# Patient Record
Sex: FEMALE | Race: WHITE | NOT HISPANIC OR LATINO | Employment: FULL TIME | URBAN - METROPOLITAN AREA
[De-identification: names, ages, dates, MRNs, and addresses within clinical notes are randomized per-mention and may not be internally consistent; named-entity substitution may affect disease eponyms.]

---

## 2017-02-22 ENCOUNTER — TRANSFERRED RECORDS (OUTPATIENT)
Dept: HEALTH INFORMATION MANAGEMENT | Facility: CLINIC | Age: 67
End: 2017-02-22

## 2019-05-30 ENCOUNTER — TRANSFERRED RECORDS (OUTPATIENT)
Dept: HEALTH INFORMATION MANAGEMENT | Facility: CLINIC | Age: 69
End: 2019-05-30

## 2019-06-04 ENCOUNTER — TRANSFERRED RECORDS (OUTPATIENT)
Dept: HEALTH INFORMATION MANAGEMENT | Facility: CLINIC | Age: 69
End: 2019-06-04

## 2019-06-06 ENCOUNTER — TRANSFERRED RECORDS (OUTPATIENT)
Dept: HEALTH INFORMATION MANAGEMENT | Facility: CLINIC | Age: 69
End: 2019-06-06

## 2019-06-20 NOTE — TELEPHONE ENCOUNTER
ONCOLOGY INTAKE: Records Information      APPT INFORMATION:  Referring provider:  Shanice Hines  Referring provider s clinic:  [facility]  Reason for visit/diagnosis:  Melanoma of Vulva  Has patient been notified of appointment date and time?: Per Gaby @ [facility]    RECORDS INFORMATION:  Were the records received with the referral (via [employer])? No    Has patient been seen for any external appt for this diagnosis? Per Gaby, all records, imaging and Bx @ [facility]    ADDITIONAL INFORMATION:  NA

## 2019-06-21 NOTE — TELEPHONE ENCOUNTER
RECORDS STATUS - ALL OTHER DIAGNOSIS      RECORDS RECEIVED FROM: [facility]   DATE RECEIVED: 06/21/2019   NOTES STATUS DETAILS   OFFICE NOTE from referring provider YES CE   OFFICE NOTE from medical oncologist NA    DISCHARGE SUMMARY from hospital NA    DISCHARGE REPORT from the ER NA    OPERATIVE REPORT NA    MEDICATION LIST YES    CLINICAL TRIAL TREATMENTS TO DATE NA    LABS YES    PATHOLOGY REPORTS PENDING REQUESTED FROM [facility]   ANYTHING RELATED TO DIAGNOSIS NA    GENONOMIC TESTING NA    TYPE:     IMAGING (NEED IMAGES & REPORT) NA    CT SCANS     MRI     MAMMO     ULTRASOUND     PET       Action Khloe   Action Taken Request sent to [facility] for pathology..cdk

## 2019-06-25 ASSESSMENT — ENCOUNTER SYMPTOMS
JOINT SWELLING: 0
MYALGIAS: 0
NECK PAIN: 0
MUSCLE CRAMPS: 0
ARTHRALGIAS: 1
STIFFNESS: 1
BACK PAIN: 0
MUSCLE WEAKNESS: 0

## 2019-06-26 ENCOUNTER — PRE VISIT (OUTPATIENT)
Dept: ONCOLOGY | Facility: CLINIC | Age: 69
End: 2019-06-26

## 2019-06-26 ENCOUNTER — ONCOLOGY VISIT (OUTPATIENT)
Dept: ONCOLOGY | Facility: CLINIC | Age: 69
End: 2019-06-26
Attending: OBSTETRICS & GYNECOLOGY
Payer: COMMERCIAL

## 2019-06-26 VITALS
HEART RATE: 67 BPM | SYSTOLIC BLOOD PRESSURE: 151 MMHG | RESPIRATION RATE: 16 BRPM | BODY MASS INDEX: 41.25 KG/M2 | WEIGHT: 247.58 LBS | DIASTOLIC BLOOD PRESSURE: 82 MMHG | OXYGEN SATURATION: 95 % | HEIGHT: 65 IN | TEMPERATURE: 98.8 F

## 2019-06-26 DIAGNOSIS — C51.9 MELANOMA OF VULVA (H): Primary | ICD-10-CM

## 2019-06-26 PROCEDURE — G0463 HOSPITAL OUTPT CLINIC VISIT: HCPCS | Mod: ZF

## 2019-06-26 PROCEDURE — 99205 OFFICE O/P NEW HI 60 MIN: CPT | Mod: ZP | Performed by: OBSTETRICS & GYNECOLOGY

## 2019-06-26 RX ORDER — CETIRIZINE HYDROCHLORIDE, PSEUDOEPHEDRINE HYDROCHLORIDE 5; 120 MG/1; MG/1
1 TABLET, FILM COATED, EXTENDED RELEASE ORAL EVERY MORNING
COMMUNITY

## 2019-06-26 RX ORDER — LEVOTHYROXINE SODIUM 175 UG/1
175 TABLET ORAL EVERY MORNING
COMMUNITY
Start: 2019-05-03 | End: 2021-05-09

## 2019-06-26 RX ORDER — NAPROXEN 500 MG/1
500 TABLET ORAL EVERY MORNING
COMMUNITY
Start: 2018-12-17

## 2019-06-26 RX ORDER — ACETAMINOPHEN 500 MG
1000 TABLET ORAL PRN
Status: ON HOLD | COMMUNITY
End: 2019-07-31

## 2019-06-26 RX ORDER — SOTALOL HYDROCHLORIDE 160 MG/1
80 TABLET ORAL 2 TIMES DAILY
COMMUNITY
Start: 2019-05-19

## 2019-06-26 RX ORDER — METFORMIN HCL 500 MG
500 TABLET, EXTENDED RELEASE 24 HR ORAL EVERY MORNING
COMMUNITY
Start: 2019-05-19

## 2019-06-26 RX ORDER — BLOOD-GLUCOSE METER
EACH MISCELLANEOUS SEE ADMIN INSTRUCTIONS
Refills: 0 | COMMUNITY
Start: 2019-05-29 | End: 2023-08-02

## 2019-06-26 RX ORDER — LISINOPRIL 5 MG/1
5 TABLET ORAL EVERY MORNING
COMMUNITY
Start: 2019-05-18 | End: 2021-05-09

## 2019-06-26 RX ORDER — WARFARIN SODIUM 5 MG/1
TABLET ORAL
COMMUNITY
Start: 2019-05-19 | End: 2021-05-09

## 2019-06-26 RX ORDER — SIMVASTATIN 40 MG
40 TABLET ORAL AT BEDTIME
COMMUNITY
Start: 2019-01-15 | End: 2021-05-09

## 2019-06-26 RX ORDER — PANTOPRAZOLE SODIUM 40 MG/1
40 TABLET, DELAYED RELEASE ORAL EVERY MORNING
COMMUNITY
Start: 2019-05-18

## 2019-06-26 ASSESSMENT — PAIN SCALES - GENERAL: PAINLEVEL: NO PAIN (0)

## 2019-06-26 ASSESSMENT — MIFFLIN-ST. JEOR: SCORE: 1659.49

## 2019-06-26 NOTE — PROGRESS NOTES
Consult Notes on Referred Patient    Date: 2019       Dr. Ligia Lagunas  [facility]  [address] [place] [address]       RE: Ilana Trent  : 1950  FILOMENA: 2019    Dear Dr. Ligia Lagunas:    I had the pleasure of seeing your patient Ilana Trent here at the Gynecologic Cancer Clinic at the [facility] on 2019.  As you know she is a very pleasant 69 year old woman with a recent diagnosis of vulvar melanoma.  Given these findings she was subsequently sent to the Gynecologic Cancer Clinic for new patient consultation.   , 2 prior vaginal deliveries, went through menopause at age 51, never been on hormone replacement therapy, never had any postmenopausal bleeding.  Is eating and drinking well.  No nausea or vomiting.  She has normal urinary and bowel function.  She had breast cancer at age 51, pancreatic cancer at age 61.  Recently diagnosed with a melanoma of vulva.           Past Medical History:  Breast cancer at age 51, pancreatic cancer at age 61.  Currently, both with no evidence of disease.           Past Surgical History:  1.  Mastectomy.   2.  Partial pancreatectomy.           Health Maintenance:  Health Maintenance Due   Topic Date Due     DEXA  1950     HEPATITIS C SCREENING  1950     ADVANCE CARE PLANNING  1950     COLONOSCOPY  1961     DTAP/TDAP/TD IMMUNIZATION (1 - Tdap) 1976     LIPID  1996     MEDICARE ANNUAL WELLNESS VISIT  2016     FALL RISK ASSESSMENT  2016     PNEUMOCOCCAL IMMUNIZATION 65+ LOW/MEDIUM RISK (1 of 2 - PCV13) 2016     PHQ-2  2019     ZOSTER IMMUNIZATION (3 of 3) 2019     No history of abnormal Pap smear.  Last colonoscopy 4 years ago.             Current Medications:     has a current medication list which includes the following prescription(s): levothyroxine, lisinopril, naproxen, pantoprazole, simvastatin, acetaminophen,  "cetirizine-pseudoephedrine er, contour next ez, metformin, sotalol, vitamin b complex with vitamin c, and warfarin.       Allergies:     [unfilled]        Social History:     Social History     Tobacco Use     Smoking status: Former Smoker     Smokeless tobacco: Never Used   Substance Use Topics     Alcohol use: Not on file       History   Drug Use Not on file           Family History:   Colon cancer in patient's mother. Pancreatic acncer in brother at age 52.  Father had prostate cancer at age 79.           Physical Exam:     /82   Pulse 67   Temp 98.8  F (37.1  C) (Oral)   Resp 16   Ht 1.66 m (5' 5.35\")   Wt 112.3 kg (247 lb 9.2 oz)   SpO2 95%   BMI 40.75 kg/m    Body mass index is 40.75 kg/m .    General Appearance: healthy and alert, no distress     Musculoskeletal: extremities non tender and without edema    Neurological: normal gait, no gross defects     Psychiatric: appropriate mood and affect                               ABDOMEN:  Soft, nontender, nondistended, no organomegaly.  Well-healed midline incision. No enlarged inguinal lymph nodes.   EXTERNAL GENITALIA:  Status post wide local excision of right labia minora.  Well-healed incision site.  Some sutures in place.   PELVIC:  Normal vaginal mucosa.  Normal-appearing cervix.  No adnexal masses or tenderness.  Rectovaginal confirms.           Assessment:    Ilana Trent is a 69 year old woman with a new diagnosis of vulvar melanoma.     A total of 60 minutes was spent with the patient, 40 minutes of which were spent in counseling the patient and/or treatment planning.      1.  Vulva melanoma.   2.  Personal history of breast cancer.   3.  Personal history of pancreatic cancer.      I discussed with the patient we will obtain a PET scan to evaluate for any distant disease.  If that is negative, we will proceed with a radical vulvectomy with a possible sentinel or lymphadenectomy or lymph node sampling.  Will also have her see a genetic " counselor given her significant family history as well as personal history of breast cancer for testing for BRCA2 mutation.  The patient agrees with the plan, is very appreciative of her care.  All questions were answered.  We will see her back after her PET scan.             Thank you for allowing us to participate in the care of your patient.         Sincerely,    Bacilio Celis MD, MS    Department of Obstetrics and Gynecology   Division of Gynecologic Oncology   [facility]  Phone: [phone]    CC  Patient Care Team:  Shanice Hines MD as PCP - General (Family Practice)  CHEN TREVIZO    Answers for HPI/ROS submitted by the patient on 6/25/2019   General Symptoms: No  Skin Symptoms: No  HENT Symptoms: No  EYE SYMPTOMS: No  HEART SYMPTOMS: No  LUNG SYMPTOMS: No  INTESTINAL SYMPTOMS: No  URINARY SYMPTOMS: No  GYNECOLOGIC SYMPTOMS: No  BREAST SYMPTOMS: No  SKELETAL SYMPTOMS: Yes  BLOOD SYMPTOMS: No  NERVOUS SYSTEM SYMPTOMS: No  MENTAL HEALTH SYMPTOMS: No  Back pain: No  Muscle aches: No  Neck pain: No  Swollen joints: No  Joint pain: Yes  Bone pain: No  Muscle cramps: No  Muscle weakness: No  Joint stiffness: Yes  Bone fracture: No

## 2019-06-26 NOTE — LETTER
2019       RE: Ilana Trent  [address] [facility] [address]     Dear Colleague,    Thank you for referring your patient, Ilana Trent, to the [facility]. Please see a copy of my visit note below.                            Consult Notes on Referred Patient    Date: 2019       Dr. Ligia Lagunas  [facility]  [address] [place] [address]       RE: Ilana Trent  : 1950  FILOMENA: 2019    Dear Dr. Ligia Lagunas:    I had the pleasure of seeing your patient Ilana Trent here at the Gynecologic Cancer Clinic at the [facility] on 2019.  As you know she is a very pleasant 69 year old woman with a recent diagnosis of vulvar melanoma.  Given these findings she was subsequently sent to the Gynecologic Cancer Clinic for new patient consultation.   , 2 prior vaginal deliveries, went through menopause at age 51, never been on hormone replacement therapy, never had any postmenopausal bleeding.  Is eating and drinking well.  No nausea or vomiting.  She has normal urinary and bowel function.  She had breast cancer at age 51, pancreatic cancer at age 61.  Recently diagnosed with a melanoma of vulva.           Past Medical History:  Breast cancer at age 51, pancreatic cancer at age 61.  Currently, both with no evidence of disease.           Past Surgical History:  1.  Mastectomy.   2.  Partial pancreatectomy.           Health Maintenance:  Health Maintenance Due   Topic Date Due     DEXA  1950     HEPATITIS C SCREENING  1950     ADVANCE CARE PLANNING  1950     COLONOSCOPY  1961     DTAP/TDAP/TD IMMUNIZATION (1 - Tdap) 1976     LIPID  1996     MEDICARE ANNUAL WELLNESS VISIT  2016     FALL RISK ASSESSMENT  2016     PNEUMOCOCCAL IMMUNIZATION 65+ LOW/MEDIUM RISK (1 of 2 - PCV13) 2016     PHQ-2  2019     ZOSTER IMMUNIZATION (3 of 3) 2019     No history of abnormal Pap smear.  Last  "colonoscopy 4 years ago.             Current Medications:     has a current medication list which includes the following prescription(s): levothyroxine, lisinopril, naproxen, pantoprazole, simvastatin, acetaminophen, cetirizine-pseudoephedrine er, contour next ez, metformin, sotalol, vitamin b complex with vitamin c, and warfarin.       Allergies:     [unfilled]        Social History:     Social History     Tobacco Use     Smoking status: Former Smoker     Smokeless tobacco: Never Used   Substance Use Topics     Alcohol use: Not on file       History   Drug Use Not on file           Family History:   Colon cancer in patient's mother. Pancreatic acncer in brother at age 52.  Father had prostate cancer at age 79.           Physical Exam:     /82   Pulse 67   Temp 98.8  F (37.1  C) (Oral)   Resp 16   Ht 1.66 m (5' 5.35\")   Wt 112.3 kg (247 lb 9.2 oz)   SpO2 95%   BMI 40.75 kg/m     Body mass index is 40.75 kg/m .    General Appearance: healthy and alert, no distress     Musculoskeletal: extremities non tender and without edema    Neurological: normal gait, no gross defects     Psychiatric: appropriate mood and affect                               ABDOMEN:  Soft, nontender, nondistended, no organomegaly.  Well-healed midline incision.  Enlarged inguinal lymph nodes.   EXTERNAL GENITALIA:  Status post wide local excision of right labia minora.  Well-healed incision site.  Some sutures in place.   PELVIC:  Normal vaginal mucosa.  Normal-appearing cervix.  No adnexal masses or tenderness.  Rectovaginal confirms.           Assessment:    Ilana Trent is a 69 year old woman with a new diagnosis of vulvar melanoma.     A total of 60 minutes was spent with the patient, 40 minutes of which were spent in counseling the patient and/or treatment planning.      1.  Vulva melanoma.   2.  Personal history of breast cancer.   3.  Personal history of pancreatic cancer.      I discussed with the patient we will obtain a " PET scan to evaluate for any distant disease.  If that is negative, we will proceed with a radical vulvectomy with a possible sentinel or lymphadenectomy or lymph node sampling.  Will also have her see a genetic counselor given her significant family history as well as personal history of breast cancer for testing for BRCA2 mutation.  The patient agrees with the plan, is very appreciative of her care.  All questions were answered.  We will see her back after her PET scan.             Thank you for allowing us to participate in the care of your patient.         Sincerely,    Bacilio Celis MD, MS    Department of Obstetrics and Gynecology   Division of Gynecologic Oncology   [facility]  Phone: [phone]    CC  Patient Care Team:  Shanice Hines MD as PCP - General (Family Practice)  CHEN TREVIZO    Answers for HPI/ROS submitted by the patient on 6/25/2019   General Symptoms: No  Skin Symptoms: No  HENT Symptoms: No  EYE SYMPTOMS: No  HEART SYMPTOMS: No  LUNG SYMPTOMS: No  INTESTINAL SYMPTOMS: No  URINARY SYMPTOMS: No  GYNECOLOGIC SYMPTOMS: No  BREAST SYMPTOMS: No  SKELETAL SYMPTOMS: Yes  BLOOD SYMPTOMS: No  NERVOUS SYSTEM SYMPTOMS: No  MENTAL HEALTH SYMPTOMS: No  Back pain: No  Muscle aches: No  Neck pain: No  Swollen joints: No  Joint pain: Yes  Bone pain: No  Muscle cramps: No  Muscle weakness: No  Joint stiffness: Yes  Bone fracture: No      Again, thank you for allowing me to participate in the care of your patient.      Sincerely,    Bacilio Celis MD

## 2019-06-26 NOTE — NURSING NOTE
"Oncology Rooming Note    June 26, 2019 5:07 PM   Ilana Trent is a 69 year old female who presents for:    Chief Complaint   Patient presents with     Oncology Clinic Visit     New for Melanoma of Vuva      Initial Vitals: /82   Pulse 67   Temp 98.8  F (37.1  C) (Oral)   Resp 16   Ht 1.66 m (5' 5.35\")   Wt 112.3 kg (247 lb 9.2 oz)   SpO2 95%   BMI 40.75 kg/m   Estimated body mass index is 40.75 kg/m  as calculated from the following:    Height as of this encounter: 1.66 m (5' 5.35\").    Weight as of this encounter: 112.3 kg (247 lb 9.2 oz). Body surface area is 2.28 meters squared.  No Pain (0) Comment: Data Unavailable   No LMP recorded.  Allergies reviewed: Yes  Medications reviewed: Yes    Medications: Medication refills not needed today.  Pharmacy name entered into EPIC: Data Unavailable    Clinical concerns: results  [facility] was notified       Maida Pierre MA              "

## 2019-06-28 ENCOUNTER — ANCILLARY PROCEDURE (OUTPATIENT)
Dept: PET IMAGING | Facility: CLINIC | Age: 69
End: 2019-06-28
Attending: OBSTETRICS & GYNECOLOGY

## 2019-06-28 DIAGNOSIS — C51.9 MELANOMA OF VULVA (H): ICD-10-CM

## 2019-06-28 LAB
CREAT BLD-MCNC: 0.8 MG/DL (ref 0.5–1.2)
GFR SERPL CREATININE-BSD FRML MDRD: 76 ML/MIN/{1.73_M2}
GFRB: NORMAL
GLUCOSE SERPL-MCNC: 101 MG/DL (ref 70–99)

## 2019-06-28 RX ORDER — FUROSEMIDE 10 MG/ML
40 INJECTION INTRAMUSCULAR; INTRAVENOUS ONCE
Status: COMPLETED | OUTPATIENT
Start: 2019-06-28 | End: 2019-06-28

## 2019-06-28 RX ADMIN — FUROSEMIDE 40 MG: 10 INJECTION INTRAMUSCULAR; INTRAVENOUS at 10:30

## 2019-06-28 NOTE — DISCHARGE INSTRUCTIONS

## 2019-07-01 DIAGNOSIS — C51.9 MELANOMA OF VULVA (H): Primary | ICD-10-CM

## 2019-07-03 ENCOUNTER — OFFICE VISIT (OUTPATIENT)
Dept: SURGERY | Facility: CLINIC | Age: 69
End: 2019-07-03

## 2019-07-03 ENCOUNTER — ONCOLOGY VISIT (OUTPATIENT)
Dept: ONCOLOGY | Facility: CLINIC | Age: 69
End: 2019-07-03
Attending: OBSTETRICS & GYNECOLOGY
Payer: COMMERCIAL

## 2019-07-03 ENCOUNTER — ANESTHESIA EVENT (OUTPATIENT)
Dept: SURGERY | Facility: CLINIC | Age: 69
End: 2019-07-03

## 2019-07-03 ENCOUNTER — ANCILLARY PROCEDURE (OUTPATIENT)
Dept: CARDIOLOGY | Facility: CLINIC | Age: 69
End: 2019-07-03
Attending: OBSTETRICS & GYNECOLOGY

## 2019-07-03 VITALS
TEMPERATURE: 98.1 F | OXYGEN SATURATION: 97 % | RESPIRATION RATE: 16 BRPM | BODY MASS INDEX: 37.33 KG/M2 | SYSTOLIC BLOOD PRESSURE: 129 MMHG | DIASTOLIC BLOOD PRESSURE: 73 MMHG | HEIGHT: 68 IN | HEART RATE: 71 BPM | WEIGHT: 246.3 LBS

## 2019-07-03 VITALS
RESPIRATION RATE: 14 BRPM | BODY MASS INDEX: 40.98 KG/M2 | HEART RATE: 64 BPM | TEMPERATURE: 98.2 F | SYSTOLIC BLOOD PRESSURE: 161 MMHG | OXYGEN SATURATION: 98 % | WEIGHT: 246 LBS | DIASTOLIC BLOOD PRESSURE: 82 MMHG | HEIGHT: 65 IN

## 2019-07-03 DIAGNOSIS — I49.5 SICK SINUS SYNDROME (H): ICD-10-CM

## 2019-07-03 DIAGNOSIS — Z01.818 PREOP EXAMINATION: Primary | ICD-10-CM

## 2019-07-03 DIAGNOSIS — C51.9 MELANOMA OF VULVA (H): ICD-10-CM

## 2019-07-03 DIAGNOSIS — C51.9 MELANOMA OF VULVA (H): Primary | ICD-10-CM

## 2019-07-03 PROCEDURE — G0463 HOSPITAL OUTPT CLINIC VISIT: HCPCS | Mod: ZF

## 2019-07-03 PROCEDURE — 99214 OFFICE O/P EST MOD 30 MIN: CPT | Performed by: OBSTETRICS & GYNECOLOGY

## 2019-07-03 RX ORDER — CEFAZOLIN SODIUM 2 G/50ML
2 SOLUTION INTRAVENOUS
Status: CANCELLED | OUTPATIENT
Start: 2019-07-03

## 2019-07-03 RX ORDER — FUROSEMIDE 20 MG
20 TABLET ORAL DAILY PRN
COMMUNITY

## 2019-07-03 RX ORDER — CEFAZOLIN SODIUM 1 G/50ML
1 INJECTION, SOLUTION INTRAVENOUS SEE ADMIN INSTRUCTIONS
Status: CANCELLED | OUTPATIENT
Start: 2019-07-03

## 2019-07-03 RX ORDER — MULTIPLE VITAMINS W/ MINERALS TAB 9MG-400MCG
1 TAB ORAL DAILY
COMMUNITY

## 2019-07-03 RX ORDER — PHENAZOPYRIDINE HYDROCHLORIDE 200 MG/1
200 TABLET, FILM COATED ORAL ONCE
Status: CANCELLED | OUTPATIENT
Start: 2019-07-03 | End: 2019-07-03

## 2019-07-03 ASSESSMENT — PAIN SCALES - GENERAL: PAINLEVEL: NO PAIN (0)

## 2019-07-03 ASSESSMENT — ENCOUNTER SYMPTOMS: DYSRHYTHMIAS: 1

## 2019-07-03 ASSESSMENT — MIFFLIN-ST. JEOR
SCORE: 1652.34
SCORE: 1695.71

## 2019-07-03 ASSESSMENT — LIFESTYLE VARIABLES: TOBACCO_USE: 1

## 2019-07-03 NOTE — H&P
Pre-Operative H & P     CC:  Preoperative exam to assess for increased cardiopulmonary risk while undergoing surgery and anesthesia.    Date of Encounter: 7/3/2019  Primary Care Physician:  Shanice Hines  Reason for visit: melanoma of vulva    HPI  Ilana Trent is a 69 year old female who presents for pre-operative H & P in preparation for Exam under anesthesia, Radical Vulvectomy, possible bilateral Inguinal and Femoral Lymph Node dissection, bilateral sentinel inguinal lymphnode sampling with Dr. Celis on date TBD at [facility]. History is obtained from the patient and .    Patient was recently diagnosed with vulvar melanoma after presenting locally with a vulvar mass which was biopsied on 6/4/19. She was referred to Dr. Celis. PET scan showed no distant disease and she was counseled for above procedure.     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 She is being referred for genetic counseling by Dr. Celis.     Her history is otherwise signficant for obesity, HLD, HTN, paroxysmal atrial fibrillation, first noted in 2004, but s/p ablation in 2012, SSS s/p pacemaker in 2007,  RHONDA, GERD, diabetes, hypothyroidism, and arthritis.    Past Medical History  Past Medical History:   Diagnosis Date     Arthritis      Back pain      Cataract      Diabetes (H)      GERD (gastroesophageal reflux disease)      HLD (hyperlipidemia)      Hypertension      Hypothyroidism      Long term current use of anticoagulant therapy      Malignant neoplasm of breast (female) (H) 2001    s/p lumpectomy, LN removal, radiation     Melanoma of vulva (H)      RHONDA (obstructive sleep apnea)      Osteopenia      PAF (paroxysmal atrial fibrillation) (H) 2012     s/p ablation     Papillary carcinoma (H)     incidentally found     Primary malignant neuroendocrine tumor of pancreas (H) 2011     Toxic multinodular goiter        Past Surgical History  Past Surgical History:   Procedure Laterality Date     BACK SURGERY  1987    L4-5     BREAST LUMPECTOMY, RT/LT  2001     BUNIONECTOMY Bilateral 2006     Excision right vulvar mass  06/04/2019     FOOT SURGERY Bilateral 2006     Intracardiac ablation  2012     Knee arthroscopy surgery Bilateral 2006     Pacemaker implantation  2007     PANCREAS SURGERY  2011    pancreaticoduodenectomy     THYROIDECTOMY          Hx of Blood transfusions/reactions: denies    Hx of abnormal bleeding or anti-platelet use: Anticoagulated on warfarin.    Menstrual history: Postmenopausal    Steroid use in the last year: denies    Personal or FH with difficulty with Anesthesia:  Denies.    Prior to Admission Medications  Current Outpatient Medications   Medication Sig Dispense Refill     acetaminophen (TYLENOL) 500 MG tablet Take 1,000 mg by mouth as needed        cetirizine-pseudoePHEDrine ER (ZYRTEC-D) 5-120 MG 12 hr tablet Take 1 tablet by mouth every morning        levothyroxine (SYNTHROID/LEVOTHROID) 175 MCG tablet Take 175 mcg by mouth every morning        lisinopril (PRINIVIL/ZESTRIL) 5 MG tablet Take 5 mg by mouth every morning        metFORMIN (GLUCOPHAGE-XR) 500 MG 24 hr tablet Take 500 mg by mouth every morning        naproxen (NAPROSYN) 500 MG tablet Take 500 mg by mouth every morning        pantoprazole (PROTONIX) 40 MG EC tablet Take 40 mg by mouth every morning        simvastatin (ZOCOR) 40 MG tablet Take 40 mg by mouth At Bedtime        sotalol (BETAPACE) 160 MG tablet Take 80 mg by mouth 2 times daily        vitamin B complex with vitamin C (STRESS TAB) tablet Take 1 tablet by mouth every morning        warfarin (COUMADIN) 5 MG tablet Take 7.5mg 5 days week ,\ 5 mg Tues, Sat  Takes in the evening       ALOE PO Take by mouth daily as needed        CONTOUR NEXT EZ (CONTOUR NEXT EZ W/DEVICE KIT) w/Device KIT See Admin Instructions  0     furosemide (LASIX) 20 MG tablet Take 20 mg by mouth daily as needed (SWELLING)       multivitamin w/minerals (THERA-VIT-M) tablet Take 1 tablet by mouth daily         Allergies  No Known Allergies    Social History  Social History     Socioeconomic History     Marital status:      Spouse name: Not on file     Number of children: Not on file     Years of education: Not on file     Highest education level: Not on file   Occupational History     Not on file   Social Needs     Financial resource strain: Not on file     Food insecurity:     Worry: Not on file     Inability: Not on file     Transportation needs:     Medical: Not on file     Non-medical: Not on file   Tobacco Use     Smoking status: Former Smoker     Years: 5.00     Types: Cigarettes     Last attempt to quit: 1973     Years since quittin.6     Smokeless tobacco: Never Used     Tobacco comment: Quit age 23   Substance and Sexual Activity     Alcohol use: Yes     Alcohol/week: 0.6 oz     Types: 1 Cans of beer per week     Drug use: Not on file     Sexual activity: Not on file   Lifestyle     Physical activity:     Days per week: Not on file     Minutes per session: Not on file     Stress: Not on file   Relationships     Social connections:     Talks on phone: Not on file     Gets together: Not on file     Attends [employer] service: Not on file     Active member of club or organization: Not on file     Attends meetings of clubs or organizations: Not on file     Relationship status: Not on file     Intimate partner violence:     Fear of current or ex partner: Not on file     Emotionally abused: Not on file     Physically abused: Not on file     Forced sexual activity: Not on file   Other Topics Concern     Not on file   Social History Narrative     Not on file       Family History  Family History   Problem Relation Age of Onset     Colon Cancer  Mother      Arthritis Father      Prostate Cancer Father      Hypertension Father      Obesity Father      Myocardial Infarction Father      Diabetes Sister      Thyroid Disease Sister      Cancer Brother      Hypertension Brother      Diabetes Maternal Grandmother      Heart Disease Maternal Grandfather        Review of Systems  ROS/MED HX    The complete review of systems is negative other than noted in the HPI or here.   ENT/Pulmonary:     (+)sleep apnea, tobacco use, Past use uses CPAP , . .    Neurologic:  - neg neurologic ROS     Cardiovascular: Comment: PAF s/p ablation 2012     (+) Dyslipidemia, hypertension----. Taking blood thinners Pt has received instructions: . . . pacemaker Reason placed: SSS:. dysrhythmias a-fib, . Previous cardiac testing Echodate:2015results:date: results:ECG reviewed date:6/10/19 results:Atrial paced rhythmCath date: 2007 results:         (-) CAD   METS/Exercise Tolerance:  >4 Swims and bikes regularly   Hematologic:  - neg hematologic  ROS       Musculoskeletal:   (+) arthritis     GI/Hepatic:     (+) GERD Asymptomatic on medication,       Renal/Genitourinary:  - ROS Renal section negative       Endo:     (+) type II DM Last HgA1c: 6.2 date: 1/11/9 Not using insulin - not using insulin pump thyroid problem hypothyroidism, Obesity, .      Psychiatric:  - neg psychiatric ROS       Infectious Disease:  - neg infectious disease ROS       Malignancy:   (+) Malignancy History of Breast and Other  Breast CA Remission status post Surgery and Radiation. Other CA Neuroendocrine tumor of pancreas Remission status post Surgery Melanoma of vulva        Other:    (+) No chance of pregnancy C-spine cleared: N/A, no H/O Chronic Pain,no other significant disability        Preop Vitals  BP Readings from Last 3 Encounters:   07/03/19 161/82   06/26/19 151/82    Pulse Readings from Last 3 Encounters:   07/03/19 64   06/26/19 67      Resp Readings from Last 3 Encounters:   07/03/19 14   06/26/19 16     "SpO2 Readings from Last 3 Encounters:   07/03/19 98%   06/26/19 95%      Temp Readings from Last 1 Encounters:   07/03/19 98.2  F (36.8  C) (Oral)    Ht Readings from Last 1 Encounters:   07/03/19 1.66 m (5' 5.35\")      Wt Readings from Last 1 Encounters:   07/03/19 111.6 kg (246 lb)    Estimated body mass index is 40.49 kg/m  as calculated from the following:    Height as of an earlier encounter on 7/3/19: 1.66 m (5' 5.35\").    Weight as of an earlier encounter on 7/3/19: 111.6 kg (246 lb).     Temp: 98.1  F (36.7  C) Temp src: Oral BP: 129/73 Pulse: 71   Resp: 16 SpO2: 97 %         246 lbs 4.8 oz  5' 8\"   Body mass index is 37.45 kg/m .       Physical Exam  Constitutional: Awake, alert, cooperative, no apparent distress, and appears stated age. Accompanied by .  Eyes: Pupils equal, round and reactive to light, extra ocular muscles intact, sclera clear, conjunctiva normal. Glasses on.  HENT: Normocephalic, oral pharynx with moist mucus membranes, good dentition.   Respiratory: Clear to auscultation bilaterally, no crackles or wheezing. No cough or obvious dyspnea.  Cardiovascular: Regular rate and rhythm, normal S1 and S2, and no murmur noted.  Carotids +2, no bruits. No edema. Palpable pulses to radial arteries.   GI: Normal bowel sounds, soft, non-distended, non-tender, no masses palpated. Surgical scars: midline surgical scar well healed.  Lymph/Hematologic: No cervical lymphadenopathy and no supraclavicular lymphadenopathy.  Genitourinary:  deferred  Skin: Warm and dry.  Surgical scar at pacemaker site well healed  Musculoskeletal: limited ROM of neck. There is no redness, warmth, or swelling of the visable joints. Gross motor strength is normal.    Neurologic: Awake, alert, oriented to name, place and time. Cranial nerves II-XII are grossly intact. Gait is normal.   Neuropsychiatric: Calm, cooperative. Normal affect.     Labs: (personally reviewed) OSH   5/30/19  WBC 8.0  Hgb 13.0  hematocrit " 38.8  Platelets 239  Na 140  K 4.1  Cl 102  Glu 155  Cr 0.90  GFR >60  1/11/19 A1c 6.2  4/30/19 TSH 0.40  EKG: Personally reviewed 6/10/19 atrial paced rhythm  Cardiac echo: 2015   CONCLUSION:  1. The left ventricle is normal size with normal systolic function. Ejection fraction is 55-60%.    2. The right ventricle is normal size with normal systolic function.     3. No significant valvular abnormalities noted.    4. No pericardial effusion.    5. When compared to previous echocardiogram done on 9/24/2012, no significant abnormalities noted.    2007 Cardiac cath  CORONARY ANGIOGRAPHY   1)    LEFT MAIN:    No significant coronary angiographic   disease.     2)    LAD:    Proximal 30% lesion noted.    Transapical LAD   without any         significant coronary angiographic disease.     3)    CIRCUMFLEX:    The left circumflex is a large, dominant   vessel         without any significant coronary angiographic disease.     4)    RCA:    Nondominant vessel, but moderate caliber with no   significant   coronary angiographic disease.     CONCLUSION      1) No significant coronary angiography disease other than a   30% proximal         LAD.      2) Left circumflex dominant vessel without any significant   coronary         angiographic disease.     PLAN:   Medical management.   Likely false positive testing in   setting of   pacemaker therapy.  2007 Carotid US  CONCLUSIONS:      1. 1-15% stenosis of the right internal carotid artery.      2. 1-15% stenosis of the left internal carotid artery.      3. Antegrade flow noted in both the right and left vertebral   arteries.      4. No prior studies for comparison.  PET Oncology 6/28/19                                                                   IMPRESSION:   In this patient with biopsy-proven melanoma of the vulva:  1. No evidence of metastatic disease in the body.  2. Postsurgical change to the pancreas.    Pacer interrogation 7/3/19  [employer]tronic pacemaker  Atrial fib burden  1%  Underlying sinus bradycardia 30 bpm or less  Ventricular paced 0.2%  Atrial paced 99.3 %  Settings [id number]   Dependent  Placed for SSS    Imaging, pacer interrogation and cardiac testing reviewed by this provider    Outside records reviewed from: Care Everywhere    ASSESSMENT and PLAN  Ilana Trent is a 69 year old female scheduled to undergo Exam under anesthesia, Radical Vulvectomy, possible bilateral Inguinal and Femoral Lymph Node dissection, bilateral sentinel inguinal lymphnode sampling with Dr. Celis on date TBD. She has the following specific operative considerations:   - RCRI : No serious cardiac risks.   - Anesthesia considerations:  Refer to PAC assessment in anesthesia records  - VTE risk: 3%  - If afib, [id number] score 4.  Risk category High.    - Risk of PONV score = 3.  If > 2, anti-emetic intervention recommended. If 3 or > anti emetic intervention recommended with two or more meds    --Melanoma of vulva with above procedure now planned.   --Multiple other cancers as above, treated and with no current evidence of disease. Referred for genetic counseling.   --Airway concern with challenging airway exam (MP IV, limited neck extension). Denies history of problems with intubation. Final assessment and decisions by Anesthesia on DOS.   --HLD. simvastatin. HTN. Will hold lisinopril on DOS. Paroxysmal atrial fibrillation as above with ablation. Pacer interrogation above, dependent. Will take sotolol on DOS. Chronically anticoagulated with warfarin with plan to hold for 5 days as she has done before without bridging. Patient has an appt for INR next week and will also check in with her INR clinic regarding her plan. All testing above. Good exercise tolerance.   --Former remote smoker. Denies pulmonary symptoms. RHONDA with CPAP and will bring on DOS.  --GERD Will take Protonix on DOS.  --Hypothyroidism. Will take Synthroid on DOS.  --DIABETES MELLITUS II. Last A1C 6.2. Will hold metformin  on DOS.   Type and screen drawn today.     Arrival time, NPO, shower and medication instructions provided by nursing staff today. Preparing For Your Surgery handout given.      Patient was discussed with Dr Peters.    DONTAE Gomez CNS  Preoperative Assessment Center  [facility]  Phone: [phone]  Fax: [phone]

## 2019-07-03 NOTE — LETTER
7/3/2019       RE: Ilana Trent  [id number] [facility] [address]     Dear Colleague,    Thank you for referring your patient, Ilana Trent, to the [facility]. Please see a copy of my visit note below.                Follow Up Notes on Referred Patient    Date: 7/3/2019       Dr. Chauncey Painting MD  No address on file       RE: Ilana Trent  : 1950  FILOMENA: 7/3/2019    Dear Dr. Chauncey Painting:    Ilana Trent is a 69 year old woman with a diagnosis of Vulva melanoma.     Patient is here for followup.  She has had no symptoms since the last time I have seen her.             Past Medical History:    Past Medical History:   Diagnosis Date     Arthritis      Back pain      Cataract      Diabetes (H)      GERD (gastroesophageal reflux disease)      HLD (hyperlipidemia)      Hypertension      Hypothyroidism      Long term current use of anticoagulant therapy      Malignant neoplasm of breast (female) (H)     s/p lumpectomy, LN removal, radiation     Melanoma of vulva (H)      RHONDA (obstructive sleep apnea)      Osteopenia      PAF (paroxysmal atrial fibrillation) (H)     s/p ablation     Papillary carcinoma (H)     incidentally found     Primary malignant neuroendocrine tumor of pancreas (H)      Toxic multinodular goiter          Past Surgical History:    Past Surgical History:   Procedure Laterality Date     BACK SURGERY      L4-5     BREAST LUMPECTOMY, RT/LT       BUNIONECTOMY Bilateral 2006     Excision right vulvar mass  2019     FOOT SURGERY Bilateral 2006     Intracardiac ablation  2012     Knee arthroscopy surgery Bilateral 2006     Pacemaker implantation  2007     PANCREAS SURGERY      pancreaticoduodenectomy     THYROIDECTOMY            Health Maintenance Due   Topic Date Due     DEXA  1950     HEPATITIS C SCREENING  1950     ADVANCE CARE PLANNING  1950     COLONOSCOPY  1961     DTAP/TDAP/TD IMMUNIZATION (1 - Tdap) 1976      LIPID  1996     MEDICARE ANNUAL WELLNESS VISIT  2016     FALL RISK ASSESSMENT  2016     PHQ-2  2019     ZOSTER IMMUNIZATION (3 of 3) 2019       Current Medications:     Current Outpatient Medications   Medication Sig Dispense Refill     acetaminophen (TYLENOL) 500 MG tablet Take 1,000 mg by mouth as needed        cetirizine-pseudoePHEDrine ER (ZYRTEC-D) 5-120 MG 12 hr tablet Take 1 tablet by mouth every morning        CONTOUR NEXT EZ (CONTOUR NEXT EZ W/DEVICE KIT) w/Device KIT See Admin Instructions  0     levothyroxine (SYNTHROID/LEVOTHROID) 175 MCG tablet Take 175 mcg by mouth every morning        lisinopril (PRINIVIL/ZESTRIL) 5 MG tablet Take 5 mg by mouth every morning        metFORMIN (GLUCOPHAGE-XR) 500 MG 24 hr tablet Take 500 mg by mouth every morning        naproxen (NAPROSYN) 500 MG tablet Take 500 mg by mouth every morning        pantoprazole (PROTONIX) 40 MG EC tablet Take 40 mg by mouth every morning        simvastatin (ZOCOR) 40 MG tablet Take 40 mg by mouth At Bedtime        sotalol (BETAPACE) 160 MG tablet Take 80 mg by mouth 2 times daily        vitamin B complex with vitamin C (STRESS TAB) tablet Take 1 tablet by mouth every morning        warfarin (COUMADIN) 5 MG tablet Take 7.5mg 5 days week ,\ 5 mg Tues, Sat           Allergies:      No Known Allergies     Social History:     Social History     Tobacco Use     Smoking status: Former Smoker     Years: 5.00     Types: Cigarettes     Last attempt to quit: 1973     Years since quittin.6     Smokeless tobacco: Never Used     Tobacco comment: Quit age 23   Substance Use Topics     Alcohol use: Yes     Alcohol/week: 0.6 oz     Types: 1 Cans of beer per week       History   Drug Use Not on file         Family History:       Family History   Problem Relation Age of Onset     Colon Cancer Mother      Arthritis Father      Prostate Cancer Father      Hypertension Father      Obesity Father      Myocardial Infarction  "Father      Diabetes Sister      Thyroid Disease Sister      Cancer Brother      Hypertension Brother      Diabetes Maternal Grandmother      Heart Disease Maternal Grandfather          Physical Exam:     /82 (BP Location: Right arm, Patient Position: Chair, Cuff Size: Adult Regular)   Pulse 64   Temp 98.2  F (36.8  C) (Oral)   Resp 14   Ht 1.66 m (5' 5.35\")   Wt 111.6 kg (246 lb)   SpO2 98%   BMI 40.49 kg/m     Body mass index is 40.49 kg/m .    General Appearance: healthy and alert, no distress    Neurological: normal gait, no gross defects     Psychiatric: appropriate mood and affect                                     Assessment:    Ilana Trent is a 69 year old woman with a diagnosis of Vulva melanoma.     A total of 25 minutes was spent with the patient, 20 minutes of which were spent in counseling the patient and/or treatment planning.      1.  Vulva melanoma  2.  PET scan negative for metastatic disease.      I discussed with the patient we will proceed with a radical vulvectomy, sentinel lymph node, possible inguinal femoral lymphadenectomy.  I will have her see my colleagues in Nuclear Medicine for mapping of lymph nodes preoperatively.  I will also have her see my colleagues in Anesthesia for preoperative evaluation.  The patient as well as  agrees with the plan.  They are very appreciative of her care.  All questions were answered.       Risks, benefits and alternatives to proceed discussed in detail with the patient. Risks include but are not limited to bleeding, infection, possible injury to surrounding organs including bowel, bladder, ureter, need for second procedure/surgery related to complications from first procedure, postoperative medical complications such as cardiopulmonary events, lymphedema, lymphocyst, thromboembolic events. Consent for surgery, blood transfusion signed.  Will arrange appropriate preoperative blood work, CXR, EKG. Patient also advised on need for " postoperative surveillance and/or adjuvant therapy. Questions answered.    Bacilio Celis MD, MS    Department of Obstetrics and Gynecology   Division of Gynecologic Oncology   [facility]  Phone: [phone]      CC  Patient Care Team:  Shanice Hines MD as PCP - General (Family Practice)

## 2019-07-03 NOTE — NURSING NOTE
Pre Op Nurse Teaching Template    Relevant Diagnosis: Melanoma of the Vulva     Teaching Topic: Radical vulvectomy, lymph node sampling, possible lymph node dissection, EUA    Person(s) involved in teaching :  Patient  & spouse  Motivation Level:  Asks Questions:    Yes      Eager to Learn:     Yes     Cooperative:          Yes    Receptive (willing. Able to accept information):    Yes      Patient and those who are listed above demonstrates understanding of the following:   Reason for the appointment, diagnosis and treatment plan:   Yes   Knowledge of proper use of medications and conditions for which they are ordered (with special attention to potential side effects or drug interactions): Yes   Which situations necessitate calling provider and whom to contact: Yes         Nutritional needs and diet plan:  Yes      Pain management techniques:     Yes, Pain Scale   Diet:   Yes, [facility] Diet Instructions    Teaching Concerns addressed: Yes    Infection Prevention:  Patient and those who are listed above demonstrate understanding of the following:  Pre-Op CHG Bathing Instructions: Yes  Surgical procedure site care taught:   Yes   Signs and symptoms of infection taught: Yes       Instructional Materials Used/Given:  [facility] [employer] [facility] Booklet  Pain Assessment Tool   Home Care after Major Abdominal or Vaginal Surgery    Copy of Surgical Consent    Comments:  BRITTA Li RN

## 2019-07-03 NOTE — PATIENT INSTRUCTIONS
Preparing for Your Surgery      Name:  Ilana Trent   MRN:  [id number]   :  1950   Today's Date:  7/3/2019     Arriving for surgery:  Surgery date:  To be determined.  You will receive a phone call from the pre-admissions office with a surgery date, arrival time and location.  The pre-admissions office phone number is [phone].  They are open Monday through Friday, 8:00 am to 5:30 pm.?     What can I eat or drink?  -  You may have solid food or milk products until 8 hours prior to your surgery.  -  You may have water, apple juice or 7up/Sprite until 2 hours prior to your surgery.    Which medicines can I take?  Stop Aspirin, vitamins and supplements one week prior to surgery.  Hold Ibuprofen for 24 hours and/or Naproxen for 48 hours prior to surgery.   -  Do NOT take these medications in the morning, the day of surgery:  Cetirizine-Pseudoephedrine (Zyrtec)  Metformin (Glucophage)  Lisinopril (Prinivil/Zesteril)    Warfarin (Coumadin) hold 5 days prior to surgery  -  Please take these medications the day of surgery:  Acetaminophen (Tylenol) if needed  Pantoprazole (Protonix)  Levothyroxine (Synthroid/Levothroid)  Simvastatin (Zocor)  Sotalol (Betapace)  How do I prepare myself?  -  Take two showers: one the night before surgery; and one the morning of surgery.         Use Scrubcare or Hibiclens to wash from neck down.  You may use your own shampoo and conditioner. No other hair products.   -  Do NOT use lotion, powder, deodorant, or antiperspirant the day of your surgery.  -  Do NOT wear any makeup, fingernail polish or jewelry.  -  Do not bring your own medications to the hospital.  -  Bring your ID and insurance card.    -If you are scheduled to go home the Same Day as surgery you must have a responsible adult as a  and to stay with you overnight the first 24 hours after surgery.     Questions or Concerns:  -If you have questions or concerns regarding the day of surgery, please call [phone].      -If you are scheduled at the [facility] please call [phone].    -For questions after surgery please call your surgeons office.           AFTER YOUR SURGERY  Breathing exercises   Breathing exercises help you recover faster. Take deep breaths and let the air out slowly. This will:     Help you wake up after surgery.    Help prevent complications like pneumonia.  Preventing complications will help you go home sooner.   We may give you a breathing device (incentive spirometer) to encourage you to breathe deeply.   Nausea and vomiting   You may feel sick to your stomach after surgery; if so, let your nurse know.    Pain control:  After surgery, you may have pain. Our goal is to help you manage your pain. Pain medicine will help you feel comfortable enough to do activities that will help you heal.  These activities may include breathing exercises, walking and physical therapy.   To help your health care team treat your pain we will ask: 1) If you have pain  2) where it is located 3) describe your pain in your words  Methods of pain control include medications given by mouth, vein or by nerve block for some surgeries.  Sequential Compression Device (SCD) or Pneumo Boots:  You may need to wear SCD S on your legs or feet. These are wraps connected to a machine that pumps in air and releases it. The repeated pumping helps prevent blood clots from forming.

## 2019-07-03 NOTE — PROGRESS NOTES
Follow Up Notes on Referred Patient    Date: 7/3/2019       Dr. Chauncey Painting MD  No address on file       RE: Ilana Trent  : 1950  FILOMENA: 7/3/2019    Dear Dr. Chauncey Painting:    Ilana Trent is a 69 year old woman with a diagnosis of Vulva melanoma.     Patient is here for followup.  She has had no symptoms since the last time I have seen her.             Past Medical History:    Past Medical History:   Diagnosis Date     Arthritis      Back pain      Cataract      Diabetes (H)      GERD (gastroesophageal reflux disease)      HLD (hyperlipidemia)      Hypertension      Hypothyroidism      Long term current use of anticoagulant therapy      Malignant neoplasm of breast (female) (H)     s/p lumpectomy, LN removal, radiation     Melanoma of vulva (H)      RHONDA (obstructive sleep apnea)      Osteopenia      PAF (paroxysmal atrial fibrillation) (H)     s/p ablation     Papillary carcinoma (H)     incidentally found     Primary malignant neuroendocrine tumor of pancreas (H)      Toxic multinodular goiter          Past Surgical History:    Past Surgical History:   Procedure Laterality Date     BACK SURGERY      L4-5     BREAST LUMPECTOMY, RT/LT       BUNIONECTOMY Bilateral 2006     Excision right vulvar mass  2019     FOOT SURGERY Bilateral 2006     Intracardiac ablation  2012     Knee arthroscopy surgery Bilateral 2006     Pacemaker implantation  2007     PANCREAS SURGERY      pancreaticoduodenectomy     THYROIDECTOMY            Health Maintenance Due   Topic Date Due     DEXA  1950     HEPATITIS C SCREENING  1950     ADVANCE CARE PLANNING  1950     COLONOSCOPY  1961     DTAP/TDAP/TD IMMUNIZATION (1 - Tdap) 1976     LIPID  1996     MEDICARE ANNUAL WELLNESS VISIT  2016     FALL RISK ASSESSMENT  2016     PHQ-2  2019     ZOSTER IMMUNIZATION (3 of 3) 2019       Current Medications:     Current Outpatient Medications    Medication Sig Dispense Refill     acetaminophen (TYLENOL) 500 MG tablet Take 1,000 mg by mouth as needed        cetirizine-pseudoePHEDrine ER (ZYRTEC-D) 5-120 MG 12 hr tablet Take 1 tablet by mouth every morning        CONTOUR NEXT EZ (CONTOUR NEXT EZ W/DEVICE KIT) w/Device KIT See Admin Instructions  0     levothyroxine (SYNTHROID/LEVOTHROID) 175 MCG tablet Take 175 mcg by mouth every morning        lisinopril (PRINIVIL/ZESTRIL) 5 MG tablet Take 5 mg by mouth every morning        metFORMIN (GLUCOPHAGE-XR) 500 MG 24 hr tablet Take 500 mg by mouth every morning        naproxen (NAPROSYN) 500 MG tablet Take 500 mg by mouth every morning        pantoprazole (PROTONIX) 40 MG EC tablet Take 40 mg by mouth every morning        simvastatin (ZOCOR) 40 MG tablet Take 40 mg by mouth At Bedtime        sotalol (BETAPACE) 160 MG tablet Take 80 mg by mouth 2 times daily        vitamin B complex with vitamin C (STRESS TAB) tablet Take 1 tablet by mouth every morning        warfarin (COUMADIN) 5 MG tablet Take 7.5mg 5 days week ,\ 5 mg Tues, Sat           Allergies:      No Known Allergies     Social History:     Social History     Tobacco Use     Smoking status: Former Smoker     Years: 5.00     Types: Cigarettes     Last attempt to quit: 1973     Years since quittin.6     Smokeless tobacco: Never Used     Tobacco comment: Quit age 23   Substance Use Topics     Alcohol use: Yes     Alcohol/week: 0.6 oz     Types: 1 Cans of beer per week       History   Drug Use Not on file         Family History:       Family History   Problem Relation Age of Onset     Colon Cancer Mother      Arthritis Father      Prostate Cancer Father      Hypertension Father      Obesity Father      Myocardial Infarction Father      Diabetes Sister      Thyroid Disease Sister      Cancer Brother      Hypertension Brother      Diabetes Maternal Grandmother      Heart Disease Maternal Grandfather          Physical Exam:     /82 (BP Location:  "Right arm, Patient Position: Chair, Cuff Size: Adult Regular)   Pulse 64   Temp 98.2  F (36.8  C) (Oral)   Resp 14   Ht 1.66 m (5' 5.35\")   Wt 111.6 kg (246 lb)   SpO2 98%   BMI 40.49 kg/m    Body mass index is 40.49 kg/m .    General Appearance: healthy and alert, no distress    Neurological: normal gait, no gross defects     Psychiatric: appropriate mood and affect                                     Assessment:    Ilana Trent is a 69 year old woman with a diagnosis of Vulva melanoma.     A total of 25 minutes was spent with the patient, 20 minutes of which were spent in counseling the patient and/or treatment planning.      1.  Vulva melanoma  2.  PET scan negative for metastatic disease.      I discussed with the patient we will proceed with a radical vulvectomy, sentinel lymph node, possible inguinal femoral lymphadenectomy.  I will have her see my colleagues in Nuclear Medicine for mapping of lymph nodes preoperatively.  I will also have her see my colleagues in Anesthesia for preoperative evaluation.  The patient as well as  agrees with the plan.  They are very appreciative of her care.  All questions were answered.       Risks, benefits and alternatives to proceed discussed in detail with the patient. Risks include but are not limited to bleeding, infection, possible injury to surrounding organs including bowel, bladder, ureter, need for second procedure/surgery related to complications from first procedure, postoperative medical complications such as cardiopulmonary events, lymphedema, lymphocyst, thromboembolic events. Consent for surgery, blood transfusion signed.  Will arrange appropriate preoperative blood work, CXR, EKG. Patient also advised on need for postoperative surveillance and/or adjuvant therapy. Questions answered.    Bacilio Celis MD, MS    Department of Obstetrics and Gynecology   Division of Gynecologic Oncology   [facility]  Phone: " [phone]        Patient Care Team:  Shanice Hines MD as PCP - General (Family Practice)  SELF, REFERRED

## 2019-07-03 NOTE — NURSING NOTE
"Oncology Rooming Note    July 3, 2019 7:02 AM   Ilana Trent is a 69 year old female who presents for:    Chief Complaint   Patient presents with     Oncology Clinic Visit     [facility] RETURN- MELANOMA OF VULVA     Initial Vitals: /82 (BP Location: Right arm, Patient Position: Chair, Cuff Size: Adult Regular)   Pulse 64   Temp 98.2  F (36.8  C) (Oral)   Resp 14   Ht 1.66 m (5' 5.35\")   Wt 111.6 kg (246 lb)   SpO2 98%   BMI 40.49 kg/m   Estimated body mass index is 40.49 kg/m  as calculated from the following:    Height as of this encounter: 1.66 m (5' 5.35\").    Weight as of this encounter: 111.6 kg (246 lb). Body surface area is 2.27 meters squared.  No Pain (0) Comment: Data Unavailable   No LMP recorded.  Allergies reviewed: Yes  Medications reviewed: Yes    Medications: Medication refills not needed today.  Pharmacy name entered into EPIC: Data Unavailable    Clinical concerns: No new concerns. Lalita was notified.      Marvin Smith LPN            "

## 2019-07-03 NOTE — PHARMACY - PREOPERATIVE ASSESSMENT CENTER
Anticoagulation Note - Preoperative Assessment Center (PAC) Pharmacist     Patient seen and interviewed during time of PAC Clinic appointment July 3, 2019.  The purpose of this note is to document the perioperative anticoagulation plan outlined by the providers caring for Ilana Trent.     Current Regimen  Anticoagulation Regimen as of July 3, 2019: warfarin 5 mg on Tues/Sat and 7.5 mg on all other days of the week. Takes in the evening.   Indication: a fib  Prescriber:  Dr. Hines, managed through [facility].   Expected Duration of therapy: indefinite   Current medications that may interact with this include: naproxen, pantoprazole, simvastatin, levothyroxine.   INR Goal: 2-3    Perioperative plan  Ilana Trent is planning to have Exam under anesthesia, Radical Vulvectomy, possible bilateral Inguinal and Femoral Lymph Node dissection, bilateral sentinel inguinal lymphnode sampling on date TBD with Dr. Celis and the perioperative anticoagulation plan outlined by PAC staff is hold warfarin x5 days pre op (DOS still TBD).  Patient will be seeing her anticoagulation clinic next week and will defer any final decision for lovenox bridging to the team who manages her anticoagulation.     Resumption of anticoagulation after procedure will be based on surgery team assessment of bleeding risks and complications.  This plan may require re-assessment and modification by her primary team in the perioperative setting depending on patients clinical situation.        Gerardo Tsai RPH  July 3, 2019  9:25 AM

## 2019-07-03 NOTE — ANESTHESIA PREPROCEDURE EVALUATION
Anesthesia Pre-Procedure Evaluation    Patient: Ilana Trent   MRN:     [id number] Gender:   female   Age:    69 year old :      1950        Preoperative Diagnosis: * No surgery found *        Past Medical History:   Diagnosis Date     Arthritis      Back pain      Cataract      Diabetes (H)      GERD (gastroesophageal reflux disease)      HLD (hyperlipidemia)      Hypertension      Hypothyroidism      Long term current use of anticoagulant therapy      Malignant neoplasm of breast (female) (H)     s/p lumpectomy, LN removal, radiation     Melanoma of vulva (H)      RHONDA (obstructive sleep apnea)      Osteopenia      PAF (paroxysmal atrial fibrillation) (H)     s/p ablation     Papillary carcinoma (H)     incidentally found     Primary malignant neuroendocrine tumor of pancreas (H)      Toxic multinodular goiter       Past Surgical History:   Procedure Laterality Date     BACK SURGERY      L4-5     BREAST LUMPECTOMY, RT/LT       BUNIONECTOMY Bilateral 2006     Excision right vulvar mass  2019     FOOT SURGERY Bilateral 2006     Intracardiac ablation       Knee arthroscopy surgery Bilateral 2006     Pacemaker implantation       PANCREAS SURGERY      pancreaticoduodenectomy     THYROIDECTOMY             Anesthesia Evaluation     . Pt has had prior anesthetic. Type: General and MAC    No history of anesthetic complications          ROS/MED HX    ENT/Pulmonary:     (+)sleep apnea, tobacco use, Past use uses CPAP , . .    Neurologic:  - neg neurologic ROS     Cardiovascular: Comment: PAF s/p ablation      (+) Dyslipidemia, hypertension-range: 120/70s, ---. Taking blood thinners Pt has received instructions: Instructions Given to patient: Will hold Coumadin for 5 days as she has done before. No bridge. Will double check plan with her local INR clinic. . . . pacemaker Reason placed: SSS:type: Medtronic settings: AAIR-DDDR  - Patient is dependent on pacemaker .  "dysrhythmias a-fib, . Previous cardiac testing Echodate:2015results:date: results:ECG reviewed date:6/10/19 results:Atrial paced rhythmCath date: 2007 results:         (-) CAD   METS/Exercise Tolerance: Comment: Swims and bikes regularly >4 METS   Hematologic:  - neg hematologic  ROS      (-) History of Transfusion   Musculoskeletal:   (+) arthritis,  -       GI/Hepatic:     (+) GERD Asymptomatic on medication,       Renal/Genitourinary:  - ROS Renal section negative       Endo:     (+) type II DM Last HgA1c: 6.2 date: 1/11/9 Not using insulin - not using insulin pump thyroid problem hypothyroidism, Obesity, .      Psychiatric:  - neg psychiatric ROS       Infectious Disease:  - neg infectious disease ROS       Malignancy:   (+) Malignancy History of Breast and Other  Breast CA Remission status post Surgery and Radiation. Other CA Neuroendocrine tumor of pancreas Remission status post Surgery Melanoma of vulva        Other:    (+) C-spine cleared: N/A, no H/O Chronic Pain,no other significant disability                        PHYSICAL EXAM:   Mental Status/Neuro: A/A/O; Age Appropriate   Airway: Facies: Challenging  Mallampati: IV  Mouth/Opening: Limited  TM distance: < 6 cm  Neck ROM: Limited   Respiratory: Auscultation: CTAB     Resp. Rate: Normal     Resp. Effort: Normal      CV: Rhythm: Regular  Heart: Normal Sounds   Comments:      Dental: Normal                  Lab Results   Component Value Date     (A) 06/28/2019       Preop Vitals  BP Readings from Last 3 Encounters:   07/03/19 161/82   06/26/19 151/82    Pulse Readings from Last 3 Encounters:   07/03/19 64   06/26/19 67      Resp Readings from Last 3 Encounters:   07/03/19 14   06/26/19 16    SpO2 Readings from Last 3 Encounters:   07/03/19 98%   06/26/19 95%      Temp Readings from Last 1 Encounters:   07/03/19 98.2  F (36.8  C) (Oral)    Ht Readings from Last 1 Encounters:   07/03/19 1.66 m (5' 5.35\")      Wt Readings from Last 1 Encounters: " "  07/03/19 111.6 kg (246 lb)    Estimated body mass index is 40.49 kg/m  as calculated from the following:    Height as of an earlier encounter on 7/3/19: 1.66 m (5' 5.35\").    Weight as of an earlier encounter on 7/3/19: 111.6 kg (246 lb).     LDA:            Assessment:              Tobacco Use:  NO Active use of Tobacco/UNKNOWN Tobacco use status     Plan:            Airway: CMAC/VL                 PONV Management:  Adult Risk Factors: Female, Non-Smoker       Comments for Plan/Consent:  Please note airway exam                  PAC Discussion and Assessment    ASA Classification: 3  Case is suitable for: Zevan Limited  Anesthetic techniques and relevant risks discussed: GA  Invasive monitoring and risk discussed: No  Types:   Possibility and Risk of blood transfusion discussed: No  NPO instructions given:   Additional anesthetic preparation and risks discussed:   Needs early admission to pre-op area:   Other:     PAC Resident/NP Anesthesia Assessment:  Ilana Trent is a 69 year old female scheduled to undergo Exam under anesthesia, Radical Vulvectomy, possible bilateral Inguinal and Femoral Lymph Node dissection, bilateral sentinel inguinal lymphnode sampling with Dr. Celis on date TBD. She has the following specific operative considerations:   - RCRI : No serious cardiac risks.   - VTE risk: 3%  - If afib, [id number] score 4.  Risk category High.    - Risk of PONV score = 3.  If > 2, anti-emetic intervention recommended. If 3 or > anti emetic intervention recommended with two or more meds    Pacer interrogation 7/3/19  Medtronic pacemaker  Atrial fib burden 1%  Underlying sinus bradycardia 30 bpm or less  Ventricular paced 0.2%  Atrial paced 99.3 %  Settings AAIR-DDDR   Dependent  Placed for SSS      --Melanoma of vulva with above procedure now planned.   --Multiple other cancers as above, treated and with no current evidence of disease. Referred for genetic testing.   --Airway concern with " challenging airway exam (MP IV, limited neck extension). Denies history of problems with intubation. Final assessment and decisions by Anesthesia on DOS.   --HLD. simvastatin. HTN. Will hold lisinopril on DOS. Paroxysmal atrial fibrillation as above with ablation. Pacer interrogation above, dependent. Will take sotolol on DOS. Chronically anticoagulated with warfarin with plan to hold for 5 days as she has done before without bridging. Patient has an appt for INR next week and will also check in with her INR clinic regarding her plan. All testing above. Good exercise tolerance.   --Former remote smoker. Denies pulmonary symptoms. RHONDA with CPAP and will bring on DOS.  --GERD Will take Protonix on DOS.  --Hypothyroidism. Will take Synthroid on DOS.  --DIABETES MELLITUS II. Last A1C 6.2. Will hold metformin on DOS.   Type and screen drawn today.         Patient was discussed with Dr Peters.      Reviewed and Signed by PAC Mid-Level Provider/Resident  Mid-Level Provider/Resident: DONTAE Funez CNS  Date: 7/3/19  Time: 8:04am    Attending Anesthesiologist Anesthesia Assessment:  I have examined the patient and reviewed the medical record.  I have discussed the patient with the MILO and concur with her assessment  Patient is having radical vulvectomy for melanoma.  She has had multiple neoplasms in the past - pancreatic, thyroid, breast.  She has no known cardiac or pulmonary disease  She has no known anesthetic complication   PE:  WNWD, anxious female.  MPC 4, short, thick neck, 5 cm TMD, limited extension, small mouth opeining.  Lungs clear  CV RRR without murmur    No further testing necessary per protocol.  PLEASE NOTE AIRWAY EXAM - could be challenging airway (unable to find anesthesia notes from previous surgery)  Final plan per attending anesthesiologist the day of surgery.      Reviewed and Signed by PAC Anesthesiologist  Anesthesiologist: Rey Peters MD  Date: 7/3/2019  Time:   Pass/Fail:   Disposition:      PAC Pharmacist Assessment:        Pharmacist:   Date:   Time:        Kaley Dennis, APRN CNS

## 2019-07-03 NOTE — PROGRESS NOTES
Preoperative Assessment Center medication history for July 3, 2019 is complete.    See Epic admission navigator for prior to admission medications.   Operating room staff will still need to confirm medications and last dose information on day of surgery.     Medication history interview sources:  patient, care everywhere    Changes made to PTA medication list (reason)  Added: aloe, lasix, MVI  Deleted: none  Changed: none    Additional medication history information (including reliability of information, actions taken by pharmacist):    -- No recent (within 30 days) course of antibiotics  -- No recent (within 30 days) course of steroids  -- No recent (within 30 days) chronic daily medications stopped   -- Patient declines being on any other prescription or over-the-counter medications    Prior to Admission medications    Medication Sig Last Dose Taking? Auth Provider   acetaminophen (TYLENOL) 500 MG tablet Take 1,000 mg by mouth as needed  Taking Yes Reported, Patient   ALOE PO Take by mouth daily as needed Taking Yes Unknown, Entered By History   cetirizine-pseudoePHEDrine ER (ZYRTEC-D) 5-120 MG 12 hr tablet Take 1 tablet by mouth every morning  Taking Yes Reported, Patient   CONTOUR NEXT EZ (CONTOUR NEXT EZ W/DEVICE KIT) w/Device KIT See Admin Instructions Taking Yes Reported, Patient   furosemide (LASIX) 20 MG tablet Take 20 mg by mouth daily as needed (SWELLING) Taking Yes Unknown, Entered By History   levothyroxine (SYNTHROID/LEVOTHROID) 175 MCG tablet Take 175 mcg by mouth every morning  Taking Yes Reported, Patient   lisinopril (PRINIVIL/ZESTRIL) 5 MG tablet Take 5 mg by mouth every morning  Taking Yes Reported, Patient   metFORMIN (GLUCOPHAGE-XR) 500 MG 24 hr tablet Take 500 mg by mouth every morning  Taking Yes Reported, Patient   multivitamin w/minerals (THERA-VIT-M) tablet Take 1 tablet by mouth daily Taking Yes Unknown, Entered By History   naproxen (NAPROSYN) 500 MG tablet Take 500 mg by mouth every  morning  Taking Yes Reported, Patient   pantoprazole (PROTONIX) 40 MG EC tablet Take 40 mg by mouth every morning  Taking Yes Reported, Patient   simvastatin (ZOCOR) 40 MG tablet Take 40 mg by mouth At Bedtime  Taking Yes Reported, Patient   sotalol (BETAPACE) 160 MG tablet Take 80 mg by mouth 2 times daily  Taking Yes Reported, Patient   vitamin B complex with vitamin C (STRESS TAB) tablet Take 1 tablet by mouth every morning  Taking Yes Reported, Patient   warfarin (COUMADIN) 5 MG tablet Take 7.5mg 5 days week ,\ 5 mg Tues, Sat  Takes in the evening Taking Yes Reported, Patient         Medication history completed by: Gerardo Tsai, [facility]

## 2019-07-08 ENCOUNTER — TELEPHONE (OUTPATIENT)
Dept: ONCOLOGY | Facility: CLINIC | Age: 69
End: 2019-07-08

## 2019-07-08 NOTE — TELEPHONE ENCOUNTER
Patient is scheduled for surgery with Dr. Celis      Spoke or left message with: Al, patient     Date of Surgery: 7/30/2019    Location: [facility]    Informed patient they will need an adult  Yes    H&P: Scheduled with PAC    Additional imaging/appointments: Post op scheduled on 8/21/2019

## 2019-07-11 LAB
MDC_IDC_EPISODE_DTM: NORMAL
MDC_IDC_EPISODE_DURATION: 12 S
MDC_IDC_EPISODE_DURATION: 1290 S
MDC_IDC_EPISODE_DURATION: 130 S
MDC_IDC_EPISODE_DURATION: 132 S
MDC_IDC_EPISODE_DURATION: 1437 S
MDC_IDC_EPISODE_DURATION: 15 S
MDC_IDC_EPISODE_DURATION: 167 S
MDC_IDC_EPISODE_DURATION: 181 S
MDC_IDC_EPISODE_DURATION: 194 S
MDC_IDC_EPISODE_DURATION: 20 S
MDC_IDC_EPISODE_DURATION: 23 S
MDC_IDC_EPISODE_DURATION: 23 S
MDC_IDC_EPISODE_DURATION: 2409 S
MDC_IDC_EPISODE_DURATION: 2419 S
MDC_IDC_EPISODE_DURATION: 25 S
MDC_IDC_EPISODE_DURATION: 26 S
MDC_IDC_EPISODE_DURATION: 27 S
MDC_IDC_EPISODE_DURATION: 274 S
MDC_IDC_EPISODE_DURATION: 2866 S
MDC_IDC_EPISODE_DURATION: 31 S
MDC_IDC_EPISODE_DURATION: 320 S
MDC_IDC_EPISODE_DURATION: 3245 S
MDC_IDC_EPISODE_DURATION: 33 S
MDC_IDC_EPISODE_DURATION: 331 S
MDC_IDC_EPISODE_DURATION: 342 S
MDC_IDC_EPISODE_DURATION: 37 S
MDC_IDC_EPISODE_DURATION: 39 S
MDC_IDC_EPISODE_DURATION: 41 S
MDC_IDC_EPISODE_DURATION: 43 S
MDC_IDC_EPISODE_DURATION: 4541 S
MDC_IDC_EPISODE_DURATION: 50 S
MDC_IDC_EPISODE_DURATION: 5067 S
MDC_IDC_EPISODE_DURATION: 517 S
MDC_IDC_EPISODE_DURATION: 5632 S
MDC_IDC_EPISODE_DURATION: 59 S
MDC_IDC_EPISODE_DURATION: 63 S
MDC_IDC_EPISODE_DURATION: 7044 S
MDC_IDC_EPISODE_DURATION: 71 S
MDC_IDC_EPISODE_DURATION: 75 S
MDC_IDC_EPISODE_DURATION: 82 S
MDC_IDC_EPISODE_DURATION: 871 S
MDC_IDC_EPISODE_DURATION: 88 S
MDC_IDC_EPISODE_DURATION: 88 S
MDC_IDC_EPISODE_DURATION: 9 S
MDC_IDC_EPISODE_DURATION: 943 S
MDC_IDC_EPISODE_DURATION: NORMAL S
MDC_IDC_EPISODE_ID: 5169
MDC_IDC_EPISODE_ID: 5170
MDC_IDC_EPISODE_ID: 5171
MDC_IDC_EPISODE_ID: 5172
MDC_IDC_EPISODE_ID: 5173
MDC_IDC_EPISODE_ID: 5174
MDC_IDC_EPISODE_ID: 5175
MDC_IDC_EPISODE_ID: 5176
MDC_IDC_EPISODE_ID: 5177
MDC_IDC_EPISODE_ID: 5178
MDC_IDC_EPISODE_ID: 5179
MDC_IDC_EPISODE_ID: 5180
MDC_IDC_EPISODE_ID: 5181
MDC_IDC_EPISODE_ID: 5182
MDC_IDC_EPISODE_ID: 5183
MDC_IDC_EPISODE_ID: 5184
MDC_IDC_EPISODE_ID: 5185
MDC_IDC_EPISODE_ID: 5186
MDC_IDC_EPISODE_ID: 5187
MDC_IDC_EPISODE_ID: 5188
MDC_IDC_EPISODE_ID: 5189
MDC_IDC_EPISODE_ID: 5190
MDC_IDC_EPISODE_ID: 5191
MDC_IDC_EPISODE_ID: 5192
MDC_IDC_EPISODE_ID: 5193
MDC_IDC_EPISODE_ID: 5194
MDC_IDC_EPISODE_ID: 5195
MDC_IDC_EPISODE_ID: 5196
MDC_IDC_EPISODE_ID: 5197
MDC_IDC_EPISODE_ID: 5198
MDC_IDC_EPISODE_ID: 5199
MDC_IDC_EPISODE_ID: 5200
MDC_IDC_EPISODE_ID: 5201
MDC_IDC_EPISODE_ID: 5202
MDC_IDC_EPISODE_ID: 5203
MDC_IDC_EPISODE_ID: 5204
MDC_IDC_EPISODE_ID: 5205
MDC_IDC_EPISODE_ID: 5206
MDC_IDC_EPISODE_ID: 5207
MDC_IDC_EPISODE_ID: 5208
MDC_IDC_EPISODE_ID: 5209
MDC_IDC_EPISODE_ID: 5210
MDC_IDC_EPISODE_ID: 5211
MDC_IDC_EPISODE_ID: 5212
MDC_IDC_EPISODE_ID: 5213
MDC_IDC_EPISODE_ID: 5214
MDC_IDC_EPISODE_ID: 5215
MDC_IDC_EPISODE_ID: 5216
MDC_IDC_EPISODE_ID: 5217
MDC_IDC_EPISODE_ID: 5218
MDC_IDC_EPISODE_TYPE: NORMAL
MDC_IDC_LEAD_IMPLANT_DT: NORMAL
MDC_IDC_LEAD_IMPLANT_DT: NORMAL
MDC_IDC_LEAD_LOCATION: NORMAL
MDC_IDC_LEAD_LOCATION: NORMAL
MDC_IDC_LEAD_LOCATION_DETAIL_1: NORMAL
MDC_IDC_LEAD_LOCATION_DETAIL_1: NORMAL
MDC_IDC_LEAD_MFG: NORMAL
MDC_IDC_LEAD_MFG: NORMAL
MDC_IDC_LEAD_MODEL: NORMAL
MDC_IDC_LEAD_MODEL: NORMAL
MDC_IDC_LEAD_POLARITY_TYPE: NORMAL
MDC_IDC_LEAD_POLARITY_TYPE: NORMAL
MDC_IDC_LEAD_SERIAL: NORMAL
MDC_IDC_LEAD_SERIAL: NORMAL
MDC_IDC_MSMT_BATTERY_DTM: NORMAL
MDC_IDC_MSMT_BATTERY_STATUS: NORMAL
MDC_IDC_MSMT_BATTERY_VOLTAGE: 2.9 V
MDC_IDC_MSMT_LEADCHNL_RA_IMPEDANCE_VALUE: 424 OHM
MDC_IDC_MSMT_LEADCHNL_RA_PACING_THRESHOLD_AMPLITUDE: 1 V
MDC_IDC_MSMT_LEADCHNL_RA_PACING_THRESHOLD_PULSEWIDTH: 0.4 MS
MDC_IDC_MSMT_LEADCHNL_RA_SENSING_INTR_AMPL: 1.71 MV
MDC_IDC_MSMT_LEADCHNL_RV_IMPEDANCE_VALUE: 672 OHM
MDC_IDC_MSMT_LEADCHNL_RV_PACING_THRESHOLD_AMPLITUDE: 1 V
MDC_IDC_MSMT_LEADCHNL_RV_PACING_THRESHOLD_PULSEWIDTH: 0.4 MS
MDC_IDC_MSMT_LEADCHNL_RV_SENSING_INTR_AMPL: 13.5 MV
MDC_IDC_PG_IMPLANT_DTM: NORMAL
MDC_IDC_PG_MFG: NORMAL
MDC_IDC_PG_MODEL: NORMAL
MDC_IDC_PG_SERIAL: NORMAL
MDC_IDC_PG_TYPE: NORMAL
MDC_IDC_SESS_CLINIC_NAME: NORMAL
MDC_IDC_SESS_DTM: NORMAL
MDC_IDC_SESS_TYPE: NORMAL
MDC_IDC_SET_BRADY_AT_MODE_SWITCH_RATE: 171 {BEATS}/MIN
MDC_IDC_SET_BRADY_HYSTRATE: NORMAL
MDC_IDC_SET_BRADY_LOWRATE: 60 {BEATS}/MIN
MDC_IDC_SET_BRADY_MAX_SENSOR_RATE: 130 {BEATS}/MIN
MDC_IDC_SET_BRADY_MAX_TRACKING_RATE: 130 {BEATS}/MIN
MDC_IDC_SET_BRADY_MODE: NORMAL
MDC_IDC_SET_BRADY_PAV_DELAY_LOW: 180 MS
MDC_IDC_SET_BRADY_SAV_DELAY_LOW: 150 MS
MDC_IDC_SET_LEADCHNL_RA_PACING_AMPLITUDE: 2.5 V
MDC_IDC_SET_LEADCHNL_RA_PACING_ANODE_ELECTRODE_1: NORMAL
MDC_IDC_SET_LEADCHNL_RA_PACING_ANODE_LOCATION_1: NORMAL
MDC_IDC_SET_LEADCHNL_RA_PACING_CATHODE_ELECTRODE_1: NORMAL
MDC_IDC_SET_LEADCHNL_RA_PACING_CATHODE_LOCATION_1: NORMAL
MDC_IDC_SET_LEADCHNL_RA_PACING_POLARITY: NORMAL
MDC_IDC_SET_LEADCHNL_RA_PACING_PULSEWIDTH: 0.4 MS
MDC_IDC_SET_LEADCHNL_RA_SENSING_ANODE_ELECTRODE_1: NORMAL
MDC_IDC_SET_LEADCHNL_RA_SENSING_ANODE_LOCATION_1: NORMAL
MDC_IDC_SET_LEADCHNL_RA_SENSING_CATHODE_ELECTRODE_1: NORMAL
MDC_IDC_SET_LEADCHNL_RA_SENSING_CATHODE_LOCATION_1: NORMAL
MDC_IDC_SET_LEADCHNL_RA_SENSING_POLARITY: NORMAL
MDC_IDC_SET_LEADCHNL_RA_SENSING_SENSITIVITY: 0.3 MV
MDC_IDC_SET_LEADCHNL_RV_PACING_AMPLITUDE: 2 V
MDC_IDC_SET_LEADCHNL_RV_PACING_ANODE_ELECTRODE_1: NORMAL
MDC_IDC_SET_LEADCHNL_RV_PACING_ANODE_LOCATION_1: NORMAL
MDC_IDC_SET_LEADCHNL_RV_PACING_CATHODE_ELECTRODE_1: NORMAL
MDC_IDC_SET_LEADCHNL_RV_PACING_CATHODE_LOCATION_1: NORMAL
MDC_IDC_SET_LEADCHNL_RV_PACING_POLARITY: NORMAL
MDC_IDC_SET_LEADCHNL_RV_PACING_PULSEWIDTH: 0.4 MS
MDC_IDC_SET_LEADCHNL_RV_SENSING_ANODE_ELECTRODE_1: NORMAL
MDC_IDC_SET_LEADCHNL_RV_SENSING_ANODE_LOCATION_1: NORMAL
MDC_IDC_SET_LEADCHNL_RV_SENSING_CATHODE_ELECTRODE_1: NORMAL
MDC_IDC_SET_LEADCHNL_RV_SENSING_CATHODE_LOCATION_1: NORMAL
MDC_IDC_SET_LEADCHNL_RV_SENSING_POLARITY: NORMAL
MDC_IDC_SET_LEADCHNL_RV_SENSING_SENSITIVITY: 2.1 MV
MDC_IDC_SET_ZONE_DETECTION_INTERVAL: 350 MS
MDC_IDC_SET_ZONE_DETECTION_INTERVAL: 400 MS
MDC_IDC_SET_ZONE_TYPE: NORMAL
MDC_IDC_STAT_AT_BURDEN_PERCENT: 1 %
MDC_IDC_STAT_AT_DTM_END: NORMAL
MDC_IDC_STAT_AT_DTM_START: NORMAL
MDC_IDC_STAT_BRADY_AP_VP_PERCENT: 0.12 %
MDC_IDC_STAT_BRADY_AP_VS_PERCENT: 99.22 %
MDC_IDC_STAT_BRADY_AS_VP_PERCENT: 0.14 %
MDC_IDC_STAT_BRADY_AS_VS_PERCENT: 0.52 %
MDC_IDC_STAT_BRADY_DTM_END: NORMAL
MDC_IDC_STAT_BRADY_DTM_START: NORMAL
MDC_IDC_STAT_BRADY_RA_PERCENT_PACED: 98.81 %
MDC_IDC_STAT_BRADY_RV_PERCENT_PACED: 0.26 %
MDC_IDC_STAT_EPISODE_RECENT_COUNT: 0
MDC_IDC_STAT_EPISODE_RECENT_COUNT: 73
MDC_IDC_STAT_EPISODE_RECENT_COUNT_DTM_END: NORMAL
MDC_IDC_STAT_EPISODE_RECENT_COUNT_DTM_START: NORMAL
MDC_IDC_STAT_EPISODE_TOTAL_COUNT: 1
MDC_IDC_STAT_EPISODE_TOTAL_COUNT: 24
MDC_IDC_STAT_EPISODE_TOTAL_COUNT: 36
MDC_IDC_STAT_EPISODE_TOTAL_COUNT: 5143
MDC_IDC_STAT_EPISODE_TOTAL_COUNT_DTM_END: NORMAL
MDC_IDC_STAT_EPISODE_TYPE: NORMAL

## 2019-07-12 ENCOUNTER — MYC MEDICAL ADVICE (OUTPATIENT)
Dept: ONCOLOGY | Facility: CLINIC | Age: 69
End: 2019-07-12

## 2019-07-26 ENCOUNTER — TELEPHONE (OUTPATIENT)
Dept: ONCOLOGY | Facility: CLINIC | Age: 69
End: 2019-07-26

## 2019-07-26 NOTE — TELEPHONE ENCOUNTER
I left a voicemail for this patient letting her know that NUC med is scheduled at 8AM on 7/30, and surgery is scheduled to start at 11AM.     Confirmed this with OR, NUC Med & RN.     The RN has provided them with education and a packet regarding their procedure.     I did also remind her that an OR RN will call 2 days prior with an exact surgery start time.

## 2019-07-29 ENCOUNTER — ANESTHESIA EVENT (OUTPATIENT)
Dept: SURGERY | Facility: CLINIC | Age: 69
DRG: 746 | End: 2019-07-29
Payer: COMMERCIAL

## 2019-07-30 ENCOUNTER — ANESTHESIA (OUTPATIENT)
Dept: SURGERY | Facility: CLINIC | Age: 69
DRG: 746 | End: 2019-07-30
Payer: COMMERCIAL

## 2019-07-30 ENCOUNTER — HOSPITAL ENCOUNTER (INPATIENT)
Facility: CLINIC | Age: 69
LOS: 1 days | Discharge: HOME | DRG: 746 | End: 2019-07-31
Attending: OBSTETRICS & GYNECOLOGY | Admitting: OBSTETRICS & GYNECOLOGY
Payer: COMMERCIAL

## 2019-07-30 ENCOUNTER — HOSPITAL ENCOUNTER (OUTPATIENT)
Dept: NUCLEAR MEDICINE | Facility: CLINIC | Age: 69
Setting detail: NUCLEAR MEDICINE
Discharge: HOME | DRG: 746 | End: 2019-07-30
Attending: OBSTETRICS & GYNECOLOGY | Admitting: OBSTETRICS & GYNECOLOGY
Payer: COMMERCIAL

## 2019-07-30 ENCOUNTER — SURGERY (OUTPATIENT)
Age: 69
End: 2019-07-30
Payer: COMMERCIAL

## 2019-07-30 DIAGNOSIS — C51.9 MELANOMA OF VULVA (H): ICD-10-CM

## 2019-07-30 DIAGNOSIS — Z98.890 POST-OPERATIVE STATE: Primary | ICD-10-CM

## 2019-07-30 LAB
ABO + RH BLD: NORMAL
ABO + RH BLD: NORMAL
ANION GAP SERPL CALCULATED.3IONS-SCNC: 9 MMOL/L (ref 3–14)
BLD GP AB SCN SERPL QL: NORMAL
BLOOD BANK CMNT PATIENT-IMP: NORMAL
BLOOD BANK CMNT PATIENT-IMP: NORMAL
BUN SERPL-MCNC: 22 MG/DL (ref 7–30)
CALCIUM SERPL-MCNC: 8.4 MG/DL (ref 8.5–10.1)
CHLORIDE SERPL-SCNC: 106 MMOL/L (ref 94–109)
CO2 SERPL-SCNC: 23 MMOL/L (ref 20–32)
CREAT SERPL-MCNC: 0.75 MG/DL (ref 0.52–1.04)
ERYTHROCYTE [DISTWIDTH] IN BLOOD BY AUTOMATED COUNT: 13.4 % (ref 10–15)
GFR SERPL CREATININE-BSD FRML MDRD: 81 ML/MIN/{1.73_M2}
GLUCOSE BLDC GLUCOMTR-MCNC: 129 MG/DL (ref 70–99)
GLUCOSE BLDC GLUCOMTR-MCNC: 139 MG/DL (ref 70–99)
GLUCOSE BLDC GLUCOMTR-MCNC: 246 MG/DL (ref 70–99)
GLUCOSE SERPL-MCNC: 203 MG/DL (ref 70–99)
HCT VFR BLD AUTO: 41.2 % (ref 35–47)
HGB BLD-MCNC: 12.3 G/DL (ref 11.7–15.7)
INR PPP: 0.97 (ref 0.86–1.14)
INR PPP: 1.02 (ref 0.86–1.14)
MAGNESIUM SERPL-MCNC: 1.9 MG/DL (ref 1.6–2.3)
MCH RBC QN AUTO: 26.8 PG (ref 26.5–33)
MCHC RBC AUTO-ENTMCNC: 29.9 G/DL (ref 31.5–36.5)
MCV RBC AUTO: 90 FL (ref 78–100)
PHOSPHATE SERPL-MCNC: 3.4 MG/DL (ref 2.5–4.5)
PLATELET # BLD AUTO: 255 10E9/L (ref 150–450)
POTASSIUM SERPL-SCNC: 4.4 MMOL/L (ref 3.4–5.3)
RBC # BLD AUTO: 4.59 10E12/L (ref 3.8–5.2)
SODIUM SERPL-SCNC: 138 MMOL/L (ref 133–144)
SPECIMEN EXP DATE BLD: NORMAL
WBC # BLD AUTO: 15.9 10E9/L (ref 4–11)

## 2019-07-30 PROCEDURE — 40000170 ZZH STATISTIC PRE-PROCEDURE ASSESSMENT II: Performed by: OBSTETRICS & GYNECOLOGY

## 2019-07-30 PROCEDURE — 83735 ASSAY OF MAGNESIUM: CPT | Performed by: OBSTETRICS & GYNECOLOGY

## 2019-07-30 PROCEDURE — 88342 IMHCHEM/IMCYTCHM 1ST ANTB: CPT | Performed by: OBSTETRICS & GYNECOLOGY

## 2019-07-30 PROCEDURE — 37000009 ZZH ANESTHESIA TECHNICAL FEE, EACH ADDTL 15 MIN: Performed by: OBSTETRICS & GYNECOLOGY

## 2019-07-30 PROCEDURE — 25000128 H RX IP 250 OP 636: Performed by: OBSTETRICS & GYNECOLOGY

## 2019-07-30 PROCEDURE — 85610 PROTHROMBIN TIME: CPT | Performed by: OBSTETRICS & GYNECOLOGY

## 2019-07-30 PROCEDURE — 37000008 ZZH ANESTHESIA TECHNICAL FEE, 1ST 30 MIN: Performed by: OBSTETRICS & GYNECOLOGY

## 2019-07-30 PROCEDURE — 25000125 ZZHC RX 250: Performed by: STUDENT IN AN ORGANIZED HEALTH CARE EDUCATION/TRAINING PROGRAM

## 2019-07-30 PROCEDURE — 36000057 ZZH SURGERY LEVEL 3 1ST 30 MIN - UMMC: Performed by: OBSTETRICS & GYNECOLOGY

## 2019-07-30 PROCEDURE — 25000125 ZZHC RX 250: Performed by: NURSE ANESTHETIST, CERTIFIED REGISTERED

## 2019-07-30 PROCEDURE — 36000059 ZZH SURGERY LEVEL 3 EA 15 ADDTL MIN UMMC: Performed by: OBSTETRICS & GYNECOLOGY

## 2019-07-30 PROCEDURE — 88307 TISSUE EXAM BY PATHOLOGIST: CPT | Performed by: OBSTETRICS & GYNECOLOGY

## 2019-07-30 PROCEDURE — 71000015 ZZH RECOVERY PHASE 1 LEVEL 2 EA ADDTL HR: Performed by: OBSTETRICS & GYNECOLOGY

## 2019-07-30 PROCEDURE — 25000131 ZZH RX MED GY IP 250 OP 636 PS 637: Performed by: STUDENT IN AN ORGANIZED HEALTH CARE EDUCATION/TRAINING PROGRAM

## 2019-07-30 PROCEDURE — 12000004 ZZH R&B IMCU UMMC

## 2019-07-30 PROCEDURE — 25000132 ZZH RX MED GY IP 250 OP 250 PS 637: Performed by: OBSTETRICS & GYNECOLOGY

## 2019-07-30 PROCEDURE — 25800030 ZZH RX IP 258 OP 636: Performed by: ANESTHESIOLOGY

## 2019-07-30 PROCEDURE — 84100 ASSAY OF PHOSPHORUS: CPT | Performed by: OBSTETRICS & GYNECOLOGY

## 2019-07-30 PROCEDURE — 25000128 H RX IP 250 OP 636: Performed by: ANESTHESIOLOGY

## 2019-07-30 PROCEDURE — 25000128 H RX IP 250 OP 636: Performed by: NURSE ANESTHETIST, CERTIFIED REGISTERED

## 2019-07-30 PROCEDURE — 88341 IMHCHEM/IMCYTCHM EA ADD ANTB: CPT | Performed by: OBSTETRICS & GYNECOLOGY

## 2019-07-30 PROCEDURE — 0UBM0ZZ EXCISION OF VULVA, OPEN APPROACH: ICD-10-PCS | Performed by: OBSTETRICS & GYNECOLOGY

## 2019-07-30 PROCEDURE — 88360 TUMOR IMMUNOHISTOCHEM/MANUAL: CPT | Performed by: OBSTETRICS & GYNECOLOGY

## 2019-07-30 PROCEDURE — 56631 VLVCTMY RAD PRTL UNI LYMPHAD: CPT | Mod: GC | Performed by: OBSTETRICS & GYNECOLOGY

## 2019-07-30 PROCEDURE — 07BH0ZX EXCISION OF RIGHT INGUINAL LYMPHATIC, OPEN APPROACH, DIAGNOSTIC: ICD-10-PCS | Performed by: OBSTETRICS & GYNECOLOGY

## 2019-07-30 PROCEDURE — 25000132 ZZH RX MED GY IP 250 OP 250 PS 637: Performed by: STUDENT IN AN ORGANIZED HEALTH CARE EDUCATION/TRAINING PROGRAM

## 2019-07-30 PROCEDURE — 93010 ELECTROCARDIOGRAM REPORT: CPT | Mod: 59 | Performed by: INTERNAL MEDICINE

## 2019-07-30 PROCEDURE — 25000565 ZZH ISOFLURANE, EA 15 MIN: Performed by: OBSTETRICS & GYNECOLOGY

## 2019-07-30 PROCEDURE — 36415 COLL VENOUS BLD VENIPUNCTURE: CPT | Performed by: CLINICAL NURSE SPECIALIST

## 2019-07-30 PROCEDURE — 88309 TISSUE EXAM BY PATHOLOGIST: CPT | Performed by: OBSTETRICS & GYNECOLOGY

## 2019-07-30 PROCEDURE — 85027 COMPLETE CBC AUTOMATED: CPT | Performed by: OBSTETRICS & GYNECOLOGY

## 2019-07-30 PROCEDURE — 36415 COLL VENOUS BLD VENIPUNCTURE: CPT | Performed by: OBSTETRICS & GYNECOLOGY

## 2019-07-30 PROCEDURE — A9520 TC99 TILMANOCEPT DIAG 0.5MCI: HCPCS | Performed by: OBSTETRICS & GYNECOLOGY

## 2019-07-30 PROCEDURE — 40000065 ZZH STATISTIC EKG NON-CHARGEABLE

## 2019-07-30 PROCEDURE — 34300033 ZZH RX 343: Performed by: OBSTETRICS & GYNECOLOGY

## 2019-07-30 PROCEDURE — 27210794 ZZH OR GENERAL SUPPLY STERILE: Performed by: OBSTETRICS & GYNECOLOGY

## 2019-07-30 PROCEDURE — 00000146 ZZHCL STATISTIC GLUCOSE BY METER IP

## 2019-07-30 PROCEDURE — 71000014 ZZH RECOVERY PHASE 1 LEVEL 2 FIRST HR: Performed by: OBSTETRICS & GYNECOLOGY

## 2019-07-30 PROCEDURE — 80048 BASIC METABOLIC PNL TOTAL CA: CPT | Performed by: OBSTETRICS & GYNECOLOGY

## 2019-07-30 PROCEDURE — 78199 UNLSTD HEMATOP RET/ENDO LYMP: CPT

## 2019-07-30 PROCEDURE — 78195 LYMPH SYSTEM IMAGING: CPT

## 2019-07-30 PROCEDURE — 25800030 ZZH RX IP 258 OP 636: Performed by: STUDENT IN AN ORGANIZED HEALTH CARE EDUCATION/TRAINING PROGRAM

## 2019-07-30 PROCEDURE — 85610 PROTHROMBIN TIME: CPT | Performed by: CLINICAL NURSE SPECIALIST

## 2019-07-30 RX ORDER — LABETALOL 20 MG/4 ML (5 MG/ML) INTRAVENOUS SYRINGE
10
Status: DISCONTINUED | OUTPATIENT
Start: 2019-07-30 | End: 2019-07-30 | Stop reason: HOSPADM

## 2019-07-30 RX ORDER — NALOXONE HYDROCHLORIDE 0.4 MG/ML
.1-.4 INJECTION, SOLUTION INTRAMUSCULAR; INTRAVENOUS; SUBCUTANEOUS
Status: DISCONTINUED | OUTPATIENT
Start: 2019-07-30 | End: 2019-07-31

## 2019-07-30 RX ORDER — ACETAMINOPHEN 325 MG/1
650 TABLET ORAL EVERY 4 HOURS PRN
Status: DISCONTINUED | OUTPATIENT
Start: 2019-08-02 | End: 2019-07-31 | Stop reason: HOSPADM

## 2019-07-30 RX ORDER — HYDROMORPHONE HYDROCHLORIDE 1 MG/ML
.3-.5 INJECTION, SOLUTION INTRAMUSCULAR; INTRAVENOUS; SUBCUTANEOUS EVERY 5 MIN PRN
Status: DISCONTINUED | OUTPATIENT
Start: 2019-07-30 | End: 2019-07-30 | Stop reason: HOSPADM

## 2019-07-30 RX ORDER — CEFAZOLIN SODIUM 1 G/3ML
1 INJECTION, POWDER, FOR SOLUTION INTRAMUSCULAR; INTRAVENOUS SEE ADMIN INSTRUCTIONS
Status: DISCONTINUED | OUTPATIENT
Start: 2019-07-30 | End: 2019-07-30 | Stop reason: HOSPADM

## 2019-07-30 RX ORDER — ACETAMINOPHEN 325 MG/1
975 TABLET ORAL EVERY 8 HOURS
Status: DISCONTINUED | OUTPATIENT
Start: 2019-07-30 | End: 2019-07-31 | Stop reason: HOSPADM

## 2019-07-30 RX ORDER — ONDANSETRON 2 MG/ML
INJECTION INTRAMUSCULAR; INTRAVENOUS PRN
Status: DISCONTINUED | OUTPATIENT
Start: 2019-07-30 | End: 2019-07-30

## 2019-07-30 RX ORDER — LIDOCAINE HYDROCHLORIDE 20 MG/ML
INJECTION, SOLUTION INFILTRATION; PERINEURAL PRN
Status: DISCONTINUED | OUTPATIENT
Start: 2019-07-30 | End: 2019-07-30

## 2019-07-30 RX ORDER — SODIUM CHLORIDE, SODIUM LACTATE, POTASSIUM CHLORIDE, CALCIUM CHLORIDE 600; 310; 30; 20 MG/100ML; MG/100ML; MG/100ML; MG/100ML
INJECTION, SOLUTION INTRAVENOUS CONTINUOUS
Status: DISCONTINUED | OUTPATIENT
Start: 2019-07-30 | End: 2019-07-30 | Stop reason: HOSPADM

## 2019-07-30 RX ORDER — PROPOFOL 10 MG/ML
INJECTION, EMULSION INTRAVENOUS PRN
Status: DISCONTINUED | OUTPATIENT
Start: 2019-07-30 | End: 2019-07-30

## 2019-07-30 RX ORDER — OXYCODONE HYDROCHLORIDE 5 MG/1
5-10 TABLET ORAL EVERY 4 HOURS PRN
Status: DISCONTINUED | OUTPATIENT
Start: 2019-07-30 | End: 2019-07-31 | Stop reason: HOSPADM

## 2019-07-30 RX ORDER — PHENAZOPYRIDINE HYDROCHLORIDE 200 MG/1
200 TABLET, FILM COATED ORAL ONCE
Status: COMPLETED | OUTPATIENT
Start: 2019-07-30 | End: 2019-07-30

## 2019-07-30 RX ORDER — NALOXONE HYDROCHLORIDE 0.4 MG/ML
.1-.4 INJECTION, SOLUTION INTRAMUSCULAR; INTRAVENOUS; SUBCUTANEOUS
Status: DISCONTINUED | OUTPATIENT
Start: 2019-07-30 | End: 2019-07-31 | Stop reason: HOSPADM

## 2019-07-30 RX ORDER — SIMVASTATIN 20 MG
40 TABLET ORAL AT BEDTIME
Status: DISCONTINUED | OUTPATIENT
Start: 2019-07-30 | End: 2019-07-31 | Stop reason: HOSPADM

## 2019-07-30 RX ORDER — SODIUM CHLORIDE, SODIUM LACTATE, POTASSIUM CHLORIDE, CALCIUM CHLORIDE 600; 310; 30; 20 MG/100ML; MG/100ML; MG/100ML; MG/100ML
INJECTION, SOLUTION INTRAVENOUS CONTINUOUS
Status: DISCONTINUED | OUTPATIENT
Start: 2019-07-30 | End: 2019-07-30

## 2019-07-30 RX ORDER — AMOXICILLIN 250 MG
1 CAPSULE ORAL 2 TIMES DAILY
Status: DISCONTINUED | OUTPATIENT
Start: 2019-07-30 | End: 2019-07-31 | Stop reason: HOSPADM

## 2019-07-30 RX ORDER — ONDANSETRON 4 MG/1
4 TABLET, ORALLY DISINTEGRATING ORAL EVERY 30 MIN PRN
Status: DISCONTINUED | OUTPATIENT
Start: 2019-07-30 | End: 2019-07-30 | Stop reason: HOSPADM

## 2019-07-30 RX ORDER — LISINOPRIL 5 MG/1
5 TABLET ORAL EVERY MORNING
Status: DISCONTINUED | OUTPATIENT
Start: 2019-07-31 | End: 2019-07-31 | Stop reason: HOSPADM

## 2019-07-30 RX ORDER — ONDANSETRON 2 MG/ML
4 INJECTION INTRAMUSCULAR; INTRAVENOUS EVERY 30 MIN PRN
Status: DISCONTINUED | OUTPATIENT
Start: 2019-07-30 | End: 2019-07-30 | Stop reason: HOSPADM

## 2019-07-30 RX ORDER — DEXAMETHASONE SODIUM PHOSPHATE 4 MG/ML
INJECTION, SOLUTION INTRA-ARTICULAR; INTRALESIONAL; INTRAMUSCULAR; INTRAVENOUS; SOFT TISSUE PRN
Status: DISCONTINUED | OUTPATIENT
Start: 2019-07-30 | End: 2019-07-30

## 2019-07-30 RX ORDER — LIDOCAINE 40 MG/G
CREAM TOPICAL
Status: DISCONTINUED | OUTPATIENT
Start: 2019-07-30 | End: 2019-07-31 | Stop reason: HOSPADM

## 2019-07-30 RX ORDER — IBUPROFEN 200 MG
400 TABLET ORAL EVERY 6 HOURS PRN
Status: DISCONTINUED | OUTPATIENT
Start: 2019-07-30 | End: 2019-07-31 | Stop reason: HOSPADM

## 2019-07-30 RX ORDER — SOTALOL HYDROCHLORIDE 80 MG/1
80 TABLET ORAL 2 TIMES DAILY
Status: DISCONTINUED | OUTPATIENT
Start: 2019-07-30 | End: 2019-07-31 | Stop reason: HOSPADM

## 2019-07-30 RX ORDER — EPHEDRINE SULFATE 50 MG/ML
INJECTION, SOLUTION INTRAMUSCULAR; INTRAVENOUS; SUBCUTANEOUS PRN
Status: DISCONTINUED | OUTPATIENT
Start: 2019-07-30 | End: 2019-07-30

## 2019-07-30 RX ORDER — DEXTROSE MONOHYDRATE 25 G/50ML
25-50 INJECTION, SOLUTION INTRAVENOUS
Status: DISCONTINUED | OUTPATIENT
Start: 2019-07-30 | End: 2019-07-31 | Stop reason: HOSPADM

## 2019-07-30 RX ORDER — FENTANYL CITRATE 50 UG/ML
INJECTION, SOLUTION INTRAMUSCULAR; INTRAVENOUS PRN
Status: DISCONTINUED | OUTPATIENT
Start: 2019-07-30 | End: 2019-07-30

## 2019-07-30 RX ORDER — CEFAZOLIN SODIUM 2 G/100ML
2 INJECTION, SOLUTION INTRAVENOUS
Status: COMPLETED | OUTPATIENT
Start: 2019-07-30 | End: 2019-07-30

## 2019-07-30 RX ORDER — LIDOCAINE 40 MG/G
CREAM TOPICAL
Status: DISCONTINUED | OUTPATIENT
Start: 2019-07-30 | End: 2019-07-30 | Stop reason: HOSPADM

## 2019-07-30 RX ORDER — NICOTINE POLACRILEX 4 MG
15-30 LOZENGE BUCCAL
Status: DISCONTINUED | OUTPATIENT
Start: 2019-07-30 | End: 2019-07-31 | Stop reason: HOSPADM

## 2019-07-30 RX ORDER — PANTOPRAZOLE SODIUM 40 MG/1
40 TABLET, DELAYED RELEASE ORAL EVERY MORNING
Status: DISCONTINUED | OUTPATIENT
Start: 2019-07-31 | End: 2019-07-31 | Stop reason: HOSPADM

## 2019-07-30 RX ORDER — GINSENG 100 MG
CAPSULE ORAL 2 TIMES DAILY
Status: DISCONTINUED | OUTPATIENT
Start: 2019-07-30 | End: 2019-07-31 | Stop reason: HOSPADM

## 2019-07-30 RX ORDER — AMOXICILLIN 250 MG
2 CAPSULE ORAL 2 TIMES DAILY
Status: DISCONTINUED | OUTPATIENT
Start: 2019-07-30 | End: 2019-07-31 | Stop reason: HOSPADM

## 2019-07-30 RX ORDER — WARFARIN SODIUM 7.5 MG/1
7.5 TABLET ORAL
Status: DISCONTINUED | OUTPATIENT
Start: 2019-07-31 | End: 2019-07-30

## 2019-07-30 RX ORDER — FENTANYL CITRATE 50 UG/ML
25-50 INJECTION, SOLUTION INTRAMUSCULAR; INTRAVENOUS
Status: DISCONTINUED | OUTPATIENT
Start: 2019-07-30 | End: 2019-07-30 | Stop reason: HOSPADM

## 2019-07-30 RX ORDER — WARFARIN SODIUM 7.5 MG/1
7.5 TABLET ORAL
Status: COMPLETED | OUTPATIENT
Start: 2019-07-30 | End: 2019-07-30

## 2019-07-30 RX ADMIN — OXYCODONE HYDROCHLORIDE 5 MG: 5 TABLET ORAL at 18:45

## 2019-07-30 RX ADMIN — FENTANYL CITRATE 25 MCG: 50 INJECTION, SOLUTION INTRAMUSCULAR; INTRAVENOUS at 15:41

## 2019-07-30 RX ADMIN — SUGAMMADEX 200 MG: 100 INJECTION, SOLUTION INTRAVENOUS at 14:24

## 2019-07-30 RX ADMIN — ROCURONIUM BROMIDE 50 MG: 10 INJECTION INTRAVENOUS at 12:22

## 2019-07-30 RX ADMIN — HYDROMORPHONE HYDROCHLORIDE 0.3 MG: 1 INJECTION, SOLUTION INTRAMUSCULAR; INTRAVENOUS; SUBCUTANEOUS at 16:28

## 2019-07-30 RX ADMIN — ONDANSETRON 4 MG: 2 INJECTION INTRAMUSCULAR; INTRAVENOUS at 13:48

## 2019-07-30 RX ADMIN — SOTALOL HYDROCHLORIDE 80 MG: 80 TABLET ORAL at 21:10

## 2019-07-30 RX ADMIN — PROPOFOL 50 MG: 10 INJECTION, EMULSION INTRAVENOUS at 12:22

## 2019-07-30 RX ADMIN — SODIUM CHLORIDE, POTASSIUM CHLORIDE, SODIUM LACTATE AND CALCIUM CHLORIDE: 600; 310; 30; 20 INJECTION, SOLUTION INTRAVENOUS at 13:55

## 2019-07-30 RX ADMIN — OXYCODONE HYDROCHLORIDE 10 MG: 5 TABLET ORAL at 22:44

## 2019-07-30 RX ADMIN — DEXAMETHASONE SODIUM PHOSPHATE 6 MG: 4 INJECTION, SOLUTION INTRA-ARTICULAR; INTRALESIONAL; INTRAMUSCULAR; INTRAVENOUS; SOFT TISSUE at 12:30

## 2019-07-30 RX ADMIN — LIDOCAINE HYDROCHLORIDE 100 MG: 20 INJECTION, SOLUTION INFILTRATION; PERINEURAL at 12:22

## 2019-07-30 RX ADMIN — PHENAZOPYRIDINE HYDROCHLORIDE 200 MG: 200 TABLET, FILM COATED ORAL at 07:59

## 2019-07-30 RX ADMIN — SIMVASTATIN 40 MG: 20 TABLET, FILM COATED ORAL at 21:10

## 2019-07-30 RX ADMIN — METHYLENE BLUE 5 MG: 10 INJECTION INTRAVENOUS at 12:47

## 2019-07-30 RX ADMIN — FENTANYL CITRATE 50 MCG: 50 INJECTION, SOLUTION INTRAMUSCULAR; INTRAVENOUS at 13:37

## 2019-07-30 RX ADMIN — HYDROMORPHONE HYDROCHLORIDE 0.2 MG: 1 INJECTION, SOLUTION INTRAMUSCULAR; INTRAVENOUS; SUBCUTANEOUS at 16:55

## 2019-07-30 RX ADMIN — FENTANYL CITRATE 25 MCG: 50 INJECTION, SOLUTION INTRAMUSCULAR; INTRAVENOUS at 14:53

## 2019-07-30 RX ADMIN — ROCURONIUM BROMIDE 10 MG: 10 INJECTION INTRAVENOUS at 13:35

## 2019-07-30 RX ADMIN — SENNOSIDES AND DOCUSATE SODIUM 2 TABLET: 8.6; 5 TABLET ORAL at 21:10

## 2019-07-30 RX ADMIN — CEFAZOLIN SODIUM 2 G: 2 INJECTION, SOLUTION INTRAVENOUS at 12:30

## 2019-07-30 RX ADMIN — Medication 5 MG: at 12:35

## 2019-07-30 RX ADMIN — BACITRACIN: 500 OINTMENT TOPICAL at 21:11

## 2019-07-30 RX ADMIN — INSULIN ASPART 1 UNITS: 100 INJECTION, SOLUTION INTRAVENOUS; SUBCUTANEOUS at 21:59

## 2019-07-30 RX ADMIN — SODIUM CHLORIDE, POTASSIUM CHLORIDE, SODIUM LACTATE AND CALCIUM CHLORIDE: 600; 310; 30; 20 INJECTION, SOLUTION INTRAVENOUS at 11:54

## 2019-07-30 RX ADMIN — Medication 5 MG: at 12:48

## 2019-07-30 RX ADMIN — ROCURONIUM BROMIDE 20 MG: 10 INJECTION INTRAVENOUS at 13:01

## 2019-07-30 RX ADMIN — FENTANYL CITRATE 150 MCG: 50 INJECTION, SOLUTION INTRAMUSCULAR; INTRAVENOUS at 12:22

## 2019-07-30 RX ADMIN — ACETAMINOPHEN 975 MG: 325 TABLET, FILM COATED ORAL at 18:45

## 2019-07-30 RX ADMIN — SODIUM CHLORIDE, POTASSIUM CHLORIDE, SODIUM LACTATE AND CALCIUM CHLORIDE: 600; 310; 30; 20 INJECTION, SOLUTION INTRAVENOUS at 18:46

## 2019-07-30 RX ADMIN — WARFARIN SODIUM 7.5 MG: 7.5 TABLET ORAL at 21:57

## 2019-07-30 RX ADMIN — TILMANOCEPT 0.5 MILLICURIE: KIT at 08:32

## 2019-07-30 RX ADMIN — FENTANYL CITRATE 25 MCG: 50 INJECTION, SOLUTION INTRAMUSCULAR; INTRAVENOUS at 15:17

## 2019-07-30 ASSESSMENT — PAIN DESCRIPTION - DESCRIPTORS
DESCRIPTORS: ACHING;CONSTANT

## 2019-07-30 ASSESSMENT — ACTIVITIES OF DAILY LIVING (ADL): ADLS_ACUITY_SCORE: 13

## 2019-07-30 ASSESSMENT — MIFFLIN-ST. JEOR: SCORE: 1697.5

## 2019-07-30 ASSESSMENT — ENCOUNTER SYMPTOMS: DYSRHYTHMIAS: 1

## 2019-07-30 NOTE — OR NURSING
Writer spoke with Dr. Celis who stated that due to the patients Afib she would place an order for the patient to be transferred to a tele unit.     Patient updated regarding plan of care.

## 2019-07-30 NOTE — PROGRESS NOTES
Gynecologic Oncology Postoperative Check Note  7/30/2019    S: Patient reports she is doing okay postoperatively. Pain is adequately controlled.  Ambulating with assistance. Voiding upon arriving to the room, no issues. Reports she feels warm. Denies chest pain, shortness of breath, dizziness, or other concerns at this time.    O:  Vitals:    07/30/19 1630 07/30/19 1645 07/30/19 1700 07/30/19 1735   BP: 115/76 109/75 115/70    BP Location:       Cuff Size:       Pulse: 105  105    Resp: 16 16 16 18   Temp:   98.2  F (36.8  C)    TempSrc:   Oral    SpO2: 95% 96% 96% 95%   Weight:       Height:       Gen: Well appearing, NAD  Cardio: RRR, S1/S2, no murmurs appreciated   Resp: CTAB, no wheezing or crackles  Abdomen: soft, non-distended, minimally tender, incision in R groin covered with primapore, dry and intact. No shadowing  Extremities: Non-tender, SCDs in place    A/P: 69 year old POD# 0 s/p right radical vulvectomy with right sentinel lymph node biospy. Doing well postoperatively.    Dz: Vulvar melanoma. Negative PET scan.   Cancer history: Hx pancreatic neuroendocrine tumor s/p pancreaticoduodenectomy, papillary thyroid cancer s/p thyroidectomy, breast cancer s/p lumpectomy  FEN: mIVF 100cc/hr, ADAT. Will stop IVF when tolerating diet.  Pain: Tylenol, ibuprofen, prn oxycodone  Heme: Hgb 13> EBL 50cc. On warfarin for hx Afib, restart POD#1, no bridge (ord'd). INR 0.97  CV:  Hx paroxysmal Afib. Afib in PACU, NSR currently. Will continue on tele overnight. Home sotalol ordered. SSS s/p pacemaker. HTN, lisinopril (hold for BP <110/60). HLD, simvistatin   Pulm: RHONDA, has home CPAP. No acute issues.  GI: GERD, protonix   : Voiding spontaneously  ID: No issues  Endo: DM, metformin held; MSSI while inpatient. Last A1c 6.2; Hypothyroid, synthroid.   Psych/Neuro/MSK/Other:No issues  PPX: SCDs, IS  Dispo: Likely home POD#1  Consults: None     Maya Cain MD  Ob/Gyn PGY-2  July 30, 2019 6:08 PM

## 2019-07-30 NOTE — ANESTHESIA POSTPROCEDURE EVALUATION
Anesthesia POST Procedure Evaluation    Patient: Ilana Trent   MRN:     [id number] Gender:   female   Age:    69 year old :      1950        Preoperative Diagnosis: Melanoma Of Vulva   Procedure(s):  Exam Under Anesthesia, Right  Radical Vulvectomy, Right [place] Inguinal Lymph Node Sampling   Postop Comments: No value filed.       Anesthesia Type:  Not documented  General    Reportable Event: NO     PAIN: Uncomplicated   Sign Out status: Comfortable, Well controlled pain     PONV: No PONV   Sign Out status:  No Nausea or Vomiting     Neuro/Psych: Uneventful perioperative course   Sign Out Status: Preoperative baseline; Age appropriate mentation     Airway/Resp.: Uneventful perioperative course   Sign Out Status: Non labored breathing, age appropriate RR; Resp. Status within EXPECTED Parameters     CV: Uneventful perioperative course   Sign Out status: Appropriate BP and perfusion indices; Appropriate HR/Rhythm     Disposition:   Sign Out in:  PACU  Disposition:  Floor  Recovery Course: Uneventful  Follow-Up: Not required           Last Anesthesia Record Vitals:  CRNA VITALS  2019 1404 - 2019 1446      2019             Resp Rate (observed):  9          Last PACU Vitals:  Vitals Value Taken Time   /67 2019  2:42 PM   Temp     Pulse 60 2019  2:42 PM   Resp     SpO2 99 % 2019  2:45 PM   Temp src     NIBP     Pulse     SpO2     Resp     Temp     Ht Rate     Temp 2     Vitals shown include unvalidated device data.      Electronically Signed By: Thomas Mendez MD, 2019, 2:46 PM

## 2019-07-30 NOTE — DISCHARGE SUMMARY
Gynecologic Oncology Discharge Summary    Ilana Trent  [id number]    Admit Date: 7/30/2019  Discharge Date: 7/31/2019   Admitting Provider: Dr. Celis  Discharge Provider: Dr. Clemons    Admission Dx:   -Vulvar Melanoma  -Paroxysmal Atrial Fibrillation  -Hypertension  -Obstructive sleep apnea  -Hypothyroidism  -Sick sinus syndrome s/p ablation  -History of breast cancer, thyroid cancer, and neuroendocrine tumor of pancreas    Discharge Dx:  -Same    Procedures:   -Exam under anesthesia, right radical vulvectomy with right sentinel lymph node dissection    Prior to Admission Medications:  Medications Prior to Admission   Medication Sig Dispense Refill Last Dose     cetirizine-pseudoePHEDrine ER (ZYRTEC-D) 5-120 MG 12 hr tablet Take 1 tablet by mouth every morning    7/29/2019 at 0500     levothyroxine (SYNTHROID/LEVOTHROID) 175 MCG tablet Take 175 mcg by mouth every morning    7/30/2019 at 0330     lisinopril (PRINIVIL/ZESTRIL) 5 MG tablet Take 5 mg by mouth every morning    7/29/2019 at 0500     metFORMIN (GLUCOPHAGE-XR) 500 MG 24 hr tablet Take 500 mg by mouth every morning    7/29/2019 at 0500     multivitamin w/minerals (THERA-VIT-M) tablet Take 1 tablet by mouth daily   7/29/2019 at 0500     naproxen (NAPROSYN) 500 MG tablet Take 500 mg by mouth every morning    7/29/2019 at 0500     pantoprazole (PROTONIX) 40 MG EC tablet Take 40 mg by mouth every morning    7/30/2019 at 0330     simvastatin (ZOCOR) 40 MG tablet Take 40 mg by mouth At Bedtime    7/29/2019 at 2200     sotalol (BETAPACE) 160 MG tablet Take 80 mg by mouth 2 times daily    7/30/2019 at 0330     ALOE PO Take by mouth daily as needed   Taking     CONTOUR NEXT EZ (CONTOUR NEXT EZ W/DEVICE KIT) w/Device KIT See Admin Instructions  0 Taking     furosemide (LASIX) 20 MG tablet Take 20 mg by mouth daily as needed (SWELLING)   7/1/2019     vitamin B complex with vitamin C (STRESS TAB) tablet Take 1 tablet by mouth every morning    Unknown at  Unknown time     warfarin (COUMADIN) 5 MG tablet Take 7.5mg 5 days week ,\ 5 mg Tues, Sat  Takes in the evening   7/24/2019       Discharge Medications:     Review of your medicines      START taking      Dose / Directions   bacitracin 500 UNIT/GM Oint      Apply topically 2 times daily  Quantity:  1 Tube  Refills:  0     oxyCODONE 5 MG tablet  Commonly known as:  ROXICODONE      Dose:  5 mg  Take 1 tablet (5 mg) by mouth every 4 hours as needed for breakthrough pain  Quantity:  15 tablet  Refills:  0     senna-docusate 8.6-50 MG tablet  Commonly known as:  SENOKOT-S/PERICOLACE      Dose:  1-2 tablet  Take 1-2 tablets by mouth 2 times daily as needed for constipation  Quantity:  60 tablet  Refills:  0        CONTINUE these medicines which may have CHANGED, or have new prescriptions. If we are uncertain of the size of tablets/capsules you have at home, strength may be listed as something that might have changed.      Dose / Directions   acetaminophen 500 MG tablet  Commonly known as:  TYLENOL  This may have changed:      how much to take    when to take this    reasons to take this      Dose:  500-1000 mg  Take 1-2 tablets (500-1,000 mg) by mouth every 6 hours as needed for mild pain  Quantity:  50 tablet  Refills:  0        CONTINUE these medicines which have NOT CHANGED      Dose / Directions   ALOE PO      Take by mouth daily as needed  Refills:  0     cetirizine-pseudoePHEDrine ER 5-120 MG 12 hr tablet  Commonly known as:  zyrTEC-D      Dose:  1 tablet  Take 1 tablet by mouth every morning  Refills:  0     CONTOUR NEXT EZ w/Device Kit      See Admin Instructions  Refills:  0     furosemide 20 MG tablet  Commonly known as:  LASIX      Dose:  20 mg  Take 20 mg by mouth daily as needed (SWELLING)  Refills:  0     levothyroxine 175 MCG tablet  Commonly known as:  SYNTHROID/LEVOTHROID      Dose:  175 mcg  Take 175 mcg by mouth every morning  Refills:  0     lisinopril 5 MG tablet  Commonly known as:   PRINIVIL/ZESTRIL      Dose:  5 mg  Take 5 mg by mouth every morning  Refills:  0     metFORMIN 500 MG 24 hr tablet  Commonly known as:  GLUCOPHAGE-XR      Dose:  500 mg  Take 500 mg by mouth every morning  Refills:  0     multivitamin w/minerals tablet      Dose:  1 tablet  Take 1 tablet by mouth daily  Refills:  0     naproxen 500 MG tablet  Commonly known as:  NAPROSYN      Dose:  500 mg  Take 500 mg by mouth every morning  Refills:  0     pantoprazole 40 MG EC tablet  Commonly known as:  PROTONIX      Dose:  40 mg  Take 40 mg by mouth every morning  Refills:  0     simvastatin 40 MG tablet  Commonly known as:  ZOCOR      Dose:  40 mg  Take 40 mg by mouth At Bedtime  Refills:  0     sotalol 160 MG tablet  Commonly known as:  BETAPACE      Dose:  80 mg  Take 80 mg by mouth 2 times daily  Refills:  0     vitamin B complex with vitamin C tablet      Dose:  1 tablet  Take 1 tablet by mouth every morning  Refills:  0     warfarin 5 MG tablet  Commonly known as:  COUMADIN      Take 7.5mg 5 days week ,\ 5 mg Tues, Sat  Takes in the evening  Refills:  0           Where to get your medicines      These medications were sent to [facility] [place], [place] - [address]  [address] [address]    Phone:  [phone]     acetaminophen 500 MG tablet    bacitracin 500 UNIT/GM Oint    senna-docusate 8.6-50 MG tablet     Some of these will need a paper prescription and others can be bought over the counter. Ask your nurse if you have questions.    Bring a paper prescription for each of these medications    oxyCODONE 5 MG tablet         Consultations:  None    Brief History of Illness:  Patient was recently diagnosed with vulvar melanoma after presenting locally with a vulvar mass and underwent a wide local excision on 6/4/19.  She was subsequently recommended to undergo right radical vulvectomy with right sentinel lymph node sampling.    Hospital Course:  Dz:   - Preoperative  diagnosis was vulvar melanoma.  Final pathology is pending at the time of discharge.  She will follow-up postoperatively for a care plan.  FEN:   - She was maintained on IVF until the evening of POD#0, when she was tolerating a regular diet without nausea and vomiting and able to maintain her hydration without IVF supplementation.  Pain:   - She was started on a PO pain regimen post-operatively. Her pain was well controlled on this and she was discharged home with these medications.  CV:   - She has a history of hypertension, atrial fibrillation on warfarin, sick sinus syndrome s/p ablation, and hyperlipidemia. In the PACU she was noted to be in atrial fibrillation and was kept on telemetry overnight. Warfarin plan was discussed with Cardiology and Pharmacy and decision was made to restart Warfarin on POD#0 without bridging. On telemetry, she was in and out of atrial fibrillation throughout the night but was asymptomatic from this. Her home sotalol was restarted on POD#0.  CBC, BMP, magnesium, and phosphorus were normal.  Her home lisinopril and simvastatin were also continued while in house.  Her vital signs were stable while in house and she had no acute CV issues.  PULM:   - She was continued on her home CPAP at night for RHONDA.  She was initially given O2 supplementation in to maintain her O2 sats in the immediate postop period and was transitioned off of this without difficulty. By discharge, her O2 sats were greater than 94% on RA.  She was encouraged to use her bedside IS while in house.  She had no acute pulmonary issues while in house.  HEME:   - Her preoperative Hgb was 13.0. Estimated blood loss from surgery was 50cc and on POD#1 hemoglobin was 14.3. She had no other acute heme issues while in house.  GI:   - She was made NPO prior to the procedure.  On POD#0, her diet was advanced as tolerated.  At the time of discharge, she was tolerating a regular diet without nausea and vomiting.  She will be discharged  with a bowel regimen to prevent constipation in the postoperative period.  She had no acute GI issues while in house.  :    -  The patient was voiding spontaneously without difficulty.  She had no acute  issues while in house.  ID:   - The patient was afebrile during her hospitalization.  She received standard preoperative antibiotics without incident.    ENDO:   - She was continued on her home synthroid for hypothyroidism. For her T2DM she was on MSSI while inpatient and her home metformin was resumed on discharge   PSYCH/NEURO:   - No issues  PPX:    -  She was given SCDs, IS, PPI during her hospital course.  She tolerated these prophylactic interventions without incident.  They were discontinued at the time of her discharge.      Discharge Instructions and Follow up:  Ms. Ilana Trent was discharged from the hospital with follow up scheduled with Dr. Celis on 8/21. She was also instructed to follow up with her Coumadin clinic in 3 days    Discharge Diet: Regular  Discharge Activity: Activity as tolerated. No swimming or sitting directly on wound until cleared by Dr. Celis    Discharge Disposition:  Discharged to home    Discharge Staff: Dr. Kaci Cain MD  Ob/Gyn PGY-2  July 31, 2019 7:38 AM    PATHOLOGY DIAGNOSIS ADDENDUM:    FINAL DIAGNOSIS:     A. SENTINEL LYMPH NODE, RIGHT INGUINAL:   -Metastatic malignant melanoma to one lymph node examined (1/1). See   comment.     B. SENTINEL LYMPH NODE, RIGHT INGUINAL SUPERFICIAL:   -Fibrovascular adipose tissue, negative for malignancy.   -No evidence of lymphoid tissue.     C. VULVA, RIGHT RADICAL VULVECTOMY:   -Residual malignant melanoma. See Comment.   -Margins negative for malignancy.     COMMENT:   Part A: Immunohistochemical stains for Melan-A and tyrosinase are   performed with proper controls on blocks A1   and A2 and demonstrate staining positivity for malignant melanocytes in   the subcapsular spaces in the lymph   node.     Part  C: Immunohistochemical stains for Melan-A on slide C8 demonstrate   staining positivity for malignant   Melanocytes.    Addendum added for Maya Cain MD on 8/22/2019 by Mikala Hanson.

## 2019-07-30 NOTE — ANESTHESIA PREPROCEDURE EVALUATION
Anesthesia Pre-Procedure Evaluation    Patient: Ilana Trent   MRN:     [id number] Gender:   female   Age:    69 year old :      1950        Preoperative Diagnosis: Melanoma Of Vulva   Procedure(s):  Exam Under Anesthesia, Radical Vulvectomy, Possible Bilateral Inguinal And Femoral Lymph Node Dissection, Bilateral [place] Inguinal Lymph Node Sampling     Past Medical History:   Diagnosis Date     Arthritis      Back pain      Cataract      Diabetes (H)      GERD (gastroesophageal reflux disease)      HLD (hyperlipidemia)      Hypertension      Hypothyroidism      Long term current use of anticoagulant therapy      Malignant neoplasm of breast (female) (H)     s/p lumpectomy, LN removal, radiation     Melanoma of vulva (H)      RHONDA (obstructive sleep apnea)      Osteopenia      PAF (paroxysmal atrial fibrillation) (H)     s/p ablation     Papillary carcinoma (H)     incidentally found     Primary malignant neuroendocrine tumor of pancreas (H)      Toxic multinodular goiter       Past Surgical History:   Procedure Laterality Date     BACK SURGERY      L4-5     BREAST LUMPECTOMY, RT/LT       BUNIONECTOMY Bilateral 2006     Excision right vulvar mass  2019     FOOT SURGERY Bilateral 2006     Intracardiac ablation  2012     Knee arthroscopy surgery Bilateral 2006     Pacemaker implantation  2007     PANCREAS SURGERY      pancreaticoduodenectomy     THYROIDECTOMY             Anesthesia Evaluation     . Pt has had prior anesthetic. Type: General           ROS/MED HX    ENT/Pulmonary:     (+)sleep apnea, , . .    Neurologic:  - neg neurologic ROS     Cardiovascular:     (+) Dyslipidemia, hypertension----. Taking blood thinners : . . . pacemaker :. dysrhythmias a-fib, .       METS/Exercise Tolerance:     Hematologic:  - neg hematologic  ROS       Musculoskeletal:   (+) arthritis,  other musculoskeletal- osteopenia      GI/Hepatic:     (+) GERD Other GI/Hepatic pancreatic tumor     "  Renal/Genitourinary:         Endo:     (+) type II DM thyroid problem hypothyroidism, Other Endocrine Disorder h/o toxic multinodular goiter.      Psychiatric:  - neg psychiatric ROS       Infectious Disease:  - neg infectious disease ROS       Malignancy:   (+) Malignancy History of Other and Breast  Breast CA status post Surgery. Other CA vulva melanoma Active status post         Other:                         PHYSICAL EXAM:   Mental Status/Neuro: A/A/O   Airway: Facies: Feasible  Mallampati: II  Mouth/Opening: Full  TM distance: > 6 cm  Neck ROM: Full   Respiratory: Auscultation: CTAB     Resp. Rate: Normal     Resp. Effort: Normal      CV: Rhythm: Regular  Rate: Age appropriate  Heart: Normal Sounds  Edema: None   Comments:      Dental: Normal Dentition                LABS:  CBC: No results found for: WBC, HGB, HCT, PLT  BMP:   Lab Results   Component Value Date     (A) 06/28/2019     COAGS: No results found for: PTT, INR, FIBR  POC: No results found for: BGM, HCG, HCGS  OTHER: No results found for: PH, LACT, A1C, RENEA, PHOS, MAG, ALBUMIN, PROTTOTAL, ALT, AST, GGT, ALKPHOS, BILITOTAL, BILIDIRECT, LIPASE, AMYLASE, TAHIRA, TSH, T4, T3, CRP, SED     Preop Vitals    BP Readings from Last 3 Encounters:   07/03/19 129/73   07/03/19 161/82   06/26/19 151/82    Pulse Readings from Last 3 Encounters:   07/03/19 71   07/03/19 64   06/26/19 67      Resp Readings from Last 3 Encounters:   07/03/19 16   07/03/19 14   06/26/19 16    SpO2 Readings from Last 3 Encounters:   07/03/19 97%   07/03/19 98%   06/26/19 95%      Temp Readings from Last 1 Encounters:   07/03/19 36.7  C (98.1  F) (Oral)    Ht Readings from Last 1 Encounters:   07/03/19 1.727 m (5' 8\")      Wt Readings from Last 1 Encounters:   07/03/19 111.7 kg (246 lb 4.8 oz)    Estimated body mass index is 37.45 kg/m  as calculated from the following:    Height as of 7/3/19: 1.727 m (5' 8\").    Weight as of 7/3/19: 111.7 kg (246 lb 4.8 oz).     LDA:    "     Assessment:   ASA SCORE: 3    H&P: History and physical reviewed and following examination; no interval change.         Plan:   Anes. Type:  General   Pre-Medication: None   Induction:  IV (Standard)   Airway: ETT; Oral   Access/Monitoring: PIV   Maintenance: Balanced     Postop Plan:   Postop Pain: Opioids  Postop Sedation/Airway: Not planned     PONV Management:   Adult Risk Factors: Female, Postop Opioids   Prevention: Ondansetron, Dexamethasone     CONSENT: Direct conversation   Plan and risks discussed with: Patient                      Thomas Mendez MD

## 2019-07-30 NOTE — OP NOTE
Gynecologic Oncology Operative Note  Date of Service: 07/30/19     PREOPERATIVE DIAGNOSES: vulvar melanoma  POSTOPERATIVE DIAGNOSES: same    OPERATIVE PROCEDURES: Exam under anesthesia, right inguinal sentinel lymph node dissection, right radical vulvectomy    SURGEON: Bacilio Celis MD  ASSISTANTS: Ritchie Khalil MD; Maya Cain MD    ANESTHESIA: GETA  ESTIMATED BLOOD LOSS: 50 cc  TOTAL INTRAVENOUS FLUIDS: 1L crystalloid  TOTAL URINE OUTPUT: 200 ml clear urine via straight cath at end of case    INDICATIONS FOR PROCEDURE: 70 yo with right vulvar mass, previously excised via WLE and found to have a nodular melanoma 13 mm in greatest dimension with 5 mm depth of invasion; all margins were positive.  She presents today for repeat excision and staging.  OPERATIVE FINDINGS: no palpable inguinal lymphadenopathy, external genitalia without any obvious lesions, well healing WLE incision at 9 o'clock.  Right inguinal sentinel lymph node identified, hot and blue.  Right vulvectomy specimen with 2 cm margins around prior scar with stitch at 12 o'clock - the superior point was approximately 1 cm from the clitoris on the patient's right, the medial margin was at least 1 cm away from her urethral meatus, and the lateral margin preserved a portion of her right labia majora.    SPECIMENS REMOVED:  ID Type Source Tests Collected by Time Destination   A : right inguinal sentinal lymph node (hot and blue) Tissue Lymph Node, Inguinal, Right SURGICAL PATHOLOGY EXAM Bacilio Celis MD 7/30/2019  1:20 PM    B : right inguinal sentinal lymph node superficial Tissue Lymph Node, Inguinal, Right SURGICAL PATHOLOGY EXAM Bacilio Celis MD 7/30/2019  1:22 PM    C : right vulva stitch at 12:00 Tissue Vulva SURGICAL PATHOLOGY EXAM Bacilio Celis MD 7/30/2019  1:44 PM      COMPLICATIONS: None.     CONDITION: Extubated and taken to PACU in stable condition.     OPERATIVE PROCEDURE IN DETAIL:   After consent was verified, the  patient was taken to the operating room where she underwent general anesthesia without difficulty.  She was then prepped and draped in the usual sterile fashion.  A safety time out was performed.  The exam under anesthesia was performed with the findings above.  A total of 4 ml of methylene blue were injected circumferentially around her prior WLE scar.    The inguinal dissection was performed first.  The patient had previously had her sentinel lymph node mapped with a lymphoscintigraphy scan, and the Connor counter was used to confirm the location of the node. An 6 cm incision was made 2 cm below and parallel to the inguinal ligament with the scalpel, and the underlying nodes were grasped with an Allis clamp.  These superficial nodes were excised with the cautery, but they were not blue or hot.  The Connor counter was then used to identify the sentinel node, which was blue and hot.  This was excised as well with the cautery and good hemostasis was noted within the harjit dissection bed.  The deep tissue defect was closed with a running 2-0 Vicryl suture in two layers, and the skin was then closed in a running fashion with 4-0 Monocryl.    Attention was then turned to the vulva, where a 2 cm margin was marked around the prior scar.  An elliptical incision was made following this margin, and carried down to the level of the deep fascia with the cautery.  Allis clamps were used to place the specimen on traction, and the specimen was dissected off the underlying deep fascia with the cautery.  The specimen was marked with a stitch at 12 o'clock and sent for permanent section. Hemostasis was achieved with the cautery.  The defect was then re-approximated in two layers with 2-0 Vicryl suture, and the skin was then closed in an interrupted fashion with a series of interrupted sutures along the length of the incision.  Good hemostasis was again noted.    All sponge, needle, and instrument counts were correct x2.  The patient  was extubated without difficulty and taken to the PACU in stable condition.     Bacilio Celis MD, MS    Department of Obstetrics and Gynecology   Division of Gynecologic Oncology   [facility]  Phone: [phone]

## 2019-07-30 NOTE — OR NURSING
Writer noted that bedside EKG now show atrial fibrillation, Dr. Aldana updated.     Dr. aldana rounded on patient and feels she is still appropriate for 7C.      Writer will update Dr. Celis of patients current cardiac rhythm.      Patient denies chest pain, shortness of breath or arm pain.

## 2019-07-31 VITALS
SYSTOLIC BLOOD PRESSURE: 108 MMHG | RESPIRATION RATE: 16 BRPM | WEIGHT: 245.9 LBS | BODY MASS INDEX: 37.27 KG/M2 | DIASTOLIC BLOOD PRESSURE: 67 MMHG | HEART RATE: 82 BPM | OXYGEN SATURATION: 96 % | TEMPERATURE: 98.7 F | HEIGHT: 68 IN

## 2019-07-31 LAB
ANION GAP SERPL CALCULATED.3IONS-SCNC: 7 MMOL/L (ref 3–14)
BUN SERPL-MCNC: 19 MG/DL (ref 7–30)
CALCIUM SERPL-MCNC: 8.6 MG/DL (ref 8.5–10.1)
CHLORIDE SERPL-SCNC: 106 MMOL/L (ref 94–109)
CO2 SERPL-SCNC: 25 MMOL/L (ref 20–32)
CREAT SERPL-MCNC: 0.76 MG/DL (ref 0.52–1.04)
ERYTHROCYTE [DISTWIDTH] IN BLOOD BY AUTOMATED COUNT: 13.2 % (ref 10–15)
GFR SERPL CREATININE-BSD FRML MDRD: 81 ML/MIN/{1.73_M2}
GLUCOSE BLDC GLUCOMTR-MCNC: 129 MG/DL (ref 70–99)
GLUCOSE BLDC GLUCOMTR-MCNC: 143 MG/DL (ref 70–99)
GLUCOSE SERPL-MCNC: 182 MG/DL (ref 70–99)
HCT VFR BLD AUTO: 38.1 % (ref 35–47)
HGB BLD-MCNC: 11.7 G/DL (ref 11.7–15.7)
INR PPP: 1.04 (ref 0.86–1.14)
INTERPRETATION ECG - MUSE: NORMAL
MAGNESIUM SERPL-MCNC: 1.7 MG/DL (ref 1.6–2.3)
MCH RBC QN AUTO: 27.1 PG (ref 26.5–33)
MCHC RBC AUTO-ENTMCNC: 30.7 G/DL (ref 31.5–36.5)
MCV RBC AUTO: 88 FL (ref 78–100)
PHOSPHATE SERPL-MCNC: 2.9 MG/DL (ref 2.5–4.5)
PLATELET # BLD AUTO: 231 10E9/L (ref 150–450)
POTASSIUM SERPL-SCNC: 4.2 MMOL/L (ref 3.4–5.3)
RBC # BLD AUTO: 4.32 10E12/L (ref 3.8–5.2)
SODIUM SERPL-SCNC: 139 MMOL/L (ref 133–144)
WBC # BLD AUTO: 14.3 10E9/L (ref 4–11)

## 2019-07-31 PROCEDURE — 84100 ASSAY OF PHOSPHORUS: CPT | Performed by: OBSTETRICS & GYNECOLOGY

## 2019-07-31 PROCEDURE — 00000146 ZZHCL STATISTIC GLUCOSE BY METER IP

## 2019-07-31 PROCEDURE — 36415 COLL VENOUS BLD VENIPUNCTURE: CPT | Performed by: OBSTETRICS & GYNECOLOGY

## 2019-07-31 PROCEDURE — 85027 COMPLETE CBC AUTOMATED: CPT | Performed by: OBSTETRICS & GYNECOLOGY

## 2019-07-31 PROCEDURE — 85610 PROTHROMBIN TIME: CPT | Performed by: OBSTETRICS & GYNECOLOGY

## 2019-07-31 PROCEDURE — 25000132 ZZH RX MED GY IP 250 OP 250 PS 637: Performed by: STUDENT IN AN ORGANIZED HEALTH CARE EDUCATION/TRAINING PROGRAM

## 2019-07-31 PROCEDURE — 83735 ASSAY OF MAGNESIUM: CPT | Performed by: OBSTETRICS & GYNECOLOGY

## 2019-07-31 PROCEDURE — 80048 BASIC METABOLIC PNL TOTAL CA: CPT | Performed by: OBSTETRICS & GYNECOLOGY

## 2019-07-31 RX ORDER — GINSENG 100 MG
CAPSULE ORAL 2 TIMES DAILY
Qty: 1 TUBE | Refills: 0 | Status: SHIPPED | OUTPATIENT
Start: 2019-07-31 | End: 2020-04-13

## 2019-07-31 RX ORDER — AMOXICILLIN 250 MG
1-2 CAPSULE ORAL 2 TIMES DAILY PRN
Qty: 60 TABLET | Refills: 0 | Status: SHIPPED | OUTPATIENT
Start: 2019-07-31 | End: 2020-05-11

## 2019-07-31 RX ORDER — WARFARIN SODIUM 7.5 MG/1
7.5 TABLET ORAL
Status: DISCONTINUED | OUTPATIENT
Start: 2019-07-31 | End: 2019-07-31 | Stop reason: HOSPADM

## 2019-07-31 RX ORDER — OXYCODONE HYDROCHLORIDE 5 MG/1
5 TABLET ORAL EVERY 4 HOURS PRN
Qty: 15 TABLET | Refills: 0 | Status: SHIPPED | OUTPATIENT
Start: 2019-07-31 | End: 2020-04-13

## 2019-07-31 RX ORDER — ACETAMINOPHEN 500 MG
500-1000 TABLET ORAL EVERY 6 HOURS PRN
Qty: 50 TABLET | Refills: 0 | Status: SHIPPED | OUTPATIENT
Start: 2019-07-31

## 2019-07-31 RX ADMIN — OXYCODONE HYDROCHLORIDE 5 MG: 5 TABLET ORAL at 11:42

## 2019-07-31 RX ADMIN — LEVOTHYROXINE SODIUM 175 MCG: 100 TABLET ORAL at 08:23

## 2019-07-31 RX ADMIN — OXYCODONE HYDROCHLORIDE 5 MG: 5 TABLET ORAL at 08:24

## 2019-07-31 RX ADMIN — ACETAMINOPHEN 975 MG: 325 TABLET, FILM COATED ORAL at 11:42

## 2019-07-31 RX ADMIN — LISINOPRIL 5 MG: 5 TABLET ORAL at 08:23

## 2019-07-31 RX ADMIN — IBUPROFEN 400 MG: 200 TABLET, FILM COATED ORAL at 08:24

## 2019-07-31 RX ADMIN — SOTALOL HYDROCHLORIDE 80 MG: 80 TABLET ORAL at 08:25

## 2019-07-31 RX ADMIN — PANTOPRAZOLE SODIUM 40 MG: 40 TABLET, DELAYED RELEASE ORAL at 08:24

## 2019-07-31 RX ADMIN — OXYCODONE HYDROCHLORIDE 10 MG: 5 TABLET ORAL at 03:02

## 2019-07-31 RX ADMIN — BACITRACIN: 500 OINTMENT TOPICAL at 08:25

## 2019-07-31 RX ADMIN — ACETAMINOPHEN 975 MG: 325 TABLET, FILM COATED ORAL at 03:02

## 2019-07-31 ASSESSMENT — ACTIVITIES OF DAILY LIVING (ADL)
ADLS_ACUITY_SCORE: 11

## 2019-07-31 ASSESSMENT — PAIN DESCRIPTION - DESCRIPTORS
DESCRIPTORS: ACHING;BURNING
DESCRIPTORS: DISCOMFORT

## 2019-07-31 ASSESSMENT — MIFFLIN-ST. JEOR: SCORE: 1693.9

## 2019-07-31 NOTE — PHARMACY-ANTICOAGULATION SERVICE
Clinical Pharmacy - Warfarin Dosing Consult     Pharmacy has been consulted to manage this patient s warfarin therapy.  Indication: Atrial Fibrillation  Therapy Goal: INR 2-3  Provider/Team: Gyn/Onc  OP Anticoag Clinic: [facility] INR clinic  Warfarin Prior to Admission: Yes  Warfarin PTA Regimen: 5 mg on TU and SA an 7.5 mg rest of week    INR   Date Value Ref Range Status   07/30/2019 0.97 0.86 - 1.14 Final       Recommend warfarin 7.5 mg today.  Pharmacy will monitor Ilana Trent daily and order warfarin doses to achieve specified goal.      Please contact pharmacy as soon as possible if the warfarin needs to be held for a procedure or if the warfarin goals change.

## 2019-07-31 NOTE — PROVIDER NOTIFICATION
Gyn/Onc cross cover notified - Pt in A.fib 80-110s. Patient stable. Should pt take warfarin? She was told she would start it POD #1?    Will continue to monitor and notify MD alegria/ changes on tele. Sotalol given. Labs drawn. Notified by Christy Gyn/Onc cross cover, that OK to give Warfarin POD #0.

## 2019-07-31 NOTE — PLAN OF CARE
Admission          7/30/2019  7:13 AM  -----------------------------------------------------------  Reason for admission:  Primary team notified of pt arrival.  Admitted from: PACU  Via: stretcher  Accompanied by: family  Belongings: Placed in closet; valuables sent home with family  Admission Profile: complete  Teaching: orientation to unit and call light- call light within reach, call don't fall, use of console, meal times, when to call for the RN, and enforced importance of safety   Access: PIV  Telemetry:Placed on pt  Ht./Wt.: complete  2 RN Skin Assessment Completed By: Jennyfer RN & Dedra RN  Pt status: stable    Temp:  [97.7  F (36.5  C)-98.3  F (36.8  C)] 98  F (36.7  C)  Pulse:  [] 103  Heart Rate:  [] 103  Resp:  [14-20] 18  BP: (103-148)/(62-87) 131/79  SpO2:  [95 %-99 %] 96 %

## 2019-07-31 NOTE — PROGRESS NOTES
Brief Progress Note    Patient in/out of A Fib tonight w/ HR <120. Home dose of Sotalol given. HR still <100 but stable. Plan was made to resume Coumadin on POD#0. Patient has held it for 5 days prior to surgery. Discussed with PharmD and felt it to be important to give tonight as she is in AFib and has been off it for so long. Hgb, electrolytes wnl. Discussed with cardiology fellow on phone who agreed with plan. Coumadin given. Will recheck labs in AM.    Discussed with Dr. Khalil, Gyn Onc Fellow.    Christy Red MD  Gyn Onc, PGY3

## 2019-07-31 NOTE — PLAN OF CARE
"/67 (BP Location: Right arm)   Pulse 82   Temp 98.7  F (37.1  C) (Oral)   Resp 16   Ht 1.727 m (5' 8\")   Wt 111.5 kg (245 lb 14.4 oz)   SpO2 96%   BMI 37.39 kg/m      A&OX4.  Lungs clear throughout, on room air.  Denies SOB.  Pt converted to Afib,  HR 70-100s, MD notified.  No new orders.  Active bowel sounds, no BM on shift.  On regular diet, no nausea or vomiting.  Perineum swollen, dressing changed.  Oxycodone and tylenol given for incisional pain.  Pain medication sent down to pharmacy.  Discharge instructions given to patient, no new questions.  Continue to monitor until patient discharges off unit.    "

## 2019-07-31 NOTE — PLAN OF CARE
Status: POD #1 of R radical vulvectomy w/ R lymph node biopsy.  VS: VSS on RA.  Neuros: A&Ox4.  Cardiovascular: Currently NSR/ST 90-100s. Intermittent A.fib throughout shift, MD aware. Continue to monitor.   GI/: Tolerating regular diet. LBM 7/30. Voiding spontaneously.   IV: R PIV SL.  Activity: Up w/ SBA.  Pain: Perineal incisional pain controlled w/ cold packs, scheduled Tylenol, and PRN Oxycodone.  Respiratory/Trach: No issues.  Skin: Labia edematous w/ minimal bleeding.   Plan of Care: Continue to monitor and follow POC. Possible discharge today.

## 2019-07-31 NOTE — PROGRESS NOTES
Gynecology Oncology Progress Note    Ms. Ilana Trent is a 69 year old HD#2/POD# 1 s/p right radical vulvectomy with right sentinel lymph node biospy    Dz: Vulvar Melanoma       24 hour events:   Restarted home dose of sotalol   Afib overnight, Coumadin given POD#0 no bridge per cardiology     Subjective: Patient is feeling well postoperatively. Pain is controlled at rests, notes discomfort with ambulation. Patient has been eating and drinking without difficulty. Notes that she felt a little bit nauseous yesterday and that has felt this in the past when she has been in A fib, unsure if it is related. No palpitations, chest pain, or difficulty breathing. Passed flatus. No BM, but has been voiding spontaneously.     Objective:   Vitals:    07/30/19 2200 07/30/19 2300 07/31/19 0300 07/31/19 0815   BP: 118/70 90/73 112/77 117/73   BP Location:  Right arm Right arm Right arm   Cuff Size:       Pulse:       Resp: 19 18 16 16   Temp:  98  F (36.7  C) 98.4  F (36.9  C) 98.2  F (36.8  C)   TempSrc:  Oral Oral Oral   SpO2: 95% 95% 96% 98%   Weight:   111.5 kg (245 lb 14.4 oz)    Height:         General: Alert, resting in bed, appears comfortable, in NAD  CV: irregular rate and rhythm. No murmurs, rubs or gallops auscultated  Resp: CTAB, unlabored breathing on room air  Abdomen: soft, non tender to palpation, nondistended  Incision: Right groin incision covered with Primapore dressing, clean/dry/intact without shadowing. Right vulvar incision intact and dry, mild swelling. No drainage. Surrounding tissue soft. Residual blue staining from methylene blue placed at time of surgery.   Extremities: nontender, mild edema B/L LE +1, SCDs in place    UOP: 900 ml overnight    New labs/imaging:   Sodium 139   Potassium 4.2   Chloride 106   Carbon Dioxide 25   Urea Nitrogen 19   Creatinine 0.76   GFR Estimate 81   GFR Estimate If Black >90   Calcium 8.6   Anion Gap 7   Magnesium 1.7   Phosphorus 2.9   Glucose 182 (H)   WBC 14.3 (H)    Hemoglobin 11.7   Hematocrit 38.1   Platelet Count 231   RBC Count 4.32   MCV 88   MCH 27.1   MCHC 30.7 (L)   RDW 13.2   INR 1.04       Assessment: Ms. Ilana Trent is a 69 y.o POD#1 s/p right radical vulvectomy and right lymph node biopsy for vulvar melanoma. She is doing well and has met all postoperative milestones. Patient has history of paroxysmal atrial fibrillation and has been in and out of atrial fibrillation postoperatively, she is asymptomatic from this. She is stable for discharge.     Active Problem list:  - Post-operative state   - Melanoma of the Vulva   - Paroxysmal atrial fibrillation   - Sick sinus syndrome s/p pacemaker   - Diabetes  - Hypothryoidism   - Long term current use of anticoagulation therapy   - RHONDA  - HTN   - Hyperlipidemia   - GERD     Dz: Vulvar melanoma, Negative PET Scan   FEN: Tolerating regular diet. IVF discontinued POD#0  Pain: Controlled on Tylenol, ibuprofen, prn oxycodone  Heme: Hgb 13>EBL 50cc>12.3>11.7. On warfarin for atrial fibrillation. Discussed anti-coagulation plan with pharmacy and cardiology on POD#0 as patient in A Fib. Warfarin restarted POD#0 without bridge as planned. INR 1.04 this AM.  CV: Hx of paroxysmal atrial fibrillation on warfarin. Has been on telemetry overnight due to atrial fibrillation noted in PACU. On review of telemetry patient has been in and out of atrial fibrillation overnight, she is asymptomatic from this. Home sotalol restarted POD#0. Cardiac history also notable for SSS s/p pacemaker, HTN (home lisinopril ordered), HLD, (home simvastatin ordered)  Resp: RHONDA, home CPAP. Currently on room air. No acute respiratory issues  GI: GERD, pantoprazole. Bowel regimen, prn antiemetics.  : Voiding spontaneously   MSK: No issues   Neuro/Psych: No issues  Endocrine: DM, metformin held, MSSI while inpatient. Last A1c 6.2; hypothyroid, synthroid.   ID: No issues   Vulvar Incision: Bacitracin BID. Discussed positioning precautions as well as activity  restrictions  PPx: SCDs, IS, PPI. On warfarin as above  Disposition: Discharge home today    Maya Cain MD  Ob/Gyn PGY-2  July 31, 2019 8:43 AM

## 2019-08-05 ENCOUNTER — TELEPHONE (OUTPATIENT)
Dept: ONCOLOGY | Facility: CLINIC | Age: 69
End: 2019-08-05

## 2019-08-05 NOTE — TELEPHONE ENCOUNTER
RN reached out to patient to discuss her MyChart message.     RN and patient discussed normal healing after a vulvectomy and what to expect. Also discussed signs and symptoms of concern.     Patient states overall she is doing well.     RN answered all patients questions and concerns to the best of her ability. Also discussed proper wound care.      Patient appreciative of the RN time.     Maya Li RN

## 2019-08-12 ENCOUNTER — NURSE TRIAGE (OUTPATIENT)
Dept: NURSING | Facility: CLINIC | Age: 69
End: 2019-08-12

## 2019-08-12 ENCOUNTER — TELEPHONE (OUTPATIENT)
Dept: ONCOLOGY | Facility: CLINIC | Age: 69
End: 2019-08-12

## 2019-08-12 NOTE — TELEPHONE ENCOUNTER
S/P right radical vulvectomy with right sentinel lymph node dissection on 7/30/19.    Pt calling to report that the drainage she had been having was almost gone and now today for 2-3 hours she had significant amount of bright red blood drainage from vagina. Went through 4-5 pads in that time frame. Then she had a large and small clot and then the bleeding slowed. She is still having streaks. Denies abd or other pain. No fever. No weakness/lightheadedness.     HP routed to Nydia Li, TI to advise.    8/12  750   Spoke with pt had vulvar surgery 7/30 with Dr. Celis  Had been feeling fine with no problems until today   This am noted bright red bleeding heavy like a menses.  Changed pads 4-5 times in 3 hours which was soaked through  Passing clots x2   Felt better after passing clots  Right now noting some bleeding when wiping, has changed pads x2 since noon, not fully soaking  Pt reports she feels fine  Denies pain or fever  No issues with bowel and bladder  Denies feeling weak, dizzy or lightheaded  Offered appt on 813 with NP  Will keep and eye on things and call back if issues

## 2019-08-13 ENCOUNTER — ONCOLOGY VISIT (OUTPATIENT)
Dept: ONCOLOGY | Facility: CLINIC | Age: 69
End: 2019-08-13
Attending: NURSE PRACTITIONER
Payer: COMMERCIAL

## 2019-08-13 VITALS
OXYGEN SATURATION: 96 % | BODY MASS INDEX: 37.4 KG/M2 | TEMPERATURE: 98.9 F | HEART RATE: 62 BPM | SYSTOLIC BLOOD PRESSURE: 106 MMHG | WEIGHT: 246 LBS | DIASTOLIC BLOOD PRESSURE: 50 MMHG

## 2019-08-13 DIAGNOSIS — C51.9 MALIGNANT MELANOMA OF VULVA (H): Primary | ICD-10-CM

## 2019-08-13 PROCEDURE — G0463 HOSPITAL OUTPT CLINIC VISIT: HCPCS | Mod: ZF

## 2019-08-13 PROCEDURE — 99024 POSTOP FOLLOW-UP VISIT: CPT | Mod: ZP | Performed by: NURSE PRACTITIONER

## 2019-08-13 ASSESSMENT — PAIN SCALES - GENERAL: PAINLEVEL: NO PAIN (0)

## 2019-08-13 NOTE — TELEPHONE ENCOUNTER
Ilana received a voice message and a call back number.    She reports she was unable to get through to the number.    Note in chart:    Conversation: bleeding   (Newest Message First)   Nan Ferguson RN   to Ilana Trent            8/12/19 7:33 PM   Ilana   The triage nurse forwarded a message from you regarding some bleeding you were having after your surgery   I left a message on your cell phone number but cannot get through to the number we have on file for your home as it says it cannot be completed as dialed.   I am sorry we were not able to connect   Please call me at [phone]     Lacey       This nurse called the number listed and was able to connect with NAVA Ferguson RN. Warm transferred patient call.    Susanna Mack RN  [facility]    Reason for Disposition    [1] Follow-up call to recent contact AND [2] information only call, no triage required    Protocols used: INFORMATION ONLY CALL-A-

## 2019-08-13 NOTE — PROGRESS NOTES
Follow Up Notes on Referred Patient    Date: 2019         RE: Ilana Trent  : 1950  FILOMENA: 2019      Ilana Trent is a 69 year old woman with a diagnosis of vulvar melanoma.   She is here today for an acute visit.     History:  19: A. RIGHT VULVA MASS               --MALIGNANT MELANOMA WITH ULCERATION, AT LEAST SHARON LEVEL IV, BRESLOW THICKNESS AT LEAT 5 MM, MITOTIC RATE IS 3 PER MM(2) (T CLASSIFICATION: T4b),    19: Exam Under Anesthesia, Right  Radical Vulvectomy, Right [place] Inguinal Lymph Node Sampling  SPECIMEN(S):   A: [place] lymph node, right inguinal hot and blue   B: [place] lymph node, right inguinal superficial   C: Vulva, right     FINAL DIAGNOSIS:     A. SENTINEL LYMPH NODE, RIGHT INGUINAL:   -Metastatic malignant melanoma to one lymph node examined (). See   comment.     B. SENTINEL LYMPH NODE, RIGHT INGUINAL SUPERFICIAL:   -Fibrovascular adipose tissue, negative for malignancy.   -No evidence of lymphoid tissue.     C. VULVA, RIGHT RADICAL VULVECTOMY:   -Residual malignant melanoma. See Comment.   -Margins negative for malignancy.         Today she comes to clinic and reports she was having some bleeding after surgery which had decreased to a yellowish drainage. Then yesterday she had been in a recliner about 45 minutes and got up to use the bathroom when she started having bright red bleeding for which she was changing a pad about 4-5 times over a 3 hour period; she states the pad was saturated. Then she passed a baseball sized clot as well as some smaller clots and her bleeding started to decrease. Today she has changed her pad twice but had not saturated the pad. She denies any increase in pressure or sensation/fullness in her vulva; no discomfort; and she has been using her robby-bottle as directed. She is eating and drinking without issues, denies any issues with her bowel or bladder. She is no longer taking any narcotic pain medication; she has been  taking Tylenol or Ibuprofen as needed. She also has Naproxyn to take as needed but has not taken this since surgery. She had her INR checked last Thursday and it was 2.0. She reports her BG have been good and have been in the low 100's up to about 167.         Review of Systems:    Systemic           no weight changes; no fever; no chills; no night sweats; no appetite changes  Skin           no rashes, or lesions  Eye           no irritation; no changes in vision  Trung-Laryngeal           no dysphagia; no hoarseness   Pulmonary    no cough; no shortness of breath  Cardiovascular    no chest pain; no palpitations; + HTN  Gastrointestinal    no diarrhea; no constipation; no abdominal pain; no changes in bowel habits; no blood in stool  Genitourinary   no urinary frequency; no urinary urgency; no dysuria; no pain; no abnormal vaginal discharge; no abnormal vaginal bleeding  Breast    no breast discharge; no breast changes; no breast pain  M/usculoskeletal    no myalgias; no arthralgias; no back pain  Psychiatric           no depressed mood; no anxiety    Hematologic               no tender lymph nodes; no noticeable swellings or lumps   Endocrine    no hot flashes; no heat/cold intolerance         Neurological   no tremor; no numbness and tingling; no headaches; no difficulty sleeping      Past Medical History:    Past Medical History:   Diagnosis Date     Arthritis      Back pain      Cataract      Diabetes (H)      GERD (gastroesophageal reflux disease)      HLD (hyperlipidemia)      Hypertension      Hypothyroidism      Long term current use of anticoagulant therapy      Malignant neoplasm of breast (female) (H) 2001    s/p lumpectomy, LN removal, radiation     Melanoma of vulva (H)      RHONDA (obstructive sleep apnea)      Osteopenia      PAF (paroxysmal atrial fibrillation) (H) 2012    s/p ablation     Papillary carcinoma (H)     incidentally found     Primary malignant neuroendocrine tumor of pancreas (H) 2011      Toxic multinodular goiter          Past Surgical History:    Past Surgical History:   Procedure Laterality Date     BACK SURGERY  1987    L4-5     BREAST LUMPECTOMY, RT/LT  2001     BUNIONECTOMY Bilateral 2006     Excision right vulvar mass  06/04/2019     FOOT SURGERY Bilateral 2006     Intracardiac ablation  2012     Knee arthroscopy surgery Bilateral 2006     Pacemaker implantation  2007     PANCREAS SURGERY  2011    pancreaticoduodenectomy     THYROIDECTOMY        VULVECTOMY RADICAL DISSECT GROIN(S) N/A 7/30/2019    Procedure: Exam Under Anesthesia, Right  Radical Vulvectomy, Right [place] Inguinal Lymph Node Sampling;  Surgeon: Bacilio Celis MD;  Location:  OR         Health Maintenance Due   Topic Date Due     DEXA  1950     HEPATITIS C SCREENING  1950     ADVANCE CARE PLANNING  1950     COLONOSCOPY  03/24/1961     DTAP/TDAP/TD IMMUNIZATION (1 - Tdap) 03/24/1976     LIPID  03/24/1996     MEDICARE ANNUAL WELLNESS VISIT  03/24/2016     FALL RISK ASSESSMENT  03/24/2016       Current Medications:     Current Outpatient Medications   Medication Sig Dispense Refill     acetaminophen (TYLENOL) 500 MG tablet Take 1-2 tablets (500-1,000 mg) by mouth every 6 hours as needed for mild pain 50 tablet 0     cetirizine-pseudoePHEDrine ER (ZYRTEC-D) 5-120 MG 12 hr tablet Take 1 tablet by mouth every morning        CONTOUR NEXT EZ (CONTOUR NEXT EZ W/DEVICE KIT) w/Device KIT See Admin Instructions  0     levothyroxine (SYNTHROID/LEVOTHROID) 175 MCG tablet Take 175 mcg by mouth every morning        lisinopril (PRINIVIL/ZESTRIL) 5 MG tablet Take 5 mg by mouth every morning        metFORMIN (GLUCOPHAGE-XR) 500 MG 24 hr tablet Take 500 mg by mouth every morning        pantoprazole (PROTONIX) 40 MG EC tablet Take 40 mg by mouth every morning        simvastatin (ZOCOR) 40 MG tablet Take 40 mg by mouth At Bedtime        sotalol (BETAPACE) 160 MG tablet Take 80 mg by mouth 2 times daily        warfarin  (COUMADIN) 5 MG tablet Take 7.5mg 5 days week ,\ 5 mg Tues, Sat  Takes in the evening       ALOE PO Take by mouth daily as needed       bacitracin 500 UNIT/GM OINT Apply topically 2 times daily (Patient not taking: Reported on 2019) 1 Tube 0     furosemide (LASIX) 20 MG tablet Take 20 mg by mouth daily as needed (SWELLING)       multivitamin w/minerals (THERA-VIT-M) tablet Take 1 tablet by mouth daily       naproxen (NAPROSYN) 500 MG tablet Take 500 mg by mouth every morning        oxyCODONE (ROXICODONE) 5 MG tablet Take 1 tablet (5 mg) by mouth every 4 hours as needed for breakthrough pain (Patient not taking: Reported on 2019) 15 tablet 0     senna-docusate (SENOKOT-S/PERICOLACE) 8.6-50 MG tablet Take 1-2 tablets by mouth 2 times daily as needed for constipation (Patient not taking: Reported on 2019) 60 tablet 0     vitamin B complex with vitamin C (STRESS TAB) tablet Take 1 tablet by mouth every morning            Allergies:      No Known Allergies     Social History:     Social History     Tobacco Use     Smoking status: Former Smoker     Years: 5.00     Types: Cigarettes     Last attempt to quit: 1973     Years since quittin.8     Smokeless tobacco: Never Used     Tobacco comment: Quit age 23   Substance Use Topics     Alcohol use: Yes     Alcohol/week: 0.6 oz     Types: 1 Cans of beer per week       History   Drug Use Not on file         Family History:       Family History   Problem Relation Age of Onset     Colon Cancer Mother      Arthritis Father      Prostate Cancer Father      Hypertension Father      Obesity Father      Myocardial Infarction Father      Diabetes Sister      Thyroid Disease Sister      Cancer Brother      Hypertension Brother      Diabetes Maternal Grandmother      Heart Disease Maternal Grandfather          Physical Exam:     /50   Pulse 62   Temp 98.9  F (37.2  C) (Oral)   Wt 111.6 kg (246 lb)   SpO2 96%   BMI 37.40 kg/m    Body mass index is 37.4  kg/m .    General Appearance: healthy and alert, no distress     HEENT: no thyromegaly, no palpable nodules or masses        Cardiovascular: regular rate and rhythm, no gallops, rubs or murmurs     Respiratory: lungs clear, no rales, rhonchi or wheezes, normal diaphragmatic excursion    Musculoskeletal: extremities non tender and without edema    Skin: no lesions or rashes     Neurological: normal gait, no gross defects     Psychiatric: appropriate mood and affect                               Hematological: normal cervical, supraclavicular and inguinal lymph nodes     Gastrointestinal:       abdomen soft, non-tender, non-distended, no organomegaly or masses    Genitourinary: Right vulvar wound malodorous and open with 3 large and several smaller blood clots and an area of necrotic tissue along at least bottom 3/4 of right lateral incision edge; sutures ruptured except for 1-2 anterior. No erythema along wound edge. Slight tenderness to palpation. Dr. Khalil, Gyn Onc fellow, examined and removed necrotic tissue along lateral edge; Dr. Redman double examined and removed additional tissue    Assessment:    Ilana Trent is a 69 year old woman with a diagnosis of vulvar melanoma. She is here for an acute visit.     30 minutes were spent with this patient, over 50% of that time was spent in symptom management, treatment planning and in counseling and coordination of care.      Plan:     1.)        Per Dr. Redman, patient does not need antibiotic treatment/additional surgery/admission at this time. She is to continue to use her robby-bottle at least 2-3 times per day and monitor for any concerning symptoms including but not limited to amy bleeding/clots, temperature, redness along wound, and pain at her site. She will return next week to see Dr. Celis for her scheduled post op visit. Reviewed signs and symptoms for when she should contact the clinic or seek additional care. Patient to contact the clinic with any  questions or concerns in the interim.     2.) Genetic risk factors were assessed and the patient does not meet the qualifications for a referral.      3.) Labs and/or tests ordered include:  None.      4.) Health maintenance issues addressed today include annual health maintenance and non-gynecologic issues with PCP.    DONTAE Ferrera, WHNP-BC, ANP-BC  Women's Health Nurse Practitioner  Adult Nurse Pracitioner  Division of Gynecologic Oncology          CC  Patient Care Team:  Shanice Hines MD as PCP - General (Family Practice)  SELF, REFERRED

## 2019-08-13 NOTE — NURSING NOTE
"Oncology Rooming Note    August 13, 2019 4:15 PM   Ilana Trent is a 69 year old female who presents for:    Chief Complaint   Patient presents with     Oncology Clinic Visit     Return; Vulvectomy, post op bleeding     Initial Vitals: /50   Pulse 62   Temp 98.9  F (37.2  C) (Oral)   Wt 111.6 kg (246 lb)   SpO2 96%   BMI 37.40 kg/m   Estimated body mass index is 37.4 kg/m  as calculated from the following:    Height as of 7/30/19: 1.727 m (5' 8\").    Weight as of this encounter: 111.6 kg (246 lb). Body surface area is 2.31 meters squared.  No Pain (0) Comment: Data Unavailable   No LMP recorded. Patient is postmenopausal.  Allergies reviewed: Yes  Medications reviewed: Yes    Medications: Medication refills not needed today.  Pharmacy name entered into Med[facility]: [address]    Clinical concerns: Post surgical bleeding.  Dr Celis was notified.      Meaghan Delcid CMA              "

## 2019-08-13 NOTE — LETTER
2019       RE: Ilana Trent  [id number] [facility] [address]     Dear Colleague,    Thank you for referring your patient, Ilana Trent, to the [facility]. Please see a copy of my visit note below.                Follow Up Notes on Referred Patient    Date: 2019         RE: Ilana Trent  : 1950  FILOMENA: 2019      Ilana Trent is a 69 year old woman with a diagnosis of vulvar melanoma.   She is here today for an acute visit.     History:  19: A. RIGHT VULVA MASS               --MALIGNANT MELANOMA WITH ULCERATION, AT LEAST SHARON LEVEL IV, BRESLOW THICKNESS AT LEAT 5 MM, MITOTIC RATE IS 3 PER MM(2) (T CLASSIFICATION: T4b),    19: Exam Under Anesthesia, Right  Radical Vulvectomy, Right [facility] Inguinal Lymph Node Sampling  SPECIMEN(S):   A: [facility] lymph node, right inguinal hot and blue   B: [facility] lymph node, right inguinal superficial   C: Vulva, right     FINAL DIAGNOSIS:     A. SENTINEL LYMPH NODE, RIGHT INGUINAL:   -Metastatic malignant melanoma to one lymph node examined (). See   comment.     B. SENTINEL LYMPH NODE, RIGHT INGUINAL SUPERFICIAL:   -Fibrovascular adipose tissue, negative for malignancy.   -No evidence of lymphoid tissue.     C. VULVA, RIGHT RADICAL VULVECTOMY:   -Residual malignant melanoma. See Comment.   -Margins negative for malignancy.         Today she comes to clinic and reports she was having some bleeding after surgery which had decreased to a yellowish drainage. Then yesterday she had been in a recliner about 45 minutes and got up to use the bathroom when she started having bright red bleeding for which she was changing a pad about 4-5 times over a 3 hour period; she states the pad was saturated. Then she passed a baseball sized clot as well as some smaller clots and her bleeding started to decrease. Today she has changed her pad twice but had not saturated the pad. She denies any increase in pressure or sensation/fullness in  her vulva; no discomfort; and she has been using her robby-bottle as directed. She is eating and drinking without issues, denies any issues with her bowel or bladder. She is no longer taking any narcotic pain medication; she has been taking Tylenol or Ibuprofen as needed. She also has Naproxyn to take as needed but has not taken this since surgery. She had her INR checked last Thursday and it was 2.0. She reports her BG have been good and have been in the low 100's up to about 167.         Review of Systems:    Systemic           no weight changes; no fever; no chills; no night sweats; no appetite changes  Skin           no rashes, or lesions  Eye           no irritation; no changes in vision  Trung-Laryngeal           no dysphagia; no hoarseness   Pulmonary    no cough; no shortness of breath  Cardiovascular    no chest pain; no palpitations; + HTN  Gastrointestinal    no diarrhea; no constipation; no abdominal pain; no changes in bowel habits; no blood in stool  Genitourinary   no urinary frequency; no urinary urgency; no dysuria; no pain; no abnormal vaginal discharge; no abnormal vaginal bleeding  Breast    no breast discharge; no breast changes; no breast pain  M/usculoskeletal    no myalgias; no arthralgias; no back pain  Psychiatric           no depressed mood; no anxiety    Hematologic               no tender lymph nodes; no noticeable swellings or lumps   Endocrine    no hot flashes; no heat/cold intolerance         Neurological   no tremor; no numbness and tingling; no headaches; no difficulty sleeping      Past Medical History:    Past Medical History:   Diagnosis Date     Arthritis      Back pain      Cataract      Diabetes (H)      GERD (gastroesophageal reflux disease)      HLD (hyperlipidemia)      Hypertension      Hypothyroidism      Long term current use of anticoagulant therapy      Malignant neoplasm of breast (female) (H) 2001    s/p lumpectomy, LN removal, radiation     Melanoma of vulva (H)       RHONDA (obstructive sleep apnea)      Osteopenia      PAF (paroxysmal atrial fibrillation) (H) 2012    s/p ablation     Papillary carcinoma (H)     incidentally found     Primary malignant neuroendocrine tumor of pancreas (H) 2011     Toxic multinodular goiter          Past Surgical History:    Past Surgical History:   Procedure Laterality Date     BACK SURGERY  1987    L4-5     BREAST LUMPECTOMY, RT/LT  2001     BUNIONECTOMY Bilateral 2006     Excision right vulvar mass  06/04/2019     FOOT SURGERY Bilateral 2006     Intracardiac ablation  2012     Knee arthroscopy surgery Bilateral 2006     Pacemaker implantation  2007     PANCREAS SURGERY  2011    pancreaticoduodenectomy     THYROIDECTOMY        VULVECTOMY RADICAL DISSECT GROIN(S) N/A 7/30/2019    Procedure: Exam Under Anesthesia, Right  Radical Vulvectomy, Right [place] Inguinal Lymph Node Sampling;  Surgeon: Bacilio Celis MD;  Location:  OR         Health Maintenance Due   Topic Date Due     DEXA  1950     HEPATITIS C SCREENING  1950     ADVANCE CARE PLANNING  1950     COLONOSCOPY  03/24/1961     DTAP/TDAP/TD IMMUNIZATION (1 - Tdap) 03/24/1976     LIPID  03/24/1996     MEDICARE ANNUAL WELLNESS VISIT  03/24/2016     FALL RISK ASSESSMENT  03/24/2016       Current Medications:     Current Outpatient Medications   Medication Sig Dispense Refill     acetaminophen (TYLENOL) 500 MG tablet Take 1-2 tablets (500-1,000 mg) by mouth every 6 hours as needed for mild pain 50 tablet 0     cetirizine-pseudoePHEDrine ER (ZYRTEC-D) 5-120 MG 12 hr tablet Take 1 tablet by mouth every morning        CONTOUR NEXT EZ (CONTOUR NEXT EZ W/DEVICE KIT) w/Device KIT See Admin Instructions  0     levothyroxine (SYNTHROID/LEVOTHROID) 175 MCG tablet Take 175 mcg by mouth every morning        lisinopril (PRINIVIL/ZESTRIL) 5 MG tablet Take 5 mg by mouth every morning        metFORMIN (GLUCOPHAGE-XR) 500 MG 24 hr tablet Take 500 mg by mouth every morning         pantoprazole (PROTONIX) 40 MG EC tablet Take 40 mg by mouth every morning        simvastatin (ZOCOR) 40 MG tablet Take 40 mg by mouth At Bedtime        sotalol (BETAPACE) 160 MG tablet Take 80 mg by mouth 2 times daily        warfarin (COUMADIN) 5 MG tablet Take 7.5mg 5 days week ,\ 5 mg Tues, Sat  Takes in the evening       ALOE PO Take by mouth daily as needed       bacitracin 500 UNIT/GM OINT Apply topically 2 times daily (Patient not taking: Reported on 2019) 1 Tube 0     furosemide (LASIX) 20 MG tablet Take 20 mg by mouth daily as needed (SWELLING)       multivitamin w/minerals (THERA-VIT-M) tablet Take 1 tablet by mouth daily       naproxen (NAPROSYN) 500 MG tablet Take 500 mg by mouth every morning        oxyCODONE (ROXICODONE) 5 MG tablet Take 1 tablet (5 mg) by mouth every 4 hours as needed for breakthrough pain (Patient not taking: Reported on 2019) 15 tablet 0     senna-docusate (SENOKOT-S/PERICOLACE) 8.6-50 MG tablet Take 1-2 tablets by mouth 2 times daily as needed for constipation (Patient not taking: Reported on 2019) 60 tablet 0     vitamin B complex with vitamin C (STRESS TAB) tablet Take 1 tablet by mouth every morning            Allergies:      No Known Allergies     Social History:     Social History     Tobacco Use     Smoking status: Former Smoker     Years: 5.00     Types: Cigarettes     Last attempt to quit: 1973     Years since quittin.8     Smokeless tobacco: Never Used     Tobacco comment: Quit age 23   Substance Use Topics     Alcohol use: Yes     Alcohol/week: 0.6 oz     Types: 1 Cans of beer per week       History   Drug Use Not on file         Family History:       Family History   Problem Relation Age of Onset     Colon Cancer Mother      Arthritis Father      Prostate Cancer Father      Hypertension Father      Obesity Father      Myocardial Infarction Father      Diabetes Sister      Thyroid Disease Sister      Cancer Brother      Hypertension Brother       Diabetes Maternal Grandmother      Heart Disease Maternal Grandfather          Physical Exam:     /50   Pulse 62   Temp 98.9  F (37.2  C) (Oral)   Wt 111.6 kg (246 lb)   SpO2 96%   BMI 37.40 kg/m     Body mass index is 37.4 kg/m .    General Appearance: healthy and alert, no distress     HEENT: no thyromegaly, no palpable nodules or masses        Cardiovascular: regular rate and rhythm, no gallops, rubs or murmurs     Respiratory: lungs clear, no rales, rhonchi or wheezes, normal diaphragmatic excursion    Musculoskeletal: extremities non tender and without edema    Skin: no lesions or rashes     Neurological: normal gait, no gross defects     Psychiatric: appropriate mood and affect                               Hematological: normal cervical, supraclavicular and inguinal lymph nodes     Gastrointestinal:       abdomen soft, non-tender, non-distended, no organomegaly or masses    Genitourinary: Right vulvar wound malodorous and open with 3 large and several smaller blood clots and an area of necrotic tissue along at least bottom 3/4 of right lateral incision edge; sutures ruptured except for 1-2 anterior. No erythema along wound edge. Slight tenderness to palpation. Dr. Khalil, Gyn Onc fellow, examined and removed necrotic tissue along lateral edge; Dr. Redman double examined and removed additional tissue    Assessment:    Ilana Trent is a 69 year old woman with a diagnosis of vulvar melanoma. She is here for an acute visit.     30 minutes were spent with this patient, over 50% of that time was spent in symptom management, treatment planning and in counseling and coordination of care.      Plan:     1.)        Per Dr. Redman, patient does not need antibiotic treatment/additional surgery/admission at this time. She is to continue to use her robby-bottle at least 2-3 times per day and monitor for any concerning symptoms including but not limited to amy bleeding/clots, temperature, redness along  wound, and pain at her site. She will return next week to see Dr. Celis for her scheduled post op visit. Reviewed signs and symptoms for when she should contact the clinic or seek additional care. Patient to contact the clinic with any questions or concerns in the interim.     2.) Genetic risk factors were assessed and the patient does not meet the qualifications for a referral.      3.) Labs and/or tests ordered include:  None.      4.) Health maintenance issues addressed today include annual health maintenance and non-gynecologic issues with PCP.    DONTAE Ferrera, WHNP-BC, ANP-BC  Women's Health Nurse Practitioner  Adult Nurse Pracitioner  Division of Gynecologic Oncology          CC  Patient Care Team:  Shanice Hines MD as PCP - General (Family Practice)  SELF, REFERRED    Again, thank you for allowing me to participate in the care of your patient.      Sincerely,    DONTAE Alva CNP

## 2019-08-21 ENCOUNTER — OFFICE VISIT (OUTPATIENT)
Dept: ONCOLOGY | Facility: CLINIC | Age: 69
End: 2019-08-21
Attending: OBSTETRICS & GYNECOLOGY
Payer: COMMERCIAL

## 2019-08-21 VITALS
BODY MASS INDEX: 36.28 KG/M2 | HEART RATE: 68 BPM | OXYGEN SATURATION: 97 % | TEMPERATURE: 98.7 F | HEIGHT: 68 IN | SYSTOLIC BLOOD PRESSURE: 125 MMHG | RESPIRATION RATE: 14 BRPM | WEIGHT: 239.4 LBS | DIASTOLIC BLOOD PRESSURE: 68 MMHG

## 2019-08-21 DIAGNOSIS — C51.9 MALIGNANT MELANOMA OF VULVA (H): Primary | ICD-10-CM

## 2019-08-21 PROCEDURE — 99214 OFFICE O/P EST MOD 30 MIN: CPT | Mod: 24 | Performed by: OBSTETRICS & GYNECOLOGY

## 2019-08-21 PROCEDURE — G0463 HOSPITAL OUTPT CLINIC VISIT: HCPCS | Mod: ZF

## 2019-08-21 ASSESSMENT — PAIN SCALES - GENERAL: PAINLEVEL: NO PAIN (0)

## 2019-08-21 ASSESSMENT — MIFFLIN-ST. JEOR: SCORE: 1664.28

## 2019-08-21 NOTE — LETTER
2019       RE: Ilana Trent  [id number] [facility] [address]     Dear Colleague,    Thank you for referring your patient, Ilana Trent, to the [facility]. Please see a copy of my visit note below.                Follow Up Notes on Referred Patient    Date: 2019       Dr. Ligia Lagunas  [facility]  [address] [place] [address]       RE: Ilana Trent  : 1950  FILOMENA: 2019    Dear Dr. Ligia Lagunas:    Ilana Trent is a 69 year old woman with a diagnosis of vulvar menlanoma pT4b, pN1a.     Patient presents for followup.  She has been doing pretty well since surgery.  Eating and drinking well.  No nausea, vomiting, fevers or chills.  Normal urinary and bowel function.  Minimal amount of discharge.  No vaginal bleeding.  No fever or chills.           Past Medical History:    Past Medical History:   Diagnosis Date     Arthritis      Back pain      Cataract      Diabetes (H)      GERD (gastroesophageal reflux disease)      HLD (hyperlipidemia)      Hypertension      Hypothyroidism      Long term current use of anticoagulant therapy      Malignant neoplasm of breast (female) (H)     s/p lumpectomy, LN removal, radiation     Melanoma of vulva (H)      RHONDA (obstructive sleep apnea)      Osteopenia      PAF (paroxysmal atrial fibrillation) (H)     s/p ablation     Papillary carcinoma (H)     incidentally found     Primary malignant neuroendocrine tumor of pancreas (H)      Toxic multinodular goiter          Past Surgical History:    Past Surgical History:   Procedure Laterality Date     BACK SURGERY      L4-5     BREAST LUMPECTOMY, RT/LT       BUNIONECTOMY Bilateral 2006     Excision right vulvar mass  2019     FOOT SURGERY Bilateral 2006     Intracardiac ablation  2012     Knee arthroscopy surgery Bilateral 2006     Pacemaker implantation  2007     PANCREAS SURGERY  2011    pancreaticoduodenectomy     THYROIDECTOMY         VULVECTOMY RADICAL DISSECT GROIN(S) N/A 7/30/2019    Procedure: Exam Under Anesthesia, Right  Radical Vulvectomy, Right [place] Inguinal Lymph Node Sampling;  Surgeon: Bacilio Celis MD;  Location:  OR         Health Maintenance Due   Topic Date Due     DEXA  1950     HEPATITIS C SCREENING  1950     ADVANCE CARE PLANNING  1950     COLONOSCOPY  03/24/1961     DTAP/TDAP/TD IMMUNIZATION (1 - Tdap) 03/24/1976     LIPID  03/24/1996     MEDICARE ANNUAL WELLNESS VISIT  03/24/2016     FALL RISK ASSESSMENT  03/24/2016     PNEUMOCOCCAL IMMUNIZATION 65+ HIGH/HIGHEST RISK (1 of 2 - PCV13) 03/24/2016       Current Medications:     Current Outpatient Medications   Medication Sig Dispense Refill     acetaminophen (TYLENOL) 500 MG tablet Take 1-2 tablets (500-1,000 mg) by mouth every 6 hours as needed for mild pain 50 tablet 0     ALOE PO Take by mouth daily as needed       cetirizine-pseudoePHEDrine ER (ZYRTEC-D) 5-120 MG 12 hr tablet Take 1 tablet by mouth every morning        CONTOUR NEXT EZ (CONTOUR NEXT EZ W/DEVICE KIT) w/Device KIT See Admin Instructions  0     furosemide (LASIX) 20 MG tablet Take 20 mg by mouth daily as needed (SWELLING)       levothyroxine (SYNTHROID/LEVOTHROID) 175 MCG tablet Take 175 mcg by mouth every morning        lisinopril (PRINIVIL/ZESTRIL) 5 MG tablet Take 5 mg by mouth every morning        metFORMIN (GLUCOPHAGE-XR) 500 MG 24 hr tablet Take 500 mg by mouth every morning        multivitamin w/minerals (THERA-VIT-M) tablet Take 1 tablet by mouth daily       naproxen (NAPROSYN) 500 MG tablet Take 500 mg by mouth every morning        pantoprazole (PROTONIX) 40 MG EC tablet Take 40 mg by mouth every morning        simvastatin (ZOCOR) 40 MG tablet Take 40 mg by mouth At Bedtime        sotalol (BETAPACE) 160 MG tablet Take 80 mg by mouth 2 times daily        vitamin B complex with vitamin C (STRESS TAB) tablet Take 1 tablet by mouth every morning        warfarin (COUMADIN) 5 MG  "tablet Take 7.5mg 5 days week ,\ 5 mg Tues, Sat  Takes in the evening       bacitracin 500 UNIT/GM OINT Apply topically 2 times daily (Patient not taking: Reported on 2019) 1 Tube 0     oxyCODONE (ROXICODONE) 5 MG tablet Take 1 tablet (5 mg) by mouth every 4 hours as needed for breakthrough pain (Patient not taking: Reported on 2019) 15 tablet 0     senna-docusate (SENOKOT-S/PERICOLACE) 8.6-50 MG tablet Take 1-2 tablets by mouth 2 times daily as needed for constipation (Patient not taking: Reported on 2019) 60 tablet 0         Allergies:      No Known Allergies     Social History:     Social History     Tobacco Use     Smoking status: Former Smoker     Years: 5.00     Types: Cigarettes     Last attempt to quit: 1973     Years since quittin.8     Smokeless tobacco: Never Used     Tobacco comment: Quit age 23   Substance Use Topics     Alcohol use: Yes     Alcohol/week: 0.6 oz     Types: 1 Cans of beer per week       History   Drug Use Not on file         Family History:       Family History   Problem Relation Age of Onset     Colon Cancer Mother      Arthritis Father      Prostate Cancer Father      Hypertension Father      Obesity Father      Myocardial Infarction Father      Diabetes Sister      Thyroid Disease Sister      Cancer Brother      Hypertension Brother      Diabetes Maternal Grandmother      Heart Disease Maternal Grandfather          Physical Exam:     /68 (BP Location: Right arm, Patient Position: Chair, Cuff Size: Adult Regular)   Pulse 68   Temp 98.7  F (37.1  C) (Oral)   Resp 14   Ht 1.727 m (5' 7.99\")   Wt 108.6 kg (239 lb 6.4 oz)   SpO2 97%   BMI 36.41 kg/m     Body mass index is 36.41 kg/m .    General Appearance: healthy and alert, no distress    Neurological: normal gait, no gross defects     Psychiatric: appropriate mood and affect                               ABDOMEN:  Soft, nontender and nondistended.  No organomegaly.  A well-healing right inguinal " groin incision.     PELVIC:  Normal external genitalia, status post right radical vulvectomy.  A well-healing vulvectomy site with healthy granulation tissue, incision partially opened. No signs of infection.             Assessment:    Ilana Trent is a 69 year old woman with a diagnosis of vulvar menlanoma pT4b, pN1a.     A total of 25 minutes was spent with the patient, 20 minutes of which were spent in counseling the patient and/or treatment planning.      1. Vulvar menlanoma pT4b, pN1a    The patient has a 1 mm focus of metastatic melanoma in her right sentinel inguinal lymph node.  Of note, the PET scan has been negative for any distant disease.  I will have her see my colleagues in Medical Oncology who specialize in melanoma for consideration of any adjuvant treatment.  Depending on that, we would follow up with her for surveillance versus the medical oncologist.  The patient agrees with the plan.  She is very appreciative of her care.  All questions were answered.       Bacilio Celis MD, MS    Department of Obstetrics and Gynecology   Division of Gynecologic Oncology   [facility]  Phone: [phone]        CC  Patient Care Team:  Shanice Hines MD as PCP - General (Family Practice)  CHEN TREVIZO

## 2019-08-21 NOTE — PROGRESS NOTES
Follow Up Notes on Referred Patient    Date: 2019       Dr. Ligia Lagunas  [facility]  [address] [place] [address]       RE: Ilana Trent  : 1950  FILOMENA: 2019    Dear Dr. Ligia Lagunas:    Ilana Trent is a 69 year old woman with a diagnosis of vulvar menlanoma pT4b, pN1a.     Patient presents for followup.  She has been doing pretty well since surgery.  Eating and drinking well.  No nausea, vomiting, fevers or chills.  Normal urinary and bowel function.  Minimal amount of discharge.  No vaginal bleeding.  No fever or chills.           Past Medical History:    Past Medical History:   Diagnosis Date     Arthritis      Back pain      Cataract      Diabetes (H)      GERD (gastroesophageal reflux disease)      HLD (hyperlipidemia)      Hypertension      Hypothyroidism      Long term current use of anticoagulant therapy      Malignant neoplasm of breast (female) (H)     s/p lumpectomy, LN removal, radiation     Melanoma of vulva (H)      RHONDA (obstructive sleep apnea)      Osteopenia      PAF (paroxysmal atrial fibrillation) (H)     s/p ablation     Papillary carcinoma (H)     incidentally found     Primary malignant neuroendocrine tumor of pancreas (H)      Toxic multinodular goiter          Past Surgical History:    Past Surgical History:   Procedure Laterality Date     BACK SURGERY      L4-5     BREAST LUMPECTOMY, RT/LT       BUNIONECTOMY Bilateral 2006     Excision right vulvar mass  2019     FOOT SURGERY Bilateral 2006     Intracardiac ablation  2012     Knee arthroscopy surgery Bilateral 2006     Pacemaker implantation  2007     PANCREAS SURGERY  2011    pancreaticoduodenectomy     THYROIDECTOMY        VULVECTOMY RADICAL DISSECT GROIN(S) N/A 2019    Procedure: Exam Under Anesthesia, Right  Radical Vulvectomy, Right [place] Inguinal Lymph Node Sampling;  Surgeon: Bacilio Celis MD;  Location: [facility]  Maintenance Due   Topic Date Due     DEXA  1950     HEPATITIS C SCREENING  1950     ADVANCE CARE PLANNING  1950     COLONOSCOPY  03/24/1961     DTAP/TDAP/TD IMMUNIZATION (1 - Tdap) 03/24/1976     LIPID  03/24/1996     MEDICARE ANNUAL WELLNESS VISIT  03/24/2016     FALL RISK ASSESSMENT  03/24/2016     PNEUMOCOCCAL IMMUNIZATION 65+ HIGH/HIGHEST RISK (1 of 2 - PCV13) 03/24/2016       Current Medications:     Current Outpatient Medications   Medication Sig Dispense Refill     acetaminophen (TYLENOL) 500 MG tablet Take 1-2 tablets (500-1,000 mg) by mouth every 6 hours as needed for mild pain 50 tablet 0     ALOE PO Take by mouth daily as needed       cetirizine-pseudoePHEDrine ER (ZYRTEC-D) 5-120 MG 12 hr tablet Take 1 tablet by mouth every morning        CONTOUR NEXT EZ (CONTOUR NEXT EZ W/DEVICE KIT) w/Device KIT See Admin Instructions  0     furosemide (LASIX) 20 MG tablet Take 20 mg by mouth daily as needed (SWELLING)       levothyroxine (SYNTHROID/LEVOTHROID) 175 MCG tablet Take 175 mcg by mouth every morning        lisinopril (PRINIVIL/ZESTRIL) 5 MG tablet Take 5 mg by mouth every morning        metFORMIN (GLUCOPHAGE-XR) 500 MG 24 hr tablet Take 500 mg by mouth every morning        multivitamin w/minerals (THERA-VIT-M) tablet Take 1 tablet by mouth daily       naproxen (NAPROSYN) 500 MG tablet Take 500 mg by mouth every morning        pantoprazole (PROTONIX) 40 MG EC tablet Take 40 mg by mouth every morning        simvastatin (ZOCOR) 40 MG tablet Take 40 mg by mouth At Bedtime        sotalol (BETAPACE) 160 MG tablet Take 80 mg by mouth 2 times daily        vitamin B complex with vitamin C (STRESS TAB) tablet Take 1 tablet by mouth every morning        warfarin (COUMADIN) 5 MG tablet Take 7.5mg 5 days week ,\ 5 mg Tues, Sat  Takes in the evening       bacitracin 500 UNIT/GM OINT Apply topically 2 times daily (Patient not taking: Reported on 8/13/2019) 1 Tube 0     oxyCODONE (ROXICODONE) 5 MG tablet  "Take 1 tablet (5 mg) by mouth every 4 hours as needed for breakthrough pain (Patient not taking: Reported on 2019) 15 tablet 0     senna-docusate (SENOKOT-S/PERICOLACE) 8.6-50 MG tablet Take 1-2 tablets by mouth 2 times daily as needed for constipation (Patient not taking: Reported on 2019) 60 tablet 0         Allergies:      No Known Allergies     Social History:     Social History     Tobacco Use     Smoking status: Former Smoker     Years: 5.00     Types: Cigarettes     Last attempt to quit: 1973     Years since quittin.8     Smokeless tobacco: Never Used     Tobacco comment: Quit age 23   Substance Use Topics     Alcohol use: Yes     Alcohol/week: 0.6 oz     Types: 1 Cans of beer per week       History   Drug Use Not on file         Family History:       Family History   Problem Relation Age of Onset     Colon Cancer Mother      Arthritis Father      Prostate Cancer Father      Hypertension Father      Obesity Father      Myocardial Infarction Father      Diabetes Sister      Thyroid Disease Sister      Cancer Brother      Hypertension Brother      Diabetes Maternal Grandmother      Heart Disease Maternal Grandfather          Physical Exam:     /68 (BP Location: Right arm, Patient Position: Chair, Cuff Size: Adult Regular)   Pulse 68   Temp 98.7  F (37.1  C) (Oral)   Resp 14   Ht 1.727 m (5' 7.99\")   Wt 108.6 kg (239 lb 6.4 oz)   SpO2 97%   BMI 36.41 kg/m    Body mass index is 36.41 kg/m .    General Appearance: healthy and alert, no distress    Neurological: normal gait, no gross defects     Psychiatric: appropriate mood and affect                               ABDOMEN:  Soft, nontender and nondistended.  No organomegaly.  A well-healing right inguinal groin incision.     PELVIC:  Normal external genitalia, status post right radical vulvectomy.  A well-healing vulvectomy site with healthy granulation tissue, incision partially opened. No signs of infection. "             Assessment:    Ilana Trent is a 69 year old woman with a diagnosis of vulvar menlanoma pT4b, pN1a.     A total of 25 minutes was spent with the patient, 20 minutes of which were spent in counseling the patient and/or treatment planning.      1. Vulvar menlanoma pT4b, pN1a    The patient has a 1 mm focus of metastatic melanoma in her right sentinel inguinal lymph node.  Of note, the PET scan has been negative for any distant disease.  I will have her see my colleagues in Medical Oncology who specialize in melanoma for consideration of any adjuvant treatment.  Depending on that, we would follow up with her for surveillance versus the medical oncologist.  The patient agrees with the plan.  She is very appreciative of her care.  All questions were answered.       Bacilio Celis MD, MS    Department of Obstetrics and Gynecology   Division of Gynecologic Oncology   [facility]  Phone: [phone]        CC  Patient Care Team:  Shanice Hines MD as PCP - General (Family Practice)  CHEN TREVIZO  Answers for HPI/ROS submitted by the patient on 8/18/2019   General Symptoms: No  Skin Symptoms: No  HENT Symptoms: No  EYE SYMPTOMS: No  HEART SYMPTOMS: No  LUNG SYMPTOMS: No  INTESTINAL SYMPTOMS: No  URINARY SYMPTOMS: No  GYNECOLOGIC SYMPTOMS: No  BREAST SYMPTOMS: No  SKELETAL SYMPTOMS: No  BLOOD SYMPTOMS: No  NERVOUS SYSTEM SYMPTOMS: No  MENTAL HEALTH SYMPTOMS: No

## 2019-08-21 NOTE — NURSING NOTE
"Oncology Rooming Note    August 21, 2019 7:25 AM   Ilana Trent is a 69 year old female who presents for:    Chief Complaint   Patient presents with     Oncology Clinic Visit     [facility] POST OP- MALIGNANT MELANOMA OF VULVA     Initial Vitals: /68 (BP Location: Right arm, Patient Position: Chair, Cuff Size: Adult Regular)   Pulse 68   Temp 98.7  F (37.1  C) (Oral)   Resp 14   Ht 1.727 m (5' 7.99\")   Wt 108.6 kg (239 lb 6.4 oz)   SpO2 97%   BMI 36.41 kg/m   Estimated body mass index is 36.41 kg/m  as calculated from the following:    Height as of this encounter: 1.727 m (5' 7.99\").    Weight as of this encounter: 108.6 kg (239 lb 6.4 oz). Body surface area is 2.28 meters squared.  No Pain (0) Comment: Data Unavailable   No LMP recorded. Patient is postmenopausal.  Allergies reviewed: Yes  Medications reviewed: Yes    Medications: Medication refills not needed today.  Pharmacy [place] entered into [employer]: [email or web address] [place] [address] [place] [address] 135    Clinical concerns: No new concerns. Lalita was notified.      Marvin Smith LPN            "

## 2019-08-22 NOTE — TELEPHONE ENCOUNTER
ONCOLOGY INTAKE: Records Information      APPT INFORMATION: 9/9/19 - [facility]  Referring provider:  Lalita  Referring provider s clinic:  [facility]  Reason for visit/diagnosis:  metastatic melanoma  Has patient been notified of appointment date and time?: Yes    RECORDS INFORMATION:  Were the records received with the referral (via [employer])? Internal referral    Has patient been seen for any external appt for this diagnosis? No    If yes, where? NA    Has patient had any imaging or procedures outside of [facility] for this condition? No      If Yes, where? NA    ADDITIONAL INFORMATION:  Scheduled via inbasket from Nydia BLANTON / called & confirmed with pt

## 2019-09-09 ENCOUNTER — ONCOLOGY VISIT (OUTPATIENT)
Dept: ONCOLOGY | Facility: CLINIC | Age: 69
End: 2019-09-09
Attending: INTERNAL MEDICINE
Payer: COMMERCIAL

## 2019-09-09 ENCOUNTER — PRE VISIT (OUTPATIENT)
Dept: ONCOLOGY | Facility: CLINIC | Age: 69
End: 2019-09-09

## 2019-09-09 ENCOUNTER — TELEPHONE (OUTPATIENT)
Dept: DERMATOLOGY | Facility: CLINIC | Age: 69
End: 2019-09-09

## 2019-09-09 VITALS
RESPIRATION RATE: 16 BRPM | WEIGHT: 246 LBS | SYSTOLIC BLOOD PRESSURE: 127 MMHG | HEIGHT: 68 IN | OXYGEN SATURATION: 97 % | HEART RATE: 72 BPM | DIASTOLIC BLOOD PRESSURE: 74 MMHG | TEMPERATURE: 99.3 F | BODY MASS INDEX: 37.28 KG/M2

## 2019-09-09 DIAGNOSIS — B37.31 CANDIDIASIS OF VULVA AND VAGINA: ICD-10-CM

## 2019-09-09 DIAGNOSIS — B37.2 CANDIDIASIS OF SKIN: ICD-10-CM

## 2019-09-09 DIAGNOSIS — C43.9 MELANOMA OF SKIN (H): Primary | ICD-10-CM

## 2019-09-09 PROCEDURE — 99205 OFFICE O/P NEW HI 60 MIN: CPT | Mod: 24 | Performed by: INTERNAL MEDICINE

## 2019-09-09 PROCEDURE — G0463 HOSPITAL OUTPT CLINIC VISIT: HCPCS | Mod: ZF

## 2019-09-09 RX ORDER — NYSTATIN 100000 [USP'U]/G
POWDER TOPICAL 3 TIMES DAILY
Qty: 42 BOTTLE | Refills: 0 | Status: SHIPPED | OUTPATIENT
Start: 2019-09-09 | End: 2019-09-26

## 2019-09-09 ASSESSMENT — MIFFLIN-ST. JEOR: SCORE: 1694.22

## 2019-09-09 ASSESSMENT — PAIN SCALES - GENERAL: PAINLEVEL: NO PAIN (0)

## 2019-09-09 NOTE — PROGRESS NOTES
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 She is referred by Dr. Littlejohn.    6/4/2019, she had excision of the right vulvar mass in [place], and on pathology this shows malignant melanoma with ulceration, at least fausto level IV, Breslow thickness at least 5 mm, mitotic rate is 3 per mm2, TILs are present and brisk, pathologic staging pT4b.    On 6/28/19 she had PET-CT, which showed no obvious evidence of metastatic disease.    7/30/2019, she has wide local excision and sentinel biopsy (Specimen #: [id number]), which showed residual melanoma to 1.3 mm depth of invasion and margins negative. Right inguinal lymph node showed a subcapsular 1 mm focus of cells.    She feels well today. She denies any major swelling in the right leg. No major swelling or tenderness in the area of resection. She does, however, note some mild erythema, mild serous drainage, and moisture and irritation in the right inguinal skin folds. She has also noted associated odor. Beyond this, she denies shortness of breath, chest pain, nausea, vomiting, or diarrhea.    ECOG performance status is 0.    Results for orders placed or performed during the hospital encounter of 07/30/19   INR   Result Value Ref Range    INR 0.97 0.86 - 1.14   Glucose by meter   Result Value Ref Range    Glucose 139 (H) 70 - 99 mg/dL   Surgical pathology exam   Result Value Ref Range    Copath Report       Patient Name: GEETA TRENT  MR#: [id number]  Specimen #: [id number]  Collected: 7/30/2019  Received: 7/30/2019  Reported: 8/5/2019 16:03  Ordering Phy(s): JACKIE LITTLEJOHN    For improved result formatting, select 'View Enhanced Report Format' under   Linked Documents section.    SPECIMEN(S):  A: [place] lymph node, right inguinal hot and blue  B: [place] lymph  node, right inguinal superficial  C: Vulva, right    FINAL DIAGNOSIS:    A. SENTINEL LYMPH NODE, RIGHT INGUINAL:  -Metastatic malignant melanoma to one lymph node examined (1/1). See   comment.    B. SENTINEL LYMPH NODE, RIGHT INGUINAL SUPERFICIAL:  -Fibrovascular adipose tissue, negative for malignancy.  -No evidence of lymphoid tissue.    C. VULVA, RIGHT RADICAL VULVECTOMY:  -Residual malignant melanoma. See Comment.  -Margins negative for malignancy.    COMMENT:  Part A: Immunohistochemical stains for Melan-A and tyrosinase are   performed with proper controls on blocks A1  and A2 and demonstrate staining positivity for  malignant melanocytes in   the subcapsular spaces in the lymph  node.    Part C: Immunohistochemical stains for Melan-A on slide C8 demonstrate   staining positivity for malignant  melanocytes.    Report Name: Melanoma of the Skin - Excision       Status: Submitted Checklist Inst: 1      Last Updated By: Rohini Sarmiento M.D., PhD, Physicians,  08/05/2019 16:03:13  Part(s) Involved:  C: Vulva, right    Synoptic Report:    SPECIMEN    Procedure:        - Excision        - [place] node(s) biopsy    Specimen Laterality:        - Right    TUMOR    Tumor Site:        - Vulva - right vulva    Tumor Size: 1.5 Millimeters (mm)    Histologic Type:        - Nodular melanoma    Maximum Tumor (Breslow) Thickness: 1.2 Millimeters (mm)    Tumor Extent      Macroscopic Satellite Nodule(s):          - Not identified      Ulceration:          - Present      Anatomic (Santi) Level:          - II (Melanoma present in but does not fill and expand papillary   dermis)    Accessory Findings      Mitotic  Rate: 1 mitoses / mm2      Microsatellite(s):          - Not identified      Lymphovascular Invasion:          - Not identified      Neurotropism:          - Not identified      Tumor-Infiltrating Lymphocytes:          - Present, nonbrisk      Tumor Regression:          - Not identified    Margins      Peripheral  "Margins:          - Uninvolved by invasive melanoma        Distance of Invasive Melanoma from Closest Peripheral Margin: 3   Millimeters (mm)        Location: 3:00 - 6:00        Status of Melanoma In Situ at Peripheral Margins:            - Uninvolved by melanoma in situ          Distance of Melanoma In Situ from Closest Peripheral Margin:   Cannot be determined - There is no  melanoma in situ      Deep Margin:          - Uninvolved by invasive melanoma        Distance of Invasive Melanoma from Deep Margin: 15 Millimeters (mm)    Lymph Nodes      Number of [place] Lymph Nodes Involved: 1      Size of Largest Metastatic Deposit in [place] Lymph Node: 1   Millimeters (mm)       Number of Lymph Nodes Involved: 1      Location:          - Subcapsular      Size of Largest Metastatic Deposit: 1 Millimeters (mm)      Extranodal Extension:          - Not identified      Matted Nodes:          - Not identified      Number of Lymph Nodes Examined: 1      Number of [place] Nodes Examined: 1    Pathologic Stage Classification (pTNM, AJCC 8th Edition)        - Classification assigned in this report includes information from          a prior procedure - please refer to prior biopsy results for   original          staging      Primary Tumor (pT):          - pT4b      Regional Lymph Nodes (pN):          - pN1a    CAP eCC August 2018 Release    I have personally reviewed all specimens and/or slides, including the   listed special stains, and used them  with my medical judgement to determine or confirm the final diagnosis.    Electronically signed out by:    Rohini Sarmiento M.D., PhD, [facility]    CLINICAL HISTORY:    A 69-year-old female with a diagnosis of right vul bernardo melanoma, diagnosed   from an outside biopsy.    GROSS:  A: The specimen is received in formalin with proper patient   identification, labeled \"right inguinal sentinel  lymph node\".  The specimen consists of a tan-yellow, lobular and   homogenous piece of " "fibroadipose (3.0 x 2.2 x  1.7 cm).  Dissection of the specimen reveals a tan-yellow, rubbery   possible lymph node (1.8 x 1.0 x 0.7 cm)  marked by blue sentinelization dye.  The possible lymph node is serially   sectioned at 0.2 cm intervals, and  the specimen is entirely submitted as per summary of sections:    Summary of Sections:  A1-A2 - possible lymph node, serially sectioned  A3-A4 - fibroadipose, entirely    B: The specimen is received in formalin with proper patient   identification, labeled \"right inguinal sentinel  lymph node superficial \".  The specimen consists of a collection of   tan-yellow, lobular fibroadipose (6.2 x  4.5 x 1.3 cm in aggregate).  The cut surface is variegated tan-brown to   yellow fibrous soft tis [place] and  fibroadipose.  No masses or lesions are grossly identified.  No lymph   nodes are grossly identified.  Specimen  is serially sectioned and entirely submitted in cassettes B1 - B9.    C: The specimen is received in formalin with proper patient   identification, labeled \"right vulva stitch at  12:00\".  The specimen consists of an oriented right pericolic vulvectomy   (5.8 x 2.4 cm and excised to a depth  of 3.4 cm).  The specimen is oriented as per the requisition with a suture   designating 12:00, and is  differentially inked as follows: 12:00 to 3:00 to 6:00 - blue, and 6:00 to   9:00 - 12:00 - Green.  The skin  surface is remarkable for a depressed previous surgical site (1.1 x 0.6 x   0.3 cm in depth) that is 1.2 cm from  the 3:00 margin, 1.4 cm from the 9:00 margin, 2.3 cm from the 6:00 margin,   and 2.0 cm from the 12:00 margin.  The skin surface is marked with blue sentinelization dye and is grossly   edematous.  Due to the sentinelization  dye, no distinct masses or lesi ons are grossly identifiable.  The cut   surface is dyed blue with  sentinelization dye, and overlies a tan-yellow, homogenous and   unremarkable segment of fibroadipose.  No  discrete masses or lesions are " grossly identified.  A gross photograph is   taken.  Specimen is serially  sectioned and sequentially submitted entirely as per summary of sections:    Summary of Sections:  C1 - 12:00 margin, en face  C2-C25 - specimen sequentially submitted from 12:00 to 6:00, 12:00 skin   margin in cassette C4, C9-C10,  C11-C12, C13-C14, C15-C16, C17-C18, C19-C20, and C21-C22 adjacent sections   bisected  C26 - 6:00 margin, en face     (Dictated by: Miky JAIME 7/31/2019 01:16 PM)    MICROSCOPIC:    A microscopic examination is performed.    The technical component of this testing was completed at the [facility], with the professional component performed   at the [facility], [address] [place] [id number] ([phone])    CPT Codes:  A: 19049-PQ7, 57608-TMN, 36371-MUM, 15684-ILS  B: 25060-AE0  C: 22688-FB5, 08087-BQO    COLLECTION SITE:  Client: [facility]  Location: Critical access hospital (B)       Glucose by meter   Result Value Ref Range    Glucose 129 (H) 70 - 99 mg/dL   Basic metabolic panel   Result Value Ref Range    Sodium 138 133 - 144 mmol/L    Potassium 4.4 3.4 - 5.3 mmol/L    Chloride 106 94 - 109 mmol/L    Carbon Dioxide 23 20 - 32 mmol/L    Anion Gap 9 3 - 14 mmol/L    Glucose 203 (H) 70 - 99 mg/dL    Urea Nitrogen 22 7 - 30 mg/dL    Creatinine 0.75 0.52 - 1.04 mg/dL    GFR Estimate 81 >60 mL/min/[1.73_m2]    GFR Estimate If Black >90 >60 mL/min/[1.73_m2]    Calcium 8.4 (L) 8.5 - 10.1 mg/dL   CBC with platelets   Result Value Ref Range    WBC 15.9 (H) 4.0 - 11.0 10e9/L    RBC Count 4.59 3.8 - 5.2 10e12/L    Hemoglobin 12.3 11.7 - 15.7 g/dL    Hematocrit 41.2 35.0 - 47.0 %    MCV 90 78 - 100 fl    MCH 26.8 26.5 - 33.0 pg    MCHC 29.9 (L) 31.5 - 36.5 g/dL    RDW 13.4 10.0 - 15.0 %    Platelet Count 255 150 - 450 10e9/L   INR   Result Value Ref Range    INR  1.02 0.86 - 1.14   Magnesium   Result Value Ref Range    Magnesium 1.9 1.6 - 2.3 mg/dL   Phosphorus   Result Value Ref Range    Phosphorus 3.4 2.5 - 4.5 mg/dL   Glucose by meter   Result Value Ref Range    Glucose 246 (H) 70 - 99 mg/dL   CBC with platelets   Result Value Ref Range    WBC 14.3 (H) 4.0 - 11.0 10e9/L    RBC Count 4.32 3.8 - 5.2 10e12/L    Hemoglobin 11.7 11.7 - 15.7 g/dL    Hematocrit 38.1 35.0 - 47.0 %    MCV 88 78 - 100 fl    MCH 27.1 26.5 - 33.0 pg    MCHC 30.7 (L) 31.5 - 36.5 g/dL    RDW 13.2 10.0 - 15.0 %    Platelet Count 231 150 - 450 10e9/L   Basic metabolic panel   Result Value Ref Range    Sodium 139 133 - 144 mmol/L    Potassium 4.2 3.4 - 5.3 mmol/L    Chloride 106 94 - 109 mmol/L    Carbon Dioxide 25 20 - 32 mmol/L    Anion Gap 7 3 - 14 mmol/L    Glucose 182 (H) 70 - 99 mg/dL    Urea Nitrogen 19 7 - 30 mg/dL    Creatinine 0.76 0.52 - 1.04 mg/dL    GFR Estimate 81 >60 mL/min/[1.73_m2]    GFR Estimate If Black >90 >60 mL/min/[1.73_m2]    Calcium 8.6 8.5 - 10.1 mg/dL   Magnesium   Result Value Ref Range    Magnesium 1.7 1.6 - 2.3 mg/dL   Phosphorus   Result Value Ref Range    Phosphorus 2.9 2.5 - 4.5 mg/dL   INR   Result Value Ref Range    INR 1.04 0.86 - 1.14   Glucose by meter   Result Value Ref Range    Glucose 143 (H) 70 - 99 mg/dL   Glucose by meter   Result Value Ref Range    Glucose 129 (H) 70 - 99 mg/dL   EKG 12-lead, tracing only   Result Value Ref Range    Interpretation ECG Click View Image link to view waveform and result          REVIEW OF SYSTEMS  A 12-point ROS negative except as in HPI    Current Outpatient Medications   Medication Sig Dispense Refill     acetaminophen (TYLENOL) 500 MG tablet Take 1-2 tablets (500-1,000 mg) by mouth every 6 hours as needed for mild pain 50 tablet 0     ALOE PO Take by mouth daily as needed       bacitracin 500 UNIT/GM OINT Apply topically 2 times daily (Patient not taking: Reported on 8/13/2019) 1 Tube 0     cetirizine-pseudoePHEDrine ER  (ZYRTEC-D) 5-120 MG 12 hr tablet Take 1 tablet by mouth every morning        CONTOUR NEXT EZ (CONTOUR NEXT EZ W/DEVICE KIT) w/Device KIT See Admin Instructions  0     furosemide (LASIX) 20 MG tablet Take 20 mg by mouth daily as needed (SWELLING)       levothyroxine (SYNTHROID/LEVOTHROID) 175 MCG tablet Take 175 mcg by mouth every morning        lisinopril (PRINIVIL/ZESTRIL) 5 MG tablet Take 5 mg by mouth every morning        metFORMIN (GLUCOPHAGE-XR) 500 MG 24 hr tablet Take 500 mg by mouth every morning        multivitamin w/minerals (THERA-VIT-M) tablet Take 1 tablet by mouth daily       naproxen (NAPROSYN) 500 MG tablet Take 500 mg by mouth every morning        oxyCODONE (ROXICODONE) 5 MG tablet Take 1 tablet (5 mg) by mouth every 4 hours as needed for breakthrough pain (Patient not taking: Reported on 8/13/2019) 15 tablet 0     pantoprazole (PROTONIX) 40 MG EC tablet Take 40 mg by mouth every morning        senna-docusate (SENOKOT-S/PERICOLACE) 8.6-50 MG tablet Take 1-2 tablets by mouth 2 times daily as needed for constipation (Patient not taking: Reported on 8/13/2019) 60 tablet 0     simvastatin (ZOCOR) 40 MG tablet Take 40 mg by mouth At Bedtime        sotalol (BETAPACE) 160 MG tablet Take 80 mg by mouth 2 times daily        vitamin B complex with vitamin C (STRESS TAB) tablet Take 1 tablet by mouth every morning        warfarin (COUMADIN) 5 MG tablet Take 7.5mg 5 days week ,\ 5 mg Tues, Sat  Takes in the evening         No Known Allergies    There is no immunization history on file for this patient.    Past Medical History:   Diagnosis Date     Arthritis      Back pain      Cataract      Diabetes (H)      GERD (gastroesophageal reflux disease)      HLD (hyperlipidemia)      Hypertension      Hypothyroidism      Long term current use of anticoagulant therapy      Malignant neoplasm of breast (female) (H) 2001    s/p lumpectomy, LN removal, radiation     Melanoma of vulva (H)      RHONDA (obstructive sleep apnea)       Osteopenia      PAF (paroxysmal atrial fibrillation) (H) 2012    s/p ablation     Papillary carcinoma (H)     incidentally found     Primary malignant neuroendocrine tumor of pancreas (H) 2011     Toxic multinodular goiter        Past Surgical History:   Procedure Laterality Date     BACK SURGERY  1987    L4-5     BREAST LUMPECTOMY, RT/LT  2001     BUNIONECTOMY Bilateral 2006     Excision right vulvar mass  06/04/2019     FOOT SURGERY Bilateral 2006     Intracardiac ablation  2012     Knee arthroscopy surgery Bilateral 2006     Pacemaker implantation  2007     PANCREAS SURGERY  2011    pancreaticoduodenectomy     THYROIDECTOMY        VULVECTOMY RADICAL DISSECT GROIN(S) N/A 7/30/2019    Procedure: Exam Under Anesthesia, Right  Radical Vulvectomy, Right [place] Inguinal Lymph Node Sampling;  Surgeon: Bacilio Celis MD;  Location:  OR       SOCIAL HISTORY  History   Smoking Status     Former Smoker     Years: 5.00     Types: Cigarettes     Quit date: 11/1/1973   Smokeless Tobacco     Never Used     Comment: Quit age 23    Social History    Substance and Sexual Activity      Alcohol use: Yes        Alcohol/week: 0.6 oz        Types: 1 Cans of beer per week     History   Drug Use Not on file       FAMILY HISTORY  Family History   Problem Relation Age of Onset     Colon Cancer Mother      Arthritis Father      Prostate Cancer Father      Hypertension Father      Obesity Father      Myocardial Infarction Father      Diabetes Sister      Thyroid Disease Sister      Cancer Brother      Hypertension Brother      Diabetes Maternal Grandmother      Heart Disease Maternal Grandfather        PHYSICAL EXAMINATION  There were no vitals taken for this visit.  Wt Readings from Last 2 Encounters:   08/21/19 108.6 kg (239 lb 6.4 oz)   08/13/19 111.6 kg (246 lb)     Physical Exam   Constitutional: She is oriented to person, place, and time. She appears well-developed and well-nourished. No distress.   HENT:   Head:  Normocephalic and atraumatic.   Mouth/Throat: Oropharynx is clear and moist.   Eyes: Pupils are equal, round, and reactive to light. EOM are normal. No scleral icterus.   Neck: Normal range of motion.   Cardiovascular: Regular rhythm.   No murmur heard.  Pulmonary/Chest: Effort normal. She has no wheezes.   Abdominal: Soft. She exhibits no mass. There is no tenderness.   Musculoskeletal: Normal range of motion. She exhibits no edema or tenderness.   Lymphadenopathy:     She has no cervical adenopathy.   Neurological: She is alert and oriented to person, place, and time.   Skin: Skin is warm and dry.   Right inguinal lymph node site with small amount of serous drainage with increased moisture and candidal rash in the inguinal skin folds   Psychiatric: She has a normal mood and affect. Her behavior is normal.   Nursing note and vitals reviewed.      ASSESSMENT AND PLAN    #1 Vulvar Melanoma, Stage IIIC pT4b N1a  Ms. Trent is a 69 year old woman with Stage IIIC melanoma. Adjuvant immunotherapy indicated. We reviewed the data suggests >90% risk of recurrence of her disease given Stage IIIC status. This is largely driven by the size of the primary tumor. She has 1 focus of metastasis in the sentinel node, which makes it likely she has smaller deposits of disease in other draining lymph nodes or distantly.    We reviewed that adjuvant immunotherapy for melanoma is associated with improved outcomes for patients with stage III melanoma. After complete resection of stage IIIB and C melanoma, 12-month RFS was 70.5% for nivolumab and 60.8% for ipilimumab in Check[place]-238.  In [place]-054, which included stage IIIA, B and C melanoma, 12-month RFS was 75.4% pembrolizumab vs. 61% placebo. We briefly reviewed associated side effects of immunotherapy include autoimmune toxicities like diarrhea, cough, shortness of breath, hypothyroidism. We will be observing her for these complications and she should contact us with any new or  concerning side effects or symptoms.    She agrees to proceed. Will plan to start nivolumab IV monthly when she returns in 1-2 weeks with PET-CT and labs for re-staging. Will try to add dermatology visit as well, given the distance she travels.    Multiple questions answered.    Elton Arellano M.D.   of Medicine  Hematology, Oncology and Transplantation      Candidiasis of vulva and vagina  - nystatin (MYCOSTATIN) 470882 UNIT/GM external powder  Dispense: 42 Bottle; Refill: 0    Candidiasis of skin  - nystatin (MYCOSTATIN) 446381 UNIT/GM external powder  Dispense: 42 Bottle; Refill: 0    Melanoma of skin (H)  - PET Oncology (Eyes to Thighs)  - CANCER RISK MGMT/CANCER GENETIC COUNSELING REFERRAL  - DERMATOLOGY REFERRAL

## 2019-09-09 NOTE — LETTER
RE: Geeta Trent  [id number] [facility] [address]     Dear Colleague,    Thank you for referring your patient, Geeta Trent, to the [facility]. Please see a copy of my visit note below.    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 She is referred by Dr. Celis.    6/4/2019, she had excision of the right vulvar mass in [place], and on pathology this shows malignant melanoma with ulceration, at least fausto level IV, Breslow thickness at least 5 mm, mitotic rate is 3 per mm2, TILs are present and brisk, pathologic staging pT4b.    On 6/28/19 she had PET-CT, which showed no obvious evidence of metastatic disease.    7/30/2019, she has wide local excision and sentinel biopsy (Specimen #: [id number]), which showed residual melanoma to 1.3 mm depth of invasion and margins negative. Right inguinal lymph node showed a subcapsular 1 mm focus of cells.    She feels well today. She denies any major swelling in the right leg. No major swelling or tenderness in the area of resection. She does, however, note some mild erythema, mild serous drainage, and moisture and irritation in the right inguinal skin folds. She has also noted associated odor. Beyond this, she denies shortness of breath, chest pain, nausea, vomiting, or diarrhea.    ECOG performance status is 0.    Results for orders placed or performed during the hospital encounter of 07/30/19   INR   Result Value Ref Range    INR 0.97 0.86 - 1.14   Glucose by meter   Result Value Ref Range    Glucose 139 (H) 70 - 99 mg/dL   Surgical pathology exam   Result Value Ref Range    Copath Report       Patient Name: GEETA TRENT  MR#: [id number]  Specimen #: [id number]  Collected: 7/30/2019  Received: 7/30/2019  Reported: 8/5/2019 16:03  Ordering  Phy(s): JACKIE LITTLEJOHN    For improved result formatting, select 'View Enhanced Report Format' under   Linked Documents section.    SPECIMEN(S):  A: [place] lymph node, right inguinal hot and blue  B: [place] lymph node, right inguinal superficial  C: Vulva, right    FINAL DIAGNOSIS:    A. SENTINEL LYMPH NODE, RIGHT INGUINAL:  -Metastatic malignant melanoma to one lymph node examined (1/1). See   comment.    B. SENTINEL LYMPH NODE, RIGHT INGUINAL SUPERFICIAL:  -Fibrovascular adipose tissue, negative for malignancy.  -No evidence of lymphoid tissue.    C. VULVA, RIGHT RADICAL VULVECTOMY:  -Residual malignant melanoma. See Comment.  -Margins negative for malignancy.    COMMENT:  Part A: Immunohistochemical stains for Melan-A and tyrosinase are   performed with proper controls on blocks A1  and A2 and demonstrate staining positivity for  malignant melanocytes in   the subcapsular spaces in the lymph  node.    Part C: Immunohistochemical stains for Melan-A on slide C8 demonstrate   staining positivity for malignant  melanocytes.    Report Name: Melanoma of the Skin - Excision       Status: Submitted Checklist Inst: 1      Last Updated By: Rohini Sarmiento M.D., PhD, Physicians,  08/05/2019 16:03:13  Part(s) Involved:  C: Vulva, right    Synoptic Report:    SPECIMEN    Procedure:        - Excision        - [place] node(s) biopsy    Specimen Laterality:        - Right    TUMOR    Tumor Site:        - Vulva - right vulva    Tumor Size: 1.5 Millimeters (mm)    Histologic Type:        - Nodular melanoma    Maximum Tumor (Breslow) Thickness: 1.2 Millimeters (mm)    Tumor Extent      Macroscopic Satellite Nodule(s):          - Not identified      Ulceration:          - Present      Anatomic (Santi) Level:          - II (Melanoma present in but does not fill and expand papillary   dermis)    Accessory Findings      Mitotic  Rate: 1 mitoses / mm2      Microsatellite(s):          - Not identified      Lymphovascular  Invasion:          - Not identified      Neurotropism:          - Not identified      Tumor-Infiltrating Lymphocytes:          - Present, nonbrisk      Tumor Regression:          - Not identified    Margins      Peripheral Margins:          - Uninvolved by invasive melanoma        Distance of Invasive Melanoma from Closest Peripheral Margin: 3   Millimeters (mm)        Location: 3:00 - 6:00        Status of Melanoma In Situ at Peripheral Margins:            - Uninvolved by melanoma in situ          Distance of Melanoma In Situ from Closest Peripheral Margin:   Cannot be determined - There is no  melanoma in situ      Deep Margin:          - Uninvolved by invasive melanoma        Distance of Invasive Melanoma from Deep Margin: 15 Millimeters (mm)    Lymph Nodes      Number of [place] Lymph Nodes Involved: 1      Size of Largest Metastatic Deposit in [place] Lymph Node: 1   Millimeters (mm)       Number of Lymph Nodes Involved: 1      Location:          - Subcapsular      Size of Largest Metastatic Deposit: 1 Millimeters (mm)      Extranodal Extension:          - Not identified      Matted Nodes:          - Not identified      Number of Lymph Nodes Examined: 1      Number of [place] Nodes Examined: 1    Pathologic Stage Classification (pTNM, AJCC 8th Edition)        - Classification assigned in this report includes information from          a prior procedure - please refer to prior biopsy results for   original          staging      Primary Tumor (pT):          - pT4b      Regional Lymph Nodes (pN):          - pN1a    [facility] August 2018 Release    I have personally reviewed all specimens and/or slides, including the   listed special stains, and used them  with my medical judgement to determine or confirm the final diagnosis.    Electronically signed out by:    Rohini Sarmiento M.D., PhD, Physicians    CLINICAL HISTORY:    A 69-year-old female with a diagnosis of right vul bernardo melanoma, diagnosed   from an  "outside biopsy.    GROSS:  A: The specimen is received in formalin with proper patient   identification, labeled \"right inguinal sentinel  lymph node\".  The specimen consists of a tan-yellow, lobular and   homogenous piece of fibroadipose (3.0 x 2.2 x  1.7 cm).  Dissection of the specimen reveals a tan-yellow, rubbery   possible lymph node (1.8 x 1.0 x 0.7 cm)  marked by blue sentinelization dye.  The possible lymph node is serially   sectioned at 0.2 cm intervals, and  the specimen is entirely submitted as per summary of sections:    Summary of Sections:  A1-A2 - possible lymph node, serially sectioned  A3-A4 - fibroadipose, entirely    B: The specimen is received in formalin with proper patient   identification, labeled \"right inguinal sentinel  lymph node superficial \".  The specimen consists of a collection of   tan-yellow, lobular fibroadipose (6.2 x  4.5 x 1.3 cm in aggregate).  The cut surface is variegated tan-brown to   yellow fibrous soft tis [place] and  fibroadipose.  No masses or lesions are grossly identified.  No lymph   nodes are grossly identified.  Specimen  is serially sectioned and entirely submitted in cassettes B1 - B9.    C: The specimen is received in formalin with proper patient   identification, labeled \"right vulva stitch at  12:00\".  The specimen consists of an oriented right pericolic vulvectomy   (5.8 x 2.4 cm and excised to a depth  of 3.4 cm).  The specimen is oriented as per the requisition with a suture   designating 12:00, and is  differentially inked as follows: 12:00 to 3:00 to 6:00 - blue, and 6:00 to   9:00 - 12:00 - Green.  The skin  surface is remarkable for a depressed previous surgical site (1.1 x 0.6 x   0.3 cm in depth) that is 1.2 cm from  the 3:00 margin, 1.4 cm from the 9:00 margin, 2.3 cm from the 6:00 margin,   and 2.0 cm from the 12:00 margin.  The skin surface is marked with blue sentinelization dye and is grossly   edematous.  Due to the sentinelization  dye, no " distinct masses or lesi ons are grossly identifiable.  The cut   surface is dyed blue with  sentinelization dye, and overlies a tan-yellow, homogenous and   unremarkable segment of fibroadipose.  No  discrete masses or lesions are grossly identified.  A gross photograph is   taken.  Specimen is serially  sectioned and sequentially submitted entirely as per summary of sections:    Summary of Sections:  C1 - 12:00 margin, en face  C2-C25 - specimen sequentially submitted from 12:00 to 6:00, 12:00 skin   margin in cassette C4, C9-C10,  C11-C12, C13-C14, C15-C16, C17-C18, C19-C20, and C21-C22 adjacent sections   bisected  C26 - 6:00 margin, en face     (Dictated by: Miky JAIME 7/31/2019 01:16 PM)    MICROSCOPIC:    A microscopic examination is performed.    The technical component of this testing was completed at the [facility], with the professional component performed   at the [facility] [address] [place] [phone] ([phone])    CPT Codes:  A: 37944-MQ3, 98644-KDV, 51511-ABT, 69083-YDG  B: 42059-PI7  C: 35758-DS7, 95747-CYQ    [facility]  [facility]  [facility] (B[facility]       Glucose by meter   Result Value Ref Range    Glucose 129 (H) 70 - 99 mg/dL   Basic metabolic panel   Result Value Ref Range    Sodium 138 133 - 144 mmol/L    Potassium 4.4 3.4 - 5.3 mmol/L    Chloride 106 94 - 109 mmol/L    Carbon Dioxide 23 20 - 32 mmol/L    Anion Gap 9 3 - 14 mmol/L    Glucose 203 (H) 70 - 99 mg/dL    Urea Nitrogen 22 7 - 30 mg/dL    Creatinine 0.75 0.52 - 1.04 mg/dL    GFR Estimate 81 >60 mL/min/[1.73_m2]    GFR Estimate If Black >90 >60 mL/min/[1.73_m2]    Calcium 8.4 (L) 8.5 - 10.1 mg/dL   CBC with platelets   Result Value Ref Range    WBC 15.9 (H) 4.0 - 11.0 10e9/L    RBC Count 4.59 3.8 - 5.2 10e12/L    Hemoglobin 12.3 11.7 -  15.7 g/dL    Hematocrit 41.2 35.0 - 47.0 %    MCV 90 78 - 100 fl    MCH 26.8 26.5 - 33.0 pg    MCHC 29.9 (L) 31.5 - 36.5 g/dL    RDW 13.4 10.0 - 15.0 %    Platelet Count 255 150 - 450 10e9/L   INR   Result Value Ref Range    INR 1.02 0.86 - 1.14   Magnesium   Result Value Ref Range    Magnesium 1.9 1.6 - 2.3 mg/dL   Phosphorus   Result Value Ref Range    Phosphorus 3.4 2.5 - 4.5 mg/dL   Glucose by meter   Result Value Ref Range    Glucose 246 (H) 70 - 99 mg/dL   CBC with platelets   Result Value Ref Range    WBC 14.3 (H) 4.0 - 11.0 10e9/L    RBC Count 4.32 3.8 - 5.2 10e12/L    Hemoglobin 11.7 11.7 - 15.7 g/dL    Hematocrit 38.1 35.0 - 47.0 %    MCV 88 78 - 100 fl    MCH 27.1 26.5 - 33.0 pg    MCHC 30.7 (L) 31.5 - 36.5 g/dL    RDW 13.2 10.0 - 15.0 %    Platelet Count 231 150 - 450 10e9/L   Basic metabolic panel   Result Value Ref Range    Sodium 139 133 - 144 mmol/L    Potassium 4.2 3.4 - 5.3 mmol/L    Chloride 106 94 - 109 mmol/L    Carbon Dioxide 25 20 - 32 mmol/L    Anion Gap 7 3 - 14 mmol/L    Glucose 182 (H) 70 - 99 mg/dL    Urea Nitrogen 19 7 - 30 mg/dL    Creatinine 0.76 0.52 - 1.04 mg/dL    GFR Estimate 81 >60 mL/min/[1.73_m2]    GFR Estimate If Black >90 >60 mL/min/[1.73_m2]    Calcium 8.6 8.5 - 10.1 mg/dL   Magnesium   Result Value Ref Range    Magnesium 1.7 1.6 - 2.3 mg/dL   Phosphorus   Result Value Ref Range    Phosphorus 2.9 2.5 - 4.5 mg/dL   INR   Result Value Ref Range    INR 1.04 0.86 - 1.14   Glucose by meter   Result Value Ref Range    Glucose 143 (H) 70 - 99 mg/dL   Glucose by meter   Result Value Ref Range    Glucose 129 (H) 70 - 99 mg/dL   EKG 12-lead, tracing only   Result Value Ref Range    Interpretation ECG Click View Image link to view waveform and result          REVIEW OF SYSTEMS  A 12-point ROS negative except as in HPI    Current Outpatient Medications   Medication Sig Dispense Refill     acetaminophen (TYLENOL) 500 MG tablet Take 1-2 tablets (500-1,000 mg) by mouth every 6 hours as  needed for mild pain 50 tablet 0     ALOE PO Take by mouth daily as needed       bacitracin 500 UNIT/GM OINT Apply topically 2 times daily (Patient not taking: Reported on 8/13/2019) 1 Tube 0     cetirizine-pseudoePHEDrine ER (ZYRTEC-D) 5-120 MG 12 hr tablet Take 1 tablet by mouth every morning        CONTOUR NEXT EZ (CONTOUR NEXT EZ W/DEVICE KIT) w/Device KIT See Admin Instructions  0     furosemide (LASIX) 20 MG tablet Take 20 mg by mouth daily as needed (SWELLING)       levothyroxine (SYNTHROID/LEVOTHROID) 175 MCG tablet Take 175 mcg by mouth every morning        lisinopril (PRINIVIL/ZESTRIL) 5 MG tablet Take 5 mg by mouth every morning        metFORMIN (GLUCOPHAGE-XR) 500 MG 24 hr tablet Take 500 mg by mouth every morning        multivitamin w/minerals (THERA-VIT-M) tablet Take 1 tablet by mouth daily       naproxen (NAPROSYN) 500 MG tablet Take 500 mg by mouth every morning        oxyCODONE (ROXICODONE) 5 MG tablet Take 1 tablet (5 mg) by mouth every 4 hours as needed for breakthrough pain (Patient not taking: Reported on 8/13/2019) 15 tablet 0     pantoprazole (PROTONIX) 40 MG EC tablet Take 40 mg by mouth every morning        senna-docusate (SENOKOT-S/PERICOLACE) 8.6-50 MG tablet Take 1-2 tablets by mouth 2 times daily as needed for constipation (Patient not taking: Reported on 8/13/2019) 60 tablet 0     simvastatin (ZOCOR) 40 MG tablet Take 40 mg by mouth At Bedtime        sotalol (BETAPACE) 160 MG tablet Take 80 mg by mouth 2 times daily        vitamin B complex with vitamin C (STRESS TAB) tablet Take 1 tablet by mouth every morning        warfarin (COUMADIN) 5 MG tablet Take 7.5mg 5 days week ,\ 5 mg Tues, Sat  Takes in the evening         No Known Allergies    There is no immunization history on file for this patient.    Past Medical History:   Diagnosis Date     Arthritis      Back pain      Cataract      Diabetes (H)      GERD (gastroesophageal reflux disease)      HLD (hyperlipidemia)      Hypertension       Hypothyroidism      Long term current use of anticoagulant therapy      Malignant neoplasm of breast (female) (H) 2001    s/p lumpectomy, LN removal, radiation     Melanoma of vulva (H)      RHONDA (obstructive sleep apnea)      Osteopenia      PAF (paroxysmal atrial fibrillation) (H) 2012    s/p ablation     Papillary carcinoma (H)     incidentally found     Primary malignant neuroendocrine tumor of pancreas (H) 2011     Toxic multinodular goiter        Past Surgical History:   Procedure Laterality Date     BACK SURGERY  1987    L4-5     BREAST LUMPECTOMY, RT/LT  2001     BUNIONECTOMY Bilateral 2006     Excision right vulvar mass  06/04/2019     FOOT SURGERY Bilateral 2006     Intracardiac ablation  2012     Knee arthroscopy surgery Bilateral 2006     Pacemaker implantation  2007     PANCREAS SURGERY  2011    pancreaticoduodenectomy     THYROIDECTOMY        VULVECTOMY RADICAL DISSECT GROIN(S) N/A 7/30/2019    Procedure: Exam Under Anesthesia, Right  Radical Vulvectomy, Right [place] Inguinal Lymph Node Sampling;  Surgeon: Bacilio Celis MD;  Location:  OR       SOCIAL HISTORY  History   Smoking Status     Former Smoker     Years: 5.00     Types: Cigarettes     Quit date: 11/1/1973   Smokeless Tobacco     Never Used     Comment: Quit age 23    Social History    Substance and Sexual Activity      Alcohol use: Yes        Alcohol/week: 0.6 oz        Types: 1 Cans of beer per week     History   Drug Use Not on file       FAMILY HISTORY  Family History   Problem Relation Age of Onset     Colon Cancer Mother      Arthritis Father      Prostate Cancer Father      Hypertension Father      Obesity Father      Myocardial Infarction Father      Diabetes Sister      Thyroid Disease Sister      Cancer Brother      Hypertension Brother      Diabetes Maternal Grandmother      Heart Disease Maternal Grandfather        PHYSICAL EXAMINATION  There were no vitals taken for this visit.  Wt Readings from Last 2 Encounters:    08/21/19 108.6 kg (239 lb 6.4 oz)   08/13/19 111.6 kg (246 lb)     Physical Exam   Constitutional: She is oriented to person, place, and time. She appears well-developed and well-nourished. No distress.   HENT:   Head: Normocephalic and atraumatic.   Mouth/Throat: Oropharynx is clear and moist.   Eyes: Pupils are equal, round, and reactive to light. EOM are normal. No scleral icterus.   Neck: Normal range of motion.   Cardiovascular: Regular rhythm.   No murmur heard.  Pulmonary/Chest: Effort normal. She has no wheezes.   Abdominal: Soft. She exhibits no mass. There is no tenderness.   Musculoskeletal: Normal range of motion. She exhibits no edema or tenderness.   Lymphadenopathy:     She has no cervical adenopathy.   Neurological: She is alert and oriented to person, place, and time.   Skin: Skin is warm and dry.   Right inguinal lymph node site with small amount of serous drainage with increased moisture and candidal rash in the inguinal skin folds   Psychiatric: She has a normal mood and affect. Her behavior is normal.   Nursing note and vitals reviewed.    ASSESSMENT AND PLAN    #1 Vulvar Melanoma, Stage IIIC pT4b N1a  Ms. Trent is a 69 year old woman with Stage IIIC melanoma. Adjuvant immunotherapy indicated. We reviewed the data suggests >90% risk of recurrence of her disease given Stage IIIC status. This is largely driven by the size of the primary tumor. She has 1 focus of metastasis in the sentinel node, which makes it likely she has smaller deposits of disease in other draining lymph nodes or distantly.    We reviewed that adjuvant immunotherapy for melanoma is associated with improved outcomes for patients with stage III melanoma. After complete resection of stage IIIB and C melanoma, 12-month RFS was 70.5% for nivolumab and 60.8% for ipilimumab in Checkmate-238.  In Keynote-054, which included stage IIIA, B and C melanoma, 12-month RFS was 75.4% pembrolizumab vs. 61% placebo. We briefly reviewed  associated side effects of immunotherapy include autoimmune toxicities like diarrhea, cough, shortness of breath, hypothyroidism. We will be observing her for these complications and she should contact us with any new or concerning side effects or symptoms.    She agrees to proceed. Will plan to start nivolumab IV monthly when she returns in 1-2 weeks with PET-CT and labs for re-staging. Will try to add dermatology visit as well, given the distance she travels.    Multiple questions answered.    Elton Arellano M.D.   of Medicine  Hematology, Oncology and Transplantation    Candidiasis of vulva and vagina  - nystatin (MYCOSTATIN) 389463 UNIT/GM external powder  Dispense: 42 Bottle; Refill: 0    Candidiasis of skin  - nystatin (MYCOSTATIN) 343931 UNIT/GM external powder  Dispense: 42 Bottle; Refill: 0    Melanoma of skin (H)  - PET Oncology (Eyes to Thighs)  - CANCER RISK MGMT/CANCER GENETIC COUNSELING REFERRAL  - DERMATOLOGY REFERRAL

## 2019-09-09 NOTE — TELEPHONE ENCOUNTER
[facility] Call Center    Phone Message    May a detailed message be left on voicemail: yes    Reason for Call: Other: Patient has a referral for Melanoma the referral is in epic please reach out to the patient asap. Thank you.     Action Taken: Message routed to:  [facility] derm

## 2019-09-09 NOTE — TELEPHONE ENCOUNTER
Left message to schedule with Dermatology. Left call back number    This patient should be scheduled with Dr. Negar De La O in Oncology. If patient wants sooner appointment than first available, it is ok per Dr. De La O to schedule with another provider such as Dr. Metzger, Dr. Ahumada, Dr. Taylor.      Marielena Miller

## 2019-09-09 NOTE — NURSING NOTE
"Oncology Rooming Note    September 9, 2019 2:27 PM   Ilana Trent is a 69 year old female who presents for:    Chief Complaint   Patient presents with     Oncology Clinic Visit     [facility] [email or web address]] NEW- METASTATIC MELANOMA     Initial Vitals: /74 (BP Location: Right arm, Patient Position: Chair, Cuff Size: Adult Regular)   Pulse 72   Temp 99.3  F (37.4  C) (Oral)   Resp 16   Ht 1.727 m (5' 7.99\")   Wt 111.6 kg (246 lb)   SpO2 97%   BMI 37.41 kg/m   Estimated body mass index is 37.41 kg/m  as calculated from the following:    Height as of this encounter: 1.727 m (5' 7.99\").    Weight as of this encounter: 111.6 kg (246 lb). Body surface area is 2.31 meters squared.  No Pain (0) Comment: Data Unavailable   No LMP recorded. Patient is postmenopausal.  Allergies reviewed: Yes  Medications reviewed: Yes    Medications: Medication refills not needed today.  Pharmacy name entered into [employer]: [facility] [address]    Clinical concerns: No new concerns. Ana was notified.      Marvin Smith LPN            "

## 2019-09-10 ENCOUNTER — TELEPHONE (OUTPATIENT)
Dept: ONCOLOGY | Facility: CLINIC | Age: 69
End: 2019-09-10

## 2019-09-10 NOTE — TELEPHONE ENCOUNTER
ONCOLOGY INTAKE: Records Information      APPT INFORMATION:  Referring provider:  Dr. Elton Arellano  Referring provider s clinic:   Oncology Adult  Reason for visit/diagnosis:  Melanoma of skin (H) [C43.9]  Has patient been notified of appointment date and time?: NA    RECORDS INFORMATION:  Were the records received with the referral (via Rightfax)? Internal Referral    ADDITIONAL INFORMATION:  Left VM with hours and phone. Letter sent for follow up. Referral in Epic

## 2019-09-13 NOTE — TELEPHONE ENCOUNTER
ONCOLOGY INTAKE: Records Information      APPT INFORMATION: [id number] Xochilt Fuentes  Referring provider:  Dr. Elton Arellano  Referring provider s clinic:   Oncology Adult  Reason for visit/diagnosis:  Melanoma of skin (H) [C43.9]  Has patient been notified of appointment date and time?: Yes    RECORDS INFORMATION:  Were the records received with the referral (via Rightfax)? Internal Referral    Has patient been seen for any external appt for this diagnosis? NO    If yes, where? NA    Has patient had any imaging or procedures outside of [place] for this condition? No      If Yes, where? NA    ADDITIONAL INFORMATION:  None

## 2019-09-17 PROCEDURE — 40000929 ZZHCL STATISTIC FOUNDATION ONE GENE PANEL: Performed by: INTERNAL MEDICINE

## 2019-09-23 ENCOUNTER — HOSPITAL ENCOUNTER (OUTPATIENT)
Dept: PET IMAGING | Facility: CLINIC | Age: 69
Discharge: HOME | End: 2019-09-23
Attending: INTERNAL MEDICINE | Admitting: INTERNAL MEDICINE
Payer: COMMERCIAL

## 2019-09-23 DIAGNOSIS — C43.9 MELANOMA OF SKIN (H): ICD-10-CM

## 2019-09-23 LAB
CREAT BLD-MCNC: 1 MG/DL (ref 0.52–1.04)
GFR SERPL CREATININE-BSD FRML MDRD: 55 ML/MIN/{1.73_M2}

## 2019-09-23 PROCEDURE — 71260 CT THORAX DX C+: CPT

## 2019-09-23 PROCEDURE — 78816 PET IMAGE W/CT FULL BODY: CPT | Mod: PS

## 2019-09-23 PROCEDURE — 25000128 H RX IP 250 OP 636: Performed by: INTERNAL MEDICINE

## 2019-09-23 PROCEDURE — 34300033 ZZH RX 343: Performed by: INTERNAL MEDICINE

## 2019-09-23 PROCEDURE — 82565 ASSAY OF CREATININE: CPT

## 2019-09-23 PROCEDURE — A9552 F18 FDG: HCPCS | Performed by: INTERNAL MEDICINE

## 2019-09-23 RX ORDER — IOPAMIDOL 755 MG/ML
45-150 INJECTION, SOLUTION INTRAVASCULAR ONCE
Status: COMPLETED | OUTPATIENT
Start: 2019-09-23 | End: 2019-09-23

## 2019-09-23 RX ADMIN — IOPAMIDOL 135 ML: 755 INJECTION, SOLUTION INTRAVENOUS at 08:19

## 2019-09-23 RX ADMIN — FLUDEOXYGLUCOSE F-18 14.85 MCI.: 500 INJECTION, SOLUTION INTRAVENOUS at 06:45

## 2019-09-24 DIAGNOSIS — C51.9 MALIGNANT MELANOMA OF VULVA (H): Primary | ICD-10-CM

## 2019-09-25 DIAGNOSIS — C43.9 MELANOMA OF SKIN (H): ICD-10-CM

## 2019-09-26 ENCOUNTER — INFUSION THERAPY VISIT (OUTPATIENT)
Dept: ONCOLOGY | Facility: CLINIC | Age: 69
End: 2019-09-26
Attending: INTERNAL MEDICINE
Payer: COMMERCIAL

## 2019-09-26 ENCOUNTER — APPOINTMENT (OUTPATIENT)
Dept: LAB | Facility: CLINIC | Age: 69
End: 2019-09-26
Attending: INTERNAL MEDICINE
Payer: COMMERCIAL

## 2019-09-26 VITALS
BODY MASS INDEX: 37.57 KG/M2 | HEART RATE: 69 BPM | SYSTOLIC BLOOD PRESSURE: 159 MMHG | TEMPERATURE: 98.1 F | DIASTOLIC BLOOD PRESSURE: 76 MMHG | WEIGHT: 247.9 LBS | OXYGEN SATURATION: 98 % | HEIGHT: 68 IN

## 2019-09-26 DIAGNOSIS — C51.9 MALIGNANT MELANOMA OF VULVA (H): ICD-10-CM

## 2019-09-26 DIAGNOSIS — C43.9 MELANOMA OF SKIN (H): Primary | ICD-10-CM

## 2019-09-26 DIAGNOSIS — C51.9 MALIGNANT MELANOMA OF VULVA (H): Primary | ICD-10-CM

## 2019-09-26 DIAGNOSIS — C43.9 MELANOMA OF SKIN (H): ICD-10-CM

## 2019-09-26 LAB
ALBUMIN SERPL-MCNC: 4.2 G/DL (ref 3.4–5)
ALP SERPL-CCNC: 86 U/L (ref 40–150)
ALT SERPL W P-5'-P-CCNC: 28 U/L (ref 0–50)
ANION GAP SERPL CALCULATED.3IONS-SCNC: 7 MMOL/L (ref 3–14)
AST SERPL W P-5'-P-CCNC: 15 U/L (ref 0–45)
BILIRUB SERPL-MCNC: 0.6 MG/DL (ref 0.2–1.3)
BUN SERPL-MCNC: 25 MG/DL (ref 7–30)
CALCIUM SERPL-MCNC: 9.3 MG/DL (ref 8.5–10.1)
CHLORIDE SERPL-SCNC: 105 MMOL/L (ref 94–109)
CO2 SERPL-SCNC: 24 MMOL/L (ref 20–32)
CREAT SERPL-MCNC: 0.83 MG/DL (ref 0.52–1.04)
GFR SERPL CREATININE-BSD FRML MDRD: 72 ML/MIN/{1.73_M2}
GLUCOSE SERPL-MCNC: 88 MG/DL (ref 70–99)
POTASSIUM SERPL-SCNC: 4.4 MMOL/L (ref 3.4–5.3)
PROT SERPL-MCNC: 7.7 G/DL (ref 6.8–8.8)
SODIUM SERPL-SCNC: 136 MMOL/L (ref 133–144)
TSH SERPL DL<=0.005 MIU/L-ACNC: 1.47 MU/L (ref 0.4–4)

## 2019-09-26 PROCEDURE — 25800030 ZZH RX IP 258 OP 636: Mod: ZF | Performed by: INTERNAL MEDICINE

## 2019-09-26 PROCEDURE — 25000128 H RX IP 250 OP 636: Mod: ZF | Performed by: INTERNAL MEDICINE

## 2019-09-26 PROCEDURE — 99214 OFFICE O/P EST MOD 30 MIN: CPT | Mod: ZP | Performed by: INTERNAL MEDICINE

## 2019-09-26 PROCEDURE — 96413 CHEMO IV INFUSION 1 HR: CPT

## 2019-09-26 PROCEDURE — G0463 HOSPITAL OUTPT CLINIC VISIT: HCPCS | Mod: ZF

## 2019-09-26 PROCEDURE — 80053 COMPREHEN METABOLIC PANEL: CPT | Performed by: INTERNAL MEDICINE

## 2019-09-26 PROCEDURE — 84443 ASSAY THYROID STIM HORMONE: CPT | Performed by: INTERNAL MEDICINE

## 2019-09-26 RX ORDER — SODIUM CHLORIDE 9 MG/ML
1000 INJECTION, SOLUTION INTRAVENOUS CONTINUOUS PRN
Status: CANCELLED
Start: 2019-09-26

## 2019-09-26 RX ORDER — NALOXONE HYDROCHLORIDE 0.4 MG/ML
.1-.4 INJECTION, SOLUTION INTRAMUSCULAR; INTRAVENOUS; SUBCUTANEOUS
Status: CANCELLED | OUTPATIENT
Start: 2019-09-26

## 2019-09-26 RX ORDER — MEPERIDINE HYDROCHLORIDE 25 MG/ML
25 INJECTION INTRAMUSCULAR; INTRAVENOUS; SUBCUTANEOUS EVERY 30 MIN PRN
Status: CANCELLED | OUTPATIENT
Start: 2019-09-26

## 2019-09-26 RX ORDER — DIPHENHYDRAMINE HYDROCHLORIDE 50 MG/ML
50 INJECTION INTRAMUSCULAR; INTRAVENOUS
Status: CANCELLED
Start: 2019-09-26

## 2019-09-26 RX ORDER — EPINEPHRINE 0.3 MG/.3ML
0.3 INJECTION SUBCUTANEOUS EVERY 5 MIN PRN
Status: CANCELLED | OUTPATIENT
Start: 2019-09-26

## 2019-09-26 RX ORDER — EPINEPHRINE 1 MG/ML
0.3 INJECTION, SOLUTION INTRAMUSCULAR; SUBCUTANEOUS EVERY 5 MIN PRN
Status: CANCELLED | OUTPATIENT
Start: 2019-09-26

## 2019-09-26 RX ORDER — ALBUTEROL SULFATE 0.83 MG/ML
2.5 SOLUTION RESPIRATORY (INHALATION)
Status: CANCELLED | OUTPATIENT
Start: 2019-09-26

## 2019-09-26 RX ORDER — METHYLPREDNISOLONE SODIUM SUCCINATE 125 MG/2ML
125 INJECTION, POWDER, LYOPHILIZED, FOR SOLUTION INTRAMUSCULAR; INTRAVENOUS
Status: CANCELLED
Start: 2019-09-26

## 2019-09-26 RX ORDER — LORAZEPAM 2 MG/ML
0.5 INJECTION INTRAMUSCULAR EVERY 4 HOURS PRN
Status: CANCELLED
Start: 2019-09-26

## 2019-09-26 RX ORDER — ALBUTEROL SULFATE 90 UG/1
1-2 AEROSOL, METERED RESPIRATORY (INHALATION)
Status: CANCELLED
Start: 2019-09-26

## 2019-09-26 RX ADMIN — SODIUM CHLORIDE 250 ML: 9 INJECTION, SOLUTION INTRAVENOUS at 15:51

## 2019-09-26 RX ADMIN — SODIUM CHLORIDE 480 MG: 9 INJECTION, SOLUTION INTRAVENOUS at 15:51

## 2019-09-26 ASSESSMENT — PAIN SCALES - GENERAL: PAINLEVEL: NO PAIN (0)

## 2019-09-26 ASSESSMENT — MIFFLIN-ST. JEOR: SCORE: 1702.81

## 2019-09-26 NOTE — NURSING NOTE
"Oncology Rooming Note    September 26, 2019 2:06 PM   Ilana Trent is a 69 year old female who presents for:    Chief Complaint   Patient presents with     Blood Draw     Labs drawn via PIV placed by RN w/ultrasound in lab     Oncology Clinic Visit     RETURN VISIT; MELANOMA FOLLOW UP      Initial Vitals: BP (!) 159/76 (BP Location: Left arm, Patient Position: Chair, Cuff Size: Adult Large)   Pulse 69   Temp 98.1  F (36.7  C) (Oral)   Ht 1.727 m (5' 7.99\")   Wt 112.4 kg (247 lb 14.4 oz)   SpO2 98%   BMI 37.70 kg/m   Estimated body mass index is 37.7 kg/m  as calculated from the following:    Height as of this encounter: 1.727 m (5' 7.99\").    Weight as of this encounter: 112.4 kg (247 lb 14.4 oz). Body surface area is 2.32 meters squared.  No Pain (0) Comment: Data Unavailable   No LMP recorded. Patient is postmenopausal.  Allergies reviewed: Yes  Medications reviewed: Yes    Medications: Medication refills not needed today.  Pharmacy name entered into [email or web address]: [facility] [place], [place] - [address] [place] [phone]    Clinical concerns: No new concerns today  Dr Perez  was notified.      Gill Prasad              "

## 2019-09-26 NOTE — PATIENT INSTRUCTIONS
Contact Numbers    [facility] [place]for Scheduling/Triage/After Hours Nurse Line): [phone]    Please call the [facility] nurse triage or the after hours nurse line if you experience a temperature greater than or equal to 100.5, shaking chills, have uncontrolled nausea, vomiting and/or diarrhea, dizziness, lightheadedness, shortness of breath, chest pain, bleeding, unexplained bruising, or if you have any other new/concerning symptoms, questions or concerns.     If you are having any concerning symptoms or wish to speak to a provider before your next infusion visit, please call your care coordinator or triage to notify them so we can adequately serve you.     If you need a refill on a narcotic prescription or other medication, please call triage before your infusion appointment.         September 2019 Sunday Monday Tuesday Wednesday Thursday Friday Saturday   1     2     3     4     5     6     7       8     9    [facility] NEW   2:15 PM   (60 min.)   Elton Alas MD   [facility] 10     11     12     13     14       15     16     17    LAB   9:30 AM   (15 min.)   SPECIMEN MGMT   [facility], Lab 18     19     20     21       22     23    [facility]/ ONCOLOGY   6:30 AM   (45 min.)   UUPET1   [facility] PET CT 24     25     26    [facility] LAB DRAW   1:30 PM   (15 min.)    [facility] LAB DRAW   [facility] Lab Draw    [facility] RETURN   2:00 PM   (30 min.)   Elton Alas MD   [facility] ONC INFUSION 60   3:00 PM   (60 min.)    ONCOLOGY INFUSION   [facility] 27     28       29     30                                          October 2019 Sunday Monday Tuesday Wednesday Thursday Friday Saturday             1     2     3     4     5       6     7     8     9     10     11     12       13     14     15     16     17     18     19       20     21     22     23     24     25     26       27     28     29     30    NEW   2:15  PM   (75 min.)   Xochilt Fuentes, [facility] 31                              Recent Results (from the past 24 hour(s))   Comprehensive metabolic panel    Collection Time: 09/26/19  1:44 PM   Result Value Ref Range    Sodium 136 133 - 144 mmol/L    Potassium 4.4 3.4 - 5.3 mmol/L    Chloride 105 94 - 109 mmol/L    Carbon Dioxide 24 20 - 32 mmol/L    Anion Gap 7 3 - 14 mmol/L    Glucose 88 70 - 99 mg/dL    Urea Nitrogen 25 7 - 30 mg/dL    Creatinine 0.83 0.52 - 1.04 mg/dL    GFR Estimate 72 >60 mL/min/[1.73_m2]    GFR Estimate If Black 84 >60 mL/min/[1.73_m2]    Calcium 9.3 8.5 - 10.1 mg/dL    Bilirubin Total 0.6 0.2 - 1.3 mg/dL    Albumin 4.2 3.4 - 5.0 g/dL    Protein Total 7.7 6.8 - 8.8 g/dL    Alkaline Phosphatase 86 40 - 150 U/L    ALT 28 0 - 50 U/L    AST 15 0 - 45 U/L   TSH with free T4 reflex    Collection Time: 09/26/19  1:44 PM   Result Value Ref Range    TSH 1.47 0.40 - 4.00 mU/L

## 2019-09-26 NOTE — NURSING NOTE
Chief Complaint   Patient presents with     Blood Draw     Labs drawn via PIV placed by RN w/ultrasound in lab     BP (!) 159/76 (BP Location: Left arm, Patient Position: Chair, Cuff Size: Adult Large)   Pulse 69   Temp 98.1  F (36.7  C) (Oral)   Wt 112.4 kg (247 lb 14.4 oz)   SpO2 98%   BMI 37.70 kg/m      PIV placed right lower forearm in lab for infusion and labs. Labs drawn and sent. Pt tolerated well.   Pt checked in for next appointment.    Mirta Landa RN

## 2019-09-26 NOTE — PROGRESS NOTES
Infusion Nursing Note:  Ilana Trent presents today for Cycle 1 Day 1 Nivolumab.    Patient seen by provider today: Yes: Dr. Crow Perez   present during visit today: Not Applicable.    Note: Patient is new to the infusion room today and is receiving Nivlumab for the first time.  Patient oriented to infusion room, location of bathrooms and nutrition stations, and call light.  Verified that patient recieved written chemotherapy information previously.  Verbally reviewed chemotherapy teaching, side effects, take-home medications, and follow-up schedule with patient. Patient instructed to call triage with any questions or if she experiences a temperature >100.5, shaking chills, uncontrolled nausea/vomiting/diarrhea, dizziness, shortness of breath, bleeding not relieved with pressure, or with any other concerns.  Instructed patient to call the after hours nurse line or [phone] on nights/weekends/holidays.    Intravenous Access:  Peripheral IV placed in lab today.    Treatment Conditions:  Lab Results   Component Value Date     09/26/2019                   Lab Results   Component Value Date    POTASSIUM 4.4 09/26/2019           Lab Results   Component Value Date    MAG 1.7 07/31/2019            Lab Results   Component Value Date    CR 0.83 09/26/2019                   Lab Results   Component Value Date    RENEA 9.3 09/26/2019                Lab Results   Component Value Date    BILITOTAL 0.6 09/26/2019           Lab Results   Component Value Date    ALBUMIN 4.2 09/26/2019                    Lab Results   Component Value Date    ALT 28 09/26/2019           Lab Results   Component Value Date    AST 15 09/26/2019       Results reviewed, labs MET treatment parameters, ok to proceed with treatment.    Post Infusion Assessment:  Patient tolerated infusion without incident.  Blood return noted pre and post infusion.  Site patent and intact, free from redness, edema or discomfort.  No evidence of  extravasations.  Access discontinued per protocol.     Discharge Plan:   Patient declined prescription refills.  Discharge instructions reviewed with: Patient and Family.  Patient and/or family verbalized understanding of discharge instructions and all questions answered.  Copy of AVS reviewed with patient and/or family.    Patient discharged in stable condition accompanied by: .  Departure Mode: Ambulatory.  Face to Face time: 0.    CATALINA LICONA, RN, RN

## 2019-09-26 NOTE — PROGRESS NOTES
[facility]  MEDICAL ONCOLOGY PROGRESS NOTE  Sep 26, 2019    CHIEF COMPLAINT: Vulvar melanoma    Melanoma History:  1. 6/4/2019, she had excision of the right vulvar mass in [place], and on pathology this shows malignant melanoma with ulceration, at least fausto level IV, Breslow thickness at least 5 mm, mitotic rate is 3 per mm2, TILs are present and brisk, pathologic staging pT4b.  2. 6/28/19 she had PET-CT, which showed no obvious evidence of metastatic disease.  3. 7/30/2019, she has wide local excision and sentinel biopsy (Specimen #: [id number]), which showed residual melanoma to 1.3 mm depth of invasion and margins negative. Right inguinal lymph node showed a subcapsular 1 mm focus of cells.    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 She is referred by Dr. Celis.    She feels well today. She denies any major swelling in the right leg. No major swelling or tenderness in the area of resection. She has had improvement in the drainage, moisture, and skin fold irritation. The candidal rash has resolved.    She denies shortness of breath, chest pain, nausea, vomiting, or diarrhea. She has on average one form stool daily.    Baseline labs are normal. PET-CT obtained on 9/23 negative for metastatic disease.    ECOG performance status is 0.     Results for orders placed or performed during the hospital encounter of 09/23/19   PET Oncology Whole Body    Narrative    Combined Report of:    PET and CT on  9/23/2019 9:14 AM :    1. PET of the neck, chest, abdomen, and pelvis.  2. PET CT Fusion for Attenuation Correction and Anatomical  Localization:    3. Diagnostic CT scan of the chest, abdomen, and pelvis with  intravenous contrast for interpretation.  3. CT of the chest, abdomen and pelvis obtained for diagnostic  interpretation.  4. 3D MIP and PET-CT fused images were processed on an  independent  workstation and archived to PACS and reviewed by a radiologist.    Technique:    1. PET: The patient received 14.5 mCi of F-18-FDG; the serum glucose  was 122 prior to administration, body weight was 111.6 kg. Images were  evaluated in the axial, sagittal, and coronal planes as well as the  rotational whole body MIP. Images were acquired from the Vertex to the  Feet.    UPTAKE WAS MEASURED AT 69 MINUTES.     BACKGROUND:  Liver SUV max= 3.31,   Aorta Blood SUV Max: 2.48.     2. CT: Volumetric acquisition for clinical interpretation of the  chest, abdomen, and pelvis acquired at 3 mm sections. The chest,  abdomen, and pelvis were evaluated at 5 mm sections in bone, soft  tissue, and lung windows.      The patient received 135 cc. Of Isovue 370 intravenously for the  examination.    --    3. 3D MIP and PET-CT fused images were processed on an independent  workstation and archived to PACS and reviewed by a radiologist.    INDICATION: Melanoma, staging.    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 History of papillary thyroid carcinoma,  pancreatic cancer, and breast cancer status post left lumpectomy and  pancreaticoduodenectomy.    COMPARISON: PET/CT 6/28/2019.    FINDINGS:     HEAD/NECK:  There is no  suspicious FDG uptake in the neck.     The paranasal sinuses are clear. The mastoid air cells are clear.     The mucosal pharyngeal space, the , prevertebral and carotid  spaces are within normal limits.     No masses, mass effect or pathologically enlarged lymph nodes are  evident. No increased FDG uptake within the thyroid.     CHEST:  There is no suspicious FDG uptake in the chest.     Ascending aorta measures 3.2 cm. Main pulmonary artery measures 3.4  cm. Right chest wall pacer with leads terminating within the right  atrial appendage and right ventricle. No mediastinal  or hilar  lymphadenopathy. Left breast clip. Changes of left axillary harjit  dissection. Heart size is normal. There is no pericardial effusion. No  pleural effusion. No pneumothorax. Bibasilar dependent atelectasis.  Atelectasis or scarring along the posterior medial left lower lobe  (series 8, image 128). Tree-in-bud pattern of micronodularity along  the anterior left upper lobe (series 8, image 67) and posterior medial  right upper lobe (series 8, image 45). Right apical scarring.    Multiple sub-4 mm pulmonary nodules bilaterally, for example:  2 mm nodule within the lateral right lower lobe (series 8, image 98).  2 mm nodule within the posterior right upper lobe (series 8, image  50).    ABDOMEN AND PELVIS:  There is no suspicious FDG uptake in the abdomen.    Postoperative changes of pancreaticoduodenectomy. The gallbladder is  surgically absent. Mild pneumobilia, unchanged from prior. No focal  lesions within the pancreas. Hypodense cystic lesion within the  anterior aspect of the interpolar region of the left kidney, likely a  simple renal cyst, though too small to accurately characterize.  Extrarenal pelvis bilaterally. No hydronephrosis. No nephrolithiasis.  No hydroureter. Adrenal glands are normal. Hepatic steatosis with  focal fatty infiltration along the falciform ligament. Spleen is  normal. Urinary bladder is decompressed. No dilated loops of small or  large bowel. No focal areas of mucosal enhancement or focal bowel wall  thickening. There is diffuse increased FDG uptake throughout the  bowel, likely secondary to metformin therapy. No free fluid within the  abdomen or pelvis.    Area of subcutaneous soft tissue stranding extending along the right  inguinal region with increased FDG uptake with a maximum SUV of 3.53,  likely area of prior procedure.  Some increased uptake is skin  thickening along the right vulva.     LOWER EXTREMITIES:   No abnormal masses or hypermetabolic lesions.    BONES:    There are no suspicious lytic or blastic osseous lesions.  There is no  abnormal FDG uptake in the skeleton. Moderate to advanced degenerative  changes of the visualized spine. Intradiscal gas and multilevel disc  height loss. Sclerosis of the inferior endplate of L3 and superior  endplate of L4. Multilevel disc osteophyte formation. Degenerative  changes in the hips. Symmetric sclerosis along the right and left  ilium.    Two symmetric areas of increased FDG uptake at the insertion points of  the gluteus ailyn bilaterally, likely inflammatory. Increased FDG  uptake along the spinous process of the high lumbar spine with Max SUV  uptake of 4.34.      Impression    IMPRESSION:   In this patient with history of metastatic melanoma of the right vulva  and right inguinal lymph node with history of neuroendocrine tumor of  the pancreas, breast cancer, and papillary thyroid cancer:  1. Uptake along the right vulva, likely postprocedural inflammation.   2. Inflammation post inguinal lymph node biopsy.   3. No evidence of metastatic disease within the chest, abdomen, or  pelvis.  4. Tree-in-bud pattern micronodularity within the left and right upper  lobes without associated increased FDG uptake. This may be infectious  in nature.    I have personally reviewed the examination and initial interpretation  and I agree with the findings.    ABNER FELIPE MD   Creatinine POCT   Result Value Ref Range    Creatinine 1.0 0.52 - 1.04 mg/dL    GFR Estimate 55 (L) >60 mL/min/[1.73_m2]    GFR Estimate If Black 67 >60 mL/min/[1.73_m2]         REVIEW OF SYSTEMS  A 12-point ROS negative except as in HPI    Current Outpatient Medications   Medication Sig Dispense Refill     acetaminophen (TYLENOL) 500 MG tablet Take 1-2 tablets (500-1,000 mg) by mouth every 6 hours as needed for mild pain 50 tablet 0     ALOE PO Take by mouth daily as needed       cetirizine-pseudoePHEDrine ER (ZYRTEC-D) 5-120 MG 12 hr tablet Take 1 tablet by  mouth every morning        CONTOUR NEXT EZ (CONTOUR NEXT EZ W/DEVICE KIT) w/Device KIT See Admin Instructions  0     furosemide (LASIX) 20 MG tablet Take 20 mg by mouth daily as needed (SWELLING)       levothyroxine (SYNTHROID/LEVOTHROID) 175 MCG tablet Take 175 mcg by mouth every morning        lisinopril (PRINIVIL/ZESTRIL) 5 MG tablet Take 5 mg by mouth every morning        metFORMIN (GLUCOPHAGE-XR) 500 MG 24 hr tablet Take 500 mg by mouth every morning        multivitamin w/minerals (THERA-VIT-M) tablet Take 1 tablet by mouth daily       naproxen (NAPROSYN) 500 MG tablet Take 500 mg by mouth every morning        pantoprazole (PROTONIX) 40 MG EC tablet Take 40 mg by mouth every morning        simvastatin (ZOCOR) 40 MG tablet Take 40 mg by mouth At Bedtime        sotalol (BETAPACE) 160 MG tablet Take 80 mg by mouth 2 times daily        vitamin B complex with vitamin C (STRESS TAB) tablet Take 1 tablet by mouth every morning        warfarin (COUMADIN) 5 MG tablet Take 7.5mg 5 days week ,\ 5 mg Tues, Sat  Takes in the evening       bacitracin 500 UNIT/GM OINT Apply topically 2 times daily (Patient not taking: Reported on 8/13/2019) 1 Tube 0     oxyCODONE (ROXICODONE) 5 MG tablet Take 1 tablet (5 mg) by mouth every 4 hours as needed for breakthrough pain (Patient not taking: Reported on 8/13/2019) 15 tablet 0     senna-docusate (SENOKOT-S/PERICOLACE) 8.6-50 MG tablet Take 1-2 tablets by mouth 2 times daily as needed for constipation (Patient not taking: Reported on 8/13/2019) 60 tablet 0       No Known Allergies    There is no immunization history on file for this patient.    Past Medical History:   Diagnosis Date     Arthritis      Back pain      Cataract      Diabetes (H)      GERD (gastroesophageal reflux disease)      HLD (hyperlipidemia)      Hypertension      Hypothyroidism      Long term current use of anticoagulant therapy      Malignant neoplasm of breast (female) (H) 2001    s/p lumpectomy, LN removal,  "radiation     Melanoma of vulva (H)      RHONDA (obstructive sleep apnea)      Osteopenia      PAF (paroxysmal atrial fibrillation) (H) 2012    s/p ablation     Papillary carcinoma (H)     incidentally found     Primary malignant neuroendocrine tumor of pancreas (H) 2011     Toxic multinodular goiter        Past Surgical History:   Procedure Laterality Date     BACK SURGERY  1987    L4-5     BREAST LUMPECTOMY, RT/LT  2001     BUNIONECTOMY Bilateral 2006     Excision right vulvar mass  06/04/2019     FOOT SURGERY Bilateral 2006     Intracardiac ablation  2012     Knee arthroscopy surgery Bilateral 2006     Pacemaker implantation  2007     PANCREAS SURGERY  2011    pancreaticoduodenectomy     THYROIDECTOMY        VULVECTOMY RADICAL DISSECT GROIN(S) N/A 7/30/2019    Procedure: Exam Under Anesthesia, Right  Radical Vulvectomy, Right [place] Inguinal Lymph Node Sampling;  Surgeon: Bacilio Celis MD;  Location:  OR       SOCIAL HISTORY  History   Smoking Status     Former Smoker     Years: 5.00     Types: Cigarettes     Quit date: 11/1/1973   Smokeless Tobacco     Never Used     Comment: Quit age 23    Social History    Substance and Sexual Activity      Alcohol use: Yes        Alcohol/week: 1.0 standard drinks        Types: 1 Cans of beer per week     History   Drug Use Not on file       FAMILY HISTORY  Family History   Problem Relation Age of Onset     Colon Cancer Mother      Arthritis Father      Prostate Cancer Father      Hypertension Father      Obesity Father      Myocardial Infarction Father      Diabetes Sister      Thyroid Disease Sister      Cancer Brother      Hypertension Brother      Diabetes Maternal Grandmother      Heart Disease Maternal Grandfather        PHYSICAL EXAMINATION  BP (!) 159/76 (BP Location: Left arm, Patient Position: Chair, Cuff Size: Adult Large)   Pulse 69   Temp 98.1  F (36.7  C) (Oral)   Ht 1.727 m (5' 7.99\")   Wt 112.4 kg (247 lb 14.4 oz)   SpO2 98%   BMI 37.70 kg/m    Wt " Readings from Last 2 Encounters:   09/26/19 112.4 kg (247 lb 14.4 oz)   09/09/19 111.6 kg (246 lb)     Physical Exam  Vitals signs and nursing note reviewed.   Constitutional:       General: She is not in acute distress.     Appearance: She is well-developed.   HENT:      Head: Normocephalic and atraumatic.   Eyes:      General: No scleral icterus.     Pupils: Pupils are equal, round, and reactive to light.   Neck:      Musculoskeletal: Normal range of motion.   Cardiovascular:      Rate and Rhythm: Regular rhythm.      Heart sounds: No murmur.   Pulmonary:      Effort: Pulmonary effort is normal.      Breath sounds: No wheezing.   Abdominal:      Palpations: Abdomen is soft. There is no mass.      Tenderness: There is no tenderness.   Musculoskeletal: Normal range of motion.         General: No tenderness.   Lymphadenopathy:      Cervical: No cervical adenopathy.   Skin:     General: Skin is warm and dry.   Neurological:      Mental Status: She is alert and oriented to person, place, and time.   Psychiatric:         Behavior: Behavior normal.       IMAGING    PET/CT, 9/23/19  IMPRESSION:   In this patient with history of metastatic melanoma of the right vulva  and right inguinal lymph node with history of neuroendocrine tumor of  the pancreas, breast cancer, and papillary thyroid cancer:  1. Uptake along the right vulva, likely postprocedural inflammation.   2. Inflammation post inguinal lymph node biopsy.   3. No evidence of metastatic disease within the chest, abdomen, or  pelvis.  4. Tree-in-bud pattern micronodularity within the left and right upper  lobes without associated increased FDG uptake. This may be infectious  in nature.    ASSESSMENT AND PLAN    #1 Vulvar Melanoma, Stage IIIC pT4b N1a  Ms. Trent is a 69 year old woman with Stage IIIC melanoma presenting to start adjuvant nivolumab today. We reviewed that she has a >90% risk of disease recurrence, given Stage IIIC status. After complete resection of  stage IIIB and IIIC melanoma, 12-month RFS was 70.5% for nivolumab in [id number].  We again reviewed associated side effects of immunotherapy including autoimmune toxicities like diarrhea, cough, shortness of breath, hypothyroidism. We will be observing her for these complications and she should contact us with any new or concerning side effects or symptoms.    She agrees to proceed. Plan to proceed with monthly treatment and return to see me in 3 months with PET-CT.    Multiple questions answered.    Elton Arellano M.D.   of Medicine  Hematology, Oncology and Transplantation

## 2019-09-26 NOTE — LETTER
9/26/2019       RE: Ilana Trent  [address] [facility] [address]     Dear Colleague,    Thank you for referring your patient, Ilana Trent, to the [facility]. Please see a copy of my visit note below.    [facility]  MEDICAL ONCOLOGY PROGRESS NOTE  Sep 26, 2019    CHIEF COMPLAINT: Vulvar melanoma    Melanoma History:  1. 6/4/2019, she had excision of the right vulvar mass in [place], and on pathology this shows malignant melanoma with ulceration, at least fausto level IV, Breslow thickness at least 5 mm, mitotic rate is 3 per mm2, TILs are present and brisk, pathologic staging pT4b.  2. 6/28/19 she had PET-CT, which showed no obvious evidence of metastatic disease.  3. 7/30/2019, she has wide local excision and sentinel biopsy (Specimen #: [id number]), which showed residual melanoma to 1.3 mm depth of invasion and margins negative. Right inguinal lymph node showed a subcapsular 1 mm focus of cells.    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 She is referred by Dr. Celis.    She feels well today. She denies any major swelling in the right leg. No major swelling or tenderness in the area of resection. She has had improvement in the drainage, moisture, and skin fold irritation. The candidal rash has resolved.    She denies shortness of breath, chest pain, nausea, vomiting, or diarrhea. She has on average one form stool daily.    Baseline labs are normal. PET-CT obtained on 9/23 negative for metastatic disease.    ECOG performance status is 0.     Results for orders placed or performed during the hospital encounter of 09/23/19   PET Oncology Whole Body    Narrative    Combined Report of:    PET and CT on  9/23/2019 9:14 AM :    1. PET of the neck, chest, abdomen, and pelvis.  2. PET CT Fusion for Attenuation Correction and Anatomical  Localization:    3. Diagnostic CT scan of  the chest, abdomen, and pelvis with  intravenous contrast for interpretation.  3. CT of the chest, abdomen and pelvis obtained for diagnostic  interpretation.  4. 3D MIP and PET-CT fused images were processed on an independent  workstation and archived to PACS and reviewed by a radiologist.    Technique:    1. PET: The patient received 14.5 mCi of F-18-FDG; the serum glucose  was 122 prior to administration, body weight was 111.6 kg. Images were  evaluated in the axial, sagittal, and coronal planes as well as the  rotational whole body MIP. Images were acquired from the Vertex to the  Feet.    UPTAKE WAS MEASURED AT 69 MINUTES.     BACKGROUND:  Liver SUV max= 3.31,   Aorta Blood SUV Max: 2.48.     2. CT: Volumetric acquisition for clinical interpretation of the  chest, abdomen, and pelvis acquired at 3 mm sections. The chest,  abdomen, and pelvis were evaluated at 5 mm sections in bone, soft  tissue, and lung windows.      The patient received 135 cc. Of Isovue 370 intravenously for the  examination.    --    3. 3D MIP and PET-CT fused images were processed on an independent  workstation and archived to PACS and reviewed by a radiologist.    INDICATION: Melanoma, staging.    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 History of papillary thyroid carcinoma,  pancreatic cancer, and breast cancer status post left lumpectomy and  pancreaticoduodenectomy.    COMPARISON: PET/CT 6/28/2019.    FINDINGS:     HEAD/NECK:  There is no  suspicious FDG uptake in the neck.     The paranasal sinuses are clear. The mastoid air cells are clear.     The mucosal pharyngeal space, the , prevertebral and carotid  spaces are within normal limits.     No masses, mass effect or pathologically enlarged lymph nodes are  evident. No increased FDG uptake within the thyroid.     CHEST:  There is no suspicious FDG  uptake in the chest.     Ascending aorta measures 3.2 cm. Main pulmonary artery measures 3.4  cm. Right chest wall pacer with leads terminating within the right  atrial appendage and right ventricle. No mediastinal or hilar  lymphadenopathy. Left breast clip. Changes of left axillary harjit  dissection. Heart size is normal. There is no pericardial effusion. No  pleural effusion. No pneumothorax. Bibasilar dependent atelectasis.  Atelectasis or scarring along the posterior medial left lower lobe  (series 8, image 128). Tree-in-bud pattern of micronodularity along  the anterior left upper lobe (series 8, image 67) and posterior medial  right upper lobe (series 8, image 45). Right apical scarring.    Multiple sub-4 mm pulmonary nodules bilaterally, for example:  2 mm nodule within the lateral right lower lobe (series 8, image 98).  2 mm nodule within the posterior right upper lobe (series 8, image  50).    ABDOMEN AND PELVIS:  There is no suspicious FDG uptake in the abdomen.    Postoperative changes of pancreaticoduodenectomy. The gallbladder is  surgically absent. Mild pneumobilia, unchanged from prior. No focal  lesions within the pancreas. Hypodense cystic lesion within the  anterior aspect of the interpolar region of the left kidney, likely a  simple renal cyst, though too small to accurately characterize.  Extrarenal pelvis bilaterally. No hydronephrosis. No nephrolithiasis.  No hydroureter. Adrenal glands are normal. Hepatic steatosis with  focal fatty infiltration along the falciform ligament. Spleen is  normal. Urinary bladder is decompressed. No dilated loops of small or  large bowel. No focal areas of mucosal enhancement or focal bowel wall  thickening. There is diffuse increased FDG uptake throughout the  bowel, likely secondary to metformin therapy. No free fluid within the  abdomen or pelvis.    Area of subcutaneous soft tissue stranding extending along the right  inguinal region with increased FDG uptake  with a maximum SUV of 3.53,  likely area of prior procedure.  Some increased uptake is skin  thickening along the right vulva.     LOWER EXTREMITIES:   No abnormal masses or hypermetabolic lesions.    BONES:   There are no suspicious lytic or blastic osseous lesions.  There is no  abnormal FDG uptake in the skeleton. Moderate to advanced degenerative  changes of the visualized spine. Intradiscal gas and multilevel disc  height loss. Sclerosis of the inferior endplate of L3 and superior  endplate of L4. Multilevel disc osteophyte formation. Degenerative  changes in the hips. Symmetric sclerosis along the right and left  ilium.    Two symmetric areas of increased FDG uptake at the insertion points of  the gluteus ailyn bilaterally, likely inflammatory. Increased FDG  uptake along the spinous process of the high lumbar spine with Max SUV  uptake of 4.34.      Impression    IMPRESSION:   In this patient with history of metastatic melanoma of the right vulva  and right inguinal lymph node with history of neuroendocrine tumor of  the pancreas, breast cancer, and papillary thyroid cancer:  1. Uptake along the right vulva, likely postprocedural inflammation.   2. Inflammation post inguinal lymph node biopsy.   3. No evidence of metastatic disease within the chest, abdomen, or  pelvis.  4. Tree-in-bud pattern micronodularity within the left and right upper  lobes without associated increased FDG uptake. This may be infectious  in nature.    I have personally reviewed the examination and initial interpretation  and I agree with the findings.    ABNER FELIPE MD   Creatinine POCT   Result Value Ref Range    Creatinine 1.0 0.52 - 1.04 mg/dL    GFR Estimate 55 (L) >60 mL/min/[1.73_m2]    GFR Estimate If Black 67 >60 mL/min/[1.73_m2]         REVIEW OF SYSTEMS  A 12-point ROS negative except as in HPI    Current Outpatient Medications   Medication Sig Dispense Refill     acetaminophen (TYLENOL) 500 MG tablet Take 1-2  tablets (500-1,000 mg) by mouth every 6 hours as needed for mild pain 50 tablet 0     ALOE PO Take by mouth daily as needed       cetirizine-pseudoePHEDrine ER (ZYRTEC-D) 5-120 MG 12 hr tablet Take 1 tablet by mouth every morning        CONTOUR NEXT EZ (CONTOUR NEXT EZ W/DEVICE KIT) w/Device KIT See Admin Instructions  0     furosemide (LASIX) 20 MG tablet Take 20 mg by mouth daily as needed (SWELLING)       levothyroxine (SYNTHROID/LEVOTHROID) 175 MCG tablet Take 175 mcg by mouth every morning        lisinopril (PRINIVIL/ZESTRIL) 5 MG tablet Take 5 mg by mouth every morning        metFORMIN (GLUCOPHAGE-XR) 500 MG 24 hr tablet Take 500 mg by mouth every morning        multivitamin w/minerals (THERA-VIT-M) tablet Take 1 tablet by mouth daily       naproxen (NAPROSYN) 500 MG tablet Take 500 mg by mouth every morning        pantoprazole (PROTONIX) 40 MG EC tablet Take 40 mg by mouth every morning        simvastatin (ZOCOR) 40 MG tablet Take 40 mg by mouth At Bedtime        sotalol (BETAPACE) 160 MG tablet Take 80 mg by mouth 2 times daily        vitamin B complex with vitamin C (STRESS TAB) tablet Take 1 tablet by mouth every morning        warfarin (COUMADIN) 5 MG tablet Take 7.5mg 5 days week ,\ 5 mg Tues, Sat  Takes in the evening       bacitracin 500 UNIT/GM OINT Apply topically 2 times daily (Patient not taking: Reported on 8/13/2019) 1 Tube 0     oxyCODONE (ROXICODONE) 5 MG tablet Take 1 tablet (5 mg) by mouth every 4 hours as needed for breakthrough pain (Patient not taking: Reported on 8/13/2019) 15 tablet 0     senna-docusate (SENOKOT-S/PERICOLACE) 8.6-50 MG tablet Take 1-2 tablets by mouth 2 times daily as needed for constipation (Patient not taking: Reported on 8/13/2019) 60 tablet 0       No Known Allergies    There is no immunization history on file for this patient.    Past Medical History:   Diagnosis Date     Arthritis      Back pain      Cataract      Diabetes (H)      GERD (gastroesophageal reflux  disease)      HLD (hyperlipidemia)      Hypertension      Hypothyroidism      Long term current use of anticoagulant therapy      Malignant neoplasm of breast (female) (H) 2001    s/p lumpectomy, LN removal, radiation     Melanoma of vulva (H)      RHONDA (obstructive sleep apnea)      Osteopenia      PAF (paroxysmal atrial fibrillation) (H) 2012    s/p ablation     Papillary carcinoma (H)     incidentally found     Primary malignant neuroendocrine tumor of pancreas (H) 2011     Toxic multinodular goiter        Past Surgical History:   Procedure Laterality Date     BACK SURGERY  1987    L4-5     BREAST LUMPECTOMY, RT/LT  2001     BUNIONECTOMY Bilateral 2006     Excision right vulvar mass  06/04/2019     FOOT SURGERY Bilateral 2006     Intracardiac ablation  2012     Knee arthroscopy surgery Bilateral 2006     Pacemaker implantation  2007     PANCREAS SURGERY  2011    pancreaticoduodenectomy     THYROIDECTOMY        VULVECTOMY RADICAL DISSECT GROIN(S) N/A 7/30/2019    Procedure: Exam Under Anesthesia, Right  Radical Vulvectomy, Right [place] Inguinal Lymph Node Sampling;  Surgeon: Bacilio Celis MD;  Location: [facility] OR       SOCIAL HISTORY  History   Smoking Status     Former Smoker     Years: 5.00     Types: Cigarettes     Quit date: 11/1/1973   Smokeless Tobacco     Never Used     Comment: Quit age 23    Social History    Substance and Sexual Activity      Alcohol use: Yes        Alcohol/week: 1.0 standard drinks        Types: 1 Cans of beer per week     History   Drug Use Not on file       FAMILY HISTORY  Family History   Problem Relation Age of Onset     Colon Cancer Mother      Arthritis Father      Prostate Cancer Father      Hypertension Father      Obesity Father      Myocardial Infarction Father      Diabetes Sister      Thyroid Disease Sister      Cancer Brother      Hypertension Brother      Diabetes Maternal Grandmother      Heart Disease Maternal Grandfather        PHYSICAL EXAMINATION  BP (!) 159/76  "(BP Location: Left arm, Patient Position: Chair, Cuff Size: Adult Large)   Pulse 69   Temp 98.1  F (36.7  C) (Oral)   Ht 1.727 m (5' 7.99\")   Wt 112.4 kg (247 lb 14.4 oz)   SpO2 98%   BMI 37.70 kg/m     Wt Readings from Last 2 Encounters:   09/26/19 112.4 kg (247 lb 14.4 oz)   09/09/19 111.6 kg (246 lb)     Physical Exam  Vitals signs and nursing note reviewed.   Constitutional:       General: She is not in acute distress.     Appearance: She is well-developed.   HENT:      Head: Normocephalic and atraumatic.   Eyes:      General: No scleral icterus.     Pupils: Pupils are equal, round, and reactive to light.   Neck:      Musculoskeletal: Normal range of motion.   Cardiovascular:      Rate and Rhythm: Regular rhythm.      Heart sounds: No murmur.   Pulmonary:      Effort: Pulmonary effort is normal.      Breath sounds: No wheezing.   Abdominal:      Palpations: Abdomen is soft. There is no mass.      Tenderness: There is no tenderness.   Musculoskeletal: Normal range of motion.         General: No tenderness.   Lymphadenopathy:      Cervical: No cervical adenopathy.   Skin:     General: Skin is warm and dry.   Neurological:      Mental Status: She is alert and oriented to person, place, and time.   Psychiatric:         Behavior: Behavior normal.       IMAGING    PET/CT, 9/23/19  IMPRESSION:   In this patient with history of metastatic melanoma of the right vulva  and right inguinal lymph node with history of neuroendocrine tumor of  the pancreas, breast cancer, and papillary thyroid cancer:  1. Uptake along the right vulva, likely postprocedural inflammation.   2. Inflammation post inguinal lymph node biopsy.   3. No evidence of metastatic disease within the chest, abdomen, or  pelvis.  4. Tree-in-bud pattern micronodularity within the left and right upper  lobes without associated increased FDG uptake. This may be infectious  in nature.    ASSESSMENT AND PLAN    #1 Vulvar Melanoma, Stage IIIC pT4b N1a  Ms. " Twan is a 69 year old woman with Stage IIIC melanoma presenting to start adjuvant nivolumab today. We reviewed that she has a >90% risk of disease recurrence, given Stage IIIC status. After complete resection of stage IIIB and IIIC melanoma, 12-month RFS was 70.5% for nivolumab in [id number].  We again reviewed associated side effects of immunotherapy including autoimmune toxicities like diarrhea, cough, shortness of breath, hypothyroidism. We will be observing her for these complications and she should contact us with any new or concerning side effects or symptoms.    She agrees to proceed. Plan to proceed with monthly treatment and return to see me in 3 months with PET-CT.    Multiple questions answered.    Elton Arellano M.D.   of Medicine  Hematology, Oncology and Transplantation

## 2019-09-27 ENCOUNTER — TELEPHONE (OUTPATIENT)
Dept: DERMATOLOGY | Facility: CLINIC | Age: 69
End: 2019-09-27

## 2019-09-27 NOTE — TELEPHONE ENCOUNTER
[facility] Call Center    Phone Message    May a detailed message be left on voicemail: yes    Reason for Call: Other: Pt needs scheduling for Melanoma. Would like to possibly come on 10/24.      Action Taken: Message routed to:  [facility] ([facility] Derm

## 2019-10-03 LAB
COPATH REPORT: NORMAL
LAB SCANNED RESULT: NORMAL
LAB SCANNED RESULT: NORMAL

## 2019-10-04 LAB — PD-L1 BY IMMUNOHISTOCHEMISTRY: NORMAL

## 2019-10-08 DIAGNOSIS — C51.9 MALIGNANT MELANOMA OF VULVA (H): ICD-10-CM

## 2019-10-08 DIAGNOSIS — C43.9 MELANOMA OF SKIN (H): Primary | ICD-10-CM

## 2019-10-24 ENCOUNTER — INFUSION THERAPY VISIT (OUTPATIENT)
Dept: ONCOLOGY | Facility: CLINIC | Age: 69
End: 2019-10-24
Attending: INTERNAL MEDICINE
Payer: COMMERCIAL

## 2019-10-24 ENCOUNTER — APPOINTMENT (OUTPATIENT)
Dept: LAB | Facility: CLINIC | Age: 69
End: 2019-10-24
Attending: INTERNAL MEDICINE
Payer: COMMERCIAL

## 2019-10-24 ENCOUNTER — ONCOLOGY VISIT (OUTPATIENT)
Dept: ONCOLOGY | Facility: CLINIC | Age: 69
End: 2019-10-24
Attending: PHYSICIAN ASSISTANT
Payer: COMMERCIAL

## 2019-10-24 VITALS
HEART RATE: 72 BPM | RESPIRATION RATE: 20 BRPM | TEMPERATURE: 97.5 F | BODY MASS INDEX: 38.19 KG/M2 | WEIGHT: 251.1 LBS | DIASTOLIC BLOOD PRESSURE: 70 MMHG | OXYGEN SATURATION: 97 % | SYSTOLIC BLOOD PRESSURE: 142 MMHG

## 2019-10-24 DIAGNOSIS — C43.9 MELANOMA OF SKIN (H): Primary | ICD-10-CM

## 2019-10-24 DIAGNOSIS — C51.9 MALIGNANT MELANOMA OF VULVA (H): ICD-10-CM

## 2019-10-24 DIAGNOSIS — C43.9 MELANOMA OF SKIN (H): ICD-10-CM

## 2019-10-24 LAB
ALBUMIN SERPL-MCNC: 4 G/DL (ref 3.4–5)
ALP SERPL-CCNC: 81 U/L (ref 40–150)
ALT SERPL W P-5'-P-CCNC: 29 U/L (ref 0–50)
ANION GAP SERPL CALCULATED.3IONS-SCNC: 7 MMOL/L (ref 3–14)
AST SERPL W P-5'-P-CCNC: 14 U/L (ref 0–45)
BILIRUB SERPL-MCNC: 0.4 MG/DL (ref 0.2–1.3)
BUN SERPL-MCNC: 27 MG/DL (ref 7–30)
CALCIUM SERPL-MCNC: 8.9 MG/DL (ref 8.5–10.1)
CHLORIDE SERPL-SCNC: 108 MMOL/L (ref 94–109)
CO2 SERPL-SCNC: 23 MMOL/L (ref 20–32)
CREAT SERPL-MCNC: 0.98 MG/DL (ref 0.52–1.04)
GFR SERPL CREATININE-BSD FRML MDRD: 59 ML/MIN/{1.73_M2}
GLUCOSE SERPL-MCNC: 103 MG/DL (ref 70–99)
POTASSIUM SERPL-SCNC: 4.6 MMOL/L (ref 3.4–5.3)
PROT SERPL-MCNC: 7.3 G/DL (ref 6.8–8.8)
SODIUM SERPL-SCNC: 138 MMOL/L (ref 133–144)
TSH SERPL DL<=0.005 MIU/L-ACNC: 2.01 MU/L (ref 0.4–4)

## 2019-10-24 PROCEDURE — 99213 OFFICE O/P EST LOW 20 MIN: CPT | Mod: 24 | Performed by: PHYSICIAN ASSISTANT

## 2019-10-24 PROCEDURE — 84443 ASSAY THYROID STIM HORMONE: CPT | Performed by: PHYSICIAN ASSISTANT

## 2019-10-24 PROCEDURE — 25800030 ZZH RX IP 258 OP 636: Mod: ZF | Performed by: PHYSICIAN ASSISTANT

## 2019-10-24 PROCEDURE — 96413 CHEMO IV INFUSION 1 HR: CPT

## 2019-10-24 PROCEDURE — 80053 COMPREHEN METABOLIC PANEL: CPT | Performed by: PHYSICIAN ASSISTANT

## 2019-10-24 PROCEDURE — 25000128 H RX IP 250 OP 636: Mod: ZF | Performed by: PHYSICIAN ASSISTANT

## 2019-10-24 RX ORDER — ALBUTEROL SULFATE 90 UG/1
1-2 AEROSOL, METERED RESPIRATORY (INHALATION)
Status: CANCELLED
Start: 2019-10-24

## 2019-10-24 RX ORDER — ALBUTEROL SULFATE 0.83 MG/ML
2.5 SOLUTION RESPIRATORY (INHALATION)
Status: CANCELLED | OUTPATIENT
Start: 2019-10-24

## 2019-10-24 RX ORDER — MEPERIDINE HYDROCHLORIDE 25 MG/ML
25 INJECTION INTRAMUSCULAR; INTRAVENOUS; SUBCUTANEOUS EVERY 30 MIN PRN
Status: CANCELLED | OUTPATIENT
Start: 2019-10-24

## 2019-10-24 RX ORDER — LORAZEPAM 2 MG/ML
0.5 INJECTION INTRAMUSCULAR EVERY 4 HOURS PRN
Status: CANCELLED
Start: 2019-10-24

## 2019-10-24 RX ORDER — EPINEPHRINE 1 MG/ML
0.3 INJECTION, SOLUTION INTRAMUSCULAR; SUBCUTANEOUS EVERY 5 MIN PRN
Status: CANCELLED | OUTPATIENT
Start: 2019-10-24

## 2019-10-24 RX ORDER — NALOXONE HYDROCHLORIDE 0.4 MG/ML
.1-.4 INJECTION, SOLUTION INTRAMUSCULAR; INTRAVENOUS; SUBCUTANEOUS
Status: CANCELLED | OUTPATIENT
Start: 2019-10-24

## 2019-10-24 RX ORDER — EPINEPHRINE 0.3 MG/.3ML
0.3 INJECTION SUBCUTANEOUS EVERY 5 MIN PRN
Status: CANCELLED | OUTPATIENT
Start: 2019-10-24

## 2019-10-24 RX ORDER — METHYLPREDNISOLONE SODIUM SUCCINATE 125 MG/2ML
125 INJECTION, POWDER, LYOPHILIZED, FOR SOLUTION INTRAMUSCULAR; INTRAVENOUS
Status: CANCELLED
Start: 2019-10-24

## 2019-10-24 RX ORDER — DIPHENHYDRAMINE HYDROCHLORIDE 50 MG/ML
50 INJECTION INTRAMUSCULAR; INTRAVENOUS
Status: CANCELLED
Start: 2019-10-24

## 2019-10-24 RX ORDER — SODIUM CHLORIDE 9 MG/ML
1000 INJECTION, SOLUTION INTRAVENOUS CONTINUOUS PRN
Status: CANCELLED
Start: 2019-10-24

## 2019-10-24 RX ADMIN — SODIUM CHLORIDE 480 MG: 9 INJECTION, SOLUTION INTRAVENOUS at 17:04

## 2019-10-24 ASSESSMENT — PAIN SCALES - GENERAL: PAINLEVEL: NO PAIN (0)

## 2019-10-24 NOTE — LETTER
RE: Ilana Trent  [address] [facility] [place] [address]       [facility]  MEDICAL ONCOLOGY PROGRESS NOTE  Oct 24, 2019    CHIEF COMPLAINT: Stage IIIC Vulvar melanoma  Oncologist: Dr. Perez    Melanoma History:  1. 6/4/2019, she had excision of the right vulvar mass in [place], and on pathology this shows malignant melanoma with ulceration, at least fausto level IV, Breslow thickness at least 5 mm, mitotic rate is 3 per mm2, TILs are present and brisk, pathologic staging pT4b.  2. 6/28/19 she had PET-CT, which showed no obvious evidence of metastatic disease.  3. 7/30/2019, she has wide local excision and sentinel biopsy (Specimen #: [id number]), which showed residual melanoma to 1.3 mm depth of invasion and margins negative. Right inguinal lymph node showed a subcapsular 1 mm focus of cells.  4. She was referred by Dr. Celis. Dr. Perez felt she has a >90% risk of recurrence given stage IIIC disease and recommends  5. Baseline labs are normal. PET-CT obtained on 9/23 negative for metastatic disease.  6. She started adjuvant monthly nivolumab 9/26/19     HISTORY OF PRESENT ILLNESS  Ilana Trent is a 69 year old female with a vulvar melanoma who is here for adjuvant nivolumab. She is here for consideration of cycle 2.     I am seeing her in the infusion room per her request. She is accompanied by her .    She feels well today. She has no complaints. She has mild fatigue with treatment but doesn't interfere with function. No rash, diarrhea, cough, TONEY etc. She does have a canker sore that is healing and occasionally gets canker sores.     10 pt ROS otherwise negative.    ECOG performance status is 0.     Results for orders placed or performed in visit on 09/26/19   Comprehensive metabolic panel   Result Value Ref Range    Sodium 136 133 - 144 mmol/L    Potassium 4.4 3.4 - 5.3 mmol/L    Chloride 105 94 - 109 mmol/L    Carbon Dioxide 24 20 - 32 mmol/L    Anion Gap 7 3 - 14 mmol/L    Glucose  88 70 - 99 mg/dL    Urea Nitrogen 25 7 - 30 mg/dL    Creatinine 0.83 0.52 - 1.04 mg/dL    GFR Estimate 72 >60 mL/min/[1.73_m2]    GFR Estimate If Black 84 >60 mL/min/[1.73_m2]    Calcium 9.3 8.5 - 10.1 mg/dL    Bilirubin Total 0.6 0.2 - 1.3 mg/dL    Albumin 4.2 3.4 - 5.0 g/dL    Protein Total 7.7 6.8 - 8.8 g/dL    Alkaline Phosphatase 86 40 - 150 U/L    ALT 28 0 - 50 U/L    AST 15 0 - 45 U/L   TSH with free T4 reflex   Result Value Ref Range    TSH 1.47 0.40 - 4.00 mU/L         REVIEW OF SYSTEMS  A 12-point ROS negative except as in HPI    Current Outpatient Medications   Medication Sig Dispense Refill     acetaminophen (TYLENOL) 500 MG tablet Take 1-2 tablets (500-1,000 mg) by mouth every 6 hours as needed for mild pain 50 tablet 0     ALOE PO Take by mouth daily as needed       bacitracin 500 UNIT/GM OINT Apply topically 2 times daily (Patient not taking: Reported on 8/13/2019) 1 Tube 0     cetirizine-pseudoePHEDrine ER (ZYRTEC-D) 5-120 MG 12 hr tablet Take 1 tablet by mouth every morning        CONTOUR NEXT EZ (CONTOUR NEXT EZ W/DEVICE KIT) w/Device KIT See Admin Instructions  0     furosemide (LASIX) 20 MG tablet Take 20 mg by mouth daily as needed (SWELLING)       levothyroxine (SYNTHROID/LEVOTHROID) 175 MCG tablet Take 175 mcg by mouth every morning        lisinopril (PRINIVIL/ZESTRIL) 5 MG tablet Take 5 mg by mouth every morning        metFORMIN (GLUCOPHAGE-XR) 500 MG 24 hr tablet Take 500 mg by mouth every morning        multivitamin w/minerals (THERA-VIT-M) tablet Take 1 tablet by mouth daily       naproxen (NAPROSYN) 500 MG tablet Take 500 mg by mouth every morning        oxyCODONE (ROXICODONE) 5 MG tablet Take 1 tablet (5 mg) by mouth every 4 hours as needed for breakthrough pain (Patient not taking: Reported on 8/13/2019) 15 tablet 0     pantoprazole (PROTONIX) 40 MG EC tablet Take 40 mg by mouth every morning        senna-docusate (SENOKOT-S/PERICOLACE) 8.6-50 MG tablet Take 1-2 tablets by mouth 2 times  daily as needed for constipation (Patient not taking: Reported on 8/13/2019) 60 tablet 0     simvastatin (ZOCOR) 40 MG tablet Take 40 mg by mouth At Bedtime        sotalol (BETAPACE) 160 MG tablet Take 80 mg by mouth 2 times daily        vitamin B complex with vitamin C (STRESS TAB) tablet Take 1 tablet by mouth every morning        warfarin (COUMADIN) 5 MG tablet Take 7.5mg 5 days week ,\ 5 mg Tues, Sat  Takes in the evening         No Known Allergies    There is no immunization history on file for this patient.    Past Medical History:   Diagnosis Date     Arthritis      Back pain      Cataract      Diabetes (H)      GERD (gastroesophageal reflux disease)      HLD (hyperlipidemia)      Hypertension      Hypothyroidism      Long term current use of anticoagulant therapy      Malignant neoplasm of breast (female) (H) 2001    s/p lumpectomy, LN removal, radiation     Melanoma of vulva (H)      RHONDA (obstructive sleep apnea)      Osteopenia      PAF (paroxysmal atrial fibrillation) (H) 2012    s/p ablation     Papillary carcinoma (H)     incidentally found     Primary malignant neuroendocrine tumor of pancreas (H) 2011     Toxic multinodular goiter        Past Surgical History:   Procedure Laterality Date     BACK SURGERY  1987    L4-5     BREAST LUMPECTOMY, RT/LT  2001     BUNIONECTOMY Bilateral 2006     Excision right vulvar mass  06/04/2019     FOOT SURGERY Bilateral 2006     Intracardiac ablation  2012     Knee arthroscopy surgery Bilateral 2006     Pacemaker implantation  2007     PANCREAS SURGERY  2011    pancreaticoduodenectomy     THYROIDECTOMY        VULVECTOMY RADICAL DISSECT GROIN(S) N/A 7/30/2019    Procedure: Exam Under Anesthesia, Right  Radical Vulvectomy, Right [place] Inguinal Lymph Node Sampling;  Surgeon: Bacilio Celis MD;  Location:  OR       SOCIAL HISTORY  History   Smoking Status     Former Smoker     Years: 5.00     Types: Cigarettes     Quit date: 11/1/1973   Smokeless Tobacco      Never Used     Comment: Quit age 23    Social History    Substance and Sexual Activity      Alcohol use: Yes        Alcohol/week: 1.0 standard drinks        Types: 1 Cans of beer per week     History   Drug Use Not on file       FAMILY HISTORY  Family History   Problem Relation Age of Onset     Colon Cancer Mother      Arthritis Father      Prostate Cancer Father      Hypertension Father      Obesity Father      Myocardial Infarction Father      Diabetes Sister      Thyroid Disease Sister      Cancer Brother      Hypertension Brother      Diabetes Maternal Grandmother      Heart Disease Maternal Grandfather        PHYSICAL EXAMINATION  BP (!) 142/70 (BP Location: Right arm, Patient Position: Sitting)   Pulse 72   Temp 97.5  F (36.4  C) (Oral)   Resp 20   Wt 113.9 kg (251 lb 1.6 oz)   SpO2 97%   BMI 38.19 kg/m     Wt Readings from Last 2 Encounters:   09/26/19 112.4 kg (247 lb 14.4 oz)   09/09/19 111.6 kg (246 lb)   Physical exam  General: No acute distress  HEENT: Sclera anicteric. Conjunctiva non-injected. Oral mucosa pink and moist. Healing canker sore, <cm in inner portion of left upper lip. Not in location of herpes simplex 1.  Posterior pharynx is non erythematous without exudate.   Lymph: No lymphadenopathy in neck, supraclavicular, and axillary areas  Heart: Regular, rate, and rhythm  Lungs: Clear to ascultation bilaterally  Extremities: Chronic PERCY  Neuro: Cranial nerves grossly intact    Labs:  CMP and TSHr pending (she is s/p a thyroidectomy and on replacement)    ASSESSMENT AND PLAN    #1 Vulvar Melanoma, Stage IIIC pT4b N1a  Ms. Trent is a 69 year old woman with Stage IIIC melanoma on adjuvant nivolumab. She tolerated the first cycle well with stage 1 fatigue. She presents for cycle 2.     She agrees to proceed. Plan to proceed with monthly treatment and return to see Dr. Perez in 2 months with PET-CT.    2. Aphthous ulcer, healing. Supportive care    Over 50% of 15 min visit was spent counseling  and coordinating care    Fela Navarro PA-C

## 2019-10-24 NOTE — PATIENT INSTRUCTIONS
Contact Numbers  [facility]: [phone]    After Hours:  [phone]  Triage: [phone]    Please call the [facility] Triage line if you experience a temperature greater than or equal to 100.5, shaking chills, have uncontrolled nausea, vomiting and/or diarrhea, dizziness, shortness of breath, chest pain, bleeding, unexplained bruising, or if you have any other new/concerning symptoms, questions or concerns.     If it is after hours, weekends, or holidays, please call the main hospital  at  [phone] and ask to speak to the Oncology doctor on call.     If you are having any concerning symptoms or wish to speak to a provider before your next infusion visit, please call your care coordinator or triage to notify them so we can adequately serve you.     If you need a refill on a narcotic prescription or other medication, please call triage before your infusion appointment.         October 2019 Sunday Monday Tuesday Wednesday Thursday Friday Saturday             1     2     3     4     5       6     7     8     9     10     11     12       13     14     15     16     17     18     19       20     21     22     23     24    [facility] DRAW   3:45 PM   (15 min.)    [facility] DRAW   [facility] Lab Draw    [facility] RETURN   4:15 PM   (60 min.)   Fela Navarro PA-C   [facility] ONC INFUSION 60   4:30 PM   (60 min.)    ONCOLOGY INFUSION   [facility] 25     26       27     28     29     30    NEW   2:15 PM   (75 min.)   Xochilt Fuentes GC   [facility] 31 November 2019 Sunday Monday Tuesday Wednesday Thursday Friday Saturday                            1     2       3     4     5     6     7     8     9       10     11     12     13     14     15     16       17     18     19     20     21     22     23       24     25    [facility] DRAW   1:30 PM   (15 min.)    [facility] DRAW     [facility] Lab Draw    P RETURN   1:55 PM   (50 min.)   Xochilt Lee PA-C [facility] ONC INFUSION 60   3:00 PM   (60 min.)    ONCOLOGY INFUSION   [facility] 26     27     28     29     30                     Lab Results:  Recent Results (from the past 12 hour(s))   Comprehensive metabolic panel    Collection Time: 10/24/19  4:00 PM   Result Value Ref Range    Sodium 138 133 - 144 mmol/L    Potassium 4.6 3.4 - 5.3 mmol/L    Chloride 108 94 - 109 mmol/L    Carbon Dioxide 23 20 - 32 mmol/L    Anion Gap 7 3 - 14 mmol/L    Glucose 103 (H) 70 - 99 mg/dL    Urea Nitrogen 27 7 - 30 mg/dL    Creatinine 0.98 0.52 - 1.04 mg/dL    GFR Estimate 59 (L) >60 mL/min/[1.73_m2]    GFR Estimate If Black 69 >60 mL/min/[1.73_m2]    Calcium 8.9 8.5 - 10.1 mg/dL    Bilirubin Total 0.4 0.2 - 1.3 mg/dL    Albumin 4.0 3.4 - 5.0 g/dL    Protein Total 7.3 6.8 - 8.8 g/dL    Alkaline Phosphatase 81 40 - 150 U/L    ALT 29 0 - 50 U/L    AST 14 0 - 45 U/L   TSH with free T4 reflex    Collection Time: 10/24/19  4:00 PM   Result Value Ref Range    TSH 2.01 0.40 - 4.00 mU/L

## 2019-10-24 NOTE — PROGRESS NOTES
Infusion Nursing Note:  Ilana Trent presents today for C2D1 Opdivo.    Patient seen by provider today: Yes: Fela JAIME   present during visit today: Not Applicable.    Note: Patient feels well. No complaints made. Otherwise well.     Intravenous Access:  Peripheral IV placed.    Treatment Conditions:  Lab Results   Component Value Date    HGB 11.7 07/31/2019     Lab Results   Component Value Date    WBC 14.3 07/31/2019      No results found for: ANEU  Lab Results   Component Value Date     07/31/2019      Lab Results   Component Value Date     10/24/2019                   Lab Results   Component Value Date    POTASSIUM 4.6 10/24/2019           Lab Results   Component Value Date    MAG 1.7 07/31/2019            Lab Results   Component Value Date    CR 0.98 10/24/2019                   Lab Results   Component Value Date    RENEA 8.9 10/24/2019                Lab Results   Component Value Date    BILITOTAL 0.4 10/24/2019           Lab Results   Component Value Date    ALBUMIN 4.0 10/24/2019                    Lab Results   Component Value Date    ALT 29 10/24/2019           Lab Results   Component Value Date    AST 14 10/24/2019       Results reviewed, labs MET treatment parameters, ok to proceed with treatment.      Post Infusion Assessment:  Patient tolerated infusion without incident.  Blood return noted pre and post infusion.  Site patent and intact, free from redness, edema or discomfort.  No evidence of extravasations.  Access discontinued per protocol.       Discharge Plan:   Patient declined prescription refills.  Discharge instructions reviewed with: Patient and Family.  Patient and/or family verbalized understanding of discharge instructions and all questions answered.  Copy of AVS reviewed with patient and/or family.  Patient will return 11/25/19 for next appointment.  Patient discharged in stable condition accompanied by: self and .  Departure Mode: Ambulatory.    ALEXANDRO  FRANCISCO ALANIZ

## 2019-10-24 NOTE — PROGRESS NOTES
[facility]  MEDICAL ONCOLOGY PROGRESS NOTE  Oct 24, 2019    CHIEF COMPLAINT: Stage IIIC Vulvar melanoma  Oncologist: Dr. Perez    Melanoma History:  1. 6/4/2019, she had excision of the right vulvar mass in [place], and on pathology this shows malignant melanoma with ulceration, at least fausto level IV, Breslow thickness at least 5 mm, mitotic rate is 3 per mm2, TILs are present and brisk, pathologic staging pT4b.  2. 6/28/19 she had PET-CT, which showed no obvious evidence of metastatic disease.  3. 7/30/2019, she has wide local excision and sentinel biopsy (Specimen #: [id number]), which showed residual melanoma to 1.3 mm depth of invasion and margins negative. Right inguinal lymph node showed a subcapsular 1 mm focus of cells.  4. She was referred by Dr. Celis. Dr. Perez felt she has a >90% risk of recurrence given stage IIIC disease and recommends  5. Baseline labs are normal. PET-CT obtained on 9/23 negative for metastatic disease.  6. She started adjuvant monthly nivolumab 9/26/19     HISTORY OF PRESENT ILLNESS  Ilana Trent is a 69 year old female with a vulvar melanoma who is here for adjuvant nivolumab. She is here for consideration of cycle 2.     I am seeing her in the infusion room per her request. She is accompanied by her .    She feels well today. She has no complaints. She has mild fatigue with treatment but doesn't interfere with function. No rash, diarrhea, cough, TONEY etc. She does have a canker sore that is healing and occasionally gets canker sores.     10 pt ROS otherwise negative.    ECOG performance status is 0.     Results for orders placed or performed in visit on 09/26/19   Comprehensive metabolic panel   Result Value Ref Range    Sodium 136 133 - 144 mmol/L    Potassium 4.4 3.4 - 5.3 mmol/L    Chloride 105 94 - 109 mmol/L    Carbon Dioxide 24 20 - 32 mmol/L    Anion Gap 7 3 - 14 mmol/L    Glucose 88 70 - 99 mg/dL    Urea Nitrogen 25 7 - 30 mg/dL    Creatinine  0.83 0.52 - 1.04 mg/dL    GFR Estimate 72 >60 mL/min/[1.73_m2]    GFR Estimate If Black 84 >60 mL/min/[1.73_m2]    Calcium 9.3 8.5 - 10.1 mg/dL    Bilirubin Total 0.6 0.2 - 1.3 mg/dL    Albumin 4.2 3.4 - 5.0 g/dL    Protein Total 7.7 6.8 - 8.8 g/dL    Alkaline Phosphatase 86 40 - 150 U/L    ALT 28 0 - 50 U/L    AST 15 0 - 45 U/L   TSH with free T4 reflex   Result Value Ref Range    TSH 1.47 0.40 - 4.00 mU/L         REVIEW OF SYSTEMS  A 12-point ROS negative except as in HPI    Current Outpatient Medications   Medication Sig Dispense Refill     acetaminophen (TYLENOL) 500 MG tablet Take 1-2 tablets (500-1,000 mg) by mouth every 6 hours as needed for mild pain 50 tablet 0     ALOE PO Take by mouth daily as needed       bacitracin 500 UNIT/GM OINT Apply topically 2 times daily (Patient not taking: Reported on 8/13/2019) 1 Tube 0     cetirizine-pseudoePHEDrine ER (ZYRTEC-D) 5-120 MG 12 hr tablet Take 1 tablet by mouth every morning        CONTOUR NEXT EZ (CONTOUR NEXT EZ W/DEVICE KIT) w/Device KIT See Admin Instructions  0     furosemide (LASIX) 20 MG tablet Take 20 mg by mouth daily as needed (SWELLING)       levothyroxine (SYNTHROID/LEVOTHROID) 175 MCG tablet Take 175 mcg by mouth every morning        lisinopril (PRINIVIL/ZESTRIL) 5 MG tablet Take 5 mg by mouth every morning        metFORMIN (GLUCOPHAGE-XR) 500 MG 24 hr tablet Take 500 mg by mouth every morning        multivitamin w/minerals (THERA-VIT-M) tablet Take 1 tablet by mouth daily       naproxen (NAPROSYN) 500 MG tablet Take 500 mg by mouth every morning        oxyCODONE (ROXICODONE) 5 MG tablet Take 1 tablet (5 mg) by mouth every 4 hours as needed for breakthrough pain (Patient not taking: Reported on 8/13/2019) 15 tablet 0     pantoprazole (PROTONIX) 40 MG EC tablet Take 40 mg by mouth every morning        senna-docusate (SENOKOT-S/PERICOLACE) 8.6-50 MG tablet Take 1-2 tablets by mouth 2 times daily as needed for constipation (Patient not taking: Reported  on 8/13/2019) 60 tablet 0     simvastatin (ZOCOR) 40 MG tablet Take 40 mg by mouth At Bedtime        sotalol (BETAPACE) 160 MG tablet Take 80 mg by mouth 2 times daily        vitamin B complex with vitamin C (STRESS TAB) tablet Take 1 tablet by mouth every morning        warfarin (COUMADIN) 5 MG tablet Take 7.5mg 5 days week ,\ 5 mg Tues, Sat  Takes in the evening         No Known Allergies    There is no immunization history on file for this patient.    Past Medical History:   Diagnosis Date     Arthritis      Back pain      Cataract      Diabetes (H)      GERD (gastroesophageal reflux disease)      HLD (hyperlipidemia)      Hypertension      Hypothyroidism      Long term current use of anticoagulant therapy      Malignant neoplasm of breast (female) (H) 2001    s/p lumpectomy, LN removal, radiation     Melanoma of vulva (H)      RHONDA (obstructive sleep apnea)      Osteopenia      PAF (paroxysmal atrial fibrillation) (H) 2012    s/p ablation     Papillary carcinoma (H)     incidentally found     Primary malignant neuroendocrine tumor of pancreas (H) 2011     Toxic multinodular goiter        Past Surgical History:   Procedure Laterality Date     BACK SURGERY  1987    L4-5     BREAST LUMPECTOMY, RT/LT  2001     BUNIONECTOMY Bilateral 2006     Excision right vulvar mass  06/04/2019     FOOT SURGERY Bilateral 2006     Intracardiac ablation  2012     Knee arthroscopy surgery Bilateral 2006     Pacemaker implantation  2007     PANCREAS SURGERY  2011    pancreaticoduodenectomy     THYROIDECTOMY        VULVECTOMY RADICAL DISSECT GROIN(S) N/A 7/30/2019    Procedure: Exam Under Anesthesia, Right  Radical Vulvectomy, Right [place] Inguinal Lymph Node Sampling;  Surgeon: Bacilio Celis MD;  Location:  OR       SOCIAL HISTORY  History   Smoking Status     Former Smoker     Years: 5.00     Types: Cigarettes     Quit date: 11/1/1973   Smokeless Tobacco     Never Used     Comment: Quit age 23    Social History    Substance  and Sexual Activity      Alcohol use: Yes        Alcohol/week: 1.0 standard drinks        Types: 1 Cans of beer per week     History   Drug Use Not on file       FAMILY HISTORY  Family History   Problem Relation Age of Onset     Colon Cancer Mother      Arthritis Father      Prostate Cancer Father      Hypertension Father      Obesity Father      Myocardial Infarction Father      Diabetes Sister      Thyroid Disease Sister      Cancer Brother      Hypertension Brother      Diabetes Maternal Grandmother      Heart Disease Maternal Grandfather        PHYSICAL EXAMINATION  BP (!) 142/70 (BP Location: Right arm, Patient Position: Sitting)   Pulse 72   Temp 97.5  F (36.4  C) (Oral)   Resp 20   Wt 113.9 kg (251 lb 1.6 oz)   SpO2 97%   BMI 38.19 kg/m    Wt Readings from Last 2 Encounters:   09/26/19 112.4 kg (247 lb 14.4 oz)   09/09/19 111.6 kg (246 lb)   Physical exam  General: No acute distress  HEENT: Sclera anicteric. Conjunctiva non-injected. Oral mucosa pink and moist. Healing canker sore, <cm in inner portion of left upper lip. Not in location of herpes simplex 1.  Posterior pharynx is non erythematous without exudate.   Lymph: No lymphadenopathy in neck, supraclavicular, and axillary areas  Heart: Regular, rate, and rhythm  Lungs: Clear to ascultation bilaterally  Extremities: Chronic PERCY  Neuro: Cranial nerves grossly intact    Labs:  CMP and TSHr pending (she is s/p a thyroidectomy and on replacement)    ASSESSMENT AND PLAN    #1 Vulvar Melanoma, Stage IIIC pT4b N1a  Ms. Trent is a 69 year old woman with Stage IIIC melanoma on adjuvant nivolumab. She tolerated the first cycle well with stage 1 fatigue. She presents for cycle 2.     She agrees to proceed. Plan to proceed with monthly treatment and return to see Dr. Perez in 2 months with PET-CT.    2. Aphthous ulcer, healing. Supportive care    Over 50% of 15 min visit was spent counseling and coordinating care    Fela Navarro PA-C

## 2019-10-30 ENCOUNTER — ONCOLOGY VISIT (OUTPATIENT)
Dept: ONCOLOGY | Facility: CLINIC | Age: 69
End: 2019-10-30
Attending: INTERNAL MEDICINE
Payer: COMMERCIAL

## 2019-10-30 ENCOUNTER — PRE VISIT (OUTPATIENT)
Dept: ONCOLOGY | Facility: CLINIC | Age: 69
End: 2019-10-30

## 2019-10-30 DIAGNOSIS — Z85.9 HISTORY OF MALIGNANT NEUROENDOCRINE TUMOR: ICD-10-CM

## 2019-10-30 DIAGNOSIS — Z80.0 FAMILY HISTORY OF COLON CANCER: ICD-10-CM

## 2019-10-30 DIAGNOSIS — Z85.3 PERSONAL HISTORY OF MALIGNANT NEOPLASM OF BREAST: Primary | ICD-10-CM

## 2019-10-30 DIAGNOSIS — C51.9 MALIGNANT MELANOMA OF VULVA (H): ICD-10-CM

## 2019-10-30 DIAGNOSIS — Z85.850 HX OF PAPILLARY THYROID CARCINOMA: ICD-10-CM

## 2019-10-30 PROCEDURE — 96040 ZZC GENETIC COUNSELING, EACH 30 MIN: CPT | Performed by: GENETIC COUNSELOR, MS

## 2019-10-30 NOTE — PATIENT INSTRUCTIONS
Assessing Cancer Risk  Only about 5-10% of cancers are thought to be due to an inherited cancer susceptibility gene.    These families often have:    Several people with the same or related types of cancer    Cancers diagnosed at a young age (before age 50)    Individuals with more than one primary cancer    Multiple generations of the family affected with cancer    Some people may be candidates for genetic testing of more than one gene.  For these families, genetic testing using a cancer panel may be offered.  These panels will test different genes known to increase the risk for breast, ovarian, uterine, and/or other cancers. All of the genes discussed below have published clinical management guidelines for individuals who are found to carry a mutation. The purpose of this handout is to serve as a brief summary of the genes analyzed by the panels used to inquire about hereditary breast and gynecologic cancer:  EULALIO, BRCA1, BRCA2, BRIP1, CDH1, CHEK2, MLH1, MSH2, MSH6, PMS2, EPCAM, PTEN, PALB2, RAD51C, RAD51D, and TP53.  ______________________________________________________________________________  Hereditary Breast and Ovarian Cancer Syndrome   (BRCA1 and BRCA2)  A single mutation in one of the copies of BRCA1 or BRCA2 increases the risk for breast and ovarian cancer, among others.  The risk for pancreatic cancer and melanoma may also be slightly increased in some families.  The chart below shows the chance that someone with a BRCA mutation would develop cancer in his or her lifetime1,2,3,4.        A person s ethnic background is also important to consider, as individuals of Ashkenazi Vood[place] ancestry have a higher chance of having a BRCA gene mutation.  There are three BRCA mutations that occur more frequently in this population.    Moreland Syndrome   (MLH1, MSH2, MSH6, PMS2, and EPCAM)  Currently five genes are known to cause Moreland Syndrome: MLH1, MSH2, MSH6, PMS2, and EPCAM.  A single mutation in one of the  Moreland Syndrome genes increases the risk for colon, endometrial, ovarian, and stomach cancers.  Other cancers that occur less commonly in Moreland Syndrome include urinary tract, skin, and brain cancers.  The chart below shows the chance that a person with Moreland syndrome would develop cancer in his or her lifetime5.      *Cancer risk varies depending on Moreland syndrome gene found    Cowden Syndrome   (PTEN)  Cowden syndrome is a hereditary condition that increases the risk for breast, thyroid, endometrial, colon, and kidney cancer.  Cowden syndrome is caused by a mutation in the PTEN gene.  A single mutation in one of the copies of PTEN causes Cowden syndrome and increases cancer risk.  The chart below shows the chance that someone with a PTEN mutation would develop cancer in their lifetime6,7.  Other benign features seen in some individuals with Cowden syndrome include benign skin lesions (facial papules, keratoses, lipomas), learning disability, autism, thyroid nodules, colon polyps, and larger head size.      *One recent study found breast cancer risk to be increased to 85%    Li-Fraumeni Syndrome   (TP53)  Li-Fraumeni Syndrome (LFS) is a cancer predisposition syndrome caused by a mutation in the TP53 gene. A single mutation in one of the copies of TP53 increases the risk for multiple cancers. Individuals with LFS are at an increased risk for developing cancer at a young age. The lifetime risk for development of a LFS-associated cancer is 50% by age 30 and 90% by age 60.   Core Cancers: Sarcomas, Breast, Brain, Lung, Leukemias/Lymphomas, Adrenocortical carcinomas  Other Cancers: Gastrointestinal, Thyroid, Skin, Genitourinary    Hereditary Diffuse Gastric Cancer   (CDH1)  Currently, one gene is known to cause hereditary diffuse gastric cancer (HDGC): CDH1.  Individuals with HDGC are at increased risk for diffuse gastric cancer and lobular breast cancer. Of people diagnosed with HDGC, 30-50% have a mutation in the CDH1  gene.  This suggests there are likely other genes that may cause HDGC that have not been identified yet.      Lifetime Cancer Risks    General Population HDGC    Diffuse Gastric  <1% ~80%   Breast 12% 39-52%         Additional Genes  EULALIO  EULALIO is a moderate-risk breast cancer gene. Women who have a mutation in EULALIO can have between a 2-4 fold increased risk for breast cancer compared to the general population8. EULALIO mutations have also been associated with increased risk for pancreatic cancer, however an estimate of this cancer risk is not well understood9. Individuals who inherit two EULALIO mutations have a condition called ataxia-telangiectasia (AT).  This rare autosomal recessive condition affects the nervous system and immune system, and is associated with progressive cerebellar ataxia beginning in childhood.  Individuals with ataxia-telangiectasia often have a weakened immune system and have an increased risk for childhood cancers.    PALB2  Mutations in PALB2 have been shown to increase the risk of breast cancer up to 33-58% in some families; where individuals fall within this risk range is dependent upon family lgdolnw56. PALB2 mutations have also been associated with increased risk for pancreatic cancer, although this risk has not been quantified yet.  Individuals who inherit two PALB2 mutations--one from their mother and one from their father--have a condition called Fanconi Anemia.  This rare autosomal recessive condition is associated with short stature, developmental delay, bone marrow failure, and increased risk for childhood cancers.    CHEK2   CHEK2 is a moderate-risk breast cancer gene.  Women who have a mutation in CHEK2 have around a 2-fold increased risk for breast cancer compared to the general population, and this risk may be higher depending upon family history.11,12,13 Mutations in CHEK2 have also been shown to increase the risk of a number of other cancers, including colon and prostate, however  these cancer risks are currently not well understood.    BRIP1, RAD51C and RAD51D  Mutations in BRIP1, RAD51C, and RAD51D have been shown to increase the risk of ovarian cancer and possibly female breast cancer as well14,15 .       Lifetime Cancer Risk    General Population BRIP1 RAD51C RAD51D   Ovarian 1-2% ~5-8% ~5-9% ~7-15%           Inheritance  All of the cancer syndromes reviewed above are inherited in an autosomal dominant pattern.  This means that if a parent has a mutation, each of his or her children will have a 50% chance of inheriting that same mutation.  Therefore, each child--male or female--would have a 50% chance of being at increased risk for developing cancer.      Image obtained from Genetics Home Reference, 2013     Mutations in some genes can occur de enriqueta, which means that a person s mutation occurred for the first time in them and was not inherited from a parent.  Now that they have the mutation, however, it can be passed on to future generations.    Genetic Testing  Genetic testing involves a blood test and will look at the genetic information in the EULALIO, BRCA1, BRCA2, BRIP1, CDH1, CHEK2, MLH1, MSH2, MSH6, PMS2, EPCAM, PTEN, PALB2, RAD51C, RAD51D, and TP53 genes for any harmful mutations that are associated with increased cancer risk.  If possible, it is recommended that the person(s) who has had cancer be tested before other family members.  That person will give us the most useful information about whether or not a specific gene is associated with the cancer in the family.    Results  There are three possible results of genetic testing:    Positive--a harmful mutation was identified in one or more of the genes    Negative--no mutation was identified in any of the genes on this panel    Variant of unknown significance--a variation in one of the genes was identified, but it is unclear how this impacts cancer risk in the family    Advantages and Disadvantages   There are advantages and  disadvantages to genetic testing.    Advantages    May clarify your cancer risk    Can help you make medical decisions    May explain the cancers in your family    May give useful information to your family members (if you share your results)    Disadvantages    Possible negative emotional impact of learning about inherited cancer risk    Uncertainty in interpreting a negative test result in some situations    Possible genetic discrimination concerns (see below)    Genetic Information Nondiscrimination Act (DANNI)  DANNI is a federal law that protects individuals from health insurance or employment discrimination based on a genetic test result alone.  Although rare, there are currently no legal discrimination protections in terms of life insurance, long term care, or disability insurances.  Visit the [employer] website to learn more.    Reducing Cancer Risk  All of the genes described above have nationally recognized cancer screening guidelines that would be recommended for individuals who test positive.  In addition to increased cancer screening, surgeries may be offered or recommended to reduce cancer risk.  Recommendations are based upon an individual s genetic test result as well as their personal and family history of cancer.    Questions to Think About Regarding Genetic Testing:    What effect will the test result have on me and my relationship with my family members if I have an inherited gene mutation?  If I don t have a gene mutation?    Should I share my test results, and how will my family react to this news, which may also affect them?    Are my children ready to learn new information that may one day affect their own health?    Hereditary Cancer Resources    FORCE: Facing Our Risk of Cancer Empowered facingourrisk.org   Bright Pink bebrightpink.org   Li-Fraumeni Syndrome Association lfsassociation.org   PTEN World PTENworld.com   No stomach for cancer, Inc.  nostomachforcancer.org   Stomach cancer relief network Scrnet.org   Collaborative Group of the Americas on Inherited Colorectal Cancer (CGA) cgaicc.com    Cancer Care cancercare.org   American Cancer Society (ACS) cancer.org   National Cancer [place] (NCI) cancer.gov     Please call us if you have any questions or concerns.   Cancer Risk Management Program [phone] ([phone])  ? Xochilt Fuentes, MS, [facility]  [phone]  ? Yobani Becerra, MS, [facility] [phone]  ? Faviola Darling, MS, [facility]  [phone]  ? Laureen Argueta, MS, [facility]  [phone]  ? Ada Telly, MS, [facility] [phone]  ? Safia Marielena, MS, [facility] [phone]  ? Carley Chaney, MS, [facility]  [phone]  ? Yudi Kali, MS, [facility]  [phone]    References  1. Cherelle A, Lety PDP, Narjacobo S, Heriberto HOLT, Jing JE, Gilberto JL, Mejia N, Lexi H, Rolanda O, Jillian A, Brenda B, Ye P, Manalison S, Bigg DM, Oswaldo N, Sven E, Van H, Román E, Mei J, Gronjimmy J, Cornelio B, Tulinius H, Thorlacius S, Eerola H, Onurna H, Eran K, Olga OP. Average risks of breast and ovarian cancer associated with BRCA1 or BRCA2 mutations detected in case series unselected for family history: a combined analysis of 222 studies. Am J Hum Anna. 2003;72:1117-30.  2. Vince N, Ann-Marie M, Nat G.  BRCA1 and BRCA2 Hereditary Breast and Ovarian Cancer. Gene Reviews online. 2013.  3. Mono YC, Emmanuel S, Andrew G, Rajan S. Breast cancer risk among male BRCA1 and BRCA2 mutation carriers. J Natl Cancer Inst. 2007;99:1811-4.  4. Sahil AVALOS, Kyler I, Ant J, Lani E, Zita ER, Mariusz F. Risk of breast cancer in male BRCA2 carriers. J Med Anna. 2010;47:710-1.  5. National Comprehensive Cancer Network. Clinical practice guidelines in oncology, colorectal cancer screening. Available online (registration required). 2015.  6. Joseluis BARRIOS, Gudelia J, Candy J, Shima LA, Herminio MS, Natalie C. Lifetime cancer risks in individuals with germline PTEN mutations. Clin Cancer Res.  2012;18:400-7.  7. Kemal DOBBS. Cowden Syndrome: A Critical Review of the Clinical Literature. J Anna . 2009:18:13-27.  8. Maddi CLAROS, Greg D, Brad S, Kelle P, Elliot T, Dai M, Vick B, Paco H, Ger R, Santi K, Stacie L, Sahil DG, Bigg D, Rustam DF, Wilda MR, The Breast Cancer Susceptibility Collaboration ([place]) & Gerald WORTHINGTON. EULALIO mutations that cause ataxia-telangiectasia are breast cancer susceptibility alleles. Nature Genetics. 2006;38:873-875  9. Raghav N , Rodolfo Y, Jill J, Ar L, Gurvinder GM , Jannet ML, Gallinger S, Manzano AG, Syngal S, Farooq ML, Tova J , Lulu R, Robert SZ, Jamie JR, Justin VE, Joaquin M, Vogelstein B, Fabian N, Katalina RH, Mayuri KW, and Vineet AP. EULALIO mutations in patients with hereditary pancreatic cancer. Cancer Discover. 2012;2:41-46  10. Cherelle BERMEO, et al. Breast-Cancer Risk in Families with Mutations in PALB2. NEJM. 2014; 371(6):497-506.  11. CHEK2 Breast Cancer Case-Control Consortium. CHEK2*1100delC and susceptibility to breast cancer: A collaborative analysis involving 10,860 breast cancer cases and 9,065 controls from 10 studies. Am J Hum Anna, 74 (2004), pp. 2304-3634  12. Angle T, Woo S, Ayana K, et al. Spectrum of Mutations in BRCA1, BRCA2, CHEK2, and TP53 in Families at High Risk of Breast Cancer. NELLY. 2006;295(12):4615-8782.   13. Lisa MCGREGOR, Maryann LAZARO, Shaji A, et al. Risk of breast cancer in women with a CHEK2 mutation with and without a family history of breast cancer. J Clin Oncol. 2011;29:7525-9531.  14. Maycol H, Jose E, Christos SJ, et al. Contribution of germline mutations in the RAD51B, RAD51C, and RAD51D genes to ovarian cancer in the population. J Clin Oncol. 2015;33(26):8947-6086. Doi:10.1200/JCO.2015.61.2408.  15. Maya T, Abdi LEWIS, Sammy P, et al. Mutations in BRIP1 confer high risk of ovarian cancer. Sherry Anna. 2011;43(11):1377-0486. doi:10.1038/ng.955.

## 2019-10-30 NOTE — PROGRESS NOTES
"10/30/2019    Referring Provider: Elton Perez MD    Presenting Information:   I met with Ilana Trent today for genetic counseling at the Cancer Risk Management Program at the [facility] to discuss her personal history of breast cancer, papillary thyroid cancer, pancreatic neuroendocrine tumor, and metastatic vulvar melanoma and family history of colon cancer.  She is here today to review this history, cancer screening recommendations, and available genetic testing options.    Personal History:  Ilana is a 69 year old female. She was diagnosed with left breast cancer at age 51; treatment included lumpectomy, radiation, and endocrine therapy. She was then diagnosed with papillary thyroid cancer at age 58; treatment included thyroidectomy. She was later diagnosed with a pancreatic neuroendocrine tumor at age 61; treatment included a Whipple procedure. 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      She had her first menstrual period at age 13, her first child at age 34, and completed menopause at age 50. Ilana has her ovaries, fallopian tubes and uterus in place, and she has had no ovarian cancer screening to date. She reports that she has not used hormone replacement therapy. She has annual clinical breast exams and mammograms; her most recent mammogram in 2019 found benign calcifications. Ilana began having colonoscopies at the age of 56. Her most recent colonoscopy in 2017 found three 2-3 mm polyps. The pathology of these polyps were not available. Follow-up was recommended in 3-5 years. She does not regularly do any other cancer screening at this time.    Family History: (Please see scanned pedigree for detailed family history information)    Ilana's mother was diagnosed with colon cancer at age 52 and  at age 58.    Ilana's brother was diagnosed and  of a \"digestive cancer\" " that was thought to be pancreatic cancer at age 52.     There is no known paternal family history of cancer.    Her maternal ethnicity is Hungarian and [place]. Her paternal ethnicity is Hungarian and English.  There is no known Ashkenazi Denominational ancestry on either side of her family. There is no reported consanguinity.    Discussion:    Ilana's personal history of multiple cancers and family history of colon cancer is suggestive of a hereditary cancer syndrome.    We reviewed the features of sporadic, familial, and hereditary cancers. We discussed that mutations in either BRCA1 or BRCA2 could be possible hereditary explanations for her personal history of breast cancer and melanoma, and family history of possible pancreatic cancer. Mutations in the BRCA1 or BRCA2 gene are known to cause Hereditary Breast and Ovarian Cancer Syndrome (HBOC). HBOC typically presents with multiple family members diagnosed with breast cancer before age 50 and/or ovarian cancer. Other cancer risks associated with HBOC include male breast cancer, prostate cancer, pancreatic cancer, and melanoma.   We also discussed that Moreland syndrome could be a possible hereditary explanation for her family history of digestive tract cancers. Moreland syndrome can be caused by a mutation in one of five genes: MLH1, MSH2, MSH6, PMS2, and EPCAM. Moreland syndrome may present with polyps, but typically a small number. The highest cancer risks associated with Moreland syndrome include colon cancer, endometrial/uterine cancer, gastric cancer, bladder cancer, ovarian cancer, and pancreatic cancer. Breast cancer has also been reported in individuals with Moreland syndrome.    Based on her personal and family history, Ilana does not meet current National Comprehensive Cancer Network (NCCN) criteria for genetic testing of BRCA1/2.  However, according to the American Society of Breast Surgeons Consensus Guideline on Genetic Testing for Hereditary Breast Cancer, genetic testing should  be available to all patients who have been diagnosed with breast cancer.  The guidelines state that testing should include BRCA1, BRCA2, and PALB2, and that additional testing may be warranted based on personal and/or family history.     We discussed the natural history and genetics of hereditary cancer. A detailed handout regarding hereditary cancer, along with the other information we discussed, was provided to Ilana at the end of our appointment today. This can be found in the after visit summary. Topics included: inheritance pattern, cancer risks, cancer screening recommendations, and also risks, benefits and limitations of testing.    We discussed that there are additional genes that could cause increased risk for breast cancer, pancreatic neuroendocrine tumor, colon cancer and/ or melanoma. As many of these genes present with overlapping features in a family and accurate cancer risk cannot always be established based upon the pedigree analysis alone, it would be reasonable for Ilana to consider panel genetic testing to analyze multiple genes at once.  Genetic testing is available for 67 genes associated with increased risk for many different cancers: CancerNext-Expanded (AIP, ALK, APC, EULALIO, BAP1, BARD1, BLM, BRCA1, BRCA2, BRIP1, BMPR1A, CDH1, CDK4, CDKN1B, CDKN2A, CHEK2, DICER1, EPCAM, FANCC, FH, FLCN, GALNT12, GREM1, HOXB13, MAX, MEN1, MET, MITF, MLH1, MRE11A, MSH2, MSH6, MUTYH, NBN, NF1, NF2, PALB2, PHOX2B, PMS2, POLD1, POLE, POT1, WEEEX0C, PTCH1, PTEN, RAD50, RAD51C, RAD51D, RB1, RET, SDHA, SDHAF2, SDHB, SDHC, SDHD, SMAD4, SMARCA4, SMARCB1, SMARCE1, STK11, SUFU, [id number], TP53, TSC1, TSC2, VHL, and XRCC2).  We discussed that many of the genes in the CancerNext-Expanded panel are associated with specific hereditary cancer syndromes and have published management guidelines:  Hereditary Breast and Ovarian Cancer syndrome (BRCA1, BRCA2), Moreland syndrome (MLH1, MSH2, MSH6, PMS2, EPCAM), Familial Adenomatous  Polyposis (APC), Hereditary Diffuse Gastric Cancer (CDH1), Familial Atypical Multiple Mole Melanoma syndrome (CDK4, CDKN2A), Juvenile Polyposis syndrome (BMPR1A, SMAD4), Cowden syndrome (PTEN), Li Fraumeni syndrome (TP53), Peutz-Jeghers syndrome (STK11), MUTYH Associated Polyposis (MUTYH), Hereditary Leiomyomatosis and Renal Cell Cancer (FH), Mgzy-Ctza-Wgxz (FLCN), Hereditary Papillary Renal Carcinoma (MET), Hereditary Paraganglioma and Pheochromocytoma syndrome (SDHA, SDHAF2, SDHB, SDHC, SDHD), Multiple Endocrine Neoplasia type 2 (RET), Tuberous sclerosis complex (TSC1, TSC2), Von Hippel-Lindau disease (VHL), Neurofibromatosis type 1 (NF1), Neurofibromatosis type 2 (NF1), Multiple Endocrine Neoplasia type 1 (MEN1), Multiple Endocrine Neoplasia type 4 (CDKN1B), Gorlin syndrome (SUFU, PTCH1), and Martínez complex ([id number]).  The EULALIO, BRIP1, CHEK2, GREM1, NBN, PALB2, POLD1, POLE, RAD51C, and RAD51D genes are associated with increased cancer risk and have published management guidelines for certain cancers.    The remaining genes (AIP, ALK, BAP1, BARD1, BLM, DICER1, FANCC, GALNT12, HOXB13, MRE11A, MAX, MET, MITF, PHOX2B, POT1, RAD50, SMARCA4, SMARCB1, SMARCE1, GANP013, and XRCC2) are associated with increased cancer risk and may allow us to make medical recommendations when mutations are identified.    Consent was obtained and genetic [facility] Turn around time: 4-5 weeks.     Medical Management: For Ilana, we reviewed that the information from genetic testing may determine:    additional cancer screening for which Ilana may qualify (i.e. mammogram and breast MRI, more frequent colonoscopies, more frequent dermatologic exams, etc.),    options for risk reducing surgeries Ilana could consider (i.e. bilateral mastectomy, surgery to remove her ovaries and/or uterus, etc.),      and targeted chemotherapies for Ilana if she were to develop certain cancers in the  future (i.e. immunotherapy for individuals with Moreland syndrome, PARP inhibitors, etc.).     These recommendations and possible targeted chemotherapies will be discussed in detail once genetic testing is completed.     Plan:  1) Today Ilana elected to proceed with CancerNext-Expanded.  2) This information should be available in 4-5 weeks.  3) I will call Ilana with the results once they become available.    Face to face time: 40 minutes    Yobani Becerra MS [facility]  Licensed Genetic Counselor  Phone: [phone]  Fax: [phone]    Attestation: Patient seen, evaluated and discussed with the Genetic Counseling Intern. I have verified the content of the note, which accurately reflects my assessment of the patient and the plan of care.  Supervising Genetic Counselor  Xochilt Fuentes MS, [facility]  Licensed Genetic Counselor

## 2019-10-30 NOTE — LETTER
Cancer Risk Management  Program Locations    [facility]  [facility]  Mailing Address  Cancer Risk Management Program  [facility]  [address]  [facility] [address]  [place] [address]    New patient appointments  [phone]  October 30, 2019    Ilana Trent  [address]  [facility] [address]      Dear Ilana,    It was a pleasure meeting with you at [facility] on 10/30/2019.  Here is a copy of the progress note from your recent genetic counseling visit to the Cancer Risk Management Program.  If you have any additional questions, please feel free to call.    10/30/2019    Referring Provider: Elton Perez MD    Presenting Information:   I met with Ilanajaylan Trent today for genetic counseling at the Cancer Risk Management Program at the [facility] to discuss her personal history of breast cancer, papillary thyroid cancer, pancreatic neuroendocrine tumor, and metastatic vulvar melanoma and family history of colon cancer.  She is here today to review this history, cancer screening recommendations, and available genetic testing options.    Personal History:  Ilana is a 69 year old female. She was diagnosed with left breast cancer at age 51; treatment included lumpectomy, radiation, and endocrine therapy. She was then diagnosed with papillary thyroid cancer at age 58; treatment included thyroidectomy. She was later diagnosed with a pancreatic neuroendocrine tumor at age 61; treatment included a Whipple procedure. 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      She had her first menstrual period at age 13, her first child at age 34, and completed menopause at age 50. Ilana has her ovaries, fallopian tubes and uterus  "in place, and she has had no ovarian cancer screening to date. She reports that she has not used hormone replacement therapy. She has annual clinical breast exams and mammograms; her most recent mammogram in 2019 found benign calcifications. Ilana began having colonoscopies at the age of 56. Her most recent colonoscopy in 2017 found three 2-3 mm polyps. The pathology of these polyps were not available. Follow-up was recommended in 3-5 years. She does not regularly do any other cancer screening at this time.    Family History: (Please see scanned pedigree for detailed family history information)    Ilana's mother was diagnosed with colon cancer at age 52 and  at age 58.    Ilana's brother was diagnosed and  of a \"digestive cancer\" that was thought to be pancreatic cancer at age 52.     There is no known paternal family history of cancer.    Her maternal ethnicity is Brazilian and [place]. Her paternal ethnicity is Brazilian and [place].  There is no known Ashkenazi Zoroastrianism ancestry on either side of her family. There is no reported consanguinity.    Discussion:    Ilana's personal history of multiple cancers and family history of colon cancer is suggestive of a hereditary cancer syndrome.    We reviewed the features of sporadic, familial, and hereditary cancers. We discussed that mutations in either BRCA1 or BRCA2 could be possible hereditary explanations for her personal history of breast cancer and melanoma, and family history of possible pancreatic cancer. Mutations in the BRCA1 or BRCA2 gene are known to cause Hereditary Breast and Ovarian Cancer Syndrome (HBOC). HBOC typically presents with multiple family members diagnosed with breast cancer before age 50 and/or ovarian cancer. Other cancer risks associated with HBOC include male breast cancer, prostate cancer, pancreatic cancer, and melanoma.   We also discussed that Moreland syndrome could be a possible hereditary explanation for her family " history of digestive tract cancers. Moreland syndrome can be caused by a mutation in one of five genes: MLH1, MSH2, MSH6, PMS2, and EPCAM. Moreland syndrome may present with polyps, but typically a small number. The highest cancer risks associated with Moreland syndrome include colon cancer, endometrial/uterine cancer, gastric cancer, bladder cancer, ovarian cancer, and pancreatic cancer. Breast cancer has also been reported in individuals with Moreland syndrome.    Based on her personal and family history, Ilana does not meet current National Comprehensive Cancer Network (NCCN) criteria for genetic testing of BRCA1/2.  However, according to the American Society of Breast Surgeons Consensus Guideline on Genetic Testing for Hereditary Breast Cancer, genetic testing should be available to all patients who have been diagnosed with breast cancer.  The guidelines state that testing should include BRCA1, BRCA2, and PALB2, and that additional testing may be warranted based on personal and/or family history.     We discussed the natural history and genetics of hereditary cancer. A detailed handout regarding hereditary cancer, along with the other information we discussed, was provided to Ilana at the end of our appointment today. This can be found in the after visit summary. Topics included: inheritance pattern, cancer risks, cancer screening recommendations, and also risks, benefits and limitations of testing.    We discussed that there are additional genes that could cause increased risk for breast cancer, pancreatic neuroendocrine tumor, colon cancer and/ or melanoma. As many of these genes present with overlapping features in a family and accurate cancer risk cannot always be established based upon the pedigree analysis alone, it would be reasonable for Ilana to consider panel genetic testing to analyze multiple genes at once.  Genetic testing is available for 67 genes associated with increased risk for many different cancers:  CancerNext-Expanded (AIP, ALK, APC, EULALIO, BAP1, BARD1, BLM, BRCA1, BRCA2, BRIP1, BMPR1A, CDH1, CDK4, CDKN1B, CDKN2A, CHEK2, DICER1, EPCAM, FANCC, FH, FLCN, GALNT12, GREM1, HOXB13, MAX, MEN1, MET, MITF, MLH1, MRE11A, MSH2, MSH6, MUTYH, NBN, NF1, NF2, PALB2, PHOX2B, PMS2, POLD1, POLE, POT1, AFFMQ6S, PTCH1, PTEN, RAD50, RAD51C, RAD51D, RB1, RET, SDHA, SDHAF2, SDHB, SDHC, SDHD, SMAD4, SMARCA4, SMARCB1, SMARCE1, STK11, SUFU, QOAI242, TP53, TSC1, TSC2, VHL, and XRCC2).  We discussed that many of the genes in the CancerNext-Expanded panel are associated with specific hereditary cancer syndromes and have published management guidelines:  Hereditary Breast and Ovarian Cancer syndrome (BRCA1, BRCA2), Moreland syndrome (MLH1, MSH2, MSH6, PMS2, EPCAM), Familial Adenomatous Polyposis (APC), Hereditary Diffuse Gastric Cancer (CDH1), Familial Atypical Multiple Mole Melanoma syndrome (CDK4, CDKN2A), Juvenile Polyposis syndrome (BMPR1A, SMAD4), Cowden syndrome (PTEN), Li Fraumeni syndrome (TP53), Peutz-Jeghers syndrome (STK11), MUTYH Associated Polyposis (MUTYH), Hereditary Leiomyomatosis and Renal Cell Cancer (FH), Ylcq-Oxuo-Enjc (FLCN), Hereditary Papillary Renal Carcinoma (MET), Hereditary Paraganglioma and Pheochromocytoma syndrome (SDHA, SDHAF2, SDHB, SDHC, SDHD), Multiple Endocrine Neoplasia type 2 (RET), Tuberous sclerosis complex (TSC1, TSC2), Von Hippel-Lindau disease (VHL), Neurofibromatosis type 1 (NF1), Neurofibromatosis type 2 (NF1), Multiple Endocrine Neoplasia type 1 (MEN1), Multiple Endocrine Neoplasia type 4 (CDKN1B), Gorlin syndrome (SUFU, PTCH1), and Martínez complex ([id number]).  The EULALIO, BRIP1, CHEK2, GREM1, NBN, PALB2, POLD1, POLE, RAD51C, and RAD51D genes are associated with increased cancer risk and have published management guidelines for certain cancers.    The remaining genes (AIP, ALK, BAP1, BARD1, BLM, DICER1, FANCC, GALNT12, HOXB13, MRE11A, MAX, MET, MITF, PHOX2B, POT1, RAD50, SMARCA4, SMARCB1, SMARCE1,  ECKP572, and XRCC2) are associated with increased cancer risk and may allow us to make medical recommendations when mutations are identified.    Consent was obtained and genetic testing for CancerNext-Expanded was sent to [facility]. Turn around time: 4-5 weeks.     Medical Management: For Ilana, we reviewed that the information from genetic testing may determine:    additional cancer screening for which Ilana may qualify (i.e. mammogram and breast MRI, more frequent colonoscopies, more frequent dermatologic exams, etc.),    options for risk reducing surgeries Ilana could consider (i.e. bilateral mastectomy, surgery to remove her ovaries and/or uterus, etc.),      and targeted chemotherapies for Ilana if she were to develop certain cancers in the future (i.e. immunotherapy for individuals with Moreland syndrome, PARP inhibitors, etc.).     These recommendations and possible targeted chemotherapies will be discussed in detail once genetic testing is completed.     Plan:  1) Today Ilana elected to proceed with CancerNext-Expanded.  2) This information should be available in 4-5 weeks.  3) I will call Ilana with the results once they become available.    Face to face time: 40 minutes    Yobani Becerra MS [facility]  Licensed Genetic Counselor  Phone: [phone]  Fax: [phone]    Attestation: Patient seen, evaluated and discussed with the Genetic Counseling Intern. I have verified the content of the note, which accurately reflects my assessment of the patient and the plan of care.  Supervising Genetic Counselor  Xochilt Fuentes MS, [facility]  Licensed Genetic Counselor

## 2019-10-31 LAB — MISCELLANEOUS TEST: NORMAL

## 2019-11-18 ENCOUNTER — TELEPHONE (OUTPATIENT)
Dept: DERMATOLOGY | Facility: CLINIC | Age: 69
End: 2019-11-18

## 2019-11-18 NOTE — TELEPHONE ENCOUNTER
I called the patient and I left a message asking that she call back. She should be scheduled with Dr. De La O in the Melanoma clinic for a skin cancer exam.   Ammy Glover CMA

## 2019-11-18 NOTE — TELEPHONE ENCOUNTER
[facility] Call Center    Phone Message    May a detailed message be left on voicemail: yes    Reason for Call: Other: Please call patient, appt needed for Melanoma of skin . Per patient she is hoping to get an appt on 11/25/19 due to appt with lab at 1:30pm and infusion at 2:10pm. If possible schedule patient after her lab/infusion appt due to work schedule. Thank you    Action Taken: Message routed to:  [facility] derm

## 2019-11-19 NOTE — TELEPHONE ENCOUNTER
[facility] Call Center    Phone Message    May a detailed message be left on voicemail: yes    Reason for Call: Other: .  attempted to connect pt with priority line and Vocera - both not available - please call pt back.    Pt would like an appt on Nov 25th.    Action Taken: Message routed to:  [facility] derm

## 2019-11-20 NOTE — TELEPHONE ENCOUNTER
Spoke with Pt relative who stated they will call back and schedule appt. Call back number was provided.

## 2019-11-25 ENCOUNTER — ONCOLOGY VISIT (OUTPATIENT)
Dept: ONCOLOGY | Facility: CLINIC | Age: 69
End: 2019-11-25
Attending: PHYSICIAN ASSISTANT
Payer: COMMERCIAL

## 2019-11-25 ENCOUNTER — APPOINTMENT (OUTPATIENT)
Dept: LAB | Facility: CLINIC | Age: 69
End: 2019-11-25
Attending: INTERNAL MEDICINE
Payer: COMMERCIAL

## 2019-11-25 VITALS
TEMPERATURE: 97.9 F | DIASTOLIC BLOOD PRESSURE: 65 MMHG | WEIGHT: 249.5 LBS | BODY MASS INDEX: 37.95 KG/M2 | HEART RATE: 86 BPM | OXYGEN SATURATION: 95 % | RESPIRATION RATE: 18 BRPM | SYSTOLIC BLOOD PRESSURE: 130 MMHG

## 2019-11-25 DIAGNOSIS — C51.9 MALIGNANT MELANOMA OF VULVA (H): ICD-10-CM

## 2019-11-25 DIAGNOSIS — R06.00 DYSPNEA, UNSPECIFIED TYPE: ICD-10-CM

## 2019-11-25 DIAGNOSIS — C43.9 MELANOMA OF SKIN (H): Primary | ICD-10-CM

## 2019-11-25 LAB
ALBUMIN SERPL-MCNC: 4 G/DL (ref 3.4–5)
ALP SERPL-CCNC: 79 U/L (ref 40–150)
ALT SERPL W P-5'-P-CCNC: 26 U/L (ref 0–50)
ANION GAP SERPL CALCULATED.3IONS-SCNC: 7 MMOL/L (ref 3–14)
AST SERPL W P-5'-P-CCNC: 15 U/L (ref 0–45)
BILIRUB SERPL-MCNC: 0.4 MG/DL (ref 0.2–1.3)
BUN SERPL-MCNC: 28 MG/DL (ref 7–30)
CALCIUM SERPL-MCNC: 8.6 MG/DL (ref 8.5–10.1)
CHLORIDE SERPL-SCNC: 104 MMOL/L (ref 94–109)
CO2 SERPL-SCNC: 22 MMOL/L (ref 20–32)
CREAT SERPL-MCNC: 1.16 MG/DL (ref 0.52–1.04)
GFR SERPL CREATININE-BSD FRML MDRD: 48 ML/MIN/{1.73_M2}
GLUCOSE SERPL-MCNC: 96 MG/DL (ref 70–99)
LAB SCANNED RESULT: NORMAL
NT-PROBNP SERPL-MCNC: 166 PG/ML (ref 0–125)
POTASSIUM SERPL-SCNC: 4.2 MMOL/L (ref 3.4–5.3)
PROT SERPL-MCNC: 7.3 G/DL (ref 6.8–8.8)
SODIUM SERPL-SCNC: 133 MMOL/L (ref 133–144)
TSH SERPL DL<=0.005 MIU/L-ACNC: 1.25 MU/L (ref 0.4–4)

## 2019-11-25 PROCEDURE — 25000128 H RX IP 250 OP 636: Mod: ZF | Performed by: INTERNAL MEDICINE

## 2019-11-25 PROCEDURE — G0463 HOSPITAL OUTPT CLINIC VISIT: HCPCS | Mod: 25

## 2019-11-25 PROCEDURE — 96413 CHEMO IV INFUSION 1 HR: CPT

## 2019-11-25 PROCEDURE — 99214 OFFICE O/P EST MOD 30 MIN: CPT | Mod: ZP | Performed by: PHYSICIAN ASSISTANT

## 2019-11-25 PROCEDURE — 93005 ELECTROCARDIOGRAM TRACING: CPT

## 2019-11-25 PROCEDURE — 93010 ELECTROCARDIOGRAM REPORT: CPT | Performed by: INTERNAL MEDICINE

## 2019-11-25 PROCEDURE — 80053 COMPREHEN METABOLIC PANEL: CPT | Performed by: PHYSICIAN ASSISTANT

## 2019-11-25 PROCEDURE — 83880 ASSAY OF NATRIURETIC PEPTIDE: CPT | Performed by: PHYSICIAN ASSISTANT

## 2019-11-25 PROCEDURE — 84443 ASSAY THYROID STIM HORMONE: CPT | Performed by: PHYSICIAN ASSISTANT

## 2019-11-25 PROCEDURE — 25800030 ZZH RX IP 258 OP 636: Mod: ZF | Performed by: INTERNAL MEDICINE

## 2019-11-25 RX ORDER — LORAZEPAM 2 MG/ML
0.5 INJECTION INTRAMUSCULAR EVERY 4 HOURS PRN
Status: CANCELLED
Start: 2019-11-25

## 2019-11-25 RX ORDER — NALOXONE HYDROCHLORIDE 0.4 MG/ML
.1-.4 INJECTION, SOLUTION INTRAMUSCULAR; INTRAVENOUS; SUBCUTANEOUS
Status: CANCELLED | OUTPATIENT
Start: 2019-11-25

## 2019-11-25 RX ORDER — ALBUTEROL SULFATE 90 UG/1
1-2 AEROSOL, METERED RESPIRATORY (INHALATION)
Status: CANCELLED
Start: 2019-11-25

## 2019-11-25 RX ORDER — EPINEPHRINE 0.3 MG/.3ML
0.3 INJECTION SUBCUTANEOUS EVERY 5 MIN PRN
Status: CANCELLED | OUTPATIENT
Start: 2019-11-25

## 2019-11-25 RX ORDER — ALBUTEROL SULFATE 0.83 MG/ML
2.5 SOLUTION RESPIRATORY (INHALATION)
Status: CANCELLED | OUTPATIENT
Start: 2019-11-25

## 2019-11-25 RX ORDER — EPINEPHRINE 1 MG/ML
0.3 INJECTION, SOLUTION INTRAMUSCULAR; SUBCUTANEOUS EVERY 5 MIN PRN
Status: CANCELLED | OUTPATIENT
Start: 2019-11-25

## 2019-11-25 RX ORDER — SODIUM CHLORIDE 9 MG/ML
1000 INJECTION, SOLUTION INTRAVENOUS CONTINUOUS PRN
Status: CANCELLED
Start: 2019-11-25

## 2019-11-25 RX ORDER — MEPERIDINE HYDROCHLORIDE 25 MG/ML
25 INJECTION INTRAMUSCULAR; INTRAVENOUS; SUBCUTANEOUS EVERY 30 MIN PRN
Status: CANCELLED | OUTPATIENT
Start: 2019-11-25

## 2019-11-25 RX ORDER — METHYLPREDNISOLONE SODIUM SUCCINATE 125 MG/2ML
125 INJECTION, POWDER, LYOPHILIZED, FOR SOLUTION INTRAMUSCULAR; INTRAVENOUS
Status: CANCELLED
Start: 2019-11-25

## 2019-11-25 RX ORDER — DIPHENHYDRAMINE HYDROCHLORIDE 50 MG/ML
50 INJECTION INTRAMUSCULAR; INTRAVENOUS
Status: CANCELLED
Start: 2019-11-25

## 2019-11-25 RX ADMIN — SODIUM CHLORIDE 480 MG: 9 INJECTION, SOLUTION INTRAVENOUS at 17:07

## 2019-11-25 ASSESSMENT — PAIN SCALES - GENERAL: PAINLEVEL: NO PAIN (0)

## 2019-11-25 NOTE — LETTER
RE: Ilana Trent  [address] [facility] [address]       Oncology/Hematology Visit Note  Nov 25, 2019    Reason for Visit: add on visit for TONEY    Melanoma History:  1. 6/4/2019, she had excision of the right vulvar mass in [place], and on pathology this shows malignant melanoma with ulceration, at least fausto level IV, Breslow thickness at least 5 mm, mitotic rate is 3 per mm2, TILs are present and brisk, pathologic staging pT4b.  2. 6/28/19 she had PET-CT, which showed no obvious evidence of metastatic disease.  3. 7/30/2019, she has wide local excision and sentinel biopsy (Specimen #: [id number]), which showed residual melanoma to 1.3 mm depth of invasion and margins negative. Right inguinal lymph node showed a subcapsular 1 mm focus of cells.  4. She was referred by Dr. Celis. Dr. Perez felt she has a >90% risk of recurrence given stage IIIC disease and recommends  5. Baseline labs are normal. PET-CT obtained on 9/23 negative for metastatic disease.  6. She started adjuvant monthly nivolumab 9/26/19    Interval History:  Ilana is seen in infusion today prior to C3. RN paged with report that patient experiencing TONEY. Patient states she noticed when walking from car wearing back pack that she felt more shortness of breath. She states she first noticed this about 2 weeks ago. However, she is swimming and riding stationary bike without any increased SOB. She has history of atrial fib and is on coumadin and sotalol and has a pacemaker. She saw cardiology in [place] on 11/21 as she was feeling increased atiral fibrillation episodes and gained 5 lbs. She had been taking lasix 40 mg prn. She was started on diltiazem and told to take lasix 40 mg daily for the next few days. She was also recommended to have a repeat echo, but this has not yet been scheduled as patient states she is checking on insurance.     Patient has chronic, intermittent cough but not worsening. Denies any pleuritic pain. She is on  coumadin and last INR on 11/21 was therapeutic. She has not noticed any increased leg edema. She states other than fatigue she is tolerating Opdivo well. Denies chest pain, dizziness, fever, chills. No SOB at rest.    Current Outpatient Medications   Medication Sig Dispense Refill     acetaminophen (TYLENOL) 500 MG tablet Take 1-2 tablets (500-1,000 mg) by mouth every 6 hours as needed for mild pain 50 tablet 0     ALOE PO Take by mouth daily as needed       bacitracin 500 UNIT/GM OINT Apply topically 2 times daily (Patient not taking: Reported on 8/13/2019) 1 Tube 0     calcium carbonate (OS-RENEA) 1500 (600 Ca) MG tablet Take 600 mg by mouth daily       cetirizine-pseudoePHEDrine ER (ZYRTEC-D) 5-120 MG 12 hr tablet Take 1 tablet by mouth every morning        CONTOUR NEXT EZ (CONTOUR NEXT EZ W/DEVICE KIT) w/Device KIT See Admin Instructions  0     furosemide (LASIX) 20 MG tablet Take 20 mg by mouth daily as needed (SWELLING)       levothyroxine (SYNTHROID/LEVOTHROID) 175 MCG tablet Take 175 mcg by mouth every morning        lisinopril (PRINIVIL/ZESTRIL) 5 MG tablet Take 5 mg by mouth every morning        metFORMIN (GLUCOPHAGE-XR) 500 MG 24 hr tablet Take 500 mg by mouth every morning        multivitamin w/minerals (THERA-VIT-M) tablet Take 1 tablet by mouth daily       naproxen (NAPROSYN) 500 MG tablet Take 500 mg by mouth every morning        oxyCODONE (ROXICODONE) 5 MG tablet Take 1 tablet (5 mg) by mouth every 4 hours as needed for breakthrough pain (Patient not taking: Reported on 8/13/2019) 15 tablet 0     pantoprazole (PROTONIX) 40 MG EC tablet Take 40 mg by mouth every morning        senna-docusate (SENOKOT-S/PERICOLACE) 8.6-50 MG tablet Take 1-2 tablets by mouth 2 times daily as needed for constipation (Patient not taking: Reported on 8/13/2019) 60 tablet 0     simvastatin (ZOCOR) 40 MG tablet Take 40 mg by mouth At Bedtime        sotalol (BETAPACE) 160 MG tablet Take 80 mg by mouth 2 times daily         vitamin B complex with vitamin C (STRESS TAB) tablet Take 1 tablet by mouth every morning        warfarin (COUMADIN) 5 MG tablet Take 7.5mg 5 days week ,\ 5 mg Tues, Sat  Takes in the evening         Physical Examination:  General: The patient is a pleasant female in no acute distress.  There were no vitals taken for this visit.  Wt Readings from Last 10 Encounters:   11/25/19 113.2 kg (249 lb 8 oz)   10/24/19 113.9 kg (251 lb 1.6 oz)   09/26/19 112.4 kg (247 lb 14.4 oz)   09/09/19 111.6 kg (246 lb)   08/21/19 108.6 kg (239 lb 6.4 oz)   08/13/19 111.6 kg (246 lb)   07/31/19 111.5 kg (245 lb 14.4 oz)   07/03/19 111.7 kg (246 lb 4.8 oz)   07/03/19 111.6 kg (246 lb)   06/26/19 112.3 kg (247 lb 9.2 oz)     HEENT: EOMI, PERRL. Sclerae are anicteric. Oral mucosa is pink and moist with no lesions or thrush.   Lymph: Neck is supple with no lymphadenopathy in the cervical or supraclavicular areas.   Heart: Regular rate and rhythm.   Lungs: Clear to auscultation bilaterally.   Extremities: Trace lower extremity edema noted bilaterally.   Neuro: Cranial nerves II through XII are grossly intact.  Skin: No rashes, petechiae, or bruising noted on exposed skin.    Laboratory Data:  Results for GEETA TRENT (MRN [id number]) as of 11/25/2019 17:32   11/25/2019 14:45   Sodium 133   Potassium 4.2   Chloride 104   Carbon Dioxide 22   Urea Nitrogen 28   Creatinine 1.16 (H)   GFR Estimate 48 (L)   GFR Estimate If Black 56 (L)   Calcium 8.6   Anion Gap 7   Albumin 4.0   Protein Total 7.3   Bilirubin Total 0.4   Alkaline Phosphatase 79   ALT 26   AST 15   N-Terminal Pro Bnp 166 (H)   TSH 1.25   Glucose 96       Assessment and Plan:  Ms. Trent is a 69 year old woman with Stage IIIC melanoma on adjuvant nivolumab. She is here for C3.     TONEY: She reports noticing some episodes of TONEY over past 2 weeks but has not experienced this after riding stationary bike or swimming recently. O2 96% at check in, HR 74. With ambulation O2 did not decline  below 95% and HR up to 94 max. Possible etiologies include pneumonitis from Opdivo, but no hypoxia and she is doing normal exercise routines without difficulty. Episodes of atrial fibrillation are also possible, as is hypervolemia, although weight is down 2 lbs from prior visit, possibly from recent lasix. She has pacemaker and was in normal rhythm on exam. Recommend she undergo echo as recommended by cardiology, although BNP does not suggest florid heart failure. PE less likely as no tachycardia, no hypoxia and on warfarin with recent therapeutic INR. Could obtain chest CT but pulmonary edema would be difficult to differentiate from pneumonitis. As no notable hypoxia with ambulation, will proceed with Opdivo today. Patient is in agreement with this. EKG with atrial paced rhythm and prolonged AV conduction, similar to prior.  --Call if worsening TONEY. Follow up with echo and cardiology.     Follow up with PET/CT and Dr. Perez in 1 month as scheduled.    Cherelle Olson PA-C  [facility] [address] [id number] [phone]

## 2019-11-25 NOTE — NURSING NOTE
Chief Complaint   Patient presents with     Blood Draw     Labs drawn via /PIV placed by RN in lab. VS taken.     Enma Portillo RN

## 2019-11-25 NOTE — PROGRESS NOTES
Infusion Nursing Note:  Ilana Trent presents today for C3D1 Nivolumab.    Patient seen by provider today: YES: Cherelle JAIME   present during visit today: Not Applicable.    Note: Patient endorses new shortness of breath on exertion accompanied with fatigue. Patient states that she noticed it walking back to the car from community center after swimming. States that she feeling like winded. Started 2-3 weeks. Denies cough, chest discomfort nor heart palpitation. No new concerns made. Otherwise well.    Intravenous Access:  Peripheral IV placed.    Treatment Conditions:  Lab Results   Component Value Date    HGB 11.7 07/31/2019     Lab Results   Component Value Date    WBC 14.3 07/31/2019      No results found for: ANEU  Lab Results   Component Value Date     07/31/2019      Lab Results   Component Value Date     11/25/2019                   Lab Results   Component Value Date    POTASSIUM 4.2 11/25/2019           Lab Results   Component Value Date    MAG 1.7 07/31/2019            Lab Results   Component Value Date    CR 1.16 11/25/2019                   Lab Results   Component Value Date    RENEA 8.6 11/25/2019                Lab Results   Component Value Date    BILITOTAL 0.4 11/25/2019           Lab Results   Component Value Date    ALBUMIN 4.0 11/25/2019                    Lab Results   Component Value Date    ALT 26 11/25/2019           Lab Results   Component Value Date    AST 15 11/25/2019       Results reviewed, labs MET treatment parameters, ok to proceed with treatment.    TORB: 11/25/19/1549H/ Cherelle JAIME/Lois Lester RN/Symptom noted. Do EKG. Oxygen saturation at rest and walking.     TORB: 11/25/19/1645H/ Cherelle JAIME/Lois Lester RN/ To proceed with treatment as planned.       Post Infusion Assessment:  Patient tolerated infusion without incident.  Blood return noted pre and post infusion.  Site patent and intact, free from redness, edema or discomfort.  No  evidence of extravasations.  Access discontinued per protocol.       Discharge Plan:   Patient declined prescription refills.  Discharge instructions reviewed with: Patient and Family.  Patient and/or family verbalized understanding of discharge instructions and all questions answered.  Copy of AVS reviewed with patient and/or family.  Patient will return 12/19/19 for next appointment.  Patient discharged in stable condition accompanied by: self and .  Departure Mode: Ambulatory.  Face to Face time: 10.    KATTY [facility] RN

## 2019-11-25 NOTE — PROGRESS NOTES
Oncology/Hematology Visit Note  Nov 25, 2019    Reason for Visit: add on visit for TONEY    Melanoma History:  1. 6/4/2019, she had excision of the right vulvar mass in [place], and on pathology this shows malignant melanoma with ulceration, at least fausto level IV, Breslow thickness at least 5 mm, mitotic rate is 3 per mm2, TILs are present and brisk, pathologic staging pT4b.  2. 6/28/19 she had PET-CT, which showed no obvious evidence of metastatic disease.  3. 7/30/2019, she has wide local excision and sentinel biopsy (Specimen #: [id number]), which showed residual melanoma to 1.3 mm depth of invasion and margins negative. Right inguinal lymph node showed a subcapsular 1 mm focus of cells.  4. She was referred by Dr. Celis. Dr. Perez felt she has a >90% risk of recurrence given stage IIIC disease and recommends  5. Baseline labs are normal. PET-CT obtained on 9/23 negative for metastatic disease.  6. She started adjuvant monthly nivolumab 9/26/19    Interval History:  Ilana is seen in infusion today prior to C3. RN paged with report that patient experiencing TONEY. Patient states she noticed when walking from car wearing back pack that she felt more shortness of breath. She states she first noticed this about 2 weeks ago. However, she is swimming and riding stationary bike without any increased SOB. She has history of atrial fib and is on coumadin and sotalol and has a pacemaker. She saw cardiology in [place] on 11/21 as she was feeling increased atiral fibrillation episodes and gained 5 lbs. She had been taking lasix 40 mg prn. She was started on diltiazem and told to take lasix 40 mg daily for the next few days. She was also recommended to have a repeat echo, but this has not yet been scheduled as patient states she is checking on insurance.     Patient has chronic, intermittent cough but not worsening. Denies any pleuritic pain. She is on coumadin and last INR on 11/21 was therapeutic. She has not  noticed any increased leg edema. She states other than fatigue she is tolerating Opdivo well. Denies chest pain, dizziness, fever, chills. No SOB at rest.    Current Outpatient Medications   Medication Sig Dispense Refill     acetaminophen (TYLENOL) 500 MG tablet Take 1-2 tablets (500-1,000 mg) by mouth every 6 hours as needed for mild pain 50 tablet 0     ALOE PO Take by mouth daily as needed       bacitracin 500 UNIT/GM OINT Apply topically 2 times daily (Patient not taking: Reported on 8/13/2019) 1 Tube 0     calcium carbonate (OS-RENEA) 1500 (600 Ca) MG tablet Take 600 mg by mouth daily       cetirizine-pseudoePHEDrine ER (ZYRTEC-D) 5-120 MG 12 hr tablet Take 1 tablet by mouth every morning        CONTOUR NEXT EZ (CONTOUR NEXT EZ W/DEVICE KIT) w/Device KIT See Admin Instructions  0     furosemide (LASIX) 20 MG tablet Take 20 mg by mouth daily as needed (SWELLING)       levothyroxine (SYNTHROID/LEVOTHROID) 175 MCG tablet Take 175 mcg by mouth every morning        lisinopril (PRINIVIL/ZESTRIL) 5 MG tablet Take 5 mg by mouth every morning        metFORMIN (GLUCOPHAGE-XR) 500 MG 24 hr tablet Take 500 mg by mouth every morning        multivitamin w/minerals (THERA-VIT-M) tablet Take 1 tablet by mouth daily       naproxen (NAPROSYN) 500 MG tablet Take 500 mg by mouth every morning        oxyCODONE (ROXICODONE) 5 MG tablet Take 1 tablet (5 mg) by mouth every 4 hours as needed for breakthrough pain (Patient not taking: Reported on 8/13/2019) 15 tablet 0     pantoprazole (PROTONIX) 40 MG EC tablet Take 40 mg by mouth every morning        senna-docusate (SENOKOT-S/PERICOLACE) 8.6-50 MG tablet Take 1-2 tablets by mouth 2 times daily as needed for constipation (Patient not taking: Reported on 8/13/2019) 60 tablet 0     simvastatin (ZOCOR) 40 MG tablet Take 40 mg by mouth At Bedtime        sotalol (BETAPACE) 160 MG tablet Take 80 mg by mouth 2 times daily        vitamin B complex with vitamin C (STRESS TAB) tablet Take 1  tablet by mouth every morning        warfarin (COUMADIN) 5 MG tablet Take 7.5mg 5 days week ,\ 5 mg Tues, Sat  Takes in the evening         Physical Examination:  General: The patient is a pleasant female in no acute distress.  There were no vitals taken for this visit.  Wt Readings from Last 10 Encounters:   11/25/19 113.2 kg (249 lb 8 oz)   10/24/19 113.9 kg (251 lb 1.6 oz)   09/26/19 112.4 kg (247 lb 14.4 oz)   09/09/19 111.6 kg (246 lb)   08/21/19 108.6 kg (239 lb 6.4 oz)   08/13/19 111.6 kg (246 lb)   07/31/19 111.5 kg (245 lb 14.4 oz)   07/03/19 111.7 kg (246 lb 4.8 oz)   07/03/19 111.6 kg (246 lb)   06/26/19 112.3 kg (247 lb 9.2 oz)     HEENT: EOMI, PERRL. Sclerae are anicteric. Oral mucosa is pink and moist with no lesions or thrush.   Lymph: Neck is supple with no lymphadenopathy in the cervical or supraclavicular areas.   Heart: Regular rate and rhythm.   Lungs: Clear to auscultation bilaterally.   Extremities: Trace lower extremity edema noted bilaterally.   Neuro: Cranial nerves II through XII are grossly intact.  Skin: No rashes, petechiae, or bruising noted on exposed skin.    Laboratory Data:  Results for GEETA TRENT (MRN [id number]) as of 11/25/2019 17:32   11/25/2019 14:45   Sodium 133   Potassium 4.2   Chloride 104   Carbon Dioxide 22   Urea Nitrogen 28   Creatinine 1.16 (H)   GFR Estimate 48 (L)   GFR Estimate If Black 56 (L)   Calcium 8.6   Anion Gap 7   Albumin 4.0   Protein Total 7.3   Bilirubin Total 0.4   Alkaline Phosphatase 79   ALT 26   AST 15   N-Terminal Pro Bnp 166 (H)   TSH 1.25   Glucose 96       Assessment and Plan:  Ms. Trent is a 69 year old woman with Stage IIIC melanoma on adjuvant nivolumab. She is here for C3.     TONEY: She reports noticing some episodes of TONEY over past 2 weeks but has not experienced this after riding stationary bike or swimming recently. O2 96% at check in, HR 74. With ambulation O2 did not decline below 95% and HR up to 94 max. Possible etiologies include  pneumonitis from Opdivo, but no hypoxia and she is doing normal exercise routines without difficulty. Episodes of atrial fibrillation are also possible, as is hypervolemia, although weight is down 2 lbs from prior visit, possibly from recent lasix. She has pacemaker and was in normal rhythm on exam. Recommend she undergo echo as recommended by cardiology, although BNP does not suggest florid heart failure. PE less likely as no tachycardia, no hypoxia and on warfarin with recent therapeutic INR. Could obtain chest CT but pulmonary edema would be difficult to differentiate from pneumonitis. As no notable hypoxia with ambulation, will proceed with Opdivo today. Patient is in agreement with this. EKG with atrial paced rhythm and prolonged AV conduction, similar to prior.  --Call if worsening TONEY. Follow up with echo and cardiology.     Follow up with PET/CT and Dr. Perez in 1 month as scheduled.    Cherelle Olson PA-C  [facility]  [address] [place], [place] [address] [phone]

## 2019-11-25 NOTE — PATIENT INSTRUCTIONS
Contact Numbers  [facility]: [phone]    After Hours:  [phone]  Triage: [phone]    Please call the [facility] Triage line if you experience a temperature greater than or equal to 100.5, shaking chills, have uncontrolled nausea, vomiting and/or diarrhea, dizziness, shortness of breath, chest pain, bleeding, unexplained bruising, or if you have any other new/concerning symptoms, questions or concerns.     If it is after hours, weekends, or holidays, please call the main hospital  at  [phone] and ask to speak to the Oncology doctor on call.     If you are having any concerning symptoms or wish to speak to a provider before your next infusion visit, please call your care coordinator or triage to notify them so we can adequately serve you.     If you need a refill on a narcotic prescription or other medication, please call triage before your infusion appointment.         November 2019 Sunday Monday Tuesday Wednesday Thursday Friday Saturday                            1     2       3     4     5     6     7     8     9       10     11     12     13     14     15     16       17     18     19     20     21     22     23       24     25    P RETURN   1:55 PM   (50 min.)   Cherelle Olson PA   [facility] DRAW   2:30 PM   (15 min.)    [facility] DRAW   [facility] Lab Draw    [facility] ONC INFUSION 60   3:00 PM   (60 min.)   [facility]   [facility] 26     27     28     29     30                 December 2019 Sunday Monday Tuesday Wednesday Thursday Friday Saturday   1     2     3     4     5     6     7       8     9     10     11     12     13     14       15     16    PET ONCOLOGY WHOLE BODY   2:15 PM   (45 min.)   [id number]   [facility] PET CT 17     18     19    [facility] DRAW   4:45 PM   (15 min.)   [facility]   [facility] Lab Draw    [facility] RETURN   5:00 PM   (30 min.)   [place]  Elton Perez MD   [facility] 20     21       22     23     24     25     26     27     28       29     30     31                                         Lab Results:  Recent Results (from the past 12 hour(s))   Comprehensive metabolic panel    Collection Time: 11/25/19  2:45 PM   Result Value Ref Range    Sodium 133 133 - 144 mmol/L    Potassium 4.2 3.4 - 5.3 mmol/L    Chloride 104 94 - 109 mmol/L    Carbon Dioxide 22 20 - 32 mmol/L    Anion Gap 7 3 - 14 mmol/L    Glucose 96 70 - 99 mg/dL    Urea Nitrogen 28 7 - 30 mg/dL    Creatinine 1.16 (H) 0.52 - 1.04 mg/dL    GFR Estimate 48 (L) >60 mL/min/[1.73_m2]    GFR Estimate If Black 56 (L) >60 mL/min/[1.73_m2]    Calcium 8.6 8.5 - 10.1 mg/dL    Bilirubin Total 0.4 0.2 - 1.3 mg/dL    Albumin 4.0 3.4 - 5.0 g/dL    Protein Total 7.3 6.8 - 8.8 g/dL    Alkaline Phosphatase 79 40 - 150 U/L    ALT 26 0 - 50 U/L    AST 15 0 - 45 U/L   TSH with free T4 reflex    Collection Time: 11/25/19  2:45 PM   Result Value Ref Range    TSH 1.25 0.40 - 4.00 mU/L   N terminal pro BNP outpatient    Collection Time: 11/25/19  2:45 PM   Result Value Ref Range    N-Terminal Pro Bnp 166 (H) 0 - 125 pg/mL   EKG 12-lead, complete    Collection Time: 11/25/19  5:21 PM   Result Value Ref Range    Interpretation ECG Click View Image link to view waveform and result

## 2019-11-26 LAB — INTERPRETATION ECG - MUSE: NORMAL

## 2019-12-16 ENCOUNTER — HOSPITAL ENCOUNTER (OUTPATIENT)
Dept: PET IMAGING | Facility: CLINIC | Age: 69
Discharge: HOME | End: 2019-12-16
Attending: INTERNAL MEDICINE | Admitting: INTERNAL MEDICINE
Payer: COMMERCIAL

## 2019-12-16 DIAGNOSIS — C43.9 MELANOMA OF SKIN (H): ICD-10-CM

## 2019-12-16 DIAGNOSIS — C51.9 MALIGNANT MELANOMA OF VULVA (H): ICD-10-CM

## 2019-12-16 PROCEDURE — 71260 CT THORAX DX C+: CPT

## 2019-12-16 PROCEDURE — 34300033 ZZH RX 343: Performed by: INTERNAL MEDICINE

## 2019-12-16 PROCEDURE — 25000128 H RX IP 250 OP 636: Performed by: INTERNAL MEDICINE

## 2019-12-16 PROCEDURE — 74177 CT ABD & PELVIS W/CONTRAST: CPT

## 2019-12-16 PROCEDURE — A9552 F18 FDG: HCPCS | Performed by: INTERNAL MEDICINE

## 2019-12-16 RX ORDER — IOPAMIDOL 755 MG/ML
45-135 INJECTION, SOLUTION INTRAVASCULAR ONCE
Status: COMPLETED | OUTPATIENT
Start: 2019-12-16 | End: 2019-12-16

## 2019-12-16 RX ADMIN — FLUDEOXYGLUCOSE F-18 14.96 MCI.: 500 INJECTION, SOLUTION INTRAVENOUS at 14:15

## 2019-12-16 RX ADMIN — IOPAMIDOL 135 ML: 755 INJECTION, SOLUTION INTRAVENOUS at 15:18

## 2019-12-19 ENCOUNTER — ONCOLOGY VISIT (OUTPATIENT)
Dept: ONCOLOGY | Facility: CLINIC | Age: 69
End: 2019-12-19
Attending: INTERNAL MEDICINE
Payer: COMMERCIAL

## 2019-12-19 VITALS
OXYGEN SATURATION: 97 % | RESPIRATION RATE: 16 BRPM | WEIGHT: 253.3 LBS | SYSTOLIC BLOOD PRESSURE: 147 MMHG | HEART RATE: 70 BPM | DIASTOLIC BLOOD PRESSURE: 67 MMHG | TEMPERATURE: 97 F | BODY MASS INDEX: 38.53 KG/M2

## 2019-12-19 DIAGNOSIS — C43.9 MELANOMA OF SKIN (H): Primary | ICD-10-CM

## 2019-12-19 DIAGNOSIS — C51.9 MALIGNANT MELANOMA OF VULVA (H): ICD-10-CM

## 2019-12-19 LAB
ALBUMIN SERPL-MCNC: 3.8 G/DL (ref 3.4–5)
ALP SERPL-CCNC: 78 U/L (ref 40–150)
ALT SERPL W P-5'-P-CCNC: 26 U/L (ref 0–50)
ANION GAP SERPL CALCULATED.3IONS-SCNC: 5 MMOL/L (ref 3–14)
AST SERPL W P-5'-P-CCNC: 12 U/L (ref 0–45)
BASOPHILS # BLD AUTO: 0.1 10E9/L (ref 0–0.2)
BASOPHILS NFR BLD AUTO: 0.6 %
BILIRUB SERPL-MCNC: 0.3 MG/DL (ref 0.2–1.3)
BUN SERPL-MCNC: 30 MG/DL (ref 7–30)
CALCIUM SERPL-MCNC: 8.9 MG/DL (ref 8.5–10.1)
CHLORIDE SERPL-SCNC: 111 MMOL/L (ref 94–109)
CO2 SERPL-SCNC: 26 MMOL/L (ref 20–32)
CREAT SERPL-MCNC: 1.06 MG/DL (ref 0.52–1.04)
DIFFERENTIAL METHOD BLD: ABNORMAL
EOSINOPHIL # BLD AUTO: 0.2 10E9/L (ref 0–0.7)
EOSINOPHIL NFR BLD AUTO: 2.4 %
ERYTHROCYTE [DISTWIDTH] IN BLOOD BY AUTOMATED COUNT: 16.1 % (ref 10–15)
GFR SERPL CREATININE-BSD FRML MDRD: 54 ML/MIN/{1.73_M2}
GLUCOSE SERPL-MCNC: 97 MG/DL (ref 70–99)
HCT VFR BLD AUTO: 35.2 % (ref 35–47)
HGB BLD-MCNC: 10.6 G/DL (ref 11.7–15.7)
IMM GRANULOCYTES # BLD: 0 10E9/L (ref 0–0.4)
IMM GRANULOCYTES NFR BLD: 0.4 %
LYMPHOCYTES # BLD AUTO: 2.5 10E9/L (ref 0.8–5.3)
LYMPHOCYTES NFR BLD AUTO: 29.3 %
MCH RBC QN AUTO: 23.7 PG (ref 26.5–33)
MCHC RBC AUTO-ENTMCNC: 30.1 G/DL (ref 31.5–36.5)
MCV RBC AUTO: 79 FL (ref 78–100)
MONOCYTES # BLD AUTO: 1 10E9/L (ref 0–1.3)
MONOCYTES NFR BLD AUTO: 11.7 %
NEUTROPHILS # BLD AUTO: 4.7 10E9/L (ref 1.6–8.3)
NEUTROPHILS NFR BLD AUTO: 55.6 %
NRBC # BLD AUTO: 0 10*3/UL
NRBC BLD AUTO-RTO: 0 /100
PLATELET # BLD AUTO: 330 10E9/L (ref 150–450)
POTASSIUM SERPL-SCNC: 4.3 MMOL/L (ref 3.4–5.3)
PROT SERPL-MCNC: 7.1 G/DL (ref 6.8–8.8)
RBC # BLD AUTO: 4.47 10E12/L (ref 3.8–5.2)
SODIUM SERPL-SCNC: 142 MMOL/L (ref 133–144)
WBC # BLD AUTO: 8.5 10E9/L (ref 4–11)

## 2019-12-19 PROCEDURE — 99214 OFFICE O/P EST MOD 30 MIN: CPT | Mod: ZP | Performed by: INTERNAL MEDICINE

## 2019-12-19 PROCEDURE — 36415 COLL VENOUS BLD VENIPUNCTURE: CPT

## 2019-12-19 PROCEDURE — G0463 HOSPITAL OUTPT CLINIC VISIT: HCPCS | Mod: ZF

## 2019-12-19 PROCEDURE — 84443 ASSAY THYROID STIM HORMONE: CPT | Performed by: INTERNAL MEDICINE

## 2019-12-19 PROCEDURE — 80053 COMPREHEN METABOLIC PANEL: CPT | Performed by: INTERNAL MEDICINE

## 2019-12-19 PROCEDURE — 85025 COMPLETE CBC W/AUTO DIFF WBC: CPT | Performed by: INTERNAL MEDICINE

## 2019-12-19 RX ORDER — NALOXONE HYDROCHLORIDE 0.4 MG/ML
.1-.4 INJECTION, SOLUTION INTRAMUSCULAR; INTRAVENOUS; SUBCUTANEOUS
Status: CANCELLED | OUTPATIENT
Start: 2019-12-23

## 2019-12-19 RX ORDER — LORAZEPAM 2 MG/ML
0.5 INJECTION INTRAMUSCULAR EVERY 4 HOURS PRN
Status: CANCELLED
Start: 2019-12-23

## 2019-12-19 RX ORDER — EPINEPHRINE 1 MG/ML
0.3 INJECTION, SOLUTION INTRAMUSCULAR; SUBCUTANEOUS EVERY 5 MIN PRN
Status: CANCELLED | OUTPATIENT
Start: 2019-12-23

## 2019-12-19 RX ORDER — DILTIAZEM HYDROCHLORIDE 120 MG/1
120 CAPSULE, COATED, EXTENDED RELEASE ORAL
COMMUNITY
Start: 2019-12-18 | End: 2023-08-02

## 2019-12-19 RX ORDER — DIPHENHYDRAMINE HYDROCHLORIDE 50 MG/ML
50 INJECTION INTRAMUSCULAR; INTRAVENOUS
Status: CANCELLED
Start: 2019-12-23

## 2019-12-19 RX ORDER — ALBUTEROL SULFATE 0.83 MG/ML
2.5 SOLUTION RESPIRATORY (INHALATION)
Status: CANCELLED | OUTPATIENT
Start: 2019-12-23

## 2019-12-19 RX ORDER — EPINEPHRINE 0.3 MG/.3ML
0.3 INJECTION SUBCUTANEOUS EVERY 5 MIN PRN
Status: CANCELLED | OUTPATIENT
Start: 2019-12-23

## 2019-12-19 RX ORDER — SODIUM CHLORIDE 9 MG/ML
1000 INJECTION, SOLUTION INTRAVENOUS CONTINUOUS PRN
Status: CANCELLED
Start: 2019-12-23

## 2019-12-19 RX ORDER — METHYLPREDNISOLONE SODIUM SUCCINATE 125 MG/2ML
125 INJECTION, POWDER, LYOPHILIZED, FOR SOLUTION INTRAMUSCULAR; INTRAVENOUS
Status: CANCELLED
Start: 2019-12-23

## 2019-12-19 RX ORDER — ALBUTEROL SULFATE 90 UG/1
1-2 AEROSOL, METERED RESPIRATORY (INHALATION)
Status: CANCELLED
Start: 2019-12-23

## 2019-12-19 RX ORDER — MEPERIDINE HYDROCHLORIDE 25 MG/ML
25 INJECTION INTRAMUSCULAR; INTRAVENOUS; SUBCUTANEOUS EVERY 30 MIN PRN
Status: CANCELLED | OUTPATIENT
Start: 2019-12-23

## 2019-12-19 ASSESSMENT — PAIN SCALES - GENERAL: PAINLEVEL: NO PAIN (0)

## 2019-12-19 NOTE — NURSING NOTE
"Oncology Rooming Note    December 19, 2019 5:29 PM   Ilana Trent is a 69 year old female who presents for:    Chief Complaint   Patient presents with     Blood Draw     Labs drawn via VPT by RN in lab.      Oncology Clinic Visit     Malignant Melanoma; 3 month return     Initial Vitals: BP (!) 147/67 (BP Location: Right arm, Patient Position: Sitting, Cuff Size: Adult Large)   Pulse 70   Temp 97  F (36.1  C) (Oral)   Resp 16   Wt 114.9 kg (253 lb 4.8 oz)   SpO2 97%   BMI 38.53 kg/m   Estimated body mass index is 38.53 kg/m  as calculated from the following:    Height as of 9/26/19: 1.727 m (5' 7.99\").    Weight as of this encounter: 114.9 kg (253 lb 4.8 oz). Body surface area is 2.35 meters squared.  No Pain (0) Comment: Data Unavailable   No LMP recorded. Patient is postmenopausal.  Allergies reviewed: Yes  Medications reviewed: Yes    Medications: Medication refills not needed today.  Pharm[place] into [facility]: [place] [place] [address] [place] [address] [id number]    Clinical concerns: Pt here for results. Dr Perez was notified.      Meaghan Delcid CMA              "

## 2019-12-19 NOTE — LETTER
12/19/2019       RE: Ilana Trent  [id number] [facility] [address]     Dear Colleague,    Thank you for referring your patient, Ilana Trent, to the [facility]. Please see a copy of my visit note below.    MEDICAL ONCOLOGY PROGRESS NOTE  Dec 19, 2019    Reason for Visit: Stage III melanoma, status post excision    Melanoma History:  1. 6/4/2019, she had excision of the right vulvar mass in [place], and on pathology this shows malignant melanoma with ulceration, at least fausto level IV, Breslow thickness at least 5 mm, mitotic rate is 3 per mm2, TILs are present and brisk, pathologic staging pT4b.  2. 6/28/19 she had PET-CT, which showed no obvious evidence of metastatic disease.  3. 7/30/2019, she has wide local excision and sentinel biopsy (Specimen #: [id number]), which showed residual melanoma to 1.3 mm depth of invasion and margins negative. Right inguinal lymph node showed a subcapsular 1 mm focus of cells.  4. She was referred by Dr. Celis. Dr. Perez felt she has a >90% risk of recurrence given stage IIIC disease and recommends  5. Baseline labs are normal. PET-CT obtained on 9/23 negative for metastatic disease.  6. She started adjuvant monthly nivolumab 9/26/19    Interval History:  Ilana swimming and riding stationary bike without any increased SOB. She has history of atrial fib and is on coumadin and sotalol and has a pacemaker. She saw cardiology in [place] on 11/21 as she was feeling increased atiral fibrillation episodes and gained 5 lbs. She had been taking lasix 40 mg prn. She was started on diltiazem and told to take lasix 40 mg daily for the next few days. She was also recommended to have a repeat echo, but this has not yet been scheduled as patient states she is checking on insurance.     Patient has chronic, intermittent cough but not worsening. Denies any pleuritic pain. She is on coumadin and last INR on 11/21 was therapeutic. She has not noticed any increased  leg edema. She states other than fatigue she is tolerating Opdivo well. Denies chest pain, dizziness, fever, chills. No SOB at rest.    Current Outpatient Medications   Medication Sig Dispense Refill     acetaminophen (TYLENOL) 500 MG tablet Take 1-2 tablets (500-1,000 mg) by mouth every 6 hours as needed for mild pain 50 tablet 0     ALOE PO Take by mouth daily as needed       calcium carbonate (OS-RENEA) 1500 (600 Ca) MG tablet Take 600 mg by mouth daily       cetirizine-pseudoePHEDrine ER (ZYRTEC-D) 5-120 MG 12 hr tablet Take 1 tablet by mouth every morning        CONTOUR NEXT EZ (CONTOUR NEXT EZ W/DEVICE KIT) w/Device KIT See Admin Instructions  0     diltiazem ER COATED BEADS (CARDIZEM CD/CARTIA XT) 120 MG 24 hr capsule Take 120 mg by mouth       furosemide (LASIX) 20 MG tablet Take 20 mg by mouth daily as needed (SWELLING)       levothyroxine (SYNTHROID/LEVOTHROID) 175 MCG tablet Take 175 mcg by mouth every morning        lisinopril (PRINIVIL/ZESTRIL) 5 MG tablet Take 5 mg by mouth every morning        metFORMIN (GLUCOPHAGE-XR) 500 MG 24 hr tablet Take 500 mg by mouth every morning        multivitamin w/minerals (THERA-VIT-M) tablet Take 1 tablet by mouth daily       naproxen (NAPROSYN) 500 MG tablet Take 500 mg by mouth every morning        pantoprazole (PROTONIX) 40 MG EC tablet Take 40 mg by mouth every morning        simvastatin (ZOCOR) 40 MG tablet Take 40 mg by mouth At Bedtime        sotalol (BETAPACE) 160 MG tablet Take 80 mg by mouth 2 times daily        vitamin B complex with vitamin C (STRESS TAB) tablet Take 1 tablet by mouth every morning        warfarin (COUMADIN) 5 MG tablet Take 7.5mg 5 days week ,\ 5 mg Tues, Sat  Takes in the evening       bacitracin 500 UNIT/GM OINT Apply topically 2 times daily (Patient not taking: Reported on 8/13/2019) 1 Tube 0     oxyCODONE (ROXICODONE) 5 MG tablet Take 1 tablet (5 mg) by mouth every 4 hours as needed for breakthrough pain (Patient not taking: Reported on  8/13/2019) 15 tablet 0     senna-docusate (SENOKOT-S/PERICOLACE) 8.6-50 MG tablet Take 1-2 tablets by mouth 2 times daily as needed for constipation (Patient not taking: Reported on 8/13/2019) 60 tablet 0       Physical Examination:  General: The patient is a pleasant female in no acute distress.  BP (!) 147/67 (BP Location: Right arm, Patient Position: Sitting, Cuff Size: Adult Large)   Pulse 70   Temp 97  F (36.1  C) (Oral)   Resp 16   Wt 114.9 kg (253 lb 4.8 oz)   SpO2 97%   BMI 38.53 kg/m     Wt Readings from Last 10 Encounters:   12/19/19 114.9 kg (253 lb 4.8 oz)   11/25/19 113.2 kg (249 lb 8 oz)   10/24/19 113.9 kg (251 lb 1.6 oz)   09/26/19 112.4 kg (247 lb 14.4 oz)   09/09/19 111.6 kg (246 lb)   08/21/19 108.6 kg (239 lb 6.4 oz)   08/13/19 111.6 kg (246 lb)   07/31/19 111.5 kg (245 lb 14.4 oz)   07/03/19 111.7 kg (246 lb 4.8 oz)   07/03/19 111.6 kg (246 lb)     HEENT: EOMI, PERRL. Sclerae are anicteric. Oral mucosa is pink and moist with no lesions or thrush.   Lymph: Neck is supple with no lymphadenopathy in the cervical or supraclavicular areas.   Heart: Regular rate and rhythm.   Lungs: Clear to auscultation bilaterally.   Extremities: Trace lower extremity edema noted bilaterally.   Neuro: Cranial nerves II through XII are grossly intact.  Skin: No rashes, petechiae, or bruising noted on exposed skin.      Assessment and Plan:  #1 Melanoma  As no notable hypoxia with ambulation, will proceed with Opdivo today. Patient is in agreement with this. EKG with atrial paced rhythm and prolonged AV conduction, similar to prior.  --Call if worsening TONEY. Follow up with echo and cardiology.    --Check with genetic counselor about testing.     Elton Arellano M.D.   of Medicine  Hematology, Oncology and Transplantation

## 2019-12-19 NOTE — NURSING NOTE
Chief Complaint   Patient presents with     Blood Draw     Labs drawn via VPT by RN in lab.      Labs collected from venipuncture by RN. Vitals taken. Checked in for appointment(s).    Chrissy HARRIS RN PHN BSN  BMT/Oncology Lab

## 2019-12-20 LAB — TSH SERPL DL<=0.005 MIU/L-ACNC: 2.7 MU/L (ref 0.4–4)

## 2019-12-20 NOTE — PROGRESS NOTES
MEDICAL ONCOLOGY PROGRESS NOTE  Dec 19, 2019    Reason for Visit: Stage III melanoma, status post excision    Melanoma History:  1. 6/4/2019, she had excision of the right vulvar mass in [place], and on pathology this shows malignant melanoma with ulceration, at least fausto level IV, Breslow thickness at least 5 mm, mitotic rate is 3 per mm2, TILs are present and brisk, pathologic staging pT4b.  2. 6/28/19 she had PET-CT, which showed no obvious evidence of metastatic disease.  3. 7/30/2019, she has wide local excision and sentinel biopsy (Specimen #: [id number]), which showed residual melanoma to 1.3 mm depth of invasion and margins negative. Right inguinal lymph node showed a subcapsular 1 mm focus of cells.  4. She was referred by Dr. Celis. Dr. Perez felt she has a >90% risk of recurrence given stage IIIC disease and recommends  5. Baseline labs are normal. PET-CT obtained on 9/23 negative for metastatic disease.  6. She started adjuvant monthly nivolumab 9/26/19    Interval History:  Ilana swimming and riding stationary bike without any increased SOB. She has history of atrial fib and is on coumadin and sotalol and has a pacemaker. She saw cardiology in [place] on 11/21 as she was feeling increased atiral fibrillation episodes and gained 5 lbs. She had been taking lasix 40 mg prn. She was started on diltiazem and told to take lasix 40 mg daily for the next few days. She was also recommended to have a repeat echo, but this has not yet been scheduled as patient states she is checking on insurance.     Patient has chronic, intermittent cough but not worsening. Denies any pleuritic pain. She is on coumadin and last INR on 11/21 was therapeutic. She has not noticed any increased leg edema. She states other than fatigue she is tolerating Opdivo well. Denies chest pain, dizziness, fever, chills. No SOB at rest.    Current Outpatient Medications   Medication Sig Dispense Refill     acetaminophen (TYLENOL)  500 MG tablet Take 1-2 tablets (500-1,000 mg) by mouth every 6 hours as needed for mild pain 50 tablet 0     ALOE PO Take by mouth daily as needed       calcium carbonate (OS-RENEA) 1500 (600 Ca) MG tablet Take 600 mg by mouth daily       cetirizine-pseudoePHEDrine ER (ZYRTEC-D) 5-120 MG 12 hr tablet Take 1 tablet by mouth every morning        CONTOUR NEXT EZ (CONTOUR NEXT EZ W/DEVICE KIT) w/Device KIT See Admin Instructions  0     diltiazem ER COATED BEADS (CARDIZEM CD/CARTIA XT) 120 MG 24 hr capsule Take 120 mg by mouth       furosemide (LASIX) 20 MG tablet Take 20 mg by mouth daily as needed (SWELLING)       levothyroxine (SYNTHROID/LEVOTHROID) 175 MCG tablet Take 175 mcg by mouth every morning        lisinopril (PRINIVIL/ZESTRIL) 5 MG tablet Take 5 mg by mouth every morning        metFORMIN (GLUCOPHAGE-XR) 500 MG 24 hr tablet Take 500 mg by mouth every morning        multivitamin w/minerals (THERA-VIT-M) tablet Take 1 tablet by mouth daily       naproxen (NAPROSYN) 500 MG tablet Take 500 mg by mouth every morning        pantoprazole (PROTONIX) 40 MG EC tablet Take 40 mg by mouth every morning        simvastatin (ZOCOR) 40 MG tablet Take 40 mg by mouth At Bedtime        sotalol (BETAPACE) 160 MG tablet Take 80 mg by mouth 2 times daily        vitamin B complex with vitamin C (STRESS TAB) tablet Take 1 tablet by mouth every morning        warfarin (COUMADIN) 5 MG tablet Take 7.5mg 5 days week ,\ 5 mg Tues, Sat  Takes in the evening       bacitracin 500 UNIT/GM OINT Apply topically 2 times daily (Patient not taking: Reported on 8/13/2019) 1 Tube 0     oxyCODONE (ROXICODONE) 5 MG tablet Take 1 tablet (5 mg) by mouth every 4 hours as needed for breakthrough pain (Patient not taking: Reported on 8/13/2019) 15 tablet 0     senna-docusate (SENOKOT-S/PERICOLACE) 8.6-50 MG tablet Take 1-2 tablets by mouth 2 times daily as needed for constipation (Patient not taking: Reported on 8/13/2019) 60 tablet 0       Physical  Examination:  General: The patient is a pleasant female in no acute distress.  BP (!) 147/67 (BP Location: Right arm, Patient Position: Sitting, Cuff Size: Adult Large)   Pulse 70   Temp 97  F (36.1  C) (Oral)   Resp 16   Wt 114.9 kg (253 lb 4.8 oz)   SpO2 97%   BMI 38.53 kg/m    Wt Readings from Last 10 Encounters:   12/19/19 114.9 kg (253 lb 4.8 oz)   11/25/19 113.2 kg (249 lb 8 oz)   10/24/19 113.9 kg (251 lb 1.6 oz)   09/26/19 112.4 kg (247 lb 14.4 oz)   09/09/19 111.6 kg (246 lb)   08/21/19 108.6 kg (239 lb 6.4 oz)   08/13/19 111.6 kg (246 lb)   07/31/19 111.5 kg (245 lb 14.4 oz)   07/03/19 111.7 kg (246 lb 4.8 oz)   07/03/19 111.6 kg (246 lb)     HEENT: EOMI, PERRL. Sclerae are anicteric. Oral mucosa is pink and moist with no lesions or thrush.   Lymph: Neck is supple with no lymphadenopathy in the cervical or supraclavicular areas.   Heart: Regular rate and rhythm.   Lungs: Clear to auscultation bilaterally.   Extremities: Trace lower extremity edema noted bilaterally.   Neuro: Cranial nerves II through XII are grossly intact.  Skin: No rashes, petechiae, or bruising noted on exposed skin.      Assessment and Plan:  #1 Melanoma  As no notable hypoxia with ambulation, will proceed with Opdivo today. Patient is in agreement with this. EKG with atrial paced rhythm and prolonged AV conduction, similar to prior.  --Call if worsening TONEY. Follow up with echo and cardiology.    --Check with genetic counselor about testing.     Elton Arellano M.D.   of Medicine  Hematology, Oncology and Transplantation

## 2019-12-23 ENCOUNTER — INFUSION THERAPY VISIT (OUTPATIENT)
Dept: ONCOLOGY | Facility: CLINIC | Age: 69
End: 2019-12-23
Attending: INTERNAL MEDICINE
Payer: COMMERCIAL

## 2019-12-23 DIAGNOSIS — C51.9 MALIGNANT MELANOMA OF VULVA (H): ICD-10-CM

## 2019-12-23 DIAGNOSIS — C43.9 MELANOMA OF SKIN (H): Primary | ICD-10-CM

## 2019-12-23 PROCEDURE — 84443 ASSAY THYROID STIM HORMONE: CPT | Performed by: INTERNAL MEDICINE

## 2019-12-23 PROCEDURE — 25000128 H RX IP 250 OP 636: Mod: ZF | Performed by: INTERNAL MEDICINE

## 2019-12-23 PROCEDURE — 96413 CHEMO IV INFUSION 1 HR: CPT

## 2019-12-23 PROCEDURE — 25800030 ZZH RX IP 258 OP 636: Mod: ZF | Performed by: INTERNAL MEDICINE

## 2019-12-23 RX ADMIN — SODIUM CHLORIDE 480 MG: 9 INJECTION, SOLUTION INTRAVENOUS at 16:15

## 2019-12-23 ASSESSMENT — PAIN SCALES - GENERAL: PAINLEVEL: NO PAIN (0)

## 2019-12-23 NOTE — PATIENT INSTRUCTIONS
[facility] Triage and after hours / weekends / holidays:  [phone]    Please call the triage or after hours line if you experience a temperature greater than or equal to 100.5, shaking chills, have uncontrolled nausea, vomiting and/or diarrhea, dizziness, shortness of breath, chest pain, bleeding, unexplained bruising, or if you have any other new/concerning symptoms, questions or concerns.      If you are having any concerning symptoms or wish to speak to a provider before your next infusion visit, please call your care coordinator or triage to notify them so we can adequately serve you.     If you need a refill on a narcotic prescription or other medication, please call before your infusion appointment.             Currently you have no additional appointments scheduled.  I put a request in with scheduling to schedule your next opdivo infusion appointment in four weeks.  I have also sent a message to Simona Kamara your care coordinator to make sure you follow up with providers get scheduled

## 2019-12-23 NOTE — PROGRESS NOTES
Infusion Nursing Note:  Ilana Trent presents today for Cycle 4 Avastin.    Patient seen and examined by Dr Arellano on 12/19/19    Note: pt states that she feels well today.  Pt reports that she has had no changes since she saw Dr Perez on 12/19/19.  Denies fevers, diarrhea, rash, or increased TONEY    Intravenous Access:  Peripheral IV placed.    Treatment Conditions:  Lab Results   Component Value Date    HGB 10.6 12/19/2019     Lab Results   Component Value Date    WBC 8.5 12/19/2019      Lab Results   Component Value Date    ANEU 4.7 12/19/2019     Lab Results   Component Value Date     12/19/2019      Lab Results   Component Value Date     12/19/2019                   Lab Results   Component Value Date    POTASSIUM 4.3 12/19/2019           Lab Results   Component Value Date    MAG 1.7 07/31/2019            Lab Results   Component Value Date    CR 1.06 12/19/2019                   Lab Results   Component Value Date    RENEA 8.9 12/19/2019                Lab Results   Component Value Date    BILITOTAL 0.3 12/19/2019           Lab Results   Component Value Date    ALBUMIN 3.8 12/19/2019                    Lab Results   Component Value Date    ALT 26 12/19/2019           Lab Results   Component Value Date    AST 12 12/19/2019       Results reviewed, labs MET treatment parameters, ok to proceed with treatment.      Post Infusion Assessment:  Patient tolerated infusion without incident.       Discharge Plan:   Patient declined prescription refills.  Copy of AVS reviewed with patient and/or family.  Dr Arellano has not finished pt's check out orders from his 12/19/19 visit.  A message was sent to scheduling to ask that opdivo is scheduled in 4 weeks.  A message was sent to Simona Kamara pts' RNCC to follow up with Dr Crow Perez for additional scheduling needs.  Pt is aware and will call Simona with questions.   Face to Face time: 0.    Gina Hein RN

## 2019-12-27 ENCOUNTER — TELEPHONE (OUTPATIENT)
Dept: ONCOLOGY | Facility: CLINIC | Age: 69
End: 2019-12-27

## 2019-12-27 NOTE — TELEPHONE ENCOUNTER
12/27/2019    Referring Provider: Elton Perez MD    Presenting Information:  I spoke to Ilana by phone today to discuss her genetic testing results. Her blood was drawn on 10/30/2019. CancerNext-Expanded testing was ordered  from [facility]. This testing was done because of Ilana's personal history of breast cancer, papillary thyroid cancer, pancreatic neuroendocrine tumor, and metastatic vulvar melanoma and family history of colon cancer.    Genetic Testing Result: NEGATIVE  [place] is negative for mutations in the AIP, ALK, APC, EULALIO, BAP1, BARD1, BLM, BMPR1A, BRCA1, BRCA2, BRIP1, CDH1, CDK4, CDKN1B, CDKN2A, CHEK2, DICER1, EPCAM, FANCC, FH, FLCN, GALNT12, GREM1, HOXB13, MAX, MEN1, MET, MITF, MLH1, MRE11A, MSH2, MSH6, MUTYH, NBN, NF1, NF2, PALB2, PHOX2B, PMS2, POLD1, POLE, POT1, FWZLM6C, PTCH1, PTEN, RAD50, RAD51C, RAD51D, RB1, RET, SDHA, SDHAF2, SDHB, SDHC, SDHD, SMAD4, SMARCA4, SMARCB1, SMARCE1, STK11, SUFU, XAJC001, TP53, TSC1, TSC2, VHL, and XRCC2 genes. No mutations were found in any of the 67 genes analyzed. This test involved sequencing and deletion/duplication analysis of all genes with the exceptions of EPCAM and GREM1 (deletions/duplications only) and MITF (only the status of the c.952G>A (p.E318K) alteration is analyzed and reported).    Testing did not detect an identifiable mutation associated with Hereditary Breast and Ovarian Cancer syndrome (BRCA1, BRCA2), Moreland syndrome (MLH1, MSH2, MSH6, PMS2, EPCAM), Familial Adenomatous Polyposis (APC), Hereditary Diffuse Gastric Cancer (CDH1), Familial Atypical Multiple Mole Melanoma syndrome (CDK4, CDKN2A), Juvenile Polyposis syndrome (BMPR1A, SMAD4), Cowden syndrome (PTEN), Li Fraumeni syndrome (TP53), Peutz-Jeghers syndrome (STK11), MUTYH Associated Polyposis (MUTYH),  Hereditary Leiomyomatosis and Renal Cell Cancer (FH), Svue-Fdha-Xmcd (FLCN), Hereditary Papillary Renal Carcinoma (MET), Hereditary Paraganglioma and Pheochromocytoma syndrome  "(SDHA, SDHAF2, SDHB, SDHC, SDHD), Multiple Endocrine Neoplasia type 2 (RET), Tuberous sclerosis complex (TSC1, TSC2), Von Hippel-Lindau disease (VHL), Neurofibromatosis type 1 (NF1), Neurofibromatosis type 2 (NF1), Multiple Endocrine Neoplasia type 1 (MEN1), Multiple Endocrine Neoplasia type 4 (CDKN1B), Gorlin syndrome (SUFU, PTCH1), or Martínez complex (YYMIO5B).      A copy of the test report can be found in the Laboratory tab, dated 10/30/2019, and named \"[facility]\". The report is scanned in as a linked document.    Interpretation:  We discussed several different interpretations of this negative test result.    1. One explanation may be that there is a different gene or combination of genes and environment that are associated with the cancers in this family.  2. There is also a small possibility that there is a mutation in one of these genes, and the testing laboratory could not find it with their current testing methods.       Screening:  Based on this negative test result, it is important for Ilana and her relatives to refer back to the family history for appropriate cancer screening.      Ilana s close female relatives remain at increased risk for breast cancer given their family history. Breast cancer screening is generally recommended to begin approximately 10 years younger than the earliest age of breast cancer diagnosis in the family, or at age 40, whichever comes first. In this family, screening may begin at age 40. Breast screening options should be discussed with an individual's primary care provider and a genetic counselor, to determine at what age to begin screening, what screening is appropriate, and if additional screening (such as breast MRI) is necessary based on personal/family history factors.  Per National Comprehensive Cancer Network (NCCN) guidelines, individuals with a first degree relative with colorectal cancer diagnosed at any age should begin colonoscopy at age 40, or 10 years " before the earliest diagnosis of colorectal cancer, whichever is first.  In this family, colonoscopy should start at age 40. Colonoscopy should be repeated every 5 years, or based on colonoscopy findings. This would apply to Ilana and her siblings. These recommendations may change based on personal and family history as well as personal preference, and should be discussed with an individuals physician.        Due to Ilana's diagnosis of melanoma, Ilana's close family members remains at some increased risk for developing melanoma. According to the American Cancer Society, Ilana's siblings and daughter are encouraged to speak to their primary care provider about having regular skin exams and safe skin practices (i.e. sunscreen, self skin exams, limited sun exposure, etc.).    Other population cancer screening options, such as those recommended by the American Cancer Society and the National Comprehensive Cancer Network (NCCN), are also appropriate for Ilana and her family. These screening recommendations may change if there are changes to Ilana's personal and/or family history of cancer. Final screening recommendations should be made by each individual's managing physician.      Inheritance:  We reviewed autosomal dominant inheritance. We discussed that Ilana did not pass on an identifiable mutation in these genes to her children based on this test result. Mutations in these genes do not skip generations.      Summary:  We do not have an explanation for Ilana's personal or family history of cancer. While no genetic changes were identified, Ilana may still be at risk for certain cancers due to family history, environmental factors, or other genetic causes not identified by this test.  Because of that, it is important that she continue with cancer screening based on her personal and family history as discussed above.    Genetic testing is rapidly advancing, and new cancer susceptibility genes will most likely  be identified in the future.  Therefore, I encouraged Ilana to contact me annually or if there are changes in her personal or family history.  This may change how we assess her cancer risk, screening, and the testing we would offer.    Plan:  1. A copy of the test results will be mailed to Ilana.  2. She plans to follow-up with her physicians.  3. She should contact me annually, or sooner if her family history changes.    If Ilana has any further questions, I encouraged her to contact me at [phone].    Yobani Becerra MS [facility]  Licensed Genetic Counselor  Phone: [phone]  Fax: [phone]

## 2019-12-27 NOTE — LETTER
Cancer Risk Management  Program [facility]  [facility]  [facility]  Mailing Address  Cancer Risk Management Program  [facility]  [address]  [facility] [address]  [place] [address]    New patient appointments  [phone]  December 27, 2019    Ilana Trent  [address]  [place] [address]    Dear Ilana,    It was a pleasure speaking with you on the phone on 12/27/2019. Here is a copy of the progress note from our discussion. If you have any additional questions, please feel free to call.    Referring Provider: Elton Perez MD    Presenting Information:  I spoke to Ilana by phone today to discuss her genetic testing results. Her blood was drawn on 10/30/2019. CancerNext-Expanded testing was ordered  from [facility]. This testing was done because of Ilana's personal history of breast cancer, papillary thyroid cancer, pancreatic neuroendocrine tumor, and metastatic vulvar melanoma and family history of colon cancer .    Genetic Testing Result: NEGATIVE  Ilana is negative for mutations in the AIP, ALK, APC, EULALIO, BAP1, BARD1, BLM, BMPR1A, BRCA1, BRCA2, BRIP1, CDH1, CDK4, CDKN1B, CDKN2A, CHEK2, DICER1, EPCAM, FANCC, FH, FLCN, GALNT12, GREM1, HOXB13, MAX, MEN1, MET, MITF, MLH1, MRE11A, MSH2, MSH6, MUTYH, NBN, NF1, NF2, PALB2, PHOX2B, PMS2, POLD1, POLE, POT1, DSSZF4N, PTCH1, PTEN, RAD50, RAD51C, RAD51D, RB1, RET, SDHA, SDHAF2, SDHB, SDHC, SDHD, SMAD4, SMARCA4, SMARCB1, SMARCE1, STK11, SUFU, TEDO893, TP53, TSC1, TSC2, VHL, and XRCC2 genes. No mutations were found in any of the 67 genes analyzed. This test involved sequencing and deletion/duplication analysis of all genes with the exceptions of EPCAM and GREM1 (deletions/duplications only) and MITF (only the status of the c.952G>A (p.E318K) alteration is analyzed and  reported).    Testing did not detect an identifiable mutation associated with Hereditary Breast and Ovarian Cancer syndrome (BRCA1, BRCA2), Moreland syndrome (MLH1, MSH2, MSH6, PMS2, EPCAM), Familial Adenomatous Polyposis (APC), Hereditary Diffuse Gastric Cancer (CDH1), Familial Atypical Multiple Mole Melanoma syndrome (CDK4, CDKN2A), Juvenile Polyposis syndrome (BMPR1A, SMAD4), Cowden syndrome (PTEN), Li Fraumeni syndrome (TP53), Peutz-Jeghers syndrome (STK11), MUTYH Associated Polyposis (MUTYH),  Hereditary Leiomyomatosis and Renal Cell Cancer (FH), Wjff-Tweu-Mbtl (FLCN), Hereditary Papillary Renal Carcinoma (MET), Hereditary Paraganglioma and Pheochromocytoma syndrome (SDHA, SDHAF2, SDHB, SDHC, SDHD), Multiple Endocrine Neoplasia type 2 (RET), Tuberous sclerosis complex (TSC1, TSC2), Von Hippel-Lindau disease (VHL), Neurofibromatosis type 1 (NF1), Neurofibromatosis type 2 (NF1), Multiple Endocrine Neoplasia type 1 (MEN1), Multiple Endocrine Neoplasia type 4 (CDKN1B), Gorlin syndrome (SUFU, PTCH1), or Martínez complex (FEEDA0U).      Interpretation:  We discussed several different interpretations of this negative test result.    1. One explanation may be that there is a different gene or combination of genes and environment that are associated with the cancers in this family.  2. There is also a small possibility that there is a mutation in one of these genes, and the testing laboratory could not find it with their current testing methods.       Screening:  Based on this negative test result, it is important for Ilana and her relatives to refer back to the family history for appropriate cancer screening.      Ilana s close female relatives remain at increased risk for breast cancer given their family history. Breast cancer screening is generally recommended to begin approximately 10 years younger than the earliest age of breast cancer diagnosis in the family, or at age 40, whichever comes first. In this family,  screening may begin at age 40. Breast screening options should be discussed with an individual's primary care provider and a genetic counselor, to determine at what age to begin screening, what screening is appropriate, and if additional screening (such as breast MRI) is necessary based on personal/family history factors.  Per National Comprehensive Cancer Network (NCCN) guidelines, individuals with a first degree relative with colorectal cancer diagnosed at any age should begin colonoscopy at age 40, or 10 years before the earliest diagnosis of colorectal cancer, whichever is first.  In this family, colonoscopy should start at age 40. Colonoscopy should be repeated every 5 years, or based on colonoscopy findings. This would apply to Ilana and her siblings. These recommendations may change based on personal and family history as well as personal preference, and should be discussed with an individuals physician.        Due to Ilana's diagnosis of melanoma, Ilana's close family members remains at some increased risk for developing melanoma. According to the American Cancer Society, Ilana's siblings and daughter are encouraged to speak to their primary care provider about having regular skin exams and safe skin practices (i.e. sunscreen, self skin exams, limited sun exposure, etc.).    Other population cancer screening options, such as those recommended by the American Cancer Society and the National Comprehensive Cancer Network (NCCN), are also appropriate for Ilana and her family. These screening recommendations may change if there are changes to Ilana's personal and/or family history of cancer. Final screening recommendations should be made by each individual's managing physician.      Inheritance:  We reviewed autosomal dominant inheritance. We discussed that Ilana did not pass on an identifiable mutation in these genes to her children based on this test result. Mutations in these genes do not skip  generations.      Summary:  We do not have an explanation for Ilana's personal or family history of cancer. While no genetic changes were identified, Ilana may still be at risk for certain cancers due to family history, environmental factors, or other genetic causes not identified by this test.  Because of that, it is important that she continue with cancer screening based on her personal and family history as discussed above.    Genetic testing is rapidly advancing, and new cancer susceptibility genes will most likely be identified in the future.  Therefore, I encouraged Ilana to contact me annually or if there are changes in her personal or family history.  This may change how we assess her cancer risk, screening, and the testing we would offer.    Plan:  1. A copy of the test results will be mailed to Ilana.  2. She plans to follow-up with her physicians.  3. She should contact me annually, or sooner if her family history changes.    If Ilana has any further questions, I encouraged her to contact me at [phone].    Yobani Becerra MS [facility]  Licensed Genetic Counselor  Phone: [phone]  Fax: [phone]

## 2019-12-27 NOTE — Clinical Note
"Hello,    Please enclose a copy of the test report [facility] dated 10/30/[facility] (Order [id number]) to send to the patient along with the letter.    Thank you,  Yobani"

## 2020-01-09 PROBLEM — C51.9: Status: ACTIVE | Noted: 2019-08-13

## 2020-01-18 DIAGNOSIS — C43.9 MELANOMA OF SKIN (H): ICD-10-CM

## 2020-01-18 DIAGNOSIS — C51.9 MALIGNANT MELANOMA OF VULVA (H): ICD-10-CM

## 2020-01-18 RX ORDER — ALBUTEROL SULFATE 0.83 MG/ML
2.5 SOLUTION RESPIRATORY (INHALATION)
Status: CANCELLED | OUTPATIENT
Start: 2020-01-20

## 2020-01-18 RX ORDER — DIPHENHYDRAMINE HYDROCHLORIDE 50 MG/ML
50 INJECTION INTRAMUSCULAR; INTRAVENOUS
Status: CANCELLED
Start: 2020-01-20

## 2020-01-18 RX ORDER — NALOXONE HYDROCHLORIDE 0.4 MG/ML
.1-.4 INJECTION, SOLUTION INTRAMUSCULAR; INTRAVENOUS; SUBCUTANEOUS
Status: CANCELLED | OUTPATIENT
Start: 2020-01-20

## 2020-01-18 RX ORDER — EPINEPHRINE 0.3 MG/.3ML
0.3 INJECTION SUBCUTANEOUS EVERY 5 MIN PRN
Status: CANCELLED | OUTPATIENT
Start: 2020-01-20

## 2020-01-18 RX ORDER — LORAZEPAM 2 MG/ML
0.5 INJECTION INTRAMUSCULAR EVERY 4 HOURS PRN
Status: CANCELLED
Start: 2020-01-20

## 2020-01-18 RX ORDER — SODIUM CHLORIDE 9 MG/ML
1000 INJECTION, SOLUTION INTRAVENOUS CONTINUOUS PRN
Status: CANCELLED
Start: 2020-01-20

## 2020-01-18 RX ORDER — EPINEPHRINE 1 MG/ML
0.3 INJECTION, SOLUTION, CONCENTRATE INTRAVENOUS EVERY 5 MIN PRN
Status: CANCELLED | OUTPATIENT
Start: 2020-01-20

## 2020-01-18 RX ORDER — ALBUTEROL SULFATE 90 UG/1
1-2 AEROSOL, METERED RESPIRATORY (INHALATION)
Status: CANCELLED
Start: 2020-01-20

## 2020-01-18 RX ORDER — MEPERIDINE HYDROCHLORIDE 25 MG/ML
25 INJECTION INTRAMUSCULAR; INTRAVENOUS; SUBCUTANEOUS EVERY 30 MIN PRN
Status: CANCELLED | OUTPATIENT
Start: 2020-01-20

## 2020-01-20 ENCOUNTER — INFUSION THERAPY VISIT (OUTPATIENT)
Dept: ONCOLOGY | Facility: CLINIC | Age: 70
End: 2020-01-20
Attending: INTERNAL MEDICINE
Payer: COMMERCIAL

## 2020-01-20 ENCOUNTER — APPOINTMENT (OUTPATIENT)
Dept: LAB | Facility: CLINIC | Age: 70
End: 2020-01-20
Attending: INTERNAL MEDICINE
Payer: COMMERCIAL

## 2020-01-20 VITALS
SYSTOLIC BLOOD PRESSURE: 146 MMHG | RESPIRATION RATE: 16 BRPM | OXYGEN SATURATION: 98 % | WEIGHT: 256 LBS | BODY MASS INDEX: 38.94 KG/M2 | TEMPERATURE: 97.9 F | DIASTOLIC BLOOD PRESSURE: 66 MMHG | HEART RATE: 76 BPM

## 2020-01-20 DIAGNOSIS — C43.9 MELANOMA OF SKIN (H): Primary | ICD-10-CM

## 2020-01-20 DIAGNOSIS — C51.9 MALIGNANT MELANOMA OF VULVA (H): ICD-10-CM

## 2020-01-20 LAB
ALBUMIN SERPL-MCNC: 3.8 G/DL (ref 3.4–5)
ALP SERPL-CCNC: 84 U/L (ref 40–150)
ALT SERPL W P-5'-P-CCNC: 26 U/L (ref 0–50)
ANION GAP SERPL CALCULATED.3IONS-SCNC: 4 MMOL/L (ref 3–14)
AST SERPL W P-5'-P-CCNC: 12 U/L (ref 0–45)
BASOPHILS # BLD AUTO: 0.1 10E9/L (ref 0–0.2)
BASOPHILS NFR BLD AUTO: 0.5 %
BILIRUB SERPL-MCNC: 0.4 MG/DL (ref 0.2–1.3)
BUN SERPL-MCNC: 23 MG/DL (ref 7–30)
CALCIUM SERPL-MCNC: 8.9 MG/DL (ref 8.5–10.1)
CHLORIDE SERPL-SCNC: 106 MMOL/L (ref 94–109)
CO2 SERPL-SCNC: 26 MMOL/L (ref 20–32)
CREAT SERPL-MCNC: 0.96 MG/DL (ref 0.52–1.04)
DIFFERENTIAL METHOD BLD: ABNORMAL
EOSINOPHIL # BLD AUTO: 0.2 10E9/L (ref 0–0.7)
EOSINOPHIL NFR BLD AUTO: 2.5 %
ERYTHROCYTE [DISTWIDTH] IN BLOOD BY AUTOMATED COUNT: 16 % (ref 10–15)
GFR SERPL CREATININE-BSD FRML MDRD: 60 ML/MIN/{1.73_M2}
GLUCOSE SERPL-MCNC: 98 MG/DL (ref 70–99)
HCT VFR BLD AUTO: 35.1 % (ref 35–47)
HGB BLD-MCNC: 10.3 G/DL (ref 11.7–15.7)
IMM GRANULOCYTES # BLD: 0 10E9/L (ref 0–0.4)
IMM GRANULOCYTES NFR BLD: 0.3 %
LYMPHOCYTES # BLD AUTO: 2.6 10E9/L (ref 0.8–5.3)
LYMPHOCYTES NFR BLD AUTO: 27.8 %
MCH RBC QN AUTO: 22.8 PG (ref 26.5–33)
MCHC RBC AUTO-ENTMCNC: 29.3 G/DL (ref 31.5–36.5)
MCV RBC AUTO: 78 FL (ref 78–100)
MONOCYTES # BLD AUTO: 0.9 10E9/L (ref 0–1.3)
MONOCYTES NFR BLD AUTO: 9.4 %
NEUTROPHILS # BLD AUTO: 5.5 10E9/L (ref 1.6–8.3)
NEUTROPHILS NFR BLD AUTO: 59.5 %
NRBC # BLD AUTO: 0 10*3/UL
NRBC BLD AUTO-RTO: 0 /100
PLATELET # BLD AUTO: 324 10E9/L (ref 150–450)
POTASSIUM SERPL-SCNC: 4.6 MMOL/L (ref 3.4–5.3)
PROT SERPL-MCNC: 7.3 G/DL (ref 6.8–8.8)
RBC # BLD AUTO: 4.51 10E12/L (ref 3.8–5.2)
SODIUM SERPL-SCNC: 136 MMOL/L (ref 133–144)
TSH SERPL DL<=0.005 MIU/L-ACNC: 2.51 MU/L (ref 0.4–4)
WBC # BLD AUTO: 9.2 10E9/L (ref 4–11)

## 2020-01-20 PROCEDURE — 25000128 H RX IP 250 OP 636: Mod: ZF | Performed by: INTERNAL MEDICINE

## 2020-01-20 PROCEDURE — 84443 ASSAY THYROID STIM HORMONE: CPT | Performed by: INTERNAL MEDICINE

## 2020-01-20 PROCEDURE — 25800030 ZZH RX IP 258 OP 636: Mod: ZF | Performed by: INTERNAL MEDICINE

## 2020-01-20 PROCEDURE — 85025 COMPLETE CBC W/AUTO DIFF WBC: CPT | Performed by: INTERNAL MEDICINE

## 2020-01-20 PROCEDURE — 96413 CHEMO IV INFUSION 1 HR: CPT

## 2020-01-20 PROCEDURE — 80053 COMPREHEN METABOLIC PANEL: CPT | Performed by: INTERNAL MEDICINE

## 2020-01-20 RX ADMIN — SODIUM CHLORIDE 480 MG: 9 INJECTION, SOLUTION INTRAVENOUS at 17:34

## 2020-01-20 ASSESSMENT — PAIN SCALES - GENERAL: PAINLEVEL: NO PAIN (0)

## 2020-01-20 NOTE — NURSING NOTE
Chief Complaint   Patient presents with     Blood Draw     Labs drawn via PIV placed by RN in lab. Line flushed with saline and heparin. VS taken.        Mery Chun RN,

## 2020-01-20 NOTE — PROGRESS NOTES
Infusion Nursing Note:  Ilana Trent presents today for C5D1 Nivolumab.    Patient seen by provider today: No   present during visit today: Not Applicable.    Note: Patient feels well. Denies fever/chills. Denies nausea/vomiting nor chest and abdominal discomfort. No new concerns made. Otherwise well.    Sent IB to  for next appointment and Dermatology referral from Dr. Perez notes December.     Appointment is not made by the time patient left the facility. Instructed patient if unable to see next few days to call . Verbalized understanding.     Intravenous Access:  Peripheral IV placed.    Treatment Conditions:  Lab Results   Component Value Date    HGB 10.3 01/20/2020     Lab Results   Component Value Date    WBC 9.2 01/20/2020      Lab Results   Component Value Date    ANEU 5.5 01/20/2020     Lab Results   Component Value Date     01/20/2020      Lab Results   Component Value Date     01/20/2020                   Lab Results   Component Value Date    POTASSIUM 4.6 01/20/2020           Lab Results   Component Value Date    MAG 1.7 07/31/2019            Lab Results   Component Value Date    CR 0.96 01/20/2020                   Lab Results   Component Value Date    RENEA 8.9 01/20/2020                Lab Results   Component Value Date    BILITOTAL 0.4 01/20/2020           Lab Results   Component Value Date    ALBUMIN 3.8 01/20/2020                    Lab Results   Component Value Date    ALT 26 01/20/2020           Lab Results   Component Value Date    AST 12 01/20/2020       Results reviewed, labs MET treatment parameters, ok to proceed with treatment.      Post Infusion Assessment:  Patient tolerated infusion without incident.  Blood return noted pre and post infusion.  Site patent and intact, free from redness, edema or discomfort.  No evidence of extravasations.  Access discontinued per protocol.       Discharge Plan:   Patient declined prescription refills.  Discharge  instructions reviewed with: Patient and Family.  Patient and/or family verbalized understanding of discharge instructions and all questions answered.  AVS to patient via [employer].  Patient will return in 4 weeks for next appointment.   Patient discharged in stable condition accompanied by: self and .  Departure Mode: Ambulatory.  Face to Face time: 5.    KATTY MC RN

## 2020-01-20 NOTE — PATIENT INSTRUCTIONS
Contact Numbers  [facility]: [phone]    After Hours:  [phone]  Triage: [phone]    Please call the [facility] Triage line if you experience a temperature greater than or equal to 100.5, shaking chills, have uncontrolled nausea, vomiting and/or diarrhea, dizziness, shortness of breath, chest pain, bleeding, unexplained bruising, or if you have any other new/concerning symptoms, questions or concerns.     If it is after hours, weekends, or holidays, please call the main hospital  at  [phone] and ask to speak to the Oncology doctor on call.     If you are having any concerning symptoms or wish to speak to a provider before your next infusion visit, please call your care coordinator or triage to notify them so we can adequately serve you.     If you need a refill on a narcotic prescription or other medication, please call triage before your infusion appointment.         January 2020 Sunday Monday Tuesday Wednesday Thursday Friday Saturday                  1     2     3     4       5     6     7     8     9     10     11       12     13     14     15     16     17     18       19     20    [facility]   4:30 PM   (15 min.)   [facility] LAB DRAW   [facility] Lab Draw    [facility] ONC INFUSION 60   5:00 PM   (60 min.)    ONCOLOGY INFUSION   [facility] 21 22     23     24     25       26     27     28     29     30     31                      February 2020 Sunday Monday Tuesday Wednesday Thursday Friday Saturday                                 1       2     3     4     5     6     7     8       9     10     11     12     13     14     15       16     17     18     19     20     21     22       23     24     25     26     27     28     29                     Lab Results:  Recent Results (from the past 12 hour(s))   Comprehensive metabolic panel    Collection Time: 01/20/20  4:37 PM   Result Value Ref Range    Sodium 136 133 - 144 mmol/L     Potassium 4.6 3.4 - 5.3 mmol/L    Chloride 106 94 - 109 mmol/L    Carbon Dioxide 26 20 - 32 mmol/L    Anion Gap 4 3 - 14 mmol/L    Glucose 98 70 - 99 mg/dL    Urea Nitrogen 23 7 - 30 mg/dL    Creatinine 0.96 0.52 - 1.04 mg/dL    GFR Estimate 60 (L) >60 mL/min/[1.73_m2]    GFR Estimate If Black 70 >60 mL/min/[1.73_m2]    Calcium 8.9 8.5 - 10.1 mg/dL    Bilirubin Total 0.4 0.2 - 1.3 mg/dL    Albumin 3.8 3.4 - 5.0 g/dL    Protein Total 7.3 6.8 - 8.8 g/dL    Alkaline Phosphatase 84 40 - 150 U/L    ALT 26 0 - 50 U/L    AST 12 0 - 45 U/L   TSH with free T4 reflex    Collection Time: 01/20/20  4:37 PM   Result Value Ref Range    TSH 2.51 0.40 - 4.00 mU/L   CBC with platelets differential    Collection Time: 01/20/20  4:37 PM   Result Value Ref Range    WBC 9.2 4.0 - 11.0 10e9/L    RBC Count 4.51 3.8 - 5.2 10e12/L    Hemoglobin 10.3 (L) 11.7 - 15.7 g/dL    Hematocrit 35.1 35.0 - 47.0 %    MCV 78 78 - 100 fl    MCH 22.8 (L) 26.5 - 33.0 pg    MCHC 29.3 (L) 31.5 - 36.5 g/dL    RDW 16.0 (H) 10.0 - 15.0 %    Platelet Count 324 150 - 450 10e9/L    Diff Method Automated Method     % Neutrophils 59.5 %    % Lymphocytes 27.8 %    % Monocytes 9.4 %    % Eosinophils 2.5 %    % Basophils 0.5 %    % Immature Granulocytes 0.3 %    Nucleated RBCs 0 0 /100    Absolute Neutrophil 5.5 1.6 - 8.3 10e9/L    Absolute Lymphocytes 2.6 0.8 - 5.3 10e9/L    Absolute Monocytes 0.9 0.0 - 1.3 10e9/L    Absolute Eosinophils 0.2 0.0 - 0.7 10e9/L    Absolute Basophils 0.1 0.0 - 0.2 10e9/L    Abs Immature Granulocytes 0.0 0 - 0.4 10e9/L    Absolute Nucleated RBC 0.0

## 2020-01-21 ENCOUNTER — TELEPHONE (OUTPATIENT)
Dept: DERMATOLOGY | Facility: CLINIC | Age: 70
End: 2020-01-21

## 2020-01-21 DIAGNOSIS — C51.9 MALIGNANT MELANOMA OF VULVA (H): Primary | ICD-10-CM

## 2020-01-21 DIAGNOSIS — C43.9 MELANOMA OF SKIN (H): ICD-10-CM

## 2020-02-03 ENCOUNTER — TELEPHONE (OUTPATIENT)
Dept: DERMATOLOGY | Facility: CLINIC | Age: 70
End: 2020-02-03

## 2020-02-03 NOTE — TELEPHONE ENCOUNTER
[facility] Call Center    Phone Message    May a detailed message be left on voicemail: yes     Reason for Call: pt called to make an appt for Melanoma. Per protocol send encounter. Referral was sent by Dr Maria 09/09/19. Pt states she would like to schedule appt on Feb 17 because she is going to [facility] for blood work at 4:15p and infusion at 5:00p. Please call pt back. Thank you.    Action Taken: Message routed to:  [facility] ([facility]): derm    Travel Screening: Not Applicable

## 2020-02-04 NOTE — TELEPHONE ENCOUNTER
[facility] Call Center    Phone Message    May a detailed message be left on voicemail: no     Reason for Call: Other: 2nd call from Pt to get scheduled for melanoma dx. Please call back, she is available after 4:30pm since she works during the day.    Action Taken: Message routed to:  [facility] ([facility]): [facility] DERM    Travel Screening: Not Applicable

## 2020-02-13 ENCOUNTER — DOCUMENTATION ONLY (OUTPATIENT)
Dept: CARE COORDINATION | Facility: CLINIC | Age: 70
End: 2020-02-13

## 2020-02-17 ENCOUNTER — INFUSION THERAPY VISIT (OUTPATIENT)
Dept: ONCOLOGY | Facility: CLINIC | Age: 70
End: 2020-02-17
Attending: INTERNAL MEDICINE
Payer: COMMERCIAL

## 2020-02-17 ENCOUNTER — APPOINTMENT (OUTPATIENT)
Dept: LAB | Facility: CLINIC | Age: 70
End: 2020-02-17
Attending: INTERNAL MEDICINE
Payer: COMMERCIAL

## 2020-02-17 VITALS
RESPIRATION RATE: 16 BRPM | HEART RATE: 76 BPM | OXYGEN SATURATION: 98 % | DIASTOLIC BLOOD PRESSURE: 76 MMHG | WEIGHT: 251 LBS | BODY MASS INDEX: 38.18 KG/M2 | SYSTOLIC BLOOD PRESSURE: 127 MMHG | TEMPERATURE: 97.9 F

## 2020-02-17 DIAGNOSIS — C43.9 MELANOMA OF SKIN (H): Primary | ICD-10-CM

## 2020-02-17 DIAGNOSIS — C51.9 MALIGNANT MELANOMA OF VULVA (H): Primary | ICD-10-CM

## 2020-02-17 DIAGNOSIS — C43.9 MELANOMA OF SKIN (H): ICD-10-CM

## 2020-02-17 DIAGNOSIS — C51.9 MALIGNANT MELANOMA OF VULVA (H): ICD-10-CM

## 2020-02-17 LAB
ALBUMIN SERPL-MCNC: 3.8 G/DL (ref 3.4–5)
ALP SERPL-CCNC: 86 U/L (ref 40–150)
ALT SERPL W P-5'-P-CCNC: 27 U/L (ref 0–50)
ANION GAP SERPL CALCULATED.3IONS-SCNC: 6 MMOL/L (ref 3–14)
AST SERPL W P-5'-P-CCNC: 12 U/L (ref 0–45)
BILIRUB SERPL-MCNC: 0.4 MG/DL (ref 0.2–1.3)
BUN SERPL-MCNC: 23 MG/DL (ref 7–30)
CALCIUM SERPL-MCNC: 9 MG/DL (ref 8.5–10.1)
CHLORIDE SERPL-SCNC: 108 MMOL/L (ref 94–109)
CO2 SERPL-SCNC: 25 MMOL/L (ref 20–32)
CREAT SERPL-MCNC: 0.83 MG/DL (ref 0.52–1.04)
GFR SERPL CREATININE-BSD FRML MDRD: 72 ML/MIN/{1.73_M2}
GLUCOSE SERPL-MCNC: 99 MG/DL (ref 70–99)
POTASSIUM SERPL-SCNC: 4.5 MMOL/L (ref 3.4–5.3)
PROT SERPL-MCNC: 7.2 G/DL (ref 6.8–8.8)
SODIUM SERPL-SCNC: 139 MMOL/L (ref 133–144)
TSH SERPL DL<=0.005 MIU/L-ACNC: 3.41 MU/L (ref 0.4–4)

## 2020-02-17 PROCEDURE — 80053 COMPREHEN METABOLIC PANEL: CPT | Performed by: INTERNAL MEDICINE

## 2020-02-17 PROCEDURE — 25800030 ZZH RX IP 258 OP 636: Mod: ZF | Performed by: INTERNAL MEDICINE

## 2020-02-17 PROCEDURE — 96413 CHEMO IV INFUSION 1 HR: CPT

## 2020-02-17 PROCEDURE — 84443 ASSAY THYROID STIM HORMONE: CPT | Performed by: INTERNAL MEDICINE

## 2020-02-17 PROCEDURE — 25000128 H RX IP 250 OP 636: Mod: ZF | Performed by: INTERNAL MEDICINE

## 2020-02-17 RX ORDER — NALOXONE HYDROCHLORIDE 0.4 MG/ML
.1-.4 INJECTION, SOLUTION INTRAMUSCULAR; INTRAVENOUS; SUBCUTANEOUS
Status: CANCELLED | OUTPATIENT
Start: 2020-02-17

## 2020-02-17 RX ORDER — ALBUTEROL SULFATE 0.83 MG/ML
2.5 SOLUTION RESPIRATORY (INHALATION)
Status: CANCELLED | OUTPATIENT
Start: 2020-02-17

## 2020-02-17 RX ORDER — SODIUM CHLORIDE 9 MG/ML
1000 INJECTION, SOLUTION INTRAVENOUS CONTINUOUS PRN
Status: CANCELLED
Start: 2020-02-17

## 2020-02-17 RX ORDER — MEPERIDINE HYDROCHLORIDE 25 MG/ML
25 INJECTION INTRAMUSCULAR; INTRAVENOUS; SUBCUTANEOUS EVERY 30 MIN PRN
Status: CANCELLED | OUTPATIENT
Start: 2020-02-17

## 2020-02-17 RX ORDER — ALBUTEROL SULFATE 90 UG/1
1-2 AEROSOL, METERED RESPIRATORY (INHALATION)
Status: CANCELLED
Start: 2020-02-17

## 2020-02-17 RX ORDER — EPINEPHRINE 1 MG/ML
0.3 INJECTION, SOLUTION INTRAMUSCULAR; SUBCUTANEOUS EVERY 5 MIN PRN
Status: CANCELLED | OUTPATIENT
Start: 2020-02-17

## 2020-02-17 RX ORDER — DIPHENHYDRAMINE HYDROCHLORIDE 50 MG/ML
50 INJECTION INTRAMUSCULAR; INTRAVENOUS
Status: CANCELLED
Start: 2020-02-17

## 2020-02-17 RX ORDER — METHYLPREDNISOLONE SODIUM SUCCINATE 125 MG/2ML
125 INJECTION, POWDER, LYOPHILIZED, FOR SOLUTION INTRAMUSCULAR; INTRAVENOUS
Status: CANCELLED
Start: 2020-02-17

## 2020-02-17 RX ORDER — LORAZEPAM 2 MG/ML
0.5 INJECTION INTRAMUSCULAR EVERY 4 HOURS PRN
Status: CANCELLED
Start: 2020-02-17

## 2020-02-17 RX ORDER — EPINEPHRINE 0.3 MG/.3ML
0.3 INJECTION SUBCUTANEOUS EVERY 5 MIN PRN
Status: CANCELLED | OUTPATIENT
Start: 2020-02-17

## 2020-02-17 RX ADMIN — SODIUM CHLORIDE 480 MG: 900 INJECTION, SOLUTION INTRAVENOUS at 17:25

## 2020-02-17 ASSESSMENT — PAIN SCALES - GENERAL: PAINLEVEL: NO PAIN (0)

## 2020-02-17 NOTE — PROGRESS NOTES
Infusion Nursing Note:  Ilana Trent presents today for Cycle 6 Day 1 of Opdivo.    Patient seen by provider today: No   present during visit today: Not Applicable.    Note: Patient reports feeling well today. Has occasional episodes of shortness of breath, but this is unchanged for her. Does continue to have fatigue, takes naps during the weekend. Unsure if she feels better after sleeping or if she feels the same. Mild edema in her bilateral lower extremities that she says is unchanged. Tries to keep her legs elevated as much as possible. No other issue or concerns today.     Intravenous Access:  Peripheral IV placed.    Treatment Conditions:  Lab Results   Component Value Date     02/17/2020                   Lab Results   Component Value Date    POTASSIUM 4.5 02/17/2020           Lab Results   Component Value Date    MAG 1.7 07/31/2019            Lab Results   Component Value Date    CR 0.83 02/17/2020                   Lab Results   Component Value Date    RENEA 9.0 02/17/2020                Lab Results   Component Value Date    BILITOTAL 0.4 02/17/2020           Lab Results   Component Value Date    ALBUMIN 3.8 02/17/2020                    Lab Results   Component Value Date    ALT 27 02/17/2020           Lab Results   Component Value Date    AST 12 02/17/2020       Results reviewed, labs MET treatment parameters, ok to proceed with treatment.      Post Infusion Assessment:  Patient tolerated infusion without incident.  Blood return noted pre and post infusion.  Site patent and intact, free from redness, edema or discomfort.  No evidence of extravasations.  Access discontinued per protocol.       Discharge Plan:   Patient declined prescription refills.  Discharge instructions reviewed with: Patient and Family.  Patient and/or family verbalized understanding of discharge instructions and all questions answered.  Copy of AVS reviewed with patient and/or family.  Patient will return 3/26/20 for  next appointment. IB sent to Simona Kamara and Dr. Crow Perez about moving up appointments if needed. Currently scheduled for next Opdivo 5 & 1/2 weeks out instead of 4. Pt aware someone will call her if they would like to move up appointments.   Patient discharged in stable condition accompanied by: .  Departure Mode: Ambulatory.    Mikala Hua RN

## 2020-02-17 NOTE — PATIENT INSTRUCTIONS
Contact Numbers  [facility]: [phone]        Please call the [facility] Triage line if you experience a temperature greater than or equal to 100.5, shaking chills, have uncontrolled nausea, vomiting and/or diarrhea, dizziness, shortness of breath, chest pain, bleeding, unexplained bruising, or if you have any other new/concerning symptoms, questions or concerns.     If it is after hours, weekends, or holidays, please call the main hospital  at  [phone] and ask to speak to the Oncology doctor on call.     If you are having any concerning symptoms or wish to speak to a provider before your next infusion visit, please call your care coordinator or triage to notify them so we can adequately serve you.     If you need a refill on a narcotic prescription or other medication, please call triage before your infusion appointment.       Lab Results:  Recent Results (from the past 12 hour(s))   Comprehensive metabolic panel    Collection Time: 02/17/20  4:25 PM   Result Value Ref Range    Sodium 139 133 - 144 mmol/L    Potassium 4.5 3.4 - 5.3 mmol/L    Chloride 108 94 - 109 mmol/L    Carbon Dioxide 25 20 - 32 mmol/L    Anion Gap 6 3 - 14 mmol/L    Glucose 99 70 - 99 mg/dL    Urea Nitrogen 23 7 - 30 mg/dL    Creatinine 0.83 0.52 - 1.04 mg/dL    GFR Estimate 72 >60 mL/min/[1.73_m2]    GFR Estimate If Black 84 >60 mL/min/[1.73_m2]    Calcium 9.0 8.5 - 10.1 mg/dL    Bilirubin Total 0.4 0.2 - 1.3 mg/dL    Albumin 3.8 3.4 - 5.0 g/dL    Protein Total 7.2 6.8 - 8.8 g/dL    Alkaline Phosphatase 86 40 - 150 U/L    ALT 27 0 - 50 U/L    AST 12 0 - 45 U/L   TSH with free T4 reflex    Collection Time: 02/17/20  4:25 PM   Result Value Ref Range    TSH 3.41 0.40 - 4.00 mU/L

## 2020-02-17 NOTE — NURSING NOTE
Chief Complaint   Patient presents with     Blood Draw     Labs drawn via PIV placed by RN in lab. VS taken.        Mery Chun RN,

## 2020-03-11 ENCOUNTER — HEALTH MAINTENANCE LETTER (OUTPATIENT)
Age: 70
End: 2020-03-11

## 2020-03-15 DIAGNOSIS — C43.9 MELANOMA OF SKIN (H): Primary | ICD-10-CM

## 2020-03-15 DIAGNOSIS — C51.9 MALIGNANT MELANOMA OF VULVA (H): ICD-10-CM

## 2020-03-15 RX ORDER — SODIUM CHLORIDE 9 MG/ML
1000 INJECTION, SOLUTION INTRAVENOUS CONTINUOUS PRN
Status: CANCELLED
Start: 2020-03-16

## 2020-03-15 RX ORDER — ALBUTEROL SULFATE 90 UG/1
1-2 AEROSOL, METERED RESPIRATORY (INHALATION)
Status: CANCELLED
Start: 2020-03-16

## 2020-03-15 RX ORDER — EPINEPHRINE 0.3 MG/.3ML
0.3 INJECTION SUBCUTANEOUS EVERY 5 MIN PRN
Status: CANCELLED | OUTPATIENT
Start: 2020-03-16

## 2020-03-15 RX ORDER — NALOXONE HYDROCHLORIDE 0.4 MG/ML
.1-.4 INJECTION, SOLUTION INTRAMUSCULAR; INTRAVENOUS; SUBCUTANEOUS
Status: CANCELLED | OUTPATIENT
Start: 2020-03-16

## 2020-03-15 RX ORDER — LORAZEPAM 2 MG/ML
0.5 INJECTION INTRAMUSCULAR EVERY 4 HOURS PRN
Status: CANCELLED
Start: 2020-03-16

## 2020-03-15 RX ORDER — METHYLPREDNISOLONE SODIUM SUCCINATE 125 MG/2ML
125 INJECTION, POWDER, LYOPHILIZED, FOR SOLUTION INTRAMUSCULAR; INTRAVENOUS
Status: CANCELLED
Start: 2020-03-16

## 2020-03-15 RX ORDER — MEPERIDINE HYDROCHLORIDE 25 MG/ML
25 INJECTION INTRAMUSCULAR; INTRAVENOUS; SUBCUTANEOUS EVERY 30 MIN PRN
Status: CANCELLED | OUTPATIENT
Start: 2020-03-16

## 2020-03-15 RX ORDER — EPINEPHRINE 1 MG/ML
0.3 INJECTION, SOLUTION INTRAMUSCULAR; SUBCUTANEOUS EVERY 5 MIN PRN
Status: CANCELLED | OUTPATIENT
Start: 2020-03-16

## 2020-03-15 RX ORDER — DIPHENHYDRAMINE HYDROCHLORIDE 50 MG/ML
50 INJECTION INTRAMUSCULAR; INTRAVENOUS
Status: CANCELLED
Start: 2020-03-16

## 2020-03-15 RX ORDER — ALBUTEROL SULFATE 0.83 MG/ML
2.5 SOLUTION RESPIRATORY (INHALATION)
Status: CANCELLED | OUTPATIENT
Start: 2020-03-16

## 2020-03-16 ENCOUNTER — APPOINTMENT (OUTPATIENT)
Dept: LAB | Facility: CLINIC | Age: 70
End: 2020-03-16
Attending: INTERNAL MEDICINE
Payer: COMMERCIAL

## 2020-03-16 ENCOUNTER — INFUSION THERAPY VISIT (OUTPATIENT)
Dept: ONCOLOGY | Facility: CLINIC | Age: 70
End: 2020-03-16
Attending: INTERNAL MEDICINE
Payer: COMMERCIAL

## 2020-03-16 VITALS
HEART RATE: 74 BPM | RESPIRATION RATE: 16 BRPM | SYSTOLIC BLOOD PRESSURE: 140 MMHG | TEMPERATURE: 98 F | WEIGHT: 249 LBS | OXYGEN SATURATION: 98 % | DIASTOLIC BLOOD PRESSURE: 73 MMHG | BODY MASS INDEX: 37.87 KG/M2

## 2020-03-16 DIAGNOSIS — C43.9 MELANOMA OF SKIN (H): Primary | ICD-10-CM

## 2020-03-16 DIAGNOSIS — C51.9 MALIGNANT MELANOMA OF VULVA (H): ICD-10-CM

## 2020-03-16 LAB
ALBUMIN SERPL-MCNC: 4 G/DL (ref 3.4–5)
ALP SERPL-CCNC: 93 U/L (ref 40–150)
ALT SERPL W P-5'-P-CCNC: 27 U/L (ref 0–50)
ANION GAP SERPL CALCULATED.3IONS-SCNC: 6 MMOL/L (ref 3–14)
AST SERPL W P-5'-P-CCNC: 14 U/L (ref 0–45)
BILIRUB SERPL-MCNC: 0.4 MG/DL (ref 0.2–1.3)
BUN SERPL-MCNC: 21 MG/DL (ref 7–30)
CALCIUM SERPL-MCNC: 8.8 MG/DL (ref 8.5–10.1)
CHLORIDE SERPL-SCNC: 108 MMOL/L (ref 94–109)
CO2 SERPL-SCNC: 26 MMOL/L (ref 20–32)
CREAT SERPL-MCNC: 0.98 MG/DL (ref 0.52–1.04)
GFR SERPL CREATININE-BSD FRML MDRD: 59 ML/MIN/{1.73_M2}
GLUCOSE SERPL-MCNC: 103 MG/DL (ref 70–99)
POTASSIUM SERPL-SCNC: 4.5 MMOL/L (ref 3.4–5.3)
PROT SERPL-MCNC: 7.4 G/DL (ref 6.8–8.8)
SODIUM SERPL-SCNC: 140 MMOL/L (ref 133–144)
TSH SERPL DL<=0.005 MIU/L-ACNC: 0.64 MU/L (ref 0.4–4)

## 2020-03-16 PROCEDURE — 25000128 H RX IP 250 OP 636: Mod: ZF | Performed by: INTERNAL MEDICINE

## 2020-03-16 PROCEDURE — 96413 CHEMO IV INFUSION 1 HR: CPT

## 2020-03-16 PROCEDURE — 84443 ASSAY THYROID STIM HORMONE: CPT | Performed by: INTERNAL MEDICINE

## 2020-03-16 PROCEDURE — 80053 COMPREHEN METABOLIC PANEL: CPT | Performed by: INTERNAL MEDICINE

## 2020-03-16 PROCEDURE — 25800030 ZZH RX IP 258 OP 636: Mod: ZF | Performed by: INTERNAL MEDICINE

## 2020-03-16 RX ADMIN — SODIUM CHLORIDE 480 MG: 9 INJECTION, SOLUTION INTRAVENOUS at 17:21

## 2020-03-16 ASSESSMENT — PAIN SCALES - GENERAL: PAINLEVEL: NO PAIN (0)

## 2020-03-16 NOTE — NURSING NOTE
Chief Complaint   Patient presents with     Blood Draw     labs drawn via piv start by rn. vs taken      Labs drawn via PIV start by rn in lab. Flushed with saline. Vitals taken. Pt checked in for next appointment.   Vaishali Blandon RN

## 2020-03-16 NOTE — PROGRESS NOTES
Infusion Nursing Note:  Ilana Trent presents today for Cycle 7 Nivolumab.          Intravenous Access:  Peripheral IV placed in lab    Treatment Conditions:  Lab Results   Component Value Date     03/16/2020                   Lab Results   Component Value Date    POTASSIUM 4.5 03/16/2020           Lab Results   Component Value Date    MAG 1.7 07/31/2019            Lab Results   Component Value Date    CR 0.98 03/16/2020                   Lab Results   Component Value Date    RENEA 8.8 03/16/2020                Lab Results   Component Value Date    BILITOTAL 0.4 03/16/2020           Lab Results   Component Value Date    ALBUMIN 4.0 03/16/2020                    Lab Results   Component Value Date    ALT 27 03/16/2020           Lab Results   Component Value Date    AST 14 03/16/2020       Results reviewed, labs MET treatment parameters, ok to proceed with treatment.      Post Infusion Assessment:  Patient tolerated infusion without incident.       Discharge Plan:   Patient declined prescription refills.  Copy of AVS reviewed with patient and/or family.  Patient will return 3/26 to see Dr Crow Perez in clinic.  A request to schedule cycle 8 on 4/13/2020 was placed to scheduling as requested by pt.   Face to Face time: 0.    Gina Hein RN

## 2020-03-20 ENCOUNTER — TELEPHONE (OUTPATIENT)
Dept: DERMATOLOGY | Facility: CLINIC | Age: 70
End: 2020-03-20

## 2020-03-20 DIAGNOSIS — C43.9 MELANOMA OF SKIN (H): Primary | ICD-10-CM

## 2020-03-20 NOTE — TELEPHONE ENCOUNTER
I attempted to reach Ilana but she was not available. Her  said call [phone] after 2 to reschedule appt.

## 2020-03-23 ENCOUNTER — ANCILLARY PROCEDURE (OUTPATIENT)
Dept: CT IMAGING | Facility: CLINIC | Age: 70
End: 2020-03-23
Attending: INTERNAL MEDICINE
Payer: COMMERCIAL

## 2020-03-23 DIAGNOSIS — C43.9 MELANOMA OF SKIN (H): ICD-10-CM

## 2020-03-23 RX ORDER — IOPAMIDOL 755 MG/ML
135 INJECTION, SOLUTION INTRAVASCULAR ONCE
Status: COMPLETED | OUTPATIENT
Start: 2020-03-23 | End: 2020-03-23

## 2020-03-23 RX ADMIN — IOPAMIDOL 135 ML: 755 INJECTION, SOLUTION INTRAVASCULAR at 07:42

## 2020-03-26 ENCOUNTER — APPOINTMENT (OUTPATIENT)
Dept: LAB | Facility: CLINIC | Age: 70
End: 2020-03-26
Attending: INTERNAL MEDICINE
Payer: COMMERCIAL

## 2020-03-26 ENCOUNTER — ONCOLOGY VISIT (OUTPATIENT)
Dept: ONCOLOGY | Facility: CLINIC | Age: 70
End: 2020-03-26
Attending: INTERNAL MEDICINE
Payer: COMMERCIAL

## 2020-03-26 VITALS
HEART RATE: 76 BPM | DIASTOLIC BLOOD PRESSURE: 73 MMHG | TEMPERATURE: 97.7 F | OXYGEN SATURATION: 97 % | WEIGHT: 252.1 LBS | BODY MASS INDEX: 38.21 KG/M2 | SYSTOLIC BLOOD PRESSURE: 149 MMHG | HEIGHT: 68 IN | RESPIRATION RATE: 16 BRPM

## 2020-03-26 DIAGNOSIS — C43.9 MELANOMA OF SKIN (H): ICD-10-CM

## 2020-03-26 DIAGNOSIS — E03.2 HYPOTHYROIDISM DUE TO MEDICATION: Primary | ICD-10-CM

## 2020-03-26 LAB
ALBUMIN SERPL-MCNC: 3.8 G/DL (ref 3.4–5)
ALP SERPL-CCNC: 86 U/L (ref 40–150)
ALT SERPL W P-5'-P-CCNC: 30 U/L (ref 0–50)
ANION GAP SERPL CALCULATED.3IONS-SCNC: 5 MMOL/L (ref 3–14)
AST SERPL W P-5'-P-CCNC: 19 U/L (ref 0–45)
BASOPHILS # BLD AUTO: 0.1 10E9/L (ref 0–0.2)
BASOPHILS NFR BLD AUTO: 0.7 %
BILIRUB SERPL-MCNC: 0.4 MG/DL (ref 0.2–1.3)
BUN SERPL-MCNC: 24 MG/DL (ref 7–30)
CALCIUM SERPL-MCNC: 9.1 MG/DL (ref 8.5–10.1)
CHLORIDE SERPL-SCNC: 107 MMOL/L (ref 94–109)
CO2 SERPL-SCNC: 25 MMOL/L (ref 20–32)
CREAT SERPL-MCNC: 1.09 MG/DL (ref 0.52–1.04)
DIFFERENTIAL METHOD BLD: ABNORMAL
EOSINOPHIL # BLD AUTO: 0.2 10E9/L (ref 0–0.7)
EOSINOPHIL NFR BLD AUTO: 2 %
ERYTHROCYTE [DISTWIDTH] IN BLOOD BY AUTOMATED COUNT: 15.5 % (ref 10–15)
GFR SERPL CREATININE-BSD FRML MDRD: 52 ML/MIN/{1.73_M2}
GLUCOSE SERPL-MCNC: 95 MG/DL (ref 70–99)
HCT VFR BLD AUTO: 34.9 % (ref 35–47)
HGB BLD-MCNC: 10.5 G/DL (ref 11.7–15.7)
IMM GRANULOCYTES # BLD: 0 10E9/L (ref 0–0.4)
IMM GRANULOCYTES NFR BLD: 0.4 %
LYMPHOCYTES # BLD AUTO: 2.5 10E9/L (ref 0.8–5.3)
LYMPHOCYTES NFR BLD AUTO: 28.1 %
MCH RBC QN AUTO: 23.3 PG (ref 26.5–33)
MCHC RBC AUTO-ENTMCNC: 30.1 G/DL (ref 31.5–36.5)
MCV RBC AUTO: 78 FL (ref 78–100)
MONOCYTES # BLD AUTO: 0.8 10E9/L (ref 0–1.3)
MONOCYTES NFR BLD AUTO: 9.2 %
NEUTROPHILS # BLD AUTO: 5.3 10E9/L (ref 1.6–8.3)
NEUTROPHILS NFR BLD AUTO: 59.6 %
NRBC # BLD AUTO: 0 10*3/UL
NRBC BLD AUTO-RTO: 0 /100
PLATELET # BLD AUTO: 299 10E9/L (ref 150–450)
POTASSIUM SERPL-SCNC: 5 MMOL/L (ref 3.4–5.3)
PROT SERPL-MCNC: 7.1 G/DL (ref 6.8–8.8)
RBC # BLD AUTO: 4.5 10E12/L (ref 3.8–5.2)
SODIUM SERPL-SCNC: 137 MMOL/L (ref 133–144)
WBC # BLD AUTO: 8.9 10E9/L (ref 4–11)

## 2020-03-26 PROCEDURE — 99214 OFFICE O/P EST MOD 30 MIN: CPT | Mod: GC | Performed by: INTERNAL MEDICINE

## 2020-03-26 PROCEDURE — 85025 COMPLETE CBC W/AUTO DIFF WBC: CPT | Performed by: INTERNAL MEDICINE

## 2020-03-26 PROCEDURE — 36415 COLL VENOUS BLD VENIPUNCTURE: CPT

## 2020-03-26 PROCEDURE — 80053 COMPREHEN METABOLIC PANEL: CPT | Performed by: INTERNAL MEDICINE

## 2020-03-26 PROCEDURE — G0463 HOSPITAL OUTPT CLINIC VISIT: HCPCS | Mod: ZF

## 2020-03-26 ASSESSMENT — MIFFLIN-ST. JEOR: SCORE: 1716.86

## 2020-03-26 ASSESSMENT — PAIN SCALES - GENERAL: PAINLEVEL: NO PAIN (0)

## 2020-03-26 NOTE — NURSING NOTE
"Oncology Rooming Note    March 26, 2020 3:57 PM   Ilana Trent is a 70 year old female who presents for:    Chief Complaint   Patient presents with     Blood Draw     Labs drawn via  by RN in lab. VS taken. Patient checked in for next appt.     Oncology Clinic Visit     RETURN VISIT; MELANOMA FOLLOW UP      Initial Vitals: BP (!) 149/73 (BP Location: Right arm, Patient Position: Sitting, Cuff Size: Adult Large)   Pulse 76   Temp 97.7  F (36.5  C) (Oral)   Resp 16   Ht 1.727 m (5' 7.99\")   Wt 114.4 kg (252 lb 1.6 oz)   SpO2 97%   BMI 38.34 kg/m   Estimated body mass index is 38.34 kg/m  as calculated from the following:    Height as of this encounter: 1.727 m (5' 7.99\").    Weight as of this encounter: 114.4 kg (252 lb 1.6 oz). Body surface area is 2.34 meters squared.  No Pain (0) Comment: Data Unavailable   No LMP recorded. Patient is postmenopausal.  Allergies reviewed: Yes  Medications reviewed: Yes    Medications: Medication refills not needed today.  Pharmacy name entered into [employer]: [facility] [place], [place] - [address] [place] [id number]    Clinical concerns: No new concerns today  Dr Perez  was notified.      Gill Prasad              "

## 2020-03-26 NOTE — LETTER
3/26/2020       RE: Ilana Trent  [id number] [facility] [address]     Dear Colleague,    Thank you for referring your patient, Ilana Trent, to the [facility]. Please see a copy of my visit note below.    MEDICAL ONCOLOGY PROGRESS NOTE  Mar 26, 2020    Reason for Visit: Stage III melanoma, status post excision    Melanoma History:  1. 6/4/2019, she had excision of the right vulvar mass in [place], and on pathology this shows malignant melanoma with ulceration, at least fausto level IV, Breslow thickness at least 5 mm, mitotic rate is 3 per mm2, TILs are present and brisk, pathologic staging pT4b.  2. 6/28/19 she had PET-CT, which showed no obvious evidence of metastatic disease.  3. 7/30/2019, she has wide local excision and sentinel biopsy (Specimen #: [id number]), which showed residual melanoma to 1.3 mm depth of invasion and margins negative. Right inguinal lymph node showed a subcapsular 1 mm focus of cells.  4. She was referred by Dr. Celis. Dr. Perez felt she has a >90% risk of recurrence given stage IIIC disease and recommends  5. Baseline labs are normal. PET-CT obtained on 9/23 negative for metastatic disease.  6. She started adjuvant monthly nivolumab 9/26/19  7. PET ct 12/2019 showing MARISA  8. CT CAP 3/2020 MARISA    Interval History:  Ilana is doing well .unfortunately she cannot swim nowadays because of the covid19 but she is riding her stationary bike.  She has no new side effects from the immunotherapy and is tolerating really well.  She has mild fatigue.  She has stable exertional shortness of breath on walking long distances.  She has history of atrial fib and is on coumadin and sotalol and diltiazem and has a pacemaker.  She follows cardiology in [place].  She is on coumadin and INR  Denies chest pain, dizziness, fever, chills. No SOB at rest.    Current Outpatient Medications   Medication Sig Dispense Refill     acetaminophen (TYLENOL) 500 MG tablet Take 1-2 tablets  (500-1,000 mg) by mouth every 6 hours as needed for mild pain 50 tablet 0     ALOE PO Take by mouth daily as needed       bacitracin 500 UNIT/GM OINT Apply topically 2 times daily (Patient not taking: Reported on 8/13/2019) 1 Tube 0     calcium carbonate (OS-RENEA) 1500 (600 Ca) MG tablet Take 600 mg by mouth daily       cetirizine-pseudoePHEDrine ER (ZYRTEC-D) 5-120 MG 12 hr tablet Take 1 tablet by mouth every morning        CONTOUR NEXT EZ (CONTOUR NEXT EZ W/DEVICE KIT) w/Device KIT See Admin Instructions  0     diltiazem ER COATED BEADS (CARDIZEM CD/CARTIA XT) 120 MG 24 hr capsule Take 120 mg by mouth       furosemide (LASIX) 20 MG tablet Take 20 mg by mouth daily as needed (SWELLING)       levothyroxine (SYNTHROID/LEVOTHROID) 175 MCG tablet Take 175 mcg by mouth every morning        lisinopril (PRINIVIL/ZESTRIL) 5 MG tablet Take 5 mg by mouth every morning        metFORMIN (GLUCOPHAGE-XR) 500 MG 24 hr tablet Take 500 mg by mouth every morning        multivitamin w/minerals (THERA-VIT-M) tablet Take 1 tablet by mouth daily       naproxen (NAPROSYN) 500 MG tablet Take 500 mg by mouth every morning        oxyCODONE (ROXICODONE) 5 MG tablet Take 1 tablet (5 mg) by mouth every 4 hours as needed for breakthrough pain (Patient not taking: Reported on 8/13/2019) 15 tablet 0     pantoprazole (PROTONIX) 40 MG EC tablet Take 40 mg by mouth every morning        senna-docusate (SENOKOT-S/PERICOLACE) 8.6-50 MG tablet Take 1-2 tablets by mouth 2 times daily as needed for constipation (Patient not taking: Reported on 8/13/2019) 60 tablet 0     simvastatin (ZOCOR) 40 MG tablet Take 40 mg by mouth At Bedtime        sotalol (BETAPACE) 160 MG tablet Take 80 mg by mouth 2 times daily        vitamin B complex with vitamin C (STRESS TAB) tablet Take 1 tablet by mouth every morning        warfarin (COUMADIN) 5 MG tablet Take 7.5mg 5 days week ,\ 5 mg Tues, Sat  Takes in the evening         Physical Examination:  General: The patient is a  "pleasant female in no acute distress.  BP (!) 149/73 (BP Location: Right arm, Patient Position: Sitting, Cuff Size: Adult Large)   Pulse 76   Temp 97.7  F (36.5  C) (Oral)   Resp 16   Ht 1.727 m (5' 7.99\")   Wt 114.4 kg (252 lb 1.6 oz)   SpO2 97%   BMI 38.34 kg/m    Wt Readings from Last 10 Encounters:   03/26/20 114.4 kg (252 lb 1.6 oz)   03/16/20 112.9 kg (249 lb)   02/17/20 113.9 kg (251 lb)   01/20/20 116.1 kg (256 lb)   12/19/19 114.9 kg (253 lb 4.8 oz)   11/25/19 113.2 kg (249 lb 8 oz)   10/24/19 113.9 kg (251 lb 1.6 oz)   09/26/19 112.4 kg (247 lb 14.4 oz)   09/09/19 111.6 kg (246 lb)   08/21/19 108.6 kg (239 lb 6.4 oz)     HEENT: EOMI, PERRL. Sclerae are anicteric. Oral mucosa is pink and moist with no lesions or thrush.   Lymph: Neck is supple with no lymphadenopathy in the cervical or supraclavicular areas.   Heart: Regular rate and rhythm.   Lungs: Clear to auscultation bilaterally.   Extremities: Trace lower extremity edema noted bilaterally.   Neuro: Cranial nerves II through XII are grossly intact.  Skin: No rashes, petechiae, or bruising noted on exposed skin.      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  she is finished 7 months of treatment and is doing well.  No immune related side effects.  Plan will be to finish 1 year of adjuvant treatment.    --Continue nivolumab every 4 weeks, move next infusion to April 13 when she sees dermatologist the same day  -- Return to clinic in 3 months to see Dr. Perez with a prior CT scan    Staffed with Dr. Perez.    Chance Mcclendon MD  Hematology Oncology fellow  [phone]    ---  I evaluated the patient and reviewed the plan of care with the patient and the Oncology Fellow. I agree with the assessment and plan documented in the clinical note.    Ms. Trent is doing well on adjuvant nivolumab and has had " no issues with toxicity. We will plan to continue with adjuvant therapy every 4 weeks for duration of 1 year. I will see her back in 3 months with CT-CAP for restaging. Multiple questions answered.    Elton Arellano M.D.   of Medicine  Hematology, Oncology and Transplantation

## 2020-03-26 NOTE — PROGRESS NOTES
MEDICAL ONCOLOGY PROGRESS NOTE  Mar 26, 2020    Reason for Visit: Stage III melanoma, status post excision    Melanoma History:  1. 6/4/2019, she had excision of the right vulvar mass in [place], and on pathology this shows malignant melanoma with ulceration, at least fausto level IV, Breslow thickness at least 5 mm, mitotic rate is 3 per mm2, TILs are present and brisk, pathologic staging pT4b.  2. 6/28/19 she had PET-CT, which showed no obvious evidence of metastatic disease.  3. 7/30/2019, she has wide local excision and sentinel biopsy (Specimen #: [id number]), which showed residual melanoma to 1.3 mm depth of invasion and margins negative. Right inguinal lymph node showed a subcapsular 1 mm focus of cells.  4. She was referred by Dr. Celis. Dr. Perez felt she has a >90% risk of recurrence given stage IIIC disease and recommends  5. Baseline labs are normal. PET-CT obtained on 9/23 negative for metastatic disease.  6. She started adjuvant monthly nivolumab 9/26/19  7. PET ct 12/2019 showing MARISA  8. CT CAP 3/2020 MARISA    Interval History:  Ilana is doing well .unfortunately she cannot swim nowadays because of the covid19 but she is riding her stationary bike.  She has no new side effects from the immunotherapy and is tolerating really well.  She has mild fatigue.  She has stable exertional shortness of breath on walking long distances.  She has history of atrial fib and is on coumadin and sotalol and diltiazem and has a pacemaker.  She follows cardiology in [place].  She is on coumadin and INR  Denies chest pain, dizziness, fever, chills. No SOB at rest.    Current Outpatient Medications   Medication Sig Dispense Refill     acetaminophen (TYLENOL) 500 MG tablet Take 1-2 tablets (500-1,000 mg) by mouth every 6 hours as needed for mild pain 50 tablet 0     ALOE PO Take by mouth daily as needed       bacitracin 500 UNIT/GM OINT Apply topically 2 times daily (Patient not taking: Reported on 8/13/2019) 1 Tube  "0     calcium carbonate (OS-RENEA) 1500 (600 Ca) MG tablet Take 600 mg by mouth daily       cetirizine-pseudoePHEDrine ER (ZYRTEC-D) 5-120 MG 12 hr tablet Take 1 tablet by mouth every morning        CONTOUR NEXT EZ (CONTOUR NEXT EZ W/DEVICE KIT) w/Device KIT See Admin Instructions  0     diltiazem ER COATED BEADS (CARDIZEM CD/CARTIA XT) 120 MG 24 hr capsule Take 120 mg by mouth       furosemide (LASIX) 20 MG tablet Take 20 mg by mouth daily as needed (SWELLING)       levothyroxine (SYNTHROID/LEVOTHROID) 175 MCG tablet Take 175 mcg by mouth every morning        lisinopril (PRINIVIL/ZESTRIL) 5 MG tablet Take 5 mg by mouth every morning        metFORMIN (GLUCOPHAGE-XR) 500 MG 24 hr tablet Take 500 mg by mouth every morning        multivitamin w/minerals (THERA-VIT-M) tablet Take 1 tablet by mouth daily       naproxen (NAPROSYN) 500 MG tablet Take 500 mg by mouth every morning        oxyCODONE (ROXICODONE) 5 MG tablet Take 1 tablet (5 mg) by mouth every 4 hours as needed for breakthrough pain (Patient not taking: Reported on 8/13/2019) 15 tablet 0     pantoprazole (PROTONIX) 40 MG EC tablet Take 40 mg by mouth every morning        senna-docusate (SENOKOT-S/PERICOLACE) 8.6-50 MG tablet Take 1-2 tablets by mouth 2 times daily as needed for constipation (Patient not taking: Reported on 8/13/2019) 60 tablet 0     simvastatin (ZOCOR) 40 MG tablet Take 40 mg by mouth At Bedtime        sotalol (BETAPACE) 160 MG tablet Take 80 mg by mouth 2 times daily        vitamin B complex with vitamin C (STRESS TAB) tablet Take 1 tablet by mouth every morning        warfarin (COUMADIN) 5 MG tablet Take 7.5mg 5 days week ,\ 5 mg Tues, Sat  Takes in the evening         Physical Examination:  General: The patient is a pleasant female in no acute distress.  BP (!) 149/73 (BP Location: Right arm, Patient Position: Sitting, Cuff Size: Adult Large)   Pulse 76   Temp 97.7  F (36.5  C) (Oral)   Resp 16   Ht 1.727 m (5' 7.99\")   Wt 114.4 kg (252 " lb 1.6 oz)   SpO2 97%   BMI 38.34 kg/m    Wt Readings from Last 10 Encounters:   03/26/20 114.4 kg (252 lb 1.6 oz)   03/16/20 112.9 kg (249 lb)   02/17/20 113.9 kg (251 lb)   01/20/20 116.1 kg (256 lb)   12/19/19 114.9 kg (253 lb 4.8 oz)   11/25/19 113.2 kg (249 lb 8 oz)   10/24/19 113.9 kg (251 lb 1.6 oz)   09/26/19 112.4 kg (247 lb 14.4 oz)   09/09/19 111.6 kg (246 lb)   08/21/19 108.6 kg (239 lb 6.4 oz)     HEENT: EOMI, PERRL. Sclerae are anicteric. Oral mucosa is pink and moist with no lesions or thrush.   Lymph: Neck is supple with no lymphadenopathy in the cervical or supraclavicular areas.   Heart: Regular rate and rhythm.   Lungs: Clear to auscultation bilaterally.   Extremities: Trace lower extremity edema noted bilaterally.   Neuro: Cranial nerves II through XII are grossly intact.  Skin: No rashes, petechiae, or bruising noted on exposed skin.      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  she is finished 7 months of treatment and is doing well.  No immune related side effects.  Plan will be to finish 1 year of adjuvant treatment.    --Continue nivolumab every 4 weeks, move next infusion to April 13 when she sees dermatologist the same day  -- Return to clinic in 3 months to see Dr. Perez with a prior CT scan    Staffed with Dr. Perez.    Chance Mcclendon MD  Hematology Oncology fellow  [phone]    ---  I evaluated the patient and reviewed the plan of care with the patient and the Oncology Fellow. I agree with the assessment and plan documented in the clinical note.    Ms. Trent is doing well on adjuvant nivolumab and has had no issues with toxicity. We will plan to continue with adjuvant therapy every 4 weeks for duration of 1 year. I will see her back in 3 months with CT-CAP for restaging. Multiple questions answered.    Elton Adamsy,  M.D.   of Medicine  Hematology, Oncology and Transplantation

## 2020-03-26 NOTE — NURSING NOTE
Chief Complaint   Patient presents with     Blood Draw     Labs drawn via  by RN in lab. VS taken. Patient checked in for next appt.     Labs collected from venipuncture by RN. Vitals taken. Checked in for appointment.    Ashley Monahan RN

## 2020-04-10 DIAGNOSIS — C43.9 MELANOMA OF SKIN (H): ICD-10-CM

## 2020-04-10 DIAGNOSIS — C51.9 MALIGNANT MELANOMA OF VULVA (H): Primary | ICD-10-CM

## 2020-04-10 RX ORDER — EPINEPHRINE 0.3 MG/.3ML
0.3 INJECTION SUBCUTANEOUS EVERY 5 MIN PRN
Status: CANCELLED | OUTPATIENT
Start: 2020-04-13

## 2020-04-10 RX ORDER — LORAZEPAM 2 MG/ML
0.5 INJECTION INTRAMUSCULAR EVERY 4 HOURS PRN
Status: CANCELLED
Start: 2020-04-13

## 2020-04-10 RX ORDER — ALBUTEROL SULFATE 90 UG/1
1-2 AEROSOL, METERED RESPIRATORY (INHALATION)
Status: CANCELLED
Start: 2020-04-13

## 2020-04-10 RX ORDER — METHYLPREDNISOLONE SODIUM SUCCINATE 125 MG/2ML
125 INJECTION, POWDER, LYOPHILIZED, FOR SOLUTION INTRAMUSCULAR; INTRAVENOUS
Status: CANCELLED
Start: 2020-04-13

## 2020-04-10 RX ORDER — SODIUM CHLORIDE 9 MG/ML
1000 INJECTION, SOLUTION INTRAVENOUS CONTINUOUS PRN
Status: CANCELLED
Start: 2020-04-13

## 2020-04-10 RX ORDER — ALBUTEROL SULFATE 0.83 MG/ML
2.5 SOLUTION RESPIRATORY (INHALATION)
Status: CANCELLED | OUTPATIENT
Start: 2020-04-13

## 2020-04-10 RX ORDER — EPINEPHRINE 1 MG/ML
0.3 INJECTION, SOLUTION INTRAMUSCULAR; SUBCUTANEOUS EVERY 5 MIN PRN
Status: CANCELLED | OUTPATIENT
Start: 2020-04-13

## 2020-04-10 RX ORDER — MEPERIDINE HYDROCHLORIDE 25 MG/ML
25 INJECTION INTRAMUSCULAR; INTRAVENOUS; SUBCUTANEOUS EVERY 30 MIN PRN
Status: CANCELLED | OUTPATIENT
Start: 2020-04-13

## 2020-04-10 RX ORDER — NALOXONE HYDROCHLORIDE 0.4 MG/ML
.1-.4 INJECTION, SOLUTION INTRAMUSCULAR; INTRAVENOUS; SUBCUTANEOUS
Status: CANCELLED | OUTPATIENT
Start: 2020-04-13

## 2020-04-10 RX ORDER — DIPHENHYDRAMINE HYDROCHLORIDE 50 MG/ML
50 INJECTION INTRAMUSCULAR; INTRAVENOUS
Status: CANCELLED
Start: 2020-04-13

## 2020-04-13 ENCOUNTER — INFUSION THERAPY VISIT (OUTPATIENT)
Dept: ONCOLOGY | Facility: CLINIC | Age: 70
End: 2020-04-13
Attending: INTERNAL MEDICINE
Payer: COMMERCIAL

## 2020-04-13 ENCOUNTER — TELEPHONE (OUTPATIENT)
Dept: DERMATOLOGY | Facility: CLINIC | Age: 70
End: 2020-04-13

## 2020-04-13 ENCOUNTER — APPOINTMENT (OUTPATIENT)
Dept: LAB | Facility: CLINIC | Age: 70
End: 2020-04-13
Attending: INTERNAL MEDICINE
Payer: COMMERCIAL

## 2020-04-13 VITALS
WEIGHT: 251.8 LBS | TEMPERATURE: 98.1 F | DIASTOLIC BLOOD PRESSURE: 75 MMHG | RESPIRATION RATE: 20 BRPM | BODY MASS INDEX: 38.3 KG/M2 | OXYGEN SATURATION: 96 % | SYSTOLIC BLOOD PRESSURE: 142 MMHG | HEART RATE: 70 BPM

## 2020-04-13 DIAGNOSIS — C51.9 MALIGNANT MELANOMA OF VULVA (H): ICD-10-CM

## 2020-04-13 DIAGNOSIS — C43.9 MELANOMA OF SKIN (H): Primary | ICD-10-CM

## 2020-04-13 DIAGNOSIS — E03.2 HYPOTHYROIDISM DUE TO MEDICATION: ICD-10-CM

## 2020-04-13 LAB
ALBUMIN SERPL-MCNC: 4 G/DL (ref 3.4–5)
ALP SERPL-CCNC: 78 U/L (ref 40–150)
ALT SERPL W P-5'-P-CCNC: 30 U/L (ref 0–50)
ANION GAP SERPL CALCULATED.3IONS-SCNC: 5 MMOL/L (ref 3–14)
AST SERPL W P-5'-P-CCNC: 35 U/L (ref 0–45)
BASOPHILS # BLD AUTO: 0.1 10E9/L (ref 0–0.2)
BASOPHILS NFR BLD AUTO: 0.8 %
BILIRUB SERPL-MCNC: 0.5 MG/DL (ref 0.2–1.3)
BUN SERPL-MCNC: 26 MG/DL (ref 7–30)
CALCIUM SERPL-MCNC: 8.7 MG/DL (ref 8.5–10.1)
CHLORIDE SERPL-SCNC: 109 MMOL/L (ref 94–109)
CO2 SERPL-SCNC: 25 MMOL/L (ref 20–32)
CREAT SERPL-MCNC: 0.95 MG/DL (ref 0.52–1.04)
DIFFERENTIAL METHOD BLD: ABNORMAL
EOSINOPHIL # BLD AUTO: 0.2 10E9/L (ref 0–0.7)
EOSINOPHIL NFR BLD AUTO: 1.9 %
ERYTHROCYTE [DISTWIDTH] IN BLOOD BY AUTOMATED COUNT: 15.9 % (ref 10–15)
GFR SERPL CREATININE-BSD FRML MDRD: 61 ML/MIN/{1.73_M2}
GLUCOSE SERPL-MCNC: 124 MG/DL (ref 70–99)
HCT VFR BLD AUTO: 37.8 % (ref 35–47)
HGB BLD-MCNC: 11.1 G/DL (ref 11.7–15.7)
IMM GRANULOCYTES # BLD: 0 10E9/L (ref 0–0.4)
IMM GRANULOCYTES NFR BLD: 0.2 %
LYMPHOCYTES # BLD AUTO: 2.4 10E9/L (ref 0.8–5.3)
LYMPHOCYTES NFR BLD AUTO: 27 %
MCH RBC QN AUTO: 23.3 PG (ref 26.5–33)
MCHC RBC AUTO-ENTMCNC: 29.4 G/DL (ref 31.5–36.5)
MCV RBC AUTO: 79 FL (ref 78–100)
MONOCYTES # BLD AUTO: 0.7 10E9/L (ref 0–1.3)
MONOCYTES NFR BLD AUTO: 8.4 %
NEUTROPHILS # BLD AUTO: 5.4 10E9/L (ref 1.6–8.3)
NEUTROPHILS NFR BLD AUTO: 61.7 %
NRBC # BLD AUTO: 0 10*3/UL
NRBC BLD AUTO-RTO: 0 /100
PLATELET # BLD AUTO: 285 10E9/L (ref 150–450)
POTASSIUM SERPL-SCNC: 5 MMOL/L (ref 3.4–5.3)
PROT SERPL-MCNC: 7.5 G/DL (ref 6.8–8.8)
RBC # BLD AUTO: 4.77 10E12/L (ref 3.8–5.2)
SODIUM SERPL-SCNC: 139 MMOL/L (ref 133–144)
TSH SERPL DL<=0.005 MIU/L-ACNC: 1.17 MU/L (ref 0.4–4)
WBC # BLD AUTO: 8.7 10E9/L (ref 4–11)

## 2020-04-13 PROCEDURE — 96413 CHEMO IV INFUSION 1 HR: CPT

## 2020-04-13 PROCEDURE — 25000128 H RX IP 250 OP 636: Mod: ZF | Performed by: INTERNAL MEDICINE

## 2020-04-13 PROCEDURE — 84443 ASSAY THYROID STIM HORMONE: CPT | Performed by: STUDENT IN AN ORGANIZED HEALTH CARE EDUCATION/TRAINING PROGRAM

## 2020-04-13 PROCEDURE — 25800030 ZZH RX IP 258 OP 636: Mod: ZF | Performed by: INTERNAL MEDICINE

## 2020-04-13 PROCEDURE — 80053 COMPREHEN METABOLIC PANEL: CPT | Performed by: STUDENT IN AN ORGANIZED HEALTH CARE EDUCATION/TRAINING PROGRAM

## 2020-04-13 PROCEDURE — 85025 COMPLETE CBC W/AUTO DIFF WBC: CPT | Performed by: STUDENT IN AN ORGANIZED HEALTH CARE EDUCATION/TRAINING PROGRAM

## 2020-04-13 RX ADMIN — SODIUM CHLORIDE 480 MG: 9 INJECTION, SOLUTION INTRAVENOUS at 15:53

## 2020-04-13 ASSESSMENT — PAIN SCALES - GENERAL: PAINLEVEL: NO PAIN (0)

## 2020-04-13 NOTE — PROGRESS NOTES
Infusion Nursing Note:  Ilana Trent presents today for Cycle 8 Nivolumab.    Patient seen by provider today: No   present during visit today: Not Applicable.    Note: Pt arrives to infusion today feeling well. No new complaints or concerns at this time.     Intravenous Access:  Peripheral IV placed.    Treatment Conditions:  Lab Results   Component Value Date    HGB 11.1 04/13/2020     Lab Results   Component Value Date    WBC 8.7 04/13/2020      Lab Results   Component Value Date    ANEU 5.4 04/13/2020     Lab Results   Component Value Date     04/13/2020      Lab Results   Component Value Date     04/13/2020                   Lab Results   Component Value Date    POTASSIUM 5.0 04/13/2020       Lab Results   Component Value Date    CR 0.95 04/13/2020                   Lab Results   Component Value Date    RENEA 8.7 04/13/2020                Lab Results   Component Value Date    BILITOTAL 0.5 04/13/2020           Lab Results   Component Value Date    ALBUMIN 4.0 04/13/2020                    Lab Results   Component Value Date    ALT 30 04/13/2020           Lab Results   Component Value Date    AST 35 04/13/2020     Results reviewed, labs MET treatment parameters, ok to proceed with treatment.    Post Infusion Assessment:  Patient tolerated infusion without incident.  Blood return noted pre and post infusion.  Site patent and intact, free from redness, edema or discomfort.  No evidence of extravasations.  Access discontinued per protocol.     Discharge Plan:   Patient declined prescription refills.  AVS to patient via [employer].  Patient will return 5/11 for next appointment.   Patient discharged in stable condition accompanied by: self.  Departure Mode: Ambulatory.  Face to Face time: 0.    Lissette Carmona RN

## 2020-04-13 NOTE — TELEPHONE ENCOUNTER
Called pt and LVM. Infusion called and said she is unable to make her 3:45 appointment since she just started the infusion and it will take an hour. I wanted to reschedule her an in person appointment soon. Clinic number provided.  MARILOU Jain

## 2020-05-06 DIAGNOSIS — C43.9 MELANOMA OF SKIN (H): Primary | ICD-10-CM

## 2020-05-06 DIAGNOSIS — C51.9 MALIGNANT MELANOMA OF VULVA (H): ICD-10-CM

## 2020-05-06 RX ORDER — METHYLPREDNISOLONE SODIUM SUCCINATE 125 MG/2ML
125 INJECTION, POWDER, LYOPHILIZED, FOR SOLUTION INTRAMUSCULAR; INTRAVENOUS
Status: CANCELLED
Start: 2020-05-15

## 2020-05-06 RX ORDER — DIPHENHYDRAMINE HYDROCHLORIDE 50 MG/ML
50 INJECTION INTRAMUSCULAR; INTRAVENOUS
Status: CANCELLED
Start: 2020-05-15

## 2020-05-06 RX ORDER — EPINEPHRINE 0.3 MG/.3ML
0.3 INJECTION SUBCUTANEOUS EVERY 5 MIN PRN
Status: CANCELLED | OUTPATIENT
Start: 2020-05-15

## 2020-05-06 RX ORDER — ALBUTEROL SULFATE 90 UG/1
1-2 AEROSOL, METERED RESPIRATORY (INHALATION)
Status: CANCELLED
Start: 2020-05-15

## 2020-05-06 RX ORDER — ALBUTEROL SULFATE 0.83 MG/ML
2.5 SOLUTION RESPIRATORY (INHALATION)
Status: CANCELLED | OUTPATIENT
Start: 2020-05-15

## 2020-05-06 RX ORDER — EPINEPHRINE 1 MG/ML
0.3 INJECTION, SOLUTION INTRAMUSCULAR; SUBCUTANEOUS EVERY 5 MIN PRN
Status: CANCELLED | OUTPATIENT
Start: 2020-05-15

## 2020-05-06 RX ORDER — MEPERIDINE HYDROCHLORIDE 25 MG/ML
25 INJECTION INTRAMUSCULAR; INTRAVENOUS; SUBCUTANEOUS EVERY 30 MIN PRN
Status: CANCELLED | OUTPATIENT
Start: 2020-05-15

## 2020-05-06 RX ORDER — SODIUM CHLORIDE 9 MG/ML
1000 INJECTION, SOLUTION INTRAVENOUS CONTINUOUS PRN
Status: CANCELLED
Start: 2020-05-15

## 2020-05-06 RX ORDER — LORAZEPAM 2 MG/ML
0.5 INJECTION INTRAMUSCULAR EVERY 4 HOURS PRN
Status: CANCELLED
Start: 2020-05-15

## 2020-05-06 RX ORDER — NALOXONE HYDROCHLORIDE 0.4 MG/ML
.1-.4 INJECTION, SOLUTION INTRAMUSCULAR; INTRAVENOUS; SUBCUTANEOUS
Status: CANCELLED | OUTPATIENT
Start: 2020-05-15

## 2020-05-11 ENCOUNTER — APPOINTMENT (OUTPATIENT)
Dept: LAB | Facility: CLINIC | Age: 70
End: 2020-05-11
Attending: INTERNAL MEDICINE
Payer: COMMERCIAL

## 2020-05-11 ENCOUNTER — INFUSION THERAPY VISIT (OUTPATIENT)
Dept: ONCOLOGY | Facility: CLINIC | Age: 70
End: 2020-05-11
Attending: INTERNAL MEDICINE
Payer: COMMERCIAL

## 2020-05-11 VITALS
OXYGEN SATURATION: 97 % | HEART RATE: 67 BPM | RESPIRATION RATE: 18 BRPM | SYSTOLIC BLOOD PRESSURE: 135 MMHG | DIASTOLIC BLOOD PRESSURE: 57 MMHG | TEMPERATURE: 98.7 F | WEIGHT: 251.4 LBS | BODY MASS INDEX: 38.24 KG/M2

## 2020-05-11 DIAGNOSIS — C51.9 MALIGNANT MELANOMA OF VULVA (H): ICD-10-CM

## 2020-05-11 DIAGNOSIS — C43.9 MELANOMA OF SKIN (H): Primary | ICD-10-CM

## 2020-05-11 LAB
ALBUMIN SERPL-MCNC: 3.7 G/DL (ref 3.4–5)
ALP SERPL-CCNC: 77 U/L (ref 40–150)
ALT SERPL W P-5'-P-CCNC: 28 U/L (ref 0–50)
ANION GAP SERPL CALCULATED.3IONS-SCNC: 8 MMOL/L (ref 3–14)
AST SERPL W P-5'-P-CCNC: 13 U/L (ref 0–45)
BASOPHILS # BLD AUTO: 0.1 10E9/L (ref 0–0.2)
BASOPHILS NFR BLD AUTO: 0.7 %
BILIRUB SERPL-MCNC: 0.4 MG/DL (ref 0.2–1.3)
BUN SERPL-MCNC: 26 MG/DL (ref 7–30)
CALCIUM SERPL-MCNC: 8.6 MG/DL (ref 8.5–10.1)
CHLORIDE SERPL-SCNC: 108 MMOL/L (ref 94–109)
CO2 SERPL-SCNC: 24 MMOL/L (ref 20–32)
CREAT SERPL-MCNC: 0.97 MG/DL (ref 0.52–1.04)
DIFFERENTIAL METHOD BLD: ABNORMAL
EOSINOPHIL # BLD AUTO: 0.2 10E9/L (ref 0–0.7)
EOSINOPHIL NFR BLD AUTO: 2.8 %
ERYTHROCYTE [DISTWIDTH] IN BLOOD BY AUTOMATED COUNT: 16.6 % (ref 10–15)
GFR SERPL CREATININE-BSD FRML MDRD: 60 ML/MIN/{1.73_M2}
GLUCOSE SERPL-MCNC: 119 MG/DL (ref 70–99)
HCT VFR BLD AUTO: 32.6 % (ref 35–47)
HGB BLD-MCNC: 9.8 G/DL (ref 11.7–15.7)
IMM GRANULOCYTES # BLD: 0 10E9/L (ref 0–0.4)
IMM GRANULOCYTES NFR BLD: 0.3 %
LYMPHOCYTES # BLD AUTO: 2.2 10E9/L (ref 0.8–5.3)
LYMPHOCYTES NFR BLD AUTO: 25.9 %
MCH RBC QN AUTO: 23.3 PG (ref 26.5–33)
MCHC RBC AUTO-ENTMCNC: 30.1 G/DL (ref 31.5–36.5)
MCV RBC AUTO: 78 FL (ref 78–100)
MONOCYTES # BLD AUTO: 0.9 10E9/L (ref 0–1.3)
MONOCYTES NFR BLD AUTO: 10 %
NEUTROPHILS # BLD AUTO: 5.2 10E9/L (ref 1.6–8.3)
NEUTROPHILS NFR BLD AUTO: 60.3 %
NRBC # BLD AUTO: 0 10*3/UL
NRBC BLD AUTO-RTO: 0 /100
PLATELET # BLD AUTO: 275 10E9/L (ref 150–450)
POTASSIUM SERPL-SCNC: 4.5 MMOL/L (ref 3.4–5.3)
PROT SERPL-MCNC: 7 G/DL (ref 6.8–8.8)
RBC # BLD AUTO: 4.2 10E12/L (ref 3.8–5.2)
SODIUM SERPL-SCNC: 139 MMOL/L (ref 133–144)
TSH SERPL DL<=0.005 MIU/L-ACNC: 1.55 MU/L (ref 0.4–4)
WBC # BLD AUTO: 8.7 10E9/L (ref 4–11)

## 2020-05-11 PROCEDURE — 25000128 H RX IP 250 OP 636: Mod: ZF | Performed by: INTERNAL MEDICINE

## 2020-05-11 PROCEDURE — 85025 COMPLETE CBC W/AUTO DIFF WBC: CPT | Performed by: INTERNAL MEDICINE

## 2020-05-11 PROCEDURE — 25800030 ZZH RX IP 258 OP 636: Mod: ZF | Performed by: INTERNAL MEDICINE

## 2020-05-11 PROCEDURE — 80053 COMPREHEN METABOLIC PANEL: CPT | Performed by: INTERNAL MEDICINE

## 2020-05-11 PROCEDURE — 84443 ASSAY THYROID STIM HORMONE: CPT | Performed by: INTERNAL MEDICINE

## 2020-05-11 PROCEDURE — 96413 CHEMO IV INFUSION 1 HR: CPT

## 2020-05-11 RX ADMIN — SODIUM CHLORIDE 480 MG: 9 INJECTION, SOLUTION INTRAVENOUS at 16:13

## 2020-05-11 ASSESSMENT — PAIN SCALES - GENERAL: PAINLEVEL: NO PAIN (0)

## 2020-05-11 NOTE — PROGRESS NOTES
Infusion Nursing Note:  Ilana Trent presents today for Cycle 9 Day 1 Opdivo.    Patient seen by provider today: No   present during visit today: Not Applicable.    Note: Patient feels well today.  Patient denies fever, cough, and shortness of breath.    Intravenous Access:  Peripheral IV placed in lab.    Treatment Conditions:  Lab Results   Component Value Date    HGB 9.8 05/11/2020     Lab Results   Component Value Date    WBC 8.7 05/11/2020      Lab Results   Component Value Date    ANEU 5.2 05/11/2020     Lab Results   Component Value Date     05/11/2020      Lab Results   Component Value Date     05/11/2020                   Lab Results   Component Value Date    POTASSIUM 4.5 05/11/2020             Lab Results   Component Value Date    CR 0.97 05/11/2020                   Lab Results   Component Value Date    RENEA 8.6 05/11/2020                Lab Results   Component Value Date    BILITOTAL 0.4 05/11/2020           Lab Results   Component Value Date    ALBUMIN 3.7 05/11/2020                    Lab Results   Component Value Date    ALT 28 05/11/2020           Lab Results   Component Value Date    AST 13 05/11/2020       Results reviewed, labs MET treatment parameters, ok to proceed with treatment.      Post Infusion Assessment:  Patient tolerated infusion without incident.  Blood return noted pre and post infusion.  Site patent and intact, free from redness, edema or discomfort.  No evidence of extravasations.  Access discontinued per protocol.       Discharge Plan:   Patient declined prescription refills.  Discharge instructions reviewed with: Patient.  Patient and/or family verbalized understanding of discharge instructions and all questions answered.  AVS to patient via [employer].  Patient needs to have appointment on 6/8/2020.  Inbasket sent to schedule appointment.  Patient knows to call if she does not hear about upcoming appointment.  Patient discharged in stable condition  accompanied by: self.  Departure Mode: Ambulatory.    Alix Le RN

## 2020-05-11 NOTE — NURSING NOTE
Chief Complaint   Patient presents with     Blood Draw     IV placement  with blood draw and vitals     Labs drawn via IV placed by Xochilt Olson in right arm

## 2020-06-05 DIAGNOSIS — C51.9 MALIGNANT MELANOMA OF VULVA (H): ICD-10-CM

## 2020-06-05 DIAGNOSIS — C43.9 MELANOMA OF SKIN (H): Primary | ICD-10-CM

## 2020-06-05 RX ORDER — LORAZEPAM 2 MG/ML
0.5 INJECTION INTRAMUSCULAR EVERY 4 HOURS PRN
Status: CANCELLED
Start: 2020-06-08

## 2020-06-05 RX ORDER — EPINEPHRINE 0.3 MG/.3ML
0.3 INJECTION SUBCUTANEOUS EVERY 5 MIN PRN
Status: CANCELLED | OUTPATIENT
Start: 2020-06-08

## 2020-06-05 RX ORDER — ALBUTEROL SULFATE 90 UG/1
1-2 AEROSOL, METERED RESPIRATORY (INHALATION)
Status: CANCELLED
Start: 2020-06-08

## 2020-06-05 RX ORDER — SODIUM CHLORIDE 9 MG/ML
1000 INJECTION, SOLUTION INTRAVENOUS CONTINUOUS PRN
Status: CANCELLED
Start: 2020-06-08

## 2020-06-05 RX ORDER — ALBUTEROL SULFATE 0.83 MG/ML
2.5 SOLUTION RESPIRATORY (INHALATION)
Status: CANCELLED | OUTPATIENT
Start: 2020-06-08

## 2020-06-05 RX ORDER — EPINEPHRINE 1 MG/ML
0.3 INJECTION, SOLUTION INTRAMUSCULAR; SUBCUTANEOUS EVERY 5 MIN PRN
Status: CANCELLED | OUTPATIENT
Start: 2020-06-08

## 2020-06-05 RX ORDER — DIPHENHYDRAMINE HYDROCHLORIDE 50 MG/ML
50 INJECTION INTRAMUSCULAR; INTRAVENOUS
Status: CANCELLED
Start: 2020-06-08

## 2020-06-05 RX ORDER — MEPERIDINE HYDROCHLORIDE 25 MG/ML
25 INJECTION INTRAMUSCULAR; INTRAVENOUS; SUBCUTANEOUS EVERY 30 MIN PRN
Status: CANCELLED | OUTPATIENT
Start: 2020-06-08

## 2020-06-05 RX ORDER — NALOXONE HYDROCHLORIDE 0.4 MG/ML
.1-.4 INJECTION, SOLUTION INTRAMUSCULAR; INTRAVENOUS; SUBCUTANEOUS
Status: CANCELLED | OUTPATIENT
Start: 2020-06-08

## 2020-06-05 RX ORDER — METHYLPREDNISOLONE SODIUM SUCCINATE 125 MG/2ML
125 INJECTION, POWDER, LYOPHILIZED, FOR SOLUTION INTRAMUSCULAR; INTRAVENOUS
Status: CANCELLED
Start: 2020-06-08

## 2020-06-08 ENCOUNTER — INFUSION THERAPY VISIT (OUTPATIENT)
Dept: ONCOLOGY | Facility: CLINIC | Age: 70
End: 2020-06-08
Payer: COMMERCIAL

## 2020-06-08 ENCOUNTER — APPOINTMENT (OUTPATIENT)
Dept: LAB | Facility: CLINIC | Age: 70
End: 2020-06-08
Payer: COMMERCIAL

## 2020-06-08 VITALS
HEART RATE: 71 BPM | WEIGHT: 251.2 LBS | OXYGEN SATURATION: 99 % | TEMPERATURE: 98.6 F | DIASTOLIC BLOOD PRESSURE: 64 MMHG | BODY MASS INDEX: 38.21 KG/M2 | SYSTOLIC BLOOD PRESSURE: 113 MMHG | RESPIRATION RATE: 18 BRPM

## 2020-06-08 DIAGNOSIS — C51.9 MALIGNANT MELANOMA OF VULVA (H): ICD-10-CM

## 2020-06-08 DIAGNOSIS — C43.9 MELANOMA OF SKIN (H): Primary | ICD-10-CM

## 2020-06-08 LAB
ALBUMIN SERPL-MCNC: 3.8 G/DL (ref 3.4–5)
ALP SERPL-CCNC: 85 U/L (ref 40–150)
ALT SERPL W P-5'-P-CCNC: 28 U/L (ref 0–50)
ANION GAP SERPL CALCULATED.3IONS-SCNC: 7 MMOL/L (ref 3–14)
AST SERPL W P-5'-P-CCNC: 13 U/L (ref 0–45)
BASOPHILS # BLD AUTO: 0.1 10E9/L (ref 0–0.2)
BASOPHILS NFR BLD AUTO: 0.6 %
BILIRUB SERPL-MCNC: 0.5 MG/DL (ref 0.2–1.3)
BUN SERPL-MCNC: 24 MG/DL (ref 7–30)
CALCIUM SERPL-MCNC: 8.7 MG/DL (ref 8.5–10.1)
CHLORIDE SERPL-SCNC: 102 MMOL/L (ref 94–109)
CO2 SERPL-SCNC: 28 MMOL/L (ref 20–32)
CREAT SERPL-MCNC: 1.27 MG/DL (ref 0.52–1.04)
DIFFERENTIAL METHOD BLD: ABNORMAL
EOSINOPHIL # BLD AUTO: 0.2 10E9/L (ref 0–0.7)
EOSINOPHIL NFR BLD AUTO: 2.1 %
ERYTHROCYTE [DISTWIDTH] IN BLOOD BY AUTOMATED COUNT: 16.3 % (ref 10–15)
GFR SERPL CREATININE-BSD FRML MDRD: 43 ML/MIN/{1.73_M2}
GLUCOSE SERPL-MCNC: 95 MG/DL (ref 70–99)
HCT VFR BLD AUTO: 34 % (ref 35–47)
HGB BLD-MCNC: 10.3 G/DL (ref 11.7–15.7)
IMM GRANULOCYTES # BLD: 0 10E9/L (ref 0–0.4)
IMM GRANULOCYTES NFR BLD: 0.4 %
LYMPHOCYTES # BLD AUTO: 2.5 10E9/L (ref 0.8–5.3)
LYMPHOCYTES NFR BLD AUTO: 24.2 %
MCH RBC QN AUTO: 23.4 PG (ref 26.5–33)
MCHC RBC AUTO-ENTMCNC: 30.3 G/DL (ref 31.5–36.5)
MCV RBC AUTO: 77 FL (ref 78–100)
MONOCYTES # BLD AUTO: 1 10E9/L (ref 0–1.3)
MONOCYTES NFR BLD AUTO: 10 %
NEUTROPHILS # BLD AUTO: 6.4 10E9/L (ref 1.6–8.3)
NEUTROPHILS NFR BLD AUTO: 62.7 %
NRBC # BLD AUTO: 0 10*3/UL
NRBC BLD AUTO-RTO: 0 /100
PLATELET # BLD AUTO: 311 10E9/L (ref 150–450)
POTASSIUM SERPL-SCNC: 4.2 MMOL/L (ref 3.4–5.3)
PROT SERPL-MCNC: 7.4 G/DL (ref 6.8–8.8)
RBC # BLD AUTO: 4.41 10E12/L (ref 3.8–5.2)
SODIUM SERPL-SCNC: 136 MMOL/L (ref 133–144)
T4 FREE SERPL-MCNC: 1.32 NG/DL (ref 0.76–1.46)
TSH SERPL DL<=0.005 MIU/L-ACNC: 4.36 MU/L (ref 0.4–4)
WBC # BLD AUTO: 10.1 10E9/L (ref 4–11)

## 2020-06-08 PROCEDURE — 85025 COMPLETE CBC W/AUTO DIFF WBC: CPT | Performed by: INTERNAL MEDICINE

## 2020-06-08 PROCEDURE — 36592 COLLECT BLOOD FROM PICC: CPT

## 2020-06-08 PROCEDURE — 84443 ASSAY THYROID STIM HORMONE: CPT | Performed by: INTERNAL MEDICINE

## 2020-06-08 PROCEDURE — 84439 ASSAY OF FREE THYROXINE: CPT | Performed by: INTERNAL MEDICINE

## 2020-06-08 PROCEDURE — 80053 COMPREHEN METABOLIC PANEL: CPT | Performed by: INTERNAL MEDICINE

## 2020-06-08 ASSESSMENT — PAIN SCALES - GENERAL: PAINLEVEL: NO PAIN (0)

## 2020-06-08 NOTE — PROGRESS NOTES
Chief Complaint   Patient presents with     Blood Draw     VItals, blood drawn and PIV placed by LPN. Pt checked into appt.      VENKATA Saunders LPN

## 2020-06-08 NOTE — PROGRESS NOTES
Infusion Nursing Note:  Ilana Trent presents today for Cycle 10 Day 1 Nivolumab--DEFERRED.    Patient seen by provider today: No   present during visit today: Not Applicable.    Note: Patient feels well today.  Denies cough, shortness of breath, and fever.  Creatinine up from baseline.  Patient states that she might have drank a little less than normal over the weekend.  TSH 4.36.  Reviewed patient with Dr. Perez.    TORB Dr. Perez/Alix eL, RN at 1605 on 6/8/2020  No intervention needed for TSH.  Defer Nivolumab until Thursday.  Patient to push fluids at home.  No need for additional IV fluids today in the infusion center.    Intravenous Access:  Peripheral IV placed in lab.    Treatment Conditions:  Lab Results   Component Value Date    HGB 10.3 06/08/2020     Lab Results   Component Value Date    WBC 10.1 06/08/2020      Lab Results   Component Value Date    ANEU 6.4 06/08/2020     Lab Results   Component Value Date     06/08/2020      Lab Results   Component Value Date     06/08/2020                   Lab Results   Component Value Date    POTASSIUM 4.2 06/08/2020           Lab Results   Component Value Date    MAG 1.7 07/31/2019            Lab Results   Component Value Date    CR 1.27 06/08/2020                   Lab Results   Component Value Date    RENEA 8.7 06/08/2020                Lab Results   Component Value Date    BILITOTAL 0.5 06/08/2020           Lab Results   Component Value Date    ALBUMIN 3.8 06/08/2020                    Lab Results   Component Value Date    ALT 28 06/08/2020           Lab Results   Component Value Date    AST 13 06/08/2020         Post Infusion Assessment:  Access discontinued per protocol.       Discharge Plan:   Patient declined prescription refills.  Patient and/or family verbalized understanding of discharge instructions and all questions answered.  AVS to patient via [employer].  Patient will return 6/11/2020 for next appointment. Patient left  before appointment scheduled.  She knows to watch [employer] for appointment.  Patient discharged in stable condition accompanied by: self.  Departure Mode: Ambulatory.    Alix Le RN

## 2020-06-09 DIAGNOSIS — C51.9 MALIGNANT MELANOMA OF VULVA (H): ICD-10-CM

## 2020-06-09 DIAGNOSIS — C43.9 MELANOMA OF SKIN (H): Primary | ICD-10-CM

## 2020-06-09 RX ORDER — SODIUM CHLORIDE 9 MG/ML
1000 INJECTION, SOLUTION INTRAVENOUS CONTINUOUS PRN
Status: CANCELLED
Start: 2020-06-11

## 2020-06-09 RX ORDER — NALOXONE HYDROCHLORIDE 0.4 MG/ML
.1-.4 INJECTION, SOLUTION INTRAMUSCULAR; INTRAVENOUS; SUBCUTANEOUS
Status: CANCELLED | OUTPATIENT
Start: 2020-06-11

## 2020-06-09 RX ORDER — METHYLPREDNISOLONE SODIUM SUCCINATE 125 MG/2ML
125 INJECTION, POWDER, LYOPHILIZED, FOR SOLUTION INTRAMUSCULAR; INTRAVENOUS
Status: CANCELLED
Start: 2020-06-11

## 2020-06-09 RX ORDER — ALBUTEROL SULFATE 90 UG/1
1-2 AEROSOL, METERED RESPIRATORY (INHALATION)
Status: CANCELLED
Start: 2020-06-11

## 2020-06-09 RX ORDER — LORAZEPAM 2 MG/ML
0.5 INJECTION INTRAMUSCULAR EVERY 4 HOURS PRN
Status: CANCELLED
Start: 2020-06-11

## 2020-06-09 RX ORDER — ALBUTEROL SULFATE 0.83 MG/ML
2.5 SOLUTION RESPIRATORY (INHALATION)
Status: CANCELLED | OUTPATIENT
Start: 2020-06-11

## 2020-06-09 RX ORDER — EPINEPHRINE 0.3 MG/.3ML
0.3 INJECTION SUBCUTANEOUS EVERY 5 MIN PRN
Status: CANCELLED | OUTPATIENT
Start: 2020-06-11

## 2020-06-09 RX ORDER — EPINEPHRINE 1 MG/ML
0.3 INJECTION, SOLUTION INTRAMUSCULAR; SUBCUTANEOUS EVERY 5 MIN PRN
Status: CANCELLED | OUTPATIENT
Start: 2020-06-11

## 2020-06-09 RX ORDER — MEPERIDINE HYDROCHLORIDE 25 MG/ML
25 INJECTION INTRAMUSCULAR; INTRAVENOUS; SUBCUTANEOUS EVERY 30 MIN PRN
Status: CANCELLED | OUTPATIENT
Start: 2020-06-11

## 2020-06-09 RX ORDER — DIPHENHYDRAMINE HYDROCHLORIDE 50 MG/ML
50 INJECTION INTRAMUSCULAR; INTRAVENOUS
Status: CANCELLED
Start: 2020-06-11

## 2020-06-11 ENCOUNTER — INFUSION THERAPY VISIT (OUTPATIENT)
Dept: ONCOLOGY | Facility: CLINIC | Age: 70
End: 2020-06-11
Attending: INTERNAL MEDICINE
Payer: COMMERCIAL

## 2020-06-11 ENCOUNTER — APPOINTMENT (OUTPATIENT)
Dept: LAB | Facility: CLINIC | Age: 70
End: 2020-06-11
Attending: INTERNAL MEDICINE
Payer: COMMERCIAL

## 2020-06-11 VITALS
TEMPERATURE: 98.5 F | HEART RATE: 71 BPM | SYSTOLIC BLOOD PRESSURE: 138 MMHG | DIASTOLIC BLOOD PRESSURE: 75 MMHG | BODY MASS INDEX: 39.18 KG/M2 | WEIGHT: 257.6 LBS | OXYGEN SATURATION: 98 % | RESPIRATION RATE: 18 BRPM

## 2020-06-11 DIAGNOSIS — C51.9 MALIGNANT MELANOMA OF VULVA (H): ICD-10-CM

## 2020-06-11 DIAGNOSIS — C43.9 MELANOMA OF SKIN (H): Primary | ICD-10-CM

## 2020-06-11 LAB
ALBUMIN SERPL-MCNC: 3.8 G/DL (ref 3.4–5)
ALP SERPL-CCNC: 85 U/L (ref 40–150)
ALT SERPL W P-5'-P-CCNC: 25 U/L (ref 0–50)
ANION GAP SERPL CALCULATED.3IONS-SCNC: 7 MMOL/L (ref 3–14)
AST SERPL W P-5'-P-CCNC: 16 U/L (ref 0–45)
BILIRUB SERPL-MCNC: 0.4 MG/DL (ref 0.2–1.3)
BUN SERPL-MCNC: 30 MG/DL (ref 7–30)
CALCIUM SERPL-MCNC: 9.4 MG/DL (ref 8.5–10.1)
CHLORIDE SERPL-SCNC: 107 MMOL/L (ref 94–109)
CO2 SERPL-SCNC: 24 MMOL/L (ref 20–32)
CREAT SERPL-MCNC: 1.12 MG/DL (ref 0.52–1.04)
GFR SERPL CREATININE-BSD FRML MDRD: 50 ML/MIN/{1.73_M2}
GLUCOSE SERPL-MCNC: 94 MG/DL (ref 70–99)
POTASSIUM SERPL-SCNC: 4.2 MMOL/L (ref 3.4–5.3)
PROT SERPL-MCNC: 7.2 G/DL (ref 6.8–8.8)
SODIUM SERPL-SCNC: 138 MMOL/L (ref 133–144)
T4 FREE SERPL-MCNC: 1.42 NG/DL (ref 0.76–1.46)
TSH SERPL DL<=0.005 MIU/L-ACNC: 5.98 MU/L (ref 0.4–4)

## 2020-06-11 PROCEDURE — 84439 ASSAY OF FREE THYROXINE: CPT | Performed by: INTERNAL MEDICINE

## 2020-06-11 PROCEDURE — 25800030 ZZH RX IP 258 OP 636: Mod: ZF | Performed by: INTERNAL MEDICINE

## 2020-06-11 PROCEDURE — 25000128 H RX IP 250 OP 636: Mod: ZF | Performed by: INTERNAL MEDICINE

## 2020-06-11 PROCEDURE — 80053 COMPREHEN METABOLIC PANEL: CPT | Performed by: INTERNAL MEDICINE

## 2020-06-11 PROCEDURE — 96413 CHEMO IV INFUSION 1 HR: CPT

## 2020-06-11 PROCEDURE — 84443 ASSAY THYROID STIM HORMONE: CPT | Performed by: INTERNAL MEDICINE

## 2020-06-11 RX ADMIN — SODIUM CHLORIDE 480 MG: 9 INJECTION, SOLUTION INTRAVENOUS at 16:34

## 2020-06-11 ASSESSMENT — PAIN SCALES - GENERAL: PAINLEVEL: NO PAIN (0)

## 2020-06-11 NOTE — PROGRESS NOTES
Infusion Nursing Note:  Ilana Trent presents today for cycle 10 day 1 Nivolumab.    Patient seen by provider today: No   present during visit today: Not Applicable.    Note: Pt feeling well today, no new complaints.  Pt was deferred a few days ago due to her elevated creatinine. Pt states she has been pushing water and Gatorade a ton the last few days. Creatinine decreased to 1.12 today but TSH increased.   TORB: Dr. Ami Perez/Gill KoromaRN  -ok to go ahead today, will continue to watch labs.    Intravenous Access:  Peripheral IV placed.    Treatment Conditions:  Lab Results   Component Value Date    HGB 10.3 06/08/2020     Lab Results   Component Value Date    WBC 10.1 06/08/2020      Lab Results   Component Value Date    ANEU 6.4 06/08/2020     Lab Results   Component Value Date     06/08/2020      Lab Results   Component Value Date     06/11/2020                   Lab Results   Component Value Date    POTASSIUM 4.2 06/11/2020           Lab Results   Component Value Date    MAG 1.7 07/31/2019            Lab Results   Component Value Date    CR 1.12 06/11/2020                   Lab Results   Component Value Date    RENEA 9.4 06/11/2020                Lab Results   Component Value Date    BILITOTAL 0.4 06/11/2020           Lab Results   Component Value Date    ALBUMIN 3.8 06/11/2020                    Lab Results   Component Value Date    ALT 25 06/11/2020           Lab Results   Component Value Date    AST 16 06/11/2020       Results reviewed, labs MET treatment parameters, ok to proceed with treatment.      Post Infusion Assessment:  Patient tolerated infusion without incident.  Blood return noted pre and post infusion.  Site patent and intact, free from redness, edema or discomfort.  No evidence of extravasations.  Access discontinued per protocol.       Discharge Plan:   Patient declined prescription refills.  Discharge instructions reviewed with: Patient.  Patient and/or  family verbalized understanding of discharge instructions and all questions answered.  Copy of AVS reviewed with patient and/or family.  Patient will return 7/9/20 for next appointment.  Patient discharged in stable condition accompanied by: self.  Departure Mode: Ambulatory.    Gill Koroma RN

## 2020-06-11 NOTE — PATIENT INSTRUCTIONS
Contact Numbers:   [facility] Main Line: [phone]    Call triage to speak with triage if you are experiencing chills and/or temperature greater than or equal to 100.5, uncontrolled nausea/vomiting, diarrhea, constipation, dizziness, shortness of breath, chest pain, bleeding, unexplained bruising, or any new/concerning symptoms, questions/concerns.     If you are having any concerning symptoms or wish to speak to a provider before your next infusion visit, please call your care coordinator or triage to notify them so we can adequately serve you.     If you need a refill on a narcotic prescription, please call triage or your care coordinator before your infusion appointment.                 June 2020 Sunday Monday Tuesday Wednesday Thursday Friday Saturday        1     2     3     4     5     6       7     8    [facility] D[facility]   3:00 PM   (15 min.)    [facility] DRAW   [facility] Draw    [facility] ONC INFUSION 60   3:30 PM   (60 min.)    ONCOLOGY INFUSION   [facility] 9     10     11    [facility]   2:30 PM   (15 min.)    [facility] DRAW   [facility] Draw    [facility] INFUSION 60   3:00 PM   (60 min.)    ONCOLOGY INFUSION   [facility] 12     13       14     15     16     17     18     19     20       21     22    [facility]   7:15 AM   (15 min.)    [facility]    CT CHEST/ABDOMEN/PELVIS W   7:40 AM   (20 min.)   UCCT1   [facility] [place] CT 23     24     25    [facility] RETURN   4:00 PM   (30 min.)   Elton Alas MD   [facility] 26     27       28     29     30                                     July 2020 Sunday Monday Tuesday Wednesday Thursday Friday Saturday                  1     2     3     4       5     6     7     8     9    [facility] DRAW   3:00 PM   (15 min.)   [facility]   [facility] Lab Draw    [facility] ONC INFUSION 60   3:30 PM    (60 min.)    ONCOLOGY [facility] 10     11       12     13     14     15     16     17     18       19     20     21     22     23     24     25       26     27     28     29     30     31                          Lab Results:  Recent Results (from the past 12 hour(s))   Comprehensive metabolic panel    Collection Time: 06/11/20  3:13 PM   Result Value Ref Range    Sodium 138 133 - 144 mmol/L    Potassium 4.2 3.4 - 5.3 mmol/L    Chloride 107 94 - 109 mmol/L    Carbon Dioxide 24 20 - 32 mmol/L    Anion Gap 7 3 - 14 mmol/L    Glucose 94 70 - 99 mg/dL    Urea Nitrogen 30 7 - 30 mg/dL    Creatinine 1.12 (H) 0.52 - 1.04 mg/dL    GFR Estimate 50 (L) >60 mL/min/[1.73_m2]    GFR Estimate If Black 58 (L) >60 mL/min/[1.73_m2]    Calcium 9.4 8.5 - 10.1 mg/dL    Bilirubin Total 0.4 0.2 - 1.3 mg/dL    Albumin 3.8 3.4 - 5.0 g/dL    Protein Total 7.2 6.8 - 8.8 g/dL    Alkaline Phosphatase 85 40 - 150 U/L    ALT 25 0 - 50 U/L    AST 16 0 - 45 U/L   TSH with free T4 reflex    Collection Time: 06/11/20  3:13 PM   Result Value Ref Range    TSH 5.98 (H) 0.40 - 4.00 mU/L   T4 free    Collection Time: 06/11/20  3:13 PM   Result Value Ref Range    T4 Free 1.42 0.76 - 1.46 ng/dL

## 2020-06-22 ENCOUNTER — ANCILLARY PROCEDURE (OUTPATIENT)
Dept: CT IMAGING | Facility: CLINIC | Age: 70
End: 2020-06-22
Payer: COMMERCIAL

## 2020-06-22 DIAGNOSIS — C43.9 MELANOMA OF SKIN (H): ICD-10-CM

## 2020-06-22 LAB
ALBUMIN SERPL-MCNC: 3.8 G/DL (ref 3.4–5)
ALP SERPL-CCNC: 86 U/L (ref 40–150)
ALT SERPL W P-5'-P-CCNC: 34 U/L (ref 0–50)
ANION GAP SERPL CALCULATED.3IONS-SCNC: 6 MMOL/L (ref 3–14)
AST SERPL W P-5'-P-CCNC: 15 U/L (ref 0–45)
BASOPHILS # BLD AUTO: 0.1 10E9/L (ref 0–0.2)
BASOPHILS NFR BLD AUTO: 0.7 %
BILIRUB SERPL-MCNC: 0.4 MG/DL (ref 0.2–1.3)
BUN SERPL-MCNC: 15 MG/DL (ref 7–30)
CALCIUM SERPL-MCNC: 8.7 MG/DL (ref 8.5–10.1)
CHLORIDE SERPL-SCNC: 108 MMOL/L (ref 94–109)
CO2 SERPL-SCNC: 27 MMOL/L (ref 20–32)
CREAT SERPL-MCNC: 0.9 MG/DL (ref 0.52–1.04)
DIFFERENTIAL METHOD BLD: ABNORMAL
EOSINOPHIL # BLD AUTO: 0.2 10E9/L (ref 0–0.7)
EOSINOPHIL NFR BLD AUTO: 2.6 %
ERYTHROCYTE [DISTWIDTH] IN BLOOD BY AUTOMATED COUNT: 16.1 % (ref 10–15)
GFR SERPL CREATININE-BSD FRML MDRD: 65 ML/MIN/{1.73_M2}
GLUCOSE SERPL-MCNC: 129 MG/DL (ref 70–99)
HCT VFR BLD AUTO: 34.5 % (ref 35–47)
HGB BLD-MCNC: 10 G/DL (ref 11.7–15.7)
IMM GRANULOCYTES # BLD: 0 10E9/L (ref 0–0.4)
IMM GRANULOCYTES NFR BLD: 0.2 %
LYMPHOCYTES # BLD AUTO: 1.7 10E9/L (ref 0.8–5.3)
LYMPHOCYTES NFR BLD AUTO: 20.9 %
MCH RBC QN AUTO: 23.2 PG (ref 26.5–33)
MCHC RBC AUTO-ENTMCNC: 29 G/DL (ref 31.5–36.5)
MCV RBC AUTO: 80 FL (ref 78–100)
MONOCYTES # BLD AUTO: 0.9 10E9/L (ref 0–1.3)
MONOCYTES NFR BLD AUTO: 11.2 %
NEUTROPHILS # BLD AUTO: 5.2 10E9/L (ref 1.6–8.3)
NEUTROPHILS NFR BLD AUTO: 64.4 %
NRBC # BLD AUTO: 0 10*3/UL
NRBC BLD AUTO-RTO: 0 /100
PLATELET # BLD AUTO: 315 10E9/L (ref 150–450)
POTASSIUM SERPL-SCNC: 4.6 MMOL/L (ref 3.4–5.3)
PROT SERPL-MCNC: 7.5 G/DL (ref 6.8–8.8)
RBC # BLD AUTO: 4.31 10E12/L (ref 3.8–5.2)
SODIUM SERPL-SCNC: 141 MMOL/L (ref 133–144)
WBC # BLD AUTO: 8.1 10E9/L (ref 4–11)

## 2020-06-22 PROCEDURE — 85025 COMPLETE CBC W/AUTO DIFF WBC: CPT | Performed by: INTERNAL MEDICINE

## 2020-06-22 PROCEDURE — 80053 COMPREHEN METABOLIC PANEL: CPT | Performed by: INTERNAL MEDICINE

## 2020-06-22 RX ORDER — IOPAMIDOL 755 MG/ML
135 INJECTION, SOLUTION INTRAVASCULAR ONCE
Status: COMPLETED | OUTPATIENT
Start: 2020-06-22 | End: 2020-06-22

## 2020-06-22 RX ADMIN — IOPAMIDOL 135 ML: 755 INJECTION, SOLUTION INTRAVASCULAR at 07:54

## 2020-06-25 ENCOUNTER — VIRTUAL VISIT (OUTPATIENT)
Dept: ONCOLOGY | Facility: CLINIC | Age: 70
End: 2020-06-25
Attending: INTERNAL MEDICINE
Payer: COMMERCIAL

## 2020-06-25 DIAGNOSIS — C43.9 MELANOMA OF SKIN (H): ICD-10-CM

## 2020-06-25 PROCEDURE — 40001009 ZZH VIDEO/TELEPHONE VISIT; NO CHARGE

## 2020-06-25 PROCEDURE — 99214 OFFICE O/P EST MOD 30 MIN: CPT | Mod: 95 | Performed by: INTERNAL MEDICINE

## 2020-06-25 ASSESSMENT — PAIN SCALES - GENERAL: PAINLEVEL: NO PAIN (0)

## 2020-06-25 NOTE — PROGRESS NOTES
"Ilana Trent is a 70 year old female who is being evaluated via a billable video visit.      The patient has been notified of following:     \"This video visit will be conducted via a call between you and your physician/provider. We have found that certain health care needs can be provided without the need for an in-person physical exam.  This service lets us provide the care you need with a video conversation.  If a prescription is necessary we can send it directly to your pharmacy.  If lab work is needed we can place an order for that and you can then stop by our lab to have the test done at a later time.    Video visits are billed at different rates depending on your insurance coverage.  Please reach out to your insurance provider with any questions.    If during the course of the call the physician/provider feels a video visit is not appropriate, you will not be charged for this service.\"    Patient has given verbal consent for Video visit? Yes    Will anyone else be joining your video visit? No      Vitals - Patient Reported  Weight (Patient Reported): 108.9 kg (240 lb)  Height (Patient Reported): 172.7 cm (5' 7.99\")  BMI (Based on Pt Reported Ht/Wt): 36.5    [email or web address]    Marvin Smith LPN    Video-Visit Details    Type of service:  Video Visit    Video Start Time: 4:30 PM  Video End Time: 5:00 PM    Originating Location (pt. Location): Home    Distant Location (provider location):  [facility]     Platform used for Video Visit: [facility]      MEDICAL ONCOLOGY PROGRESS NOTE  Jun 25, 2020    Reason for Visit: Stage III vulvar melanoma, status post excision    Melanoma History:  1. 6/4/2019, she had excision of the right vulvar mass in [place], and on pathology this shows malignant melanoma with ulceration, at least fausto level IV, Breslow thickness at least 5 mm, mitotic rate is 3 per mm2, TILs are present and brisk, pathologic staging pT4b.  2. 6/28/19 she had PET-CT, which " showed no obvious evidence of metastatic disease.  3. 7/30/2019, she has wide local excision and sentinel biopsy (Specimen #: [id number]), which showed residual melanoma to 1.3 mm depth of invasion and margins negative. Right inguinal lymph node showed a subcapsular 1 mm focus of cells.  4. She was referred by Dr. Celis. Dr. Perez felt she has a >90% risk of recurrence given stage IIIC disease and recommends  5. Baseline labs are normal. PET-CT obtained on 9/23 negative for metastatic disease.  6. She started adjuvant monthly nivolumab 9/26/19  7. PET ct 12/2019 showing MARISA  8. CT CAP 3/2020 MARISA    Interval History:  Ilana is doing well .She starting swimming again after June 10th. She is also riding her stationary bike.  She notes some worsening of wrist pain after infusions for a few days, but then subsides. 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    She continues to note fatigue.  She has stable exertional shortness of breath on walking long distances.  She has history of atrial fib and is on coumadin and sotalol and diltiazem and has a pacemaker.  She follows cardiology in [place].  She was told recently the battery is going lower so she is following closely every 6 weeks. She remains on same doses of coumadin. She denies chest pain, dizziness, fever, chills. No SOB at rest. No nausea, vomiting, or diarrhea.    ECOG performance status is 0.    Current Outpatient Medications   Medication Sig Dispense Refill     acetaminophen (TYLENOL) 500 MG tablet Take 1-2 tablets (500-1,000 mg) by mouth every 6 hours as needed for mild pain 50 tablet 0     ALOE PO Take by mouth daily as needed       calcium carbonate (OS-RENEA) 1500 (600 Ca) MG tablet Take 600 mg by mouth daily       cetirizine-pseudoePHEDrine ER (ZYRTEC-D) 5-120 MG 12 hr tablet Take 1 tablet by mouth every morning        CONTOUR NEXT EZ (CONTOUR NEXT EZ W/DEVICE KIT) w/Device  KIT See Admin Instructions  0     diltiazem ER COATED BEADS (CARDIZEM CD/CARTIA XT) 120 MG 24 hr capsule Take 120 mg by mouth       furosemide (LASIX) 20 MG tablet Take 20 mg by mouth daily as needed (SWELLING)       lisinopril (PRINIVIL/ZESTRIL) 5 MG tablet Take 5 mg by mouth every morning        metFORMIN (GLUCOPHAGE-XR) 500 MG 24 hr tablet Take 500 mg by mouth every morning        multivitamin w/minerals (THERA-VIT-M) tablet Take 1 tablet by mouth daily       naproxen (NAPROSYN) 500 MG tablet Take 500 mg by mouth every morning        pantoprazole (PROTONIX) 40 MG EC tablet Take 40 mg by mouth every morning        simvastatin (ZOCOR) 40 MG tablet Take 40 mg by mouth At Bedtime        sotalol (BETAPACE) 160 MG tablet Take 80 mg by mouth 2 times daily        vitamin B complex with vitamin C (STRESS TAB) tablet Take 1 tablet by mouth every morning        warfarin (COUMADIN) 5 MG tablet Take 7.5mg 5 days week ,\ 5 mg Tues, Sat  Takes in the evening       levothyroxine (SYNTHROID/LEVOTHROID) 175 MCG tablet Take 175 mcg by mouth every morning          PHYSICAL EXAMINATION  There were no vitals taken for this visit.  Wt Readings from Last 10 Encounters:   06/11/20 116.8 kg (257 lb 9.6 oz)   06/08/20 113.9 kg (251 lb 3.2 oz)   05/11/20 114 kg (251 lb 6.4 oz)   04/13/20 114.2 kg (251 lb 12.8 oz)   03/26/20 114.4 kg (252 lb 1.6 oz)   03/16/20 112.9 kg (249 lb)   02/17/20 113.9 kg (251 lb)   01/20/20 116.1 kg (256 lb)   12/19/19 114.9 kg (253 lb 4.8 oz)   11/25/19 113.2 kg (249 lb 8 oz)   Head: Normocephalic  Eyes: No icterus  ENT: Oropharynx is clear  Neck: No mases  Lungs: Speaking in complete sentences, comfortable on room air  Neuro: Cranial nerves II through XII are grossly intact.  Skin: No rashes, petechiae, or bruising noted on exposed skin.    The rest of a comprehensive physical examination is deferred due to PHE (public health emergency) video visit restrictions.    IMAGING  CT-CAP, 6/22/2020  IMPRESSION:  Since  3/23/2020:  1. No evidence of new or recurrent disease of the chest, abdomen or  pelvis.  2. Similar appearance of the previously described abdominal  postsurgical changes and pneumobilia.    ASSESSMENT AND PLAN  #1 Vulvar Melanoma, resected stage III  It was a pleasure to meet with Ms. Trent. She has resected stage IIIc vulvar melanoma on adjuvant treatment with nivolumab since Aug 2019.  She has received 10 cycles of treatment and is doing well.  No immune related side effects.  Plan will be to finish 1 year of adjuvant treatment.    -- Continue nivolumab every 4 weeks  -- Return to clinic in 3 months with CT-CAP    Elton Arellano M.D.   of Medicine  Hematology, Oncology and Transplantation

## 2020-06-25 NOTE — LETTER
"    6/25/2020         RE: Ilana Trent  [address] [facility] [address]        Dear Colleague,    Thank you for referring your patient, Ilana Trent, to the [facility]. Please see a copy of my visit note below.    Ilana Trent is a 70 year old female who is being evaluated via a billable video visit.      The patient has been notified of following:     \"This video visit will be conducted via a call between you and your physician/provider. We have found that certain health care needs can be provided without the need for an in-person physical exam.  This service lets us provide the care you need with a video conversation.  If a prescription is necessary we can send it directly to your pharmacy.  If lab work is needed we can place an order for that and you can then stop by our lab to have the test done at a later time.    Video visits are billed at different rates depending on your insurance coverage.  Please reach out to your insurance provider with any questions.    If during the course of the call the physician/provider feels a video visit is not appropriate, you will not be charged for this service.\"    Patient has given verbal consent for Video visit? Yes    Will anyone else be joining your video visit? No      Vitals - Patient Reported  Weight (Patient Reported): 108.9 kg (240 lb)  Height (Patient Reported): 172.7 cm (5' 7.99\")  BMI (Based on Pt Reported Ht/Wt): 36.5    [email or web address]    Marvin Smith LPN    Video-Visit Details    Type of service:  Video Visit    Video Start Time: 4:30 PM  Video End Time: 5:00 PM    Originating Location (pt. Location): Home    Distant Location (provider location):  [facility]     Platform used for Video Visit: [facility]      MEDICAL ONCOLOGY PROGRESS NOTE  Jun 25, 2020    Reason for Visit: Stage III vulvar melanoma, status post excision    Melanoma History:  1. 6/4/2019, she had excision of the right vulvar mass in [place], " and on pathology this shows malignant melanoma with ulceration, at least fausto level IV, Breslow thickness at least 5 mm, mitotic rate is 3 per mm2, TILs are present and brisk, pathologic staging pT4b.  2. 6/28/19 she had PET-CT, which showed no obvious evidence of metastatic disease.  3. 7/30/2019, she has wide local excision and sentinel biopsy (Specimen #: [id number]), which showed residual melanoma to 1.3 mm depth of invasion and margins negative. Right inguinal lymph node showed a subcapsular 1 mm focus of cells.  4. She was referred by Dr. Celis. Dr. Perez felt she has a >90% risk of recurrence given stage IIIC disease and recommends  5. Baseline labs are normal. PET-CT obtained on 9/23 negative for metastatic disease.  6. She started adjuvant monthly nivolumab 9/26/19  7. PET ct 12/2019 showing MARISA  8. CT CAP 3/2020 MARISA    Interval History:  Ilana is doing well .She starting swimming again after June 10th. She is also riding her stationary bike.  She notes some worsening of wrist pain after infusions for a few days, but then subsides. 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    She continues to note fatigue.  She has stable exertional shortness of breath on walking long distances.  She has history of atrial fib and is on coumadin and sotalol and diltiazem and has a pacemaker.  She follows cardiology in [place].  She was told recently the battery is going lower so she is following closely every 6 weeks. She remains on same doses of coumadin. She denies chest pain, dizziness, fever, chills. No SOB at rest. No nausea, vomiting, or diarrhea.    ECOG performance status is 0.    Current Outpatient Medications   Medication Sig Dispense Refill     acetaminophen (TYLENOL) 500 MG tablet Take 1-2 tablets (500-1,000 mg) by mouth every 6 hours as needed for mild pain 50 tablet 0     ALOE PO Take by mouth daily as needed       calcium  carbonate (OS-RENEA) 1500 (600 Ca) MG tablet Take 600 mg by mouth daily       cetirizine-pseudoePHEDrine ER (ZYRTEC-D) 5-120 MG 12 hr tablet Take 1 tablet by mouth every morning        CONTOUR NEXT EZ (CONTOUR NEXT EZ W/DEVICE KIT) w/Device KIT See Admin Instructions  0     diltiazem ER COATED BEADS (CARDIZEM CD/CARTIA XT) 120 MG 24 hr capsule Take 120 mg by mouth       furosemide (LASIX) 20 MG tablet Take 20 mg by mouth daily as needed (SWELLING)       lisinopril (PRINIVIL/ZESTRIL) 5 MG tablet Take 5 mg by mouth every morning        metFORMIN (GLUCOPHAGE-XR) 500 MG 24 hr tablet Take 500 mg by mouth every morning        multivitamin w/minerals (THERA-VIT-M) tablet Take 1 tablet by mouth daily       naproxen (NAPROSYN) 500 MG tablet Take 500 mg by mouth every morning        pantoprazole (PROTONIX) 40 MG EC tablet Take 40 mg by mouth every morning        simvastatin (ZOCOR) 40 MG tablet Take 40 mg by mouth At Bedtime        sotalol (BETAPACE) 160 MG tablet Take 80 mg by mouth 2 times daily        vitamin B complex with vitamin C (STRESS TAB) tablet Take 1 tablet by mouth every morning        warfarin (COUMADIN) 5 MG tablet Take 7.5mg 5 days week ,\ 5 mg Tues, Sat  Takes in the evening       levothyroxine (SYNTHROID/LEVOTHROID) 175 MCG tablet Take 175 mcg by mouth every morning          PHYSICAL EXAMINATION  There were no vitals taken for this visit.  Wt Readings from Last 10 Encounters:   06/11/20 116.8 kg (257 lb 9.6 oz)   06/08/20 113.9 kg (251 lb 3.2 oz)   05/11/20 114 kg (251 lb 6.4 oz)   04/13/20 114.2 kg (251 lb 12.8 oz)   03/26/20 114.4 kg (252 lb 1.6 oz)   03/16/20 112.9 kg (249 lb)   02/17/20 113.9 kg (251 lb)   01/20/20 116.1 kg (256 lb)   12/19/19 114.9 kg (253 lb 4.8 oz)   11/25/19 113.2 kg (249 lb 8 oz)   Head: Normocephalic  Eyes: No icterus  ENT: Oropharynx is clear  Neck: No mases  Lungs: Speaking in complete sentences, comfortable on room air  Neuro: Cranial nerves II through XII are grossly  intact.  Skin: No rashes, petechiae, or bruising noted on exposed skin.    The rest of a comprehensive physical examination is deferred due to PHE (public health emergency) video visit restrictions.    IMAGING  CT-CAP, 6/22/2020  IMPRESSION:  Since 3/23/2020:  1. No evidence of new or recurrent disease of the chest, abdomen or  pelvis.  2. Similar appearance of the previously described abdominal  postsurgical changes and pneumobilia.    ASSESSMENT AND PLAN  #1 Vulvar Melanoma, resected stage III  It was a pleasure to meet with Ms. Trent. She has resected stage IIIc vulvar melanoma on adjuvant treatment with nivolumab since Aug 2019.  She has received 10 cycles of treatment and is doing well.  No immune related side effects.  Plan will be to finish 1 year of adjuvant treatment.    -- Continue nivolumab every 4 weeks  -- Return to clinic in 3 months with CT-CAP    Elton Arellano M.D.   of Medicine  Hematology, Oncology and Transplantation

## 2020-07-08 DIAGNOSIS — C51.9 MALIGNANT MELANOMA OF VULVA (H): ICD-10-CM

## 2020-07-08 DIAGNOSIS — C43.9 MELANOMA OF SKIN (H): Primary | ICD-10-CM

## 2020-07-08 RX ORDER — DIPHENHYDRAMINE HYDROCHLORIDE 50 MG/ML
50 INJECTION INTRAMUSCULAR; INTRAVENOUS
Status: CANCELLED
Start: 2020-07-09

## 2020-07-08 RX ORDER — MEPERIDINE HYDROCHLORIDE 25 MG/ML
25 INJECTION INTRAMUSCULAR; INTRAVENOUS; SUBCUTANEOUS EVERY 30 MIN PRN
Status: CANCELLED | OUTPATIENT
Start: 2020-07-09

## 2020-07-08 RX ORDER — EPINEPHRINE 1 MG/ML
0.3 INJECTION, SOLUTION INTRAMUSCULAR; SUBCUTANEOUS EVERY 5 MIN PRN
Status: CANCELLED | OUTPATIENT
Start: 2020-07-09

## 2020-07-08 RX ORDER — METHYLPREDNISOLONE SODIUM SUCCINATE 125 MG/2ML
125 INJECTION, POWDER, LYOPHILIZED, FOR SOLUTION INTRAMUSCULAR; INTRAVENOUS
Status: CANCELLED
Start: 2020-07-09

## 2020-07-08 RX ORDER — ALBUTEROL SULFATE 0.83 MG/ML
2.5 SOLUTION RESPIRATORY (INHALATION)
Status: CANCELLED | OUTPATIENT
Start: 2020-07-09

## 2020-07-08 RX ORDER — SODIUM CHLORIDE 9 MG/ML
1000 INJECTION, SOLUTION INTRAVENOUS CONTINUOUS PRN
Status: CANCELLED
Start: 2020-07-09

## 2020-07-08 RX ORDER — EPINEPHRINE 0.3 MG/.3ML
0.3 INJECTION SUBCUTANEOUS EVERY 5 MIN PRN
Status: CANCELLED | OUTPATIENT
Start: 2020-07-09

## 2020-07-08 RX ORDER — NALOXONE HYDROCHLORIDE 0.4 MG/ML
.1-.4 INJECTION, SOLUTION INTRAMUSCULAR; INTRAVENOUS; SUBCUTANEOUS
Status: CANCELLED | OUTPATIENT
Start: 2020-07-09

## 2020-07-08 RX ORDER — ALBUTEROL SULFATE 90 UG/1
1-2 AEROSOL, METERED RESPIRATORY (INHALATION)
Status: CANCELLED
Start: 2020-07-09

## 2020-07-08 RX ORDER — LORAZEPAM 2 MG/ML
0.5 INJECTION INTRAMUSCULAR EVERY 4 HOURS PRN
Status: CANCELLED
Start: 2020-07-09

## 2020-07-09 ENCOUNTER — APPOINTMENT (OUTPATIENT)
Dept: LAB | Facility: CLINIC | Age: 70
End: 2020-07-09
Attending: INTERNAL MEDICINE
Payer: COMMERCIAL

## 2020-07-09 ENCOUNTER — INFUSION THERAPY VISIT (OUTPATIENT)
Dept: ONCOLOGY | Facility: CLINIC | Age: 70
End: 2020-07-09
Attending: INTERNAL MEDICINE
Payer: COMMERCIAL

## 2020-07-09 VITALS
BODY MASS INDEX: 38.51 KG/M2 | DIASTOLIC BLOOD PRESSURE: 72 MMHG | TEMPERATURE: 98.3 F | WEIGHT: 253.2 LBS | HEART RATE: 70 BPM | SYSTOLIC BLOOD PRESSURE: 143 MMHG | RESPIRATION RATE: 18 BRPM | OXYGEN SATURATION: 99 %

## 2020-07-09 DIAGNOSIS — C51.9 MALIGNANT MELANOMA OF VULVA (H): ICD-10-CM

## 2020-07-09 DIAGNOSIS — C43.9 MELANOMA OF SKIN (H): Primary | ICD-10-CM

## 2020-07-09 LAB
ALBUMIN SERPL-MCNC: 3.7 G/DL (ref 3.4–5)
ALP SERPL-CCNC: 76 U/L (ref 40–150)
ALT SERPL W P-5'-P-CCNC: 27 U/L (ref 0–50)
ANION GAP SERPL CALCULATED.3IONS-SCNC: 5 MMOL/L (ref 3–14)
AST SERPL W P-5'-P-CCNC: 15 U/L (ref 0–45)
BILIRUB SERPL-MCNC: 0.4 MG/DL (ref 0.2–1.3)
BUN SERPL-MCNC: 27 MG/DL (ref 7–30)
CALCIUM SERPL-MCNC: 8.4 MG/DL (ref 8.5–10.1)
CHLORIDE SERPL-SCNC: 110 MMOL/L (ref 94–109)
CO2 SERPL-SCNC: 23 MMOL/L (ref 20–32)
CREAT SERPL-MCNC: 0.95 MG/DL (ref 0.52–1.04)
GFR SERPL CREATININE-BSD FRML MDRD: 61 ML/MIN/{1.73_M2}
GLUCOSE SERPL-MCNC: 95 MG/DL (ref 70–99)
POTASSIUM SERPL-SCNC: 4.3 MMOL/L (ref 3.4–5.3)
PROT SERPL-MCNC: 6.8 G/DL (ref 6.8–8.8)
SODIUM SERPL-SCNC: 138 MMOL/L (ref 133–144)
TSH SERPL DL<=0.005 MIU/L-ACNC: 2.06 MU/L (ref 0.4–4)

## 2020-07-09 PROCEDURE — 84443 ASSAY THYROID STIM HORMONE: CPT | Performed by: INTERNAL MEDICINE

## 2020-07-09 PROCEDURE — 25800030 ZZH RX IP 258 OP 636: Mod: ZF | Performed by: INTERNAL MEDICINE

## 2020-07-09 PROCEDURE — 96413 CHEMO IV INFUSION 1 HR: CPT

## 2020-07-09 PROCEDURE — 80053 COMPREHEN METABOLIC PANEL: CPT | Performed by: INTERNAL MEDICINE

## 2020-07-09 PROCEDURE — 25000128 H RX IP 250 OP 636: Mod: ZF | Performed by: INTERNAL MEDICINE

## 2020-07-09 RX ADMIN — SODIUM CHLORIDE 250 ML: 9 INJECTION, SOLUTION INTRAVENOUS at 16:38

## 2020-07-09 RX ADMIN — SODIUM CHLORIDE 480 MG: 9 INJECTION, SOLUTION INTRAVENOUS at 16:38

## 2020-07-09 ASSESSMENT — PAIN SCALES - GENERAL: PAINLEVEL: NO PAIN (0)

## 2020-07-09 NOTE — PATIENT INSTRUCTIONS
Contact Numbers    [facility] [place] [facility] Main Line/[facility]: [phone]    Call with chills and/or temperature greater than or equal to 100.5, uncontrolled nausea/vomiting, diarrhea, constipation, dizziness, shortness of breath, chest pain, bleeding, unexplained bruising, or any new/concerning symptoms, questions/concerns.     If you are having any concerning symptoms or wish to speak to a provider before your next infusion visit, please call your care coordinator or triage to notify them so we can adequately serve you.       After Hours: [phone]    If after hours, weekends, or holidays, call main hospital  and ask for Oncology doctor on call.               Lab Results:  Recent Results (from the past 12 hour(s))   Comprehensive metabolic panel    Collection Time: 07/09/20  3:21 PM   Result Value Ref Range    Sodium 138 133 - 144 mmol/L    Potassium 4.3 3.4 - 5.3 mmol/L    Chloride 110 (H) 94 - 109 mmol/L    Carbon Dioxide 23 20 - 32 mmol/L    Anion Gap 5 3 - 14 mmol/L    Glucose 95 70 - 99 mg/dL    Urea Nitrogen 27 7 - 30 mg/dL    Creatinine 0.95 0.52 - 1.04 mg/dL    GFR Estimate 61 >60 mL/min/[1.73_m2]    GFR Estimate If Black 71 >60 mL/min/[1.73_m2]    Calcium 8.4 (L) 8.5 - 10.1 mg/dL    Bilirubin Total 0.4 0.2 - 1.3 mg/dL    Albumin 3.7 3.4 - 5.0 g/dL    Protein Total 6.8 6.8 - 8.8 g/dL    Alkaline Phosphatase 76 40 - 150 U/L    ALT 27 0 - 50 U/L    AST 15 0 - 45 U/L   TSH with free T4 reflex    Collection Time: 07/09/20  3:21 PM   Result Value Ref Range    TSH 2.06 0.40 - 4.00 mU/L

## 2020-07-09 NOTE — NURSING NOTE
Chief Complaint   Patient presents with     Blood Draw     Right hand 22 ga angio placed after 1 attempt, labs drawn and sent, flushed with saline, vitals completed, checked into next appointment.     Abigail Camejo RN

## 2020-07-09 NOTE — PROGRESS NOTES
Infusion Nursing Note:  Ilana Trent presents today for Cycle 11, day 1 Nivolumab.    Patient seen by provider today: No    Note: Patient presents to clinic today feeling well with no questions.  Pt did not request or require any intervention for pain today.    Intravenous Access:  Peripheral IV placed.    Treatment Conditions:  Lab Results   Component Value Date    HGB 10.0 06/22/2020     Lab Results   Component Value Date    WBC 8.1 06/22/2020      Lab Results   Component Value Date    ANEU 5.2 06/22/2020     Lab Results   Component Value Date     06/22/2020      Lab Results   Component Value Date     07/09/2020                   Lab Results   Component Value Date    POTASSIUM 4.3 07/09/2020           Lab Results   Component Value Date    MAG 1.7 07/31/2019            Lab Results   Component Value Date    CR 0.95 07/09/2020                   Lab Results   Component Value Date    RENEA 8.4 07/09/2020                Lab Results   Component Value Date    BILITOTAL 0.4 07/09/2020           Lab Results   Component Value Date    ALBUMIN 3.7 07/09/2020                    Lab Results   Component Value Date    ALT 27 07/09/2020           Lab Results   Component Value Date    AST 15 07/09/2020       Results reviewed, labs MET treatment parameters, ok to proceed with treatment.    Post Infusion Assessment:  Patient tolerated infusion without incident.  Blood return noted pre and post infusion.  Site patent and intact, free from redness, edema or discomfort.  No evidence of extravasations.  Access discontinued per protocol.    Discharge Plan:   Patient declined prescription refills.  Discharge instructions reviewed with: Patient.  Patient and/or family verbalized understanding of discharge instructions and all questions answered.  Copy of AVS reviewed with patient and/or family.  Patient will return 8/6/2020 for next appointment.  Patient discharged in stable condition accompanied by: self.  Departure Mode:  Ambulatory.    Nicole Pinedo RN

## 2020-08-04 DIAGNOSIS — C43.9 MELANOMA OF SKIN (H): Primary | ICD-10-CM

## 2020-08-04 DIAGNOSIS — C51.9 MALIGNANT MELANOMA OF VULVA (H): ICD-10-CM

## 2020-08-04 RX ORDER — MEPERIDINE HYDROCHLORIDE 25 MG/ML
25 INJECTION INTRAMUSCULAR; INTRAVENOUS; SUBCUTANEOUS EVERY 30 MIN PRN
Status: CANCELLED | OUTPATIENT
Start: 2020-08-06

## 2020-08-04 RX ORDER — EPINEPHRINE 1 MG/ML
0.3 INJECTION, SOLUTION INTRAMUSCULAR; SUBCUTANEOUS EVERY 5 MIN PRN
Status: CANCELLED | OUTPATIENT
Start: 2020-08-06

## 2020-08-04 RX ORDER — NALOXONE HYDROCHLORIDE 0.4 MG/ML
.1-.4 INJECTION, SOLUTION INTRAMUSCULAR; INTRAVENOUS; SUBCUTANEOUS
Status: CANCELLED | OUTPATIENT
Start: 2020-08-06

## 2020-08-04 RX ORDER — EPINEPHRINE 0.3 MG/.3ML
0.3 INJECTION SUBCUTANEOUS EVERY 5 MIN PRN
Status: CANCELLED | OUTPATIENT
Start: 2020-08-06

## 2020-08-04 RX ORDER — ALBUTEROL SULFATE 0.83 MG/ML
2.5 SOLUTION RESPIRATORY (INHALATION)
Status: CANCELLED | OUTPATIENT
Start: 2020-08-06

## 2020-08-04 RX ORDER — ALBUTEROL SULFATE 90 UG/1
1-2 AEROSOL, METERED RESPIRATORY (INHALATION)
Status: CANCELLED
Start: 2020-08-06

## 2020-08-04 RX ORDER — METHYLPREDNISOLONE SODIUM SUCCINATE 125 MG/2ML
125 INJECTION, POWDER, LYOPHILIZED, FOR SOLUTION INTRAMUSCULAR; INTRAVENOUS
Status: CANCELLED
Start: 2020-08-06

## 2020-08-04 RX ORDER — DIPHENHYDRAMINE HYDROCHLORIDE 50 MG/ML
50 INJECTION INTRAMUSCULAR; INTRAVENOUS
Status: CANCELLED
Start: 2020-08-06

## 2020-08-04 RX ORDER — LORAZEPAM 2 MG/ML
0.5 INJECTION INTRAMUSCULAR EVERY 4 HOURS PRN
Status: CANCELLED
Start: 2020-08-06

## 2020-08-04 RX ORDER — SODIUM CHLORIDE 9 MG/ML
1000 INJECTION, SOLUTION INTRAVENOUS CONTINUOUS PRN
Status: CANCELLED
Start: 2020-08-06

## 2020-08-06 ENCOUNTER — INFUSION THERAPY VISIT (OUTPATIENT)
Dept: ONCOLOGY | Facility: CLINIC | Age: 70
End: 2020-08-06
Attending: INTERNAL MEDICINE
Payer: COMMERCIAL

## 2020-08-06 VITALS
WEIGHT: 251.77 LBS | BODY MASS INDEX: 38.29 KG/M2 | OXYGEN SATURATION: 98 % | RESPIRATION RATE: 16 BRPM | TEMPERATURE: 98.4 F | DIASTOLIC BLOOD PRESSURE: 73 MMHG | HEART RATE: 65 BPM | SYSTOLIC BLOOD PRESSURE: 131 MMHG

## 2020-08-06 DIAGNOSIS — C43.9 MELANOMA OF SKIN (H): Primary | ICD-10-CM

## 2020-08-06 DIAGNOSIS — C51.9 MALIGNANT MELANOMA OF VULVA (H): ICD-10-CM

## 2020-08-06 LAB
ALBUMIN SERPL-MCNC: 3.7 G/DL (ref 3.4–5)
ALP SERPL-CCNC: 75 U/L (ref 40–150)
ALT SERPL W P-5'-P-CCNC: 23 U/L (ref 0–50)
ANION GAP SERPL CALCULATED.3IONS-SCNC: 5 MMOL/L (ref 3–14)
AST SERPL W P-5'-P-CCNC: 11 U/L (ref 0–45)
BILIRUB SERPL-MCNC: 0.4 MG/DL (ref 0.2–1.3)
BUN SERPL-MCNC: 30 MG/DL (ref 7–30)
CALCIUM SERPL-MCNC: 9 MG/DL (ref 8.5–10.1)
CHLORIDE SERPL-SCNC: 109 MMOL/L (ref 94–109)
CO2 SERPL-SCNC: 25 MMOL/L (ref 20–32)
CREAT SERPL-MCNC: 1.01 MG/DL (ref 0.52–1.04)
GFR SERPL CREATININE-BSD FRML MDRD: 57 ML/MIN/{1.73_M2}
GLUCOSE SERPL-MCNC: 92 MG/DL (ref 70–99)
POTASSIUM SERPL-SCNC: 4.2 MMOL/L (ref 3.4–5.3)
PROT SERPL-MCNC: 7.2 G/DL (ref 6.8–8.8)
SODIUM SERPL-SCNC: 139 MMOL/L (ref 133–144)
TSH SERPL DL<=0.005 MIU/L-ACNC: 1.22 MU/L (ref 0.4–4)

## 2020-08-06 PROCEDURE — 96413 CHEMO IV INFUSION 1 HR: CPT

## 2020-08-06 PROCEDURE — 40000556 ZZH STATISTIC PERIPHERAL IV START W US GUIDANCE

## 2020-08-06 PROCEDURE — 84443 ASSAY THYROID STIM HORMONE: CPT | Performed by: INTERNAL MEDICINE

## 2020-08-06 PROCEDURE — 80053 COMPREHEN METABOLIC PANEL: CPT | Performed by: INTERNAL MEDICINE

## 2020-08-06 PROCEDURE — 25000128 H RX IP 250 OP 636: Mod: ZF | Performed by: INTERNAL MEDICINE

## 2020-08-06 PROCEDURE — 25800030 ZZH RX IP 258 OP 636: Mod: ZF | Performed by: INTERNAL MEDICINE

## 2020-08-06 RX ADMIN — SODIUM CHLORIDE 250 ML: 9 INJECTION, SOLUTION INTRAVENOUS at 16:32

## 2020-08-06 RX ADMIN — SODIUM CHLORIDE 480 MG: 9 INJECTION, SOLUTION INTRAVENOUS at 16:32

## 2020-08-06 ASSESSMENT — PAIN SCALES - GENERAL: PAINLEVEL: NO PAIN (0)

## 2020-08-06 NOTE — NURSING NOTE
Chief Complaint   Patient presents with     Blood Draw     Labs drawn by Vascular Access RN in Lab following Left Arm PIV placement.      Maya Ortiz RN

## 2020-08-06 NOTE — PROGRESS NOTES
"Infusion Nursing Note:  Ilana Trent presents today for Cycle 12 Day 1 Nivolumab.    Patient seen by provider today: No   present during visit today: Not Applicable.    Note: Patient states she has been \"feeling pretty good.\"  She continues to have some fatigue and mild pain in her left wrist.  She otherwise offers no new concerns at this time.    Intravenous Access:  Peripheral IV placed in lab today.    Treatment Conditions:  Lab Results   Component Value Date     08/06/2020                   Lab Results   Component Value Date    POTASSIUM 4.2 08/06/2020           Lab Results   Component Value Date    MAG 1.7 07/31/2019            Lab Results   Component Value Date    CR 1.01 08/06/2020                   Lab Results   Component Value Date    RENEA 9.0 08/06/2020                Lab Results   Component Value Date    BILITOTAL 0.4 08/06/2020           Lab Results   Component Value Date    ALBUMIN 3.7 08/06/2020                    Lab Results   Component Value Date    ALT 23 08/06/2020           Lab Results   Component Value Date    AST 11 08/06/2020       Results reviewed, labs MET treatment parameters, ok to proceed with treatment.      Post Infusion Assessment:  Patient tolerated infusion without incident.  Blood return noted pre and post infusion.  Site patent and intact, free from redness, edema or discomfort.  No evidence of extravasations.  Access discontinued per protocol.     Discharge Plan:   Patient declined prescription refills.  Discharge instructions reviewed with: Patient.  Patient and/or family verbalized understanding of discharge instructions and all questions answered.  AVS to patient via [employer].  Patient will return 10/1 for next provider appointment.   Patient discharged in stable condition accompanied by: self.  Departure Mode: Ambulatory.  Face to Face time: 0.    CATALINA LICONA RN                      "

## 2020-09-28 ENCOUNTER — ANCILLARY PROCEDURE (OUTPATIENT)
Dept: CT IMAGING | Facility: CLINIC | Age: 70
End: 2020-09-28
Attending: INTERNAL MEDICINE
Payer: COMMERCIAL

## 2020-09-28 VITALS
HEART RATE: 72 BPM | WEIGHT: 246.7 LBS | TEMPERATURE: 98.7 F | RESPIRATION RATE: 18 BRPM | DIASTOLIC BLOOD PRESSURE: 76 MMHG | OXYGEN SATURATION: 97 % | BODY MASS INDEX: 37.52 KG/M2 | SYSTOLIC BLOOD PRESSURE: 152 MMHG

## 2020-09-28 DIAGNOSIS — C43.9 MELANOMA OF SKIN (H): ICD-10-CM

## 2020-09-28 LAB
ALBUMIN SERPL-MCNC: 3.7 G/DL (ref 3.4–5)
ALP SERPL-CCNC: 72 U/L (ref 40–150)
ALT SERPL W P-5'-P-CCNC: 24 U/L (ref 0–50)
ANION GAP SERPL CALCULATED.3IONS-SCNC: 4 MMOL/L (ref 3–14)
AST SERPL W P-5'-P-CCNC: 17 U/L (ref 0–45)
BASOPHILS # BLD AUTO: 0.1 10E9/L (ref 0–0.2)
BASOPHILS NFR BLD AUTO: 0.7 %
BILIRUB SERPL-MCNC: 0.4 MG/DL (ref 0.2–1.3)
BUN SERPL-MCNC: 16 MG/DL (ref 7–30)
CALCIUM SERPL-MCNC: 9.2 MG/DL (ref 8.5–10.1)
CHLORIDE SERPL-SCNC: 106 MMOL/L (ref 94–109)
CO2 SERPL-SCNC: 26 MMOL/L (ref 20–32)
CREAT BLD-MCNC: 0.9 MG/DL (ref 0.52–1.04)
CREAT SERPL-MCNC: 0.94 MG/DL (ref 0.52–1.04)
DIFFERENTIAL METHOD BLD: ABNORMAL
EOSINOPHIL # BLD AUTO: 0.2 10E9/L (ref 0–0.7)
EOSINOPHIL NFR BLD AUTO: 2.5 %
ERYTHROCYTE [DISTWIDTH] IN BLOOD BY AUTOMATED COUNT: 15.9 % (ref 10–15)
GFR SERPL CREATININE-BSD FRML MDRD: 62 ML/MIN/{1.73_M2}
GFR SERPL CREATININE-BSD FRML MDRD: 62 ML/MIN/{1.73_M2}
GLUCOSE SERPL-MCNC: 142 MG/DL (ref 70–99)
HCT VFR BLD AUTO: 33.7 % (ref 35–47)
HGB BLD-MCNC: 9.8 G/DL (ref 11.7–15.7)
IMM GRANULOCYTES # BLD: 0 10E9/L (ref 0–0.4)
IMM GRANULOCYTES NFR BLD: 0.5 %
LYMPHOCYTES # BLD AUTO: 1.7 10E9/L (ref 0.8–5.3)
LYMPHOCYTES NFR BLD AUTO: 19.4 %
MCH RBC QN AUTO: 21.9 PG (ref 26.5–33)
MCHC RBC AUTO-ENTMCNC: 29.1 G/DL (ref 31.5–36.5)
MCV RBC AUTO: 75 FL (ref 78–100)
MONOCYTES # BLD AUTO: 0.9 10E9/L (ref 0–1.3)
MONOCYTES NFR BLD AUTO: 10.5 %
NEUTROPHILS # BLD AUTO: 5.8 10E9/L (ref 1.6–8.3)
NEUTROPHILS NFR BLD AUTO: 66.4 %
NRBC # BLD AUTO: 0 10*3/UL
NRBC BLD AUTO-RTO: 0 /100
PLATELET # BLD AUTO: 361 10E9/L (ref 150–450)
POTASSIUM SERPL-SCNC: 4.9 MMOL/L (ref 3.4–5.3)
PROT SERPL-MCNC: 7.6 G/DL (ref 6.8–8.8)
RBC # BLD AUTO: 4.47 10E12/L (ref 3.8–5.2)
SODIUM SERPL-SCNC: 137 MMOL/L (ref 133–144)
WBC # BLD AUTO: 8.8 10E9/L (ref 4–11)

## 2020-09-28 PROCEDURE — 85025 COMPLETE CBC W/AUTO DIFF WBC: CPT | Performed by: INTERNAL MEDICINE

## 2020-09-28 PROCEDURE — 83550 IRON BINDING TEST: CPT | Performed by: INTERNAL MEDICINE

## 2020-09-28 PROCEDURE — 80053 COMPREHEN METABOLIC PANEL: CPT | Performed by: INTERNAL MEDICINE

## 2020-09-28 PROCEDURE — 83540 ASSAY OF IRON: CPT | Performed by: INTERNAL MEDICINE

## 2020-09-28 PROCEDURE — 82728 ASSAY OF FERRITIN: CPT | Performed by: INTERNAL MEDICINE

## 2020-09-28 RX ORDER — IOPAMIDOL 755 MG/ML
135 INJECTION, SOLUTION INTRAVASCULAR ONCE
Status: COMPLETED | OUTPATIENT
Start: 2020-09-28 | End: 2020-09-28

## 2020-09-28 RX ADMIN — IOPAMIDOL 135 ML: 755 INJECTION, SOLUTION INTRAVASCULAR at 08:04

## 2020-09-28 ASSESSMENT — PAIN SCALES - GENERAL: PAINLEVEL: NO PAIN (0)

## 2020-09-28 NOTE — PROGRESS NOTES
Chief Complaint   Patient presents with     Blood Draw     Vitals and blood drawn via VPT by FREDRICK.      VENKATA Saunders LPN

## 2020-10-01 ENCOUNTER — VIRTUAL VISIT (OUTPATIENT)
Dept: ONCOLOGY | Facility: CLINIC | Age: 70
End: 2020-10-01
Attending: INTERNAL MEDICINE
Payer: COMMERCIAL

## 2020-10-01 DIAGNOSIS — C43.9 MELANOMA OF SKIN (H): ICD-10-CM

## 2020-10-01 DIAGNOSIS — D50.0 IRON DEFICIENCY ANEMIA DUE TO CHRONIC BLOOD LOSS: Primary | ICD-10-CM

## 2020-10-01 LAB
FERRITIN SERPL-MCNC: 6 NG/ML (ref 8–252)
IRON SATN MFR SERPL: 5 % (ref 15–46)
IRON SERPL-MCNC: 27 UG/DL (ref 35–180)
TIBC SERPL-MCNC: 557 UG/DL (ref 240–430)

## 2020-10-01 PROCEDURE — 999N001193 HC VIDEO/TELEPHONE VISIT; NO CHARGE

## 2020-10-01 PROCEDURE — 99214 OFFICE O/P EST MOD 30 MIN: CPT | Mod: 95 | Performed by: INTERNAL MEDICINE

## 2020-10-01 NOTE — LETTER
10/1/2020         RE: Ilana Trent  [id number] [id number]        Dear Colleague,    Thank you for referring your patient, Ilana Trent, to the [facility]. Please see a copy of my visit note below.    MEDICAL ONCOLOGY PROGRESS NOTE  Melanoma Clinic  Oct 1, 2020    Reason for Visit: Stage III vulvar melanoma, status post excision    Melanoma History:  1. 6/4/2019, she had excision of the right vulvar mass in [place], and on pathology this shows malignant melanoma with ulceration, at least fausto level IV, Breslow thickness at least 5 mm, mitotic rate is 3 per mm2, TILs are present and brisk, pathologic staging pT4b.  2. 6/28/19 she had PET-CT, which showed no obvious evidence of metastatic disease.  3. 7/30/2019, she has wide local excision and sentinel biopsy (Specimen #: [id number]), which showed residual melanoma to 1.3 mm depth of invasion and margins negative. Right inguinal lymph node showed a subcapsular 1 mm focus of cells.  4. She was referred by Dr. Celis. Dr. Perez felt she has a >90% risk of recurrence given stage IIIC disease and recommends  5. Baseline labs are normal. PET-CT obtained on 9/23 negative for metastatic disease.  6. She started adjuvant monthly nivolumab 9/26/19  7. PET/CT 12/2019 showing MARISA  8. CT CAP 3/2020 MARISA  9. She completes immunotherapy on 8/6/2020    Interval History:  Ilana is doing well and still feels hansel tired. However, she is up and going swimming at 5:30 AM every day. Her knee pain and wrist pain have improved.    Restaging CT-CAP on 9/28/20 showed no evidence of recurrent or metastatic disease.    She has stable exertional shortness of breath on walking long distances.  She has history of atrial fib and is on coumadin and sotalol and diltiazem and has a pacemaker.  She follows cardiology in [place].  She was told recently the battery is going lower so she is following closely every 6 weeks. She remains on same doses  of coumadin. Recent INR was 3.0.     She denies chest pain, dizziness, fever, chills. No SOB at rest. No nausea, vomiting, or diarrhea. Her hemoglobin is low at 9.8 g/dL (MCV 75). Add on iron studies show Ferritin is low at 6, Iron low at 27. Iron saturation low at 5 (TIBC high at 557), consistent with iron deficiency.     ECOG performance status is 0.    Current Outpatient Medications   Medication Sig Dispense Refill     acetaminophen (TYLENOL) 500 MG tablet Take 1-2 tablets (500-1,000 mg) by mouth every 6 hours as needed for mild pain 50 tablet 0     ALOE PO Take by mouth daily as needed       calcium carbonate (OS-RENEA) 1500 (600 Ca) MG tablet Take 600 mg by mouth daily       cetirizine-pseudoePHEDrine ER (ZYRTEC-D) 5-120 MG 12 hr tablet Take 1 tablet by mouth every morning        CONTOUR NEXT EZ (CONTOUR NEXT EZ W/DEVICE KIT) w/Device KIT See Admin Instructions  0     diltiazem ER COATED BEADS (CARDIZEM CD/CARTIA XT) 120 MG 24 hr capsule Take 120 mg by mouth       furosemide (LASIX) 20 MG tablet Take 20 mg by mouth daily as needed (SWELLING)       lisinopril (PRINIVIL/ZESTRIL) 5 MG tablet Take 5 mg by mouth every morning        metFORMIN (GLUCOPHAGE-XR) 500 MG 24 hr tablet Take 500 mg by mouth every morning        multivitamin w/minerals (THERA-VIT-M) tablet Take 1 tablet by mouth daily       naproxen (NAPROSYN) 500 MG tablet Take 500 mg by mouth every morning        pantoprazole (PROTONIX) 40 MG EC tablet Take 40 mg by mouth every morning        simvastatin (ZOCOR) 40 MG tablet Take 40 mg by mouth At Bedtime        sotalol (BETAPACE) 160 MG tablet Take 80 mg by mouth 2 times daily        vitamin B complex with vitamin C (STRESS TAB) tablet Take 1 tablet by mouth every morning        warfarin (COUMADIN) 5 MG tablet Take 7.5mg 5 days week ,\ 5 mg Tues, Sat  Takes in the evening       levothyroxine (SYNTHROID/LEVOTHROID) 175 MCG tablet Take 175 mcg by mouth every morning          PHYSICAL EXAMINATION  There were no  "vitals taken for this visit.  Wt Readings from Last 10 Encounters:   09/28/20 111.9 kg (246 lb 11.2 oz)   08/06/20 114.2 kg (251 lb 12.3 oz)   07/09/20 114.9 kg (253 lb 3.2 oz)   06/11/20 116.8 kg (257 lb 9.6 oz)   06/08/20 113.9 kg (251 lb 3.2 oz)   05/11/20 114 kg (251 lb 6.4 oz)   04/13/20 114.2 kg (251 lb 12.8 oz)   03/26/20 114.4 kg (252 lb 1.6 oz)   03/16/20 112.9 kg (249 lb)   02/17/20 113.9 kg (251 lb)   General: Alert and oriented, no distress  Lungs: Speaking in complete sentences, comfortable on room air  Psych: Euthymic, linear thought process    The rest of a comprehensive physical examination is deferred due to PHE (public health emergency) video visit restrictions.    IMAGING  CT-CAP, 9/28/2020  IMPRESSION:    1.  No CT evidence of new or recurrent disease in the abdomen or  pelvis.  2.  Tiny unchanged scattered pulmonary nodules since 2019, largest  measures 4 mm.  No new or enlarging pulmonary nodule.  3.  Postsurgical changes in the abdomen and pneumobilia, unchanged.    ASSESSMENT AND PLAN  #1 Vulvar Melanoma, resected stage III  It was a pleasure to meet with Ms. Trent. She has resected stage IIIc vulvar melanoma on adjuvant treatment with nivolumab since Aug 2019.  She received 1 year of adjuvant therapy through August 2020. She is doing well. We will continue with observation.  -- Return to clinic in 3 months with CT-CAP and labs    #2 Iron deficiency anemia, Hgb 9.8 and MCV 75  Patient is on Warfarin and a PPI. Hemoglobin 9.8 g/dL with low iron and low ferritin. Hemoglobin has steadily been falling recently.  -- Iron replacement. Heme formulation preferred. Slo-Fe or Vitron-C daily recommended.    Elton Arellano M.D.   of Medicine  Hematology, Oncology and Transplantation          Ilana Trent is a 70 year old female who is being evaluated via a billable video visit.      The patient has been notified of following:     \"This video visit will be conducted via a call " "between you and your physician/provider. We have found that certain health care needs can be provided without the need for an in-person physical exam.  This service lets us provide the care you need with a video conversation.  If a prescription is necessary we can send it directly to your pharmacy.  If lab work is needed we can place an order for that and you can then stop by our lab to have the test done at a later time.    Video visits are billed at different rates depending on your insurance coverage.  Please reach out to your insurance provider with any questions.    If during the course of the call the physician/provider feels a video visit is not appropriate, you will not be charged for this service.\"    Patient has given verbal consent for Video visit? Yes    How would you like to obtain your AVS? MyChart     If you are dropped from the video visit, the video invite should be resent to: Text to cell phone: [phone]     Will anyone else be joining your video visit? No         Vitals - Patient Reported  Weight (Patient Reported): 111.6 kg (246 lb)  Height (Patient Reported): 172.7 cm (5' 7.99\")  BMI (Based on Pt Reported Ht/Wt): 37.41  Pain Score: No Pain (0)    Marvin mSith LPN    Video-Visit Details    Type of service:  Video Visit    Video Start Time: 4:50 PM  Video End Time: 5:19 PM    Originating Location (pt. Location): Home    Distant Location (provider location):  [facility]     Platform used for Video Visit: Wes      Again, thank you for allowing me to participate in the care of your patient.        Sincerely,        Elton Perez MD    "

## 2020-10-01 NOTE — PROGRESS NOTES
MEDICAL ONCOLOGY PROGRESS NOTE  Melanoma Clinic  Oct 1, 2020    Reason for Visit: Stage III vulvar melanoma, status post excision    Melanoma History:  1. 6/4/2019, she had excision of the right vulvar mass in [place], and on pathology this shows malignant melanoma with ulceration, at least fausto level IV, Breslow thickness at least 5 mm, mitotic rate is 3 per mm2, TILs are present and brisk, pathologic staging pT4b.  2. 6/28/19 she had PET-CT, which showed no obvious evidence of metastatic disease.  3. 7/30/2019, she has wide local excision and sentinel biopsy (Specimen #: [id number]), which showed residual melanoma to 1.3 mm depth of invasion and margins negative. Right inguinal lymph node showed a subcapsular 1 mm focus of cells.  4. She was referred by Dr. Celis. Dr. Perez felt she has a >90% risk of recurrence given stage IIIC disease and recommends  5. Baseline labs are normal. PET-CT obtained on 9/23 negative for metastatic disease.  6. She started adjuvant monthly nivolumab 9/26/19  7. PET/CT 12/2019 showing MARISA  8. CT CAP 3/2020 MARISA  9. She completes immunotherapy on 8/6/2020    Interval History:  Ilana is doing well and still feels hansel tired. However, she is up and going swimming at 5:30 AM every day. Her knee pain and wrist pain have improved.    Restaging CT-CAP on 9/28/20 showed no evidence of recurrent or metastatic disease.    She has stable exertional shortness of breath on walking long distances.  She has history of atrial fib and is on coumadin and sotalol and diltiazem and has a pacemaker.  She follows cardiology in [place].  She was told recently the battery is going lower so she is following closely every 6 weeks. She remains on same doses of coumadin. Recent INR was 3.0.     She denies chest pain, dizziness, fever, chills. No SOB at rest. No nausea, vomiting, or diarrhea. Her hemoglobin is low at 9.8 g/dL (MCV 75). Add on iron studies show Ferritin is low at 6, Iron low at 27. Iron  saturation low at 5 (TIBC high at 557), consistent with iron deficiency.     ECOG performance status is 0.    Current Outpatient Medications   Medication Sig Dispense Refill     acetaminophen (TYLENOL) 500 MG tablet Take 1-2 tablets (500-1,000 mg) by mouth every 6 hours as needed for mild pain 50 tablet 0     ALOE PO Take by mouth daily as needed       calcium carbonate (OS-RENEA) 1500 (600 Ca) MG tablet Take 600 mg by mouth daily       cetirizine-pseudoePHEDrine ER (ZYRTEC-D) 5-120 MG 12 hr tablet Take 1 tablet by mouth every morning        CONTOUR NEXT EZ (CONTOUR NEXT EZ W/DEVICE KIT) w/Device KIT See Admin Instructions  0     diltiazem ER COATED BEADS (CARDIZEM CD/CARTIA XT) 120 MG 24 hr capsule Take 120 mg by mouth       furosemide (LASIX) 20 MG tablet Take 20 mg by mouth daily as needed (SWELLING)       lisinopril (PRINIVIL/ZESTRIL) 5 MG tablet Take 5 mg by mouth every morning        metFORMIN (GLUCOPHAGE-XR) 500 MG 24 hr tablet Take 500 mg by mouth every morning        multivitamin w/minerals (THERA-VIT-M) tablet Take 1 tablet by mouth daily       naproxen (NAPROSYN) 500 MG tablet Take 500 mg by mouth every morning        pantoprazole (PROTONIX) 40 MG EC tablet Take 40 mg by mouth every morning        simvastatin (ZOCOR) 40 MG tablet Take 40 mg by mouth At Bedtime        sotalol (BETAPACE) 160 MG tablet Take 80 mg by mouth 2 times daily        vitamin B complex with vitamin C (STRESS TAB) tablet Take 1 tablet by mouth every morning        warfarin (COUMADIN) 5 MG tablet Take 7.5mg 5 days week ,\ 5 mg Tues, Sat  Takes in the evening       levothyroxine (SYNTHROID/LEVOTHROID) 175 MCG tablet Take 175 mcg by mouth every morning          PHYSICAL EXAMINATION  There were no vitals taken for this visit.  Wt Readings from Last 10 Encounters:   09/28/20 111.9 kg (246 lb 11.2 oz)   08/06/20 114.2 kg (251 lb 12.3 oz)   07/09/20 114.9 kg (253 lb 3.2 oz)   06/11/20 116.8 kg (257 lb 9.6 oz)   06/08/20 113.9 kg (251 lb 3.2  "oz)   05/11/20 114 kg (251 lb 6.4 oz)   04/13/20 114.2 kg (251 lb 12.8 oz)   03/26/20 114.4 kg (252 lb 1.6 oz)   03/16/20 112.9 kg (249 lb)   02/17/20 113.9 kg (251 lb)   General: Alert and oriented, no distress  Lungs: Speaking in complete sentences, comfortable on room air  Psych: Euthymic, linear thought process    The rest of a comprehensive physical examination is deferred due to PHE (public health emergency) video visit restrictions.    IMAGING  CT-CAP, 9/28/2020  IMPRESSION:    1.  No CT evidence of new or recurrent disease in the abdomen or  pelvis.  2.  Tiny unchanged scattered pulmonary nodules since 2019, largest  measures 4 mm.  No new or enlarging pulmonary nodule.  3.  Postsurgical changes in the abdomen and pneumobilia, unchanged.    ASSESSMENT AND PLAN  #1 Vulvar Melanoma, resected stage III  It was a pleasure to meet with Ms. Trent. She has resected stage IIIc vulvar melanoma on adjuvant treatment with nivolumab since Aug 2019.  She received 1 year of adjuvant therapy through August 2020. She is doing well. We will continue with observation.  -- Return to clinic in 3 months with CT-CAP and labs    #2 Iron deficiency anemia, Hgb 9.8 and MCV 75  Patient is on Warfarin and a PPI. Hemoglobin 9.8 g/dL with low iron and low ferritin. Hemoglobin has steadily been falling recently.  -- Iron replacement. Heme formulation preferred. Slo-Fe or Vitron-C daily recommended.    Elton Arellano M.D.   of Medicine  Hematology, Oncology and Transplantation          Ilana Trent is a 70 year old female who is being evaluated via a billable video visit.      The patient has been notified of following:     \"This video visit will be conducted via a call between you and your physician/provider. We have found that certain health care needs can be provided without the need for an in-person physical exam.  This service lets us provide the care you need with a video conversation.  If a prescription " "is necessary we can send it directly to your pharmacy.  If lab work is needed we can place an order for that and you can then stop by our lab to have the test done at a later time.    Video visits are billed at different rates depending on your insurance coverage.  Please reach out to your insurance provider with any questions.    If during the course of the call the physician/provider feels a video visit is not appropriate, you will not be charged for this service.\"    Patient has given verbal consent for Video visit? Yes    How would you like to obtain your AVS? MyChart     If you are dropped from the video visit, the video invite should be resent to: Text to cell phone: [phone]     Will anyone else be joining your video visit? No         Vitals - Patient Reported  Weight (Patient Reported): 111.6 kg (246 lb)  Height (Patient Reported): 172.7 cm (5' 7.99\")  BMI (Based on Pt Reported Ht/Wt): 37.41  Pain Score: No Pain (0)    Marvin Smith LPN    Video-Visit Details    Type of service:  Video Visit    Video Start Time: 4:50 PM  Video End Time: 5:19 PM    Originating Location (pt. Location): Home    Distant Location (provider location):  [facility]     Platform used for Video Visit: Wes"

## 2021-01-11 ENCOUNTER — ANCILLARY PROCEDURE (OUTPATIENT)
Dept: CT IMAGING | Facility: CLINIC | Age: 71
End: 2021-01-11
Attending: INTERNAL MEDICINE
Payer: COMMERCIAL

## 2021-01-11 DIAGNOSIS — C43.9 MELANOMA OF SKIN (H): ICD-10-CM

## 2021-01-11 LAB
ALBUMIN SERPL-MCNC: 3.6 G/DL (ref 3.4–5)
ALP SERPL-CCNC: 83 U/L (ref 40–150)
ALT SERPL W P-5'-P-CCNC: 38 U/L (ref 0–50)
ANION GAP SERPL CALCULATED.3IONS-SCNC: 2 MMOL/L (ref 3–14)
AST SERPL W P-5'-P-CCNC: 13 U/L (ref 0–45)
BASOPHILS # BLD AUTO: 0.1 10E9/L (ref 0–0.2)
BASOPHILS NFR BLD AUTO: 0.9 %
BILIRUB SERPL-MCNC: 0.3 MG/DL (ref 0.2–1.3)
BUN SERPL-MCNC: 19 MG/DL (ref 7–30)
CALCIUM SERPL-MCNC: 8.9 MG/DL (ref 8.5–10.1)
CHLORIDE SERPL-SCNC: 109 MMOL/L (ref 94–109)
CO2 SERPL-SCNC: 30 MMOL/L (ref 20–32)
CREAT BLD-MCNC: 0.9 MG/DL (ref 0.52–1.04)
CREAT SERPL-MCNC: 1 MG/DL (ref 0.52–1.04)
DIFFERENTIAL METHOD BLD: ABNORMAL
EOSINOPHIL # BLD AUTO: 0.2 10E9/L (ref 0–0.7)
EOSINOPHIL NFR BLD AUTO: 3 %
ERYTHROCYTE [DISTWIDTH] IN BLOOD BY AUTOMATED COUNT: 20.7 % (ref 10–15)
GFR SERPL CREATININE-BSD FRML MDRD: 57 ML/MIN/{1.73_M2}
GFR SERPL CREATININE-BSD FRML MDRD: 62 ML/MIN/{1.73_M2}
GLUCOSE SERPL-MCNC: 157 MG/DL (ref 70–99)
HCT VFR BLD AUTO: 41.1 % (ref 35–47)
HGB BLD-MCNC: 12.1 G/DL (ref 11.7–15.7)
IMM GRANULOCYTES # BLD: 0 10E9/L (ref 0–0.4)
IMM GRANULOCYTES NFR BLD: 0.4 %
LYMPHOCYTES # BLD AUTO: 2 10E9/L (ref 0.8–5.3)
LYMPHOCYTES NFR BLD AUTO: 26.8 %
MCH RBC QN AUTO: 24 PG (ref 26.5–33)
MCHC RBC AUTO-ENTMCNC: 29.4 G/DL (ref 31.5–36.5)
MCV RBC AUTO: 82 FL (ref 78–100)
MONOCYTES # BLD AUTO: 0.9 10E9/L (ref 0–1.3)
MONOCYTES NFR BLD AUTO: 11.4 %
NEUTROPHILS # BLD AUTO: 4.4 10E9/L (ref 1.6–8.3)
NEUTROPHILS NFR BLD AUTO: 57.5 %
NRBC # BLD AUTO: 0 10*3/UL
NRBC BLD AUTO-RTO: 0 /100
PLATELET # BLD AUTO: 267 10E9/L (ref 150–450)
POTASSIUM SERPL-SCNC: 5 MMOL/L (ref 3.4–5.3)
PROT SERPL-MCNC: 7.4 G/DL (ref 6.8–8.8)
RBC # BLD AUTO: 5.04 10E12/L (ref 3.8–5.2)
SODIUM SERPL-SCNC: 142 MMOL/L (ref 133–144)
WBC # BLD AUTO: 7.6 10E9/L (ref 4–11)

## 2021-01-11 PROCEDURE — 36415 COLL VENOUS BLD VENIPUNCTURE: CPT | Performed by: PATHOLOGY

## 2021-01-11 PROCEDURE — 74177 CT ABD & PELVIS W/CONTRAST: CPT | Mod: GC | Performed by: RADIOLOGY

## 2021-01-11 PROCEDURE — 82565 ASSAY OF CREATININE: CPT | Performed by: PATHOLOGY

## 2021-01-11 PROCEDURE — 80053 COMPREHEN METABOLIC PANEL: CPT | Performed by: PATHOLOGY

## 2021-01-11 PROCEDURE — 85025 COMPLETE CBC W/AUTO DIFF WBC: CPT | Performed by: PATHOLOGY

## 2021-01-11 PROCEDURE — 71260 CT THORAX DX C+: CPT | Mod: GC | Performed by: RADIOLOGY

## 2021-01-11 RX ORDER — IOPAMIDOL 755 MG/ML
135 INJECTION, SOLUTION INTRAVASCULAR ONCE
Status: COMPLETED | OUTPATIENT
Start: 2021-01-11 | End: 2021-01-11

## 2021-01-11 RX ADMIN — IOPAMIDOL 135 ML: 755 INJECTION, SOLUTION INTRAVASCULAR at 07:12

## 2021-01-21 ENCOUNTER — VIRTUAL VISIT (OUTPATIENT)
Dept: ONCOLOGY | Facility: CLINIC | Age: 71
End: 2021-01-21
Attending: INTERNAL MEDICINE
Payer: COMMERCIAL

## 2021-01-21 DIAGNOSIS — C43.9 SKIN MELANOMA (H): Primary | ICD-10-CM

## 2021-01-21 PROBLEM — N18.30 CHRONIC KIDNEY DISEASE, STAGE 3 (H): Status: ACTIVE | Noted: 2021-01-21

## 2021-01-21 PROBLEM — E11.8 TYPE 2 DIABETES MELLITUS WITH COMPLICATION, WITHOUT LONG-TERM CURRENT USE OF INSULIN (H): Status: ACTIVE | Noted: 2019-01-15

## 2021-01-21 PROBLEM — C51.9: Status: ACTIVE | Noted: 2019-06-04

## 2021-01-21 PROBLEM — E78.5 HYPERLIPIDEMIA: Status: ACTIVE | Noted: 2019-01-15

## 2021-01-21 PROBLEM — Z98.890 POST-OPERATIVE STATE: Status: RESOLVED | Noted: 2019-07-30 | Resolved: 2021-01-21

## 2021-01-21 PROBLEM — M85.80 OSTEOPENIA: Status: ACTIVE | Noted: 2021-01-21

## 2021-01-21 PROCEDURE — 999N001193 HC VIDEO/TELEPHONE VISIT; NO CHARGE

## 2021-01-21 PROCEDURE — 99213 OFFICE O/P EST LOW 20 MIN: CPT | Mod: 95 | Performed by: INTERNAL MEDICINE

## 2021-01-21 NOTE — LETTER
1/21/2021         RE: Ilana Trent  [address] [facility] [address]        Dear Colleague,    Thank you for referring your patient, Ilana Trent, to the [facility]CER [facility]. Please see a copy of my visit note below.    Ilana is a 70 year old who is being evaluated via a billable video visit.      How would you like to obtain your AVS? MyChart  If the video visit is dropped, the invitation should be resent by: Text to cell phone: [phone]  Will anyone else be joining your video visit? No      Vitals - Patient Reported  Weight (Patient Reported): 111.1 kg (245 lb)  Pain Score: No Pain (0)    Magdalena ZAMARRIPA    Video-Visit Details    Type of service:  Video Visit    Video Start Time: 4:55 PM    Video End Time:5:18 PM    Originating Location (pt. Location): Home    Distant Location (provider location):  [facility]     Platform used for Video Visit: [facility] ONCOLOGY PROGRESS NOTE  Melanoma Clinic  Jan 21, 2021    Reason for Visit: Stage III vulvar melanoma, status post excision    Melanoma History:  1. 6/4/2019, she had excision of the right vulvar mass in [place], and on pathology this shows malignant melanoma with ulceration, at least fausto level IV, Breslow thickness at least 5 mm, mitotic rate is 3 per mm2, TILs are present and brisk, pathologic stage pT4b.  2. 6/28/19, she had PET-CT, which showed no obvious evidence of metastatic disease.  3. 7/30/2019, she has wide local excision and sentinel biopsy (Specimen #: [id number]), which showed residual melanoma to a depth of invasion of 1.3 mm and margins negative. Right inguinal sentinel lymph node showed a subcapsular 1 mm focus of cells. pT4b pN1a MX, Stage IIIC.  4. PET-CT obtained on 9/23 negative for metastatic disease.  5. 9/26/2019, she started adjuvant nivolumab monthly   6. PET/CT 12/2019 showing MARISA  7. CT CAP 3/2020 MARISA  8. 8/6/2020, She completes adjuvant  immunotherapy.    History of Present Illness:  Ilana Trent is  70 year old woman with resected stage IIIC melanoma s/p 1 year of adjuvant nivolumab. She is doing well today. After I saw her back in September 2020 she noted mild dyspnea. This probably had started in about June 2020, she thinks. She has history of atrial fib and is on coumadin, sotalol and diltiazem and has a pacemaker. She was to have replacement of her pacemaker batttery and was told by cariology that the dyspnea was related, but one day she was at the swimming pool when she noticed severe dyspnea. She called in and   was offered a Covid test, which returned positive. She figures she was exposed at work.    Today her dyspnea is mild and improved dramatically with replacement of the pacemaker battery. She denies chest pain, dizziness, fever, chills. No SOB at rest. No nausea, vomiting, or diarrhea.     We reviewed her CT-chest, which shows no evidence for recurrence or metastasis. No ground glass opacities in the lungs. She requests antibody based Covid testing to see if she might have some serologic evidence of prior infection and potential protective immunity.    ECOG performance status is 0.    Current Outpatient Medications   Medication Sig Dispense Refill     acetaminophen (TYLENOL) 500 MG tablet Take 1-2 tablets (500-1,000 mg) by mouth every 6 hours as needed for mild pain 50 tablet 0     ALOE PO Take by mouth daily as needed       calcium carbonate (OS-RENEA) 1500 (600 Ca) MG tablet Take 600 mg by mouth daily       cetirizine-pseudoePHEDrine ER (ZYRTEC-D) 5-120 MG 12 hr tablet Take 1 tablet by mouth every morning        CONTOUR NEXT EZ (CONTOUR NEXT EZ W/DEVICE KIT) w/Device KIT See Admin Instructions  0     diltiazem ER COATED BEADS (CARDIZEM CD/CARTIA XT) 120 MG 24 hr capsule Take 120 mg by mouth       furosemide (LASIX) 20 MG tablet Take 20 mg by mouth daily as needed (SWELLING)       lisinopril (PRINIVIL/ZESTRIL) 5 MG tablet Take 5 mg by  mouth every morning        metFORMIN (GLUCOPHAGE-XR) 500 MG 24 hr tablet Take 500 mg by mouth every morning        multivitamin w/minerals (THERA-VIT-M) tablet Take 1 tablet by mouth daily       naproxen (NAPROSYN) 500 MG tablet Take 500 mg by mouth every morning        pantoprazole (PROTONIX) 40 MG EC tablet Take 40 mg by mouth every morning        simvastatin (ZOCOR) 40 MG tablet Take 40 mg by mouth At Bedtime        sotalol (BETAPACE) 160 MG tablet Take 80 mg by mouth 2 times daily        vitamin B complex with vitamin C (STRESS TAB) tablet Take 1 tablet by mouth every morning        warfarin (COUMADIN) 5 MG tablet Take 7.5mg 5 days week ,\ 5 mg Tues, Sat  Takes in the evening       levothyroxine (SYNTHROID/LEVOTHROID) 175 MCG tablet Take 175 mcg by mouth every morning          Physical Examination:  There were no vitals taken for this visit.  General: Alert and oriented, no distress  Lungs: Speaking in complete sentences, comfortable on room air  Psych: Euthymic, linear thought process    The rest of a comprehensive physical examination is deferred due to PHE (public health emergency) video visit restrictions.    Laboratory Studies:  Ancillary Procedure on 01/11/2021   Component Date Value Ref Range Status     Creatinine 01/11/2021 0.9  0.52 - 1.04 mg/dL Final     GFR Estimate 01/11/2021 62  >60 mL/min/[1.73_m2] Final     GFR Estimate If Black 01/11/2021 75  >60 mL/min/[1.73_m2] Final   Orders Only on 01/11/2021   Component Date Value Ref Range Status     Sodium 01/11/2021 142  133 - 144 mmol/L Final     Potassium 01/11/2021 5.0  3.4 - 5.3 mmol/L Final     Chloride 01/11/2021 109  94 - 109 mmol/L Final     Carbon Dioxide 01/11/2021 30  20 - 32 mmol/L Final     Anion Gap 01/11/2021 2* 3 - 14 mmol/L Final     Glucose 01/11/2021 157* 70 - 99 mg/dL Final     Urea Nitrogen 01/11/2021 19  7 - 30 mg/dL Final     Creatinine 01/11/2021 1.00  0.52 - 1.04 mg/dL Final     GFR Estimate 01/11/2021 57* >60 mL/min/[1.73_m2]  Final     GFR Estimate If Black 01/11/2021 67  >60 mL/min/[1.73_m2] Final     Calcium 01/11/2021 8.9  8.5 - 10.1 mg/dL Final     Bilirubin Total 01/11/2021 0.3  0.2 - 1.3 mg/dL Final     Albumin 01/11/2021 3.6  3.4 - 5.0 g/dL Final     Protein Total 01/11/2021 7.4  6.8 - 8.8 g/dL Final     Alkaline Phosphatase 01/11/2021 83  40 - 150 U/L Final     ALT 01/11/2021 38  0 - 50 U/L Final     AST 01/11/2021 13  0 - 45 U/L Final     WBC 01/11/2021 7.6  4.0 - 11.0 10e9/L Final     RBC Count 01/11/2021 5.04  3.8 - 5.2 10e12/L Final     Hemoglobin 01/11/2021 12.1  11.7 - 15.7 g/dL Final     Hematocrit 01/11/2021 41.1  35.0 - 47.0 % Final     MCV 01/11/2021 82  78 - 100 fl Final     MCH 01/11/2021 24.0* 26.5 - 33.0 pg Final     MCHC 01/11/2021 29.4* 31.5 - 36.5 g/dL Final     RDW 01/11/2021 20.7* 10.0 - 15.0 % Final     Platelet Count 01/11/2021 267  150 - 450 10e9/L Final     Diff Method 01/11/2021 Automated Method   Final     % Neutrophils 01/11/2021 57.5  % Final     % Lymphocytes 01/11/2021 26.8  % Final     % Monocytes 01/11/2021 11.4  % Final     % Eosinophils 01/11/2021 3.0  % Final     % Basophils 01/11/2021 0.9  % Final     % Immature Granulocytes 01/11/2021 0.4  % Final     Nucleated RBCs 01/11/2021 0  0 /100 Final     Absolute Neutrophil 01/11/2021 4.4  1.6 - 8.3 10e9/L Final     Absolute Lymphocytes 01/11/2021 2.0  0.8 - 5.3 10e9/L Final     Absolute Monocytes 01/11/2021 0.9  0.0 - 1.3 10e9/L Final     Absolute Eosinophils 01/11/2021 0.2  0.0 - 0.7 10e9/L Final     Absolute Basophils 01/11/2021 0.1  0.0 - 0.2 10e9/L Final     Abs Immature Granulocytes 01/11/2021 0.0  0 - 0.4 10e9/L Final     Absolute Nucleated RBC 01/11/2021 0.0   Final     Imaging Studies:  CT-CAP, 9/28/2020  IMPRESSION:    1.  No CT evidence of new or recurrent disease in the abdomen or  pelvis.  2.  Tiny unchanged scattered pulmonary nodules since 2019, largest  measures 4 mm.  No new or enlarging pulmonary nodule.  3.  Postsurgical changes in  the abdomen and pneumobilia, unchanged.      ASSESSMENT AND PLAN:    #1 Vulvar Melanoma, resected stage III  It was a pleasure to see Mrs. Trent. She has resected stage IIIC vulvar melanoma. She received 1 year of adjuvant therapy through August 2020. She is doing well. We will continue with observation.  -- Return to clinic in 3 months with CT-CAP and labs    #2 Iron deficiency anemia, Hgb 9.8 and MCV 75, resolving  Patient is on Warfarin and a PPI. her hemoglobin had fallen to 9.8 g/dL. She started Iron replacement therapy, and hemoglobin is 12.1 g/dL, MCV 82 today. She is no longer anemic. She may discontinue Iron supplements and we will continue to monitor.    Elton Arellano M.D.   of Medicine  Hematology, Oncology and Transplantation

## 2021-01-21 NOTE — PROGRESS NOTES
Ilana is a 70 year old who is being evaluated via a billable video visit.      How would you like to obtain your AVS? MyChart  If the video visit is dropped, the invitation should be resent by: Text to cell phone: [phone]  Will anyone else be joining your video visit? No      Vitals - Patient Reported  Weight (Patient Reported): 111.1 kg (245 lb)  Pain Score: No Pain (0)    Magdalena ZAMARRIPA    Video-Visit Details    Type of service:  Video Visit    Video Start Time: 4:55 PM    Video End Time:5:18 PM    Originating Location (pt. Location): Home    Distant Location (provider location):  [facility]     Platform used for Video Visit: [facility] ONCOLOGY PROGRESS NOTE  Melanoma Clinic  Jan 21, 2021    Reason for Visit: Stage III vulvar melanoma, status post excision    Melanoma History:  1. 6/4/2019, she had excision of the right vulvar mass in [place], and on pathology this shows malignant melanoma with ulceration, at least fausto level IV, Breslow thickness at least 5 mm, mitotic rate is 3 per mm2, TILs are present and brisk, pathologic stage pT4b.  2. 6/28/19, she had PET-CT, which showed no obvious evidence of metastatic disease.  3. 7/30/2019, she has wide local excision and sentinel biopsy (Specimen #: [id number]), which showed residual melanoma to a depth of invasion of 1.3 mm and margins negative. Right inguinal sentinel lymph node showed a subcapsular 1 mm focus of cells. pT4b pN1a MX, Stage IIIC.  4. PET-CT obtained on 9/23 negative for metastatic disease.  5. 9/26/2019, she started adjuvant nivolumab monthly   6. PET/CT 12/2019 showing MARISA  7. CT CAP 3/2020 MARISA  8. 8/6/2020, She completes adjuvant immunotherapy.    History of Present Illness:  Ilana Trent is  70 year old woman with resected stage IIIC melanoma s/p 1 year of adjuvant nivolumab. She is doing well today. After I saw her back in September 2020 she noted mild dyspnea. This probably had started in about  June 2020, she thinks. She has history of atrial fib and is on coumadin, sotalol and diltiazem and has a pacemaker. She was to have replacement of her pacemaker batttery and was told by cariology that the dyspnea was related, but one day she was at the swimming pool when she noticed severe dyspnea. She called in and   was offered a Covid test, which returned positive. She figures she was exposed at work.    Today her dyspnea is mild and improved dramatically with replacement of the pacemaker battery. She denies chest pain, dizziness, fever, chills. No SOB at rest. No nausea, vomiting, or diarrhea.     We reviewed her CT-chest, which shows no evidence for recurrence or metastasis. No ground glass opacities in the lungs. She requests antibody based Covid testing to see if she might have some serologic evidence of prior infection and potential protective immunity.    ECOG performance status is 0.    Current Outpatient Medications   Medication Sig Dispense Refill     acetaminophen (TYLENOL) 500 MG tablet Take 1-2 tablets (500-1,000 mg) by mouth every 6 hours as needed for mild pain 50 tablet 0     ALOE PO Take by mouth daily as needed       calcium carbonate (OS-RENEA) 1500 (600 Ca) MG tablet Take 600 mg by mouth daily       cetirizine-pseudoePHEDrine ER (ZYRTEC-D) 5-120 MG 12 hr tablet Take 1 tablet by mouth every morning        CONTOUR NEXT EZ (CONTOUR NEXT EZ W/DEVICE KIT) w/Device KIT See Admin Instructions  0     diltiazem ER COATED BEADS (CARDIZEM CD/CARTIA XT) 120 MG 24 hr capsule Take 120 mg by mouth       furosemide (LASIX) 20 MG tablet Take 20 mg by mouth daily as needed (SWELLING)       lisinopril (PRINIVIL/ZESTRIL) 5 MG tablet Take 5 mg by mouth every morning        metFORMIN (GLUCOPHAGE-XR) 500 MG 24 hr tablet Take 500 mg by mouth every morning        multivitamin w/minerals (THERA-VIT-M) tablet Take 1 tablet by mouth daily       naproxen (NAPROSYN) 500 MG tablet Take 500 mg by mouth every morning         pantoprazole (PROTONIX) 40 MG EC tablet Take 40 mg by mouth every morning        simvastatin (ZOCOR) 40 MG tablet Take 40 mg by mouth At Bedtime        sotalol (BETAPACE) 160 MG tablet Take 80 mg by mouth 2 times daily        vitamin B complex with vitamin C (STRESS TAB) tablet Take 1 tablet by mouth every morning        warfarin (COUMADIN) 5 MG tablet Take 7.5mg 5 days week ,\ 5 mg Tues, Sat  Takes in the evening       levothyroxine (SYNTHROID/LEVOTHROID) 175 MCG tablet Take 175 mcg by mouth every morning          Physical Examination:  There were no vitals taken for this visit.  General: Alert and oriented, no distress  Lungs: Speaking in complete sentences, comfortable on room air  Psych: Euthymic, linear thought process    The rest of a comprehensive physical examination is deferred due to PHE (public health emergency) video visit restrictions.    Laboratory Studies:  Ancillary Procedure on 01/11/2021   Component Date Value Ref Range Status     Creatinine 01/11/2021 0.9  0.52 - 1.04 mg/dL Final     GFR Estimate 01/11/2021 62  >60 mL/min/[1.73_m2] Final     GFR Estimate If Black 01/11/2021 75  >60 mL/min/[1.73_m2] Final   Orders Only on 01/11/2021   Component Date Value Ref Range Status     Sodium 01/11/2021 142  133 - 144 mmol/L Final     Potassium 01/11/2021 5.0  3.4 - 5.3 mmol/L Final     Chloride 01/11/2021 109  94 - 109 mmol/L Final     Carbon Dioxide 01/11/2021 30  20 - 32 mmol/L Final     Anion Gap 01/11/2021 2* 3 - 14 mmol/L Final     Glucose 01/11/2021 157* 70 - 99 mg/dL Final     Urea Nitrogen 01/11/2021 19  7 - 30 mg/dL Final     Creatinine 01/11/2021 1.00  0.52 - 1.04 mg/dL Final     GFR Estimate 01/11/2021 57* >60 mL/min/[1.73_m2] Final     GFR Estimate If Black 01/11/2021 67  >60 mL/min/[1.73_m2] Final     Calcium 01/11/2021 8.9  8.5 - 10.1 mg/dL Final     Bilirubin Total 01/11/2021 0.3  0.2 - 1.3 mg/dL Final     Albumin 01/11/2021 3.6  3.4 - 5.0 g/dL Final     Protein Total 01/11/2021 7.4  6.8 -  8.8 g/dL Final     Alkaline Phosphatase 01/11/2021 83  40 - 150 U/L Final     ALT 01/11/2021 38  0 - 50 U/L Final     AST 01/11/2021 13  0 - 45 U/L Final     WBC 01/11/2021 7.6  4.0 - 11.0 10e9/L Final     RBC Count 01/11/2021 5.04  3.8 - 5.2 10e12/L Final     Hemoglobin 01/11/2021 12.1  11.7 - 15.7 g/dL Final     Hematocrit 01/11/2021 41.1  35.0 - 47.0 % Final     MCV 01/11/2021 82  78 - 100 fl Final     MCH 01/11/2021 24.0* 26.5 - 33.0 pg Final     MCHC 01/11/2021 29.4* 31.5 - 36.5 g/dL Final     RDW 01/11/2021 20.7* 10.0 - 15.0 % Final     Platelet Count 01/11/2021 267  150 - 450 10e9/L Final     Diff Method 01/11/2021 Automated Method   Final     % Neutrophils 01/11/2021 57.5  % Final     % Lymphocytes 01/11/2021 26.8  % Final     % Monocytes 01/11/2021 11.4  % Final     % Eosinophils 01/11/2021 3.0  % Final     % Basophils 01/11/2021 0.9  % Final     % Immature Granulocytes 01/11/2021 0.4  % Final     Nucleated RBCs 01/11/2021 0  0 /100 Final     Absolute Neutrophil 01/11/2021 4.4  1.6 - 8.3 10e9/L Final     Absolute Lymphocytes 01/11/2021 2.0  0.8 - 5.3 10e9/L Final     Absolute Monocytes 01/11/2021 0.9  0.0 - 1.3 10e9/L Final     Absolute Eosinophils 01/11/2021 0.2  0.0 - 0.7 10e9/L Final     Absolute Basophils 01/11/2021 0.1  0.0 - 0.2 10e9/L Final     Abs Immature Granulocytes 01/11/2021 0.0  0 - 0.4 10e9/L Final     Absolute Nucleated RBC 01/11/2021 0.0   Final     Imaging Studies:  CT-CAP, 9/28/2020  IMPRESSION:    1.  No CT evidence of new or recurrent disease in the abdomen or  pelvis.  2.  Tiny unchanged scattered pulmonary nodules since 2019, largest  measures 4 mm.  No new or enlarging pulmonary nodule.  3.  Postsurgical changes in the abdomen and pneumobilia, unchanged.      ASSESSMENT AND PLAN:    #1 Vulvar Melanoma, resected stage III  It was a pleasure to see Mrs. Trent. She has resected stage IIIC vulvar melanoma. She received 1 year of adjuvant therapy through August 2020. She is doing well. We  will continue with observation.  -- Return to clinic in 3 months with CT-CAP and labs    #2 Iron deficiency anemia, Hgb 9.8 and MCV 75, resolving  Patient is on Warfarin and a PPI. her hemoglobin had fallen to 9.8 g/dL. She started Iron replacement therapy, and hemoglobin is 12.1 g/dL, MCV 82 today. She is no longer anemic. She may discontinue Iron supplements and we will continue to monitor.    Elton Arellano M.D.   of Medicine  Hematology, Oncology and Transplantation

## 2021-02-02 DIAGNOSIS — C43.9 SKIN MELANOMA (H): ICD-10-CM

## 2021-02-02 PROCEDURE — 86769 SARS-COV-2 COVID-19 ANTIBODY: CPT | Mod: 59 | Performed by: INTERNAL MEDICINE

## 2021-02-02 PROCEDURE — 36415 COLL VENOUS BLD VENIPUNCTURE: CPT | Performed by: INTERNAL MEDICINE

## 2021-02-02 PROCEDURE — 86769 SARS-COV-2 COVID-19 ANTIBODY: CPT | Performed by: INTERNAL MEDICINE

## 2021-02-03 LAB
SARS-COV-2 AB PNL SERPL IA: POSITIVE
SARS-COV-2 IGG SERPL IA-ACNC: NORMAL

## 2021-03-07 ENCOUNTER — HEALTH MAINTENANCE LETTER (OUTPATIENT)
Age: 71
End: 2021-03-07

## 2021-04-19 ENCOUNTER — ANCILLARY PROCEDURE (OUTPATIENT)
Dept: CT IMAGING | Facility: CLINIC | Age: 71
End: 2021-04-19
Attending: INTERNAL MEDICINE
Payer: COMMERCIAL

## 2021-04-19 DIAGNOSIS — C43.9 MELANOMA OF SKIN (H): ICD-10-CM

## 2021-04-19 DIAGNOSIS — C43.9 SKIN MELANOMA (H): ICD-10-CM

## 2021-04-19 LAB
ALBUMIN SERPL-MCNC: 3.3 G/DL (ref 3.4–5)
ALP SERPL-CCNC: 63 U/L (ref 40–150)
ALT SERPL W P-5'-P-CCNC: 26 U/L (ref 0–50)
ANION GAP SERPL CALCULATED.3IONS-SCNC: 4 MMOL/L (ref 3–14)
AST SERPL W P-5'-P-CCNC: 8 U/L (ref 0–45)
BASOPHILS # BLD AUTO: 0.1 10E9/L (ref 0–0.2)
BASOPHILS NFR BLD AUTO: 0.6 %
BILIRUB SERPL-MCNC: 0.5 MG/DL (ref 0.2–1.3)
BUN SERPL-MCNC: 21 MG/DL (ref 7–30)
CALCIUM SERPL-MCNC: 8.3 MG/DL (ref 8.5–10.1)
CHLORIDE SERPL-SCNC: 106 MMOL/L (ref 94–109)
CO2 SERPL-SCNC: 27 MMOL/L (ref 20–32)
CREAT BLD-MCNC: 1 MG/DL (ref 0.52–1.04)
CREAT SERPL-MCNC: 0.96 MG/DL (ref 0.52–1.04)
DIFFERENTIAL METHOD BLD: ABNORMAL
EOSINOPHIL # BLD AUTO: 0.3 10E9/L (ref 0–0.7)
EOSINOPHIL NFR BLD AUTO: 3.6 %
ERYTHROCYTE [DISTWIDTH] IN BLOOD BY AUTOMATED COUNT: 15.9 % (ref 10–15)
GFR SERPL CREATININE-BSD FRML MDRD: 55 ML/MIN/{1.73_M2}
GFR SERPL CREATININE-BSD FRML MDRD: 60 ML/MIN/{1.73_M2}
GLUCOSE SERPL-MCNC: 110 MG/DL (ref 70–99)
HCT VFR BLD AUTO: 35.5 % (ref 35–47)
HGB BLD-MCNC: 10.7 G/DL (ref 11.7–15.7)
IMM GRANULOCYTES # BLD: 0 10E9/L (ref 0–0.4)
IMM GRANULOCYTES NFR BLD: 0.3 %
LYMPHOCYTES # BLD AUTO: 1.9 10E9/L (ref 0.8–5.3)
LYMPHOCYTES NFR BLD AUTO: 23.8 %
MCH RBC QN AUTO: 26.1 PG (ref 26.5–33)
MCHC RBC AUTO-ENTMCNC: 30.1 G/DL (ref 31.5–36.5)
MCV RBC AUTO: 87 FL (ref 78–100)
MONOCYTES # BLD AUTO: 0.9 10E9/L (ref 0–1.3)
MONOCYTES NFR BLD AUTO: 10.9 %
NEUTROPHILS # BLD AUTO: 4.9 10E9/L (ref 1.6–8.3)
NEUTROPHILS NFR BLD AUTO: 60.8 %
NRBC # BLD AUTO: 0 10*3/UL
NRBC BLD AUTO-RTO: 0 /100
PLATELET # BLD AUTO: 255 10E9/L (ref 150–450)
POTASSIUM SERPL-SCNC: 4.3 MMOL/L (ref 3.4–5.3)
PROT SERPL-MCNC: 6.6 G/DL (ref 6.8–8.8)
RBC # BLD AUTO: 4.1 10E12/L (ref 3.8–5.2)
SODIUM SERPL-SCNC: 137 MMOL/L (ref 133–144)
WBC # BLD AUTO: 8 10E9/L (ref 4–11)

## 2021-04-19 PROCEDURE — 80053 COMPREHEN METABOLIC PANEL: CPT | Performed by: PATHOLOGY

## 2021-04-19 PROCEDURE — 36415 COLL VENOUS BLD VENIPUNCTURE: CPT | Performed by: PATHOLOGY

## 2021-04-19 PROCEDURE — 74177 CT ABD & PELVIS W/CONTRAST: CPT | Mod: GC | Performed by: RADIOLOGY

## 2021-04-19 PROCEDURE — 71260 CT THORAX DX C+: CPT | Mod: GC | Performed by: RADIOLOGY

## 2021-04-19 PROCEDURE — 85025 COMPLETE CBC W/AUTO DIFF WBC: CPT | Performed by: PATHOLOGY

## 2021-04-19 RX ORDER — IOPAMIDOL 755 MG/ML
135 INJECTION, SOLUTION INTRAVASCULAR ONCE
Status: COMPLETED | OUTPATIENT
Start: 2021-04-19 | End: 2021-04-19

## 2021-04-19 RX ADMIN — IOPAMIDOL 135 ML: 755 INJECTION, SOLUTION INTRAVASCULAR at 06:59

## 2021-04-25 ENCOUNTER — HEALTH MAINTENANCE LETTER (OUTPATIENT)
Age: 71
End: 2021-04-25

## 2021-04-29 ENCOUNTER — VIRTUAL VISIT (OUTPATIENT)
Dept: ONCOLOGY | Facility: CLINIC | Age: 71
End: 2021-04-29
Attending: INTERNAL MEDICINE
Payer: COMMERCIAL

## 2021-04-29 DIAGNOSIS — C51.9 MALIGNANT MELANOMA OF SKIN OF VULVA (H): Primary | ICD-10-CM

## 2021-04-29 DIAGNOSIS — D50.0 IRON DEFICIENCY ANEMIA DUE TO CHRONIC BLOOD LOSS: ICD-10-CM

## 2021-04-29 DIAGNOSIS — Z85.850 HISTORY OF THYROID CANCER: ICD-10-CM

## 2021-04-29 PROCEDURE — 999N001193 HC VIDEO/TELEPHONE VISIT; NO CHARGE

## 2021-04-29 PROCEDURE — 99214 OFFICE O/P EST MOD 30 MIN: CPT | Mod: 95 | Performed by: INTERNAL MEDICINE

## 2021-04-29 NOTE — LETTER
4/29/2021         RE: Ilana Trent  [address] [facility] [address]        Dear Colleague,    Thank you for referring your patient, Ilana Trent, to the [facility]. Please see a copy of my visit note below.    Ilana is a 71 year old who is being evaluated via a billable video visit.      How would you like to obtain your AVS? MyChart  If the video visit is dropped, the invitation should be resent by: Send to e-mail at: [email or web address]  Will anyone else be joining your video visit? No      Caroline Estrada CMA on 4/29/2021 at 4:31 PM    Video-Visit Details  Type of service:  Video Visit  Video Start Time: 6:45 PM  Video End Time:7:05 PM  Originating Location (pt. Location): Home  Distant Location (provider location):  [facility]   Platform used for Video Visit: [facility] ONCOLOGY PROGRESS NOTE  Melanoma Clinic  Apr 29, 2021    Reason for Visit: Stage III vulvar melanoma, status post excision    Melanoma History:  1. 6/4/2019, she had excision of the right vulvar mass in [place], and on pathology this shows malignant melanoma with ulceration, at least fausto level IV, Breslow thickness at least 5 mm, mitotic rate is 3 per mm2, TILs are present and brisk, pathologic stage pT4b.  2. 6/28/19, she had PET-CT, which showed no obvious evidence of metastatic disease.  3. 7/30/2019, she has wide local excision and sentinel biopsy (Specimen #: [id number]), which showed residual melanoma to a depth of invasion of 1.3 mm and margins negative. Right inguinal sentinel lymph node showed a subcapsular 1 mm focus of cells. pT4b pN1a MX, Stage IIIC.  4. PET-CT obtained on 9/23 negative for metastatic disease.  5. 9/26/2019, she started adjuvant nivolumab monthly   6. PET/CT 12/2019 showing MARISA  7. CT CAP 3/2020 MARISA  8. 8/6/2020, She completes adjuvant nivolumab immunotherapy.  9. September 2020, she has Covid-19 infections, exposed at work. She has mild  symptoms. Dyspnea improves markedly after change of pacemaker battery.      History of Present Illness:  Ilana Trent is  71 year old woman with resected stage IIIC melanoma s/p 1 year of adjuvant nivolumab, completed August 2020. This was started as a video visit but converted to a phone visit due to technical difficulties.    Ilana is doing well. 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 She enjoys working. She denies any major complaints, no chest pain, dizziness, fever, chills. No SOB at rest. No nausea, vomiting, or diarrhea.     CT-chest, shows no evidence for recurrence or metastasis.    ECOG performance status is 0.    Current Outpatient Medications   Medication Sig Dispense Refill     acetaminophen (TYLENOL) 500 MG tablet Take 1-2 tablets (500-1,000 mg) by mouth every 6 hours as needed for mild pain 50 tablet 0     ALOE PO Take by mouth daily as needed       calcium carbonate (OS-RENEA) 1500 (600 Ca) MG tablet Take 600 mg by mouth daily       cetirizine-pseudoePHEDrine ER (ZYRTEC-D) 5-120 MG 12 hr tablet Take 1 tablet by mouth every morning        CONTOUR NEXT EZ (CONTOUR NEXT EZ W/DEVICE KIT) w/Device KIT See Admin Instructions  0     diltiazem ER COATED BEADS (CARDIZEM CD/CARTIA XT) 120 MG 24 hr capsule Take 120 mg by mouth       furosemide (LASIX) 20 MG tablet Take 20 mg by mouth daily as needed (SWELLING)       lisinopril (PRINIVIL/ZESTRIL) 5 MG tablet Take 5 mg by mouth every morning        metFORMIN (GLUCOPHAGE-XR) 500 MG 24 hr tablet Take 500 mg by mouth every morning        multivitamin w/minerals (THERA-VIT-M) tablet Take 1 tablet by mouth daily       naproxen (NAPROSYN) 500 MG tablet Take 500 mg by mouth every morning        pantoprazole (PROTONIX) 40 MG EC tablet Take 40 mg by mouth every morning        simvastatin (ZOCOR) 40 MG tablet Take 40 mg by mouth At Bedtime        sotalol (BETAPACE) 160 MG tablet Take 80 mg by mouth 2 times daily         vitamin B complex with vitamin C (STRESS TAB) tablet Take 1 tablet by mouth every morning        warfarin (COUMADIN) 5 MG tablet Take 7.5mg 5 days week ,\ 5 mg Tues, Sat  Takes in the evening       levothyroxine (SYNTHROID/LEVOTHROID) 175 MCG tablet Take 175 mcg by mouth every morning          Physical Examination:  There were no vitals taken for this visit.  General: Alert and oriented, no distress  Lungs: Speaking in complete sentences, comfortable on room air  Psych: Euthymic, linear thought process    The rest of a comprehensive physical examination is deferred due to PHE (public health emergency) video visit restrictions.    Laboratory Studies:  No visits with results within 1 Week(s) from this visit.   Latest known visit with results is:   Orders Only on 04/19/2021   Component Date Value Ref Range Status     WBC 04/19/2021 8.0  4.0 - 11.0 10e9/L Final     RBC Count 04/19/2021 4.10  3.8 - 5.2 10e12/L Final     Hemoglobin 04/19/2021 10.7* 11.7 - 15.7 g/dL Final     Hematocrit 04/19/2021 35.5  35.0 - 47.0 % Final     MCV 04/19/2021 87  78 - 100 fl Final     MCH 04/19/2021 26.1* 26.5 - 33.0 pg Final     MCHC 04/19/2021 30.1* 31.5 - 36.5 g/dL Final     RDW 04/19/2021 15.9* 10.0 - 15.0 % Final     Platelet Count 04/19/2021 255  150 - 450 10e9/L Final     Diff Method 04/19/2021 Automated Method   Final     % Neutrophils 04/19/2021 60.8  % Final     % Lymphocytes 04/19/2021 23.8  % Final     % Monocytes 04/19/2021 10.9  % Final     % Eosinophils 04/19/2021 3.6  % Final     % Basophils 04/19/2021 0.6  % Final     % Immature Granulocytes 04/19/2021 0.3  % Final     Nucleated RBCs 04/19/2021 0  0 /100 Final     Absolute Neutrophil 04/19/2021 4.9  1.6 - 8.3 10e9/L Final     Absolute Lymphocytes 04/19/2021 1.9  0.8 - 5.3 10e9/L Final     Absolute Monocytes 04/19/2021 0.9  0.0 - 1.3 10e9/L Final     Absolute Eosinophils 04/19/2021 0.3  0.0 - 0.7 10e9/L Final     Absolute Basophils 04/19/2021 0.1  0.0 - 0.2 10e9/L Final      Abs Immature Granulocytes 04/19/2021 0.0  0 - 0.4 10e9/L Final     Absolute Nucleated RBC 04/19/2021 0.0   Final     Sodium 04/19/2021 137  133 - 144 mmol/L Final     Potassium 04/19/2021 4.3  3.4 - 5.3 mmol/L Final     Chloride 04/19/2021 106  94 - 109 mmol/L Final     Carbon Dioxide 04/19/2021 27  20 - 32 mmol/L Final     Anion Gap 04/19/2021 4  3 - 14 mmol/L Final     Glucose 04/19/2021 110* 70 - 99 mg/dL Final     Urea Nitrogen 04/19/2021 21  7 - 30 mg/dL Final     Creatinine 04/19/2021 0.96  0.52 - 1.04 mg/dL Final     GFR Estimate 04/19/2021 60* >60 mL/min/[1.73_m2] Final     GFR Estimate If Black 04/19/2021 70  >60 mL/min/[1.73_m2] Final     Calcium 04/19/2021 8.3* 8.5 - 10.1 mg/dL Final     Bilirubin Total 04/19/2021 0.5  0.2 - 1.3 mg/dL Final     Albumin 04/19/2021 3.3* 3.4 - 5.0 g/dL Final     Protein Total 04/19/2021 6.6* 6.8 - 8.8 g/dL Final     Alkaline Phosphatase 04/19/2021 63  40 - 150 U/L Final     ALT 04/19/2021 26  0 - 50 U/L Final     AST 04/19/2021 8  0 - 45 U/L Final     Creatinine 04/19/2021 1.0  0.52 - 1.04 mg/dL Final     GFR Estimate 04/19/2021 55* >60 mL/min/[1.73_m2] Final     GFR Estimate If Black 04/19/2021 66  >60 mL/min/[1.73_m2] Final       Imaging Studies:  CT Chest/Abdomen/Pelvis w Contrast  Narrative: EXAMINATION: CT CHEST/ABDOMEN/PELVIS W CONTRAST  4/19/2021 7:26 AM      CLINICAL HISTORY: Melanoma, stage >= IIB, monitor; Skin melanoma (H)    COMPARISON: CT 1/11/2021, 9/28/2020, 6/22/2020, PET CT 12/16/2019        PROCEDURE COMMENTS: CT of the chest, abdomen, and pelvis was performed  with Isovue 370 135cc intravenous contrast. Axial MIP  images of the  chest, and coronal and sagittal reformatted images of the chest,  abdomen, and pelvis obtained.    FINDINGS:    Support devices: Right chest wall pacemaker with leads in the right  atrium and right ventricle.    Chest:  The thyroid is unremarkable. The trachea and central airways are  clear. No focal pulmonary infiltrate. No  pleural effusion.    Tree in bud nodularity is within the posterior inferior right upper  lobe (series 11, image 106), this is new from 1/11/2021. Small amount  of mucous plugging within the left lower lobe.     A few tiny pulmonary nodules are present such as a left upper lobe  pulmonary nodule which measures up to 3 mm (series 11, image 48), not  significantly changed from prior. Stable 4 mm pulmonary nodule within  the right basilar lung (series 11, image 202). No other new or  enlarging pulmonary nodules.    There are no abnormally sized axillary, hilar, or mediastinal lymph  nodes. Postoperative changes from left axillary lymph node dissection.    Moderate coronary artery calcification. No pericardial effusion.  Normal configuration of the great vessels    Abdomen/pelvis:  There are no arterially enhancing foci within the liver. Small amount  of focal fatty deposition about the falciform ligament. Postoperative  changes of pancreaticoduodenectomy and cholecystectomy. Iatrogenic  pneumobilia, unchanged. No intra or extrahepatic biliary ductal  dilation. The residual pancreatic tail is within normal limits. The  spleen is unremarkable. Tiny splenule in the left upper quadrant.  Adrenal glands are normal.    No focal renal lesions. No hydronephrosis or hydroureter. The bladder  is partially distended and unremarkable.    There are no abnormally dilated or thickened loops of small bowel or  colon. Appendix is unremarkable. There is no free fluid or free air.    There are no abnormally sized lymph nodes. Major vasculature of the  abdomen is patent. There is moderate calcific atherosclerotic disease,  most notably at the origins of the celiac and superior mesenteric  arteries although this is not significantly changed since prior exam.    Bones:   Moderate degenerative changes of the spine without acute osseous  abnormalities. Stable lytic foci in the left femoral head.    Postoperative changes of the anterior  abdominal wall.  Impression: IMPRESSION:    1. No evidence of new or recurrent metastatic disease in the chest  abdomen or pelvis.    2. Small tree-in-bud opacities within the peripheral right upper lobe.  This could potentially be seen in infectious bronchiolitis. Short  interval follow-up may be appropriate.    3. Other sub-6 mm pulmonary nodules throughout the lungs are  unchanged.    I have personally reviewed the examination and initial interpretation  and I agree with the findings.    THOMAS BANKS MD      ASSESSMENT AND PLAN:    #1 Vulvar Melanoma, resected stage III  It was a pleasure to see Mrs. Trent. She has resected stage IIIC vulvar melanoma. She received 1 year of adjuvant therapy through August 2020. She is doing well off of adjuvant therapy and we will continue with observation.  - Return to clinic in 3-4 months with CT-CAP and labs    #2 Anemia, improved  Patient is on Warfarin and a PPI. Her hemoglobin had fallen to 9.8 g/dL. She started Iron replacement therapy  and hemoglobin improved to 12.1 g/dL, MCV 82. Today, hemoglobin is back down to 10.7 (MCV 87) off of replacement. She should resume intermittent oral Iron supplementation before iron reserves are depleted. We will continue to monitor.    Elton Arellano M.D.   of Medicine  Hematology, Oncology and Transplantation          Again, thank you for allowing me to participate in the care of your patient.        Sincerely,        Elton Perez MD

## 2021-04-29 NOTE — PROGRESS NOTES
Ilana is a 71 year old who is being evaluated via a billable video visit.      How would you like to obtain your AVS? MyChart  If the video visit is dropped, the invitation should be resent by: Send to e-mail at: [email or web address]  Will anyone else be joining your video visit? No      Caroline Estrada CMA on 4/29/2021 at 4:31 PM    Video-Visit Details  Type of service:  Video Visit  Video Start Time: 6:45 PM  Video End Time:7:05 PM  Originating Location (pt. Location): Home  Distant Location (provider location):  [facility]   Platform used for Video Visit: [facility] ONCOLOGY PROGRESS NOTE  Melanoma Clinic  Apr 29, 2021    Reason for Visit: Stage III vulvar melanoma, status post excision    Melanoma History:  1. 6/4/2019, she had excision of the right vulvar mass in [place], and on pathology this shows malignant melanoma with ulceration, at least fausto level IV, Breslow thickness at least 5 mm, mitotic rate is 3 per mm2, TILs are present and brisk, pathologic stage pT4b.  2. 6/28/19, she had PET-CT, which showed no obvious evidence of metastatic disease.  3. 7/30/2019, she has wide local excision and sentinel biopsy (Specimen #: [id number]), which showed residual melanoma to a depth of invasion of 1.3 mm and margins negative. Right inguinal sentinel lymph node showed a subcapsular 1 mm focus of cells. pT4b pN1a MX, Stage IIIC.  4. PET-CT obtained on 9/23 negative for metastatic disease.  5. 9/26/2019, she started adjuvant nivolumab monthly   6. PET/CT 12/2019 showing MARISA  7. CT CAP 3/2020 MARISA  8. 8/6/2020, She completes adjuvant nivolumab immunotherapy.  9. September 2020, she has Covid-19 infections, exposed at work. She has mild symptoms. Dyspnea improves markedly after change of pacemaker battery.      History of Present Illness:  Ilana Trent is  71 year old woman with resected stage IIIC melanoma s/p 1 year of adjuvant nivolumab, completed August 2020. This was started  as a video visit but converted to a phone visit due to technical difficulties.    Ilana is doing well. 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 She enjoys working. She denies any major complaints, no chest pain, dizziness, fever, chills. No SOB at rest. No nausea, vomiting, or diarrhea.     CT-chest, shows no evidence for recurrence or metastasis.    ECOG performance status is 0.    Current Outpatient Medications   Medication Sig Dispense Refill     acetaminophen (TYLENOL) 500 MG tablet Take 1-2 tablets (500-1,000 mg) by mouth every 6 hours as needed for mild pain 50 tablet 0     ALOE PO Take by mouth daily as needed       calcium carbonate (OS-RENEA) 1500 (600 Ca) MG tablet Take 600 mg by mouth daily       cetirizine-pseudoePHEDrine ER (ZYRTEC-D) 5-120 MG 12 hr tablet Take 1 tablet by mouth every morning        CONTOUR NEXT EZ (CONTOUR NEXT EZ W/DEVICE KIT) w/Device KIT See Admin Instructions  0     diltiazem ER COATED BEADS (CARDIZEM CD/CARTIA XT) 120 MG 24 hr capsule Take 120 mg by mouth       furosemide (LASIX) 20 MG tablet Take 20 mg by mouth daily as needed (SWELLING)       lisinopril (PRINIVIL/ZESTRIL) 5 MG tablet Take 5 mg by mouth every morning        metFORMIN (GLUCOPHAGE-XR) 500 MG 24 hr tablet Take 500 mg by mouth every morning        multivitamin w/minerals (THERA-VIT-M) tablet Take 1 tablet by mouth daily       naproxen (NAPROSYN) 500 MG tablet Take 500 mg by mouth every morning        pantoprazole (PROTONIX) 40 MG EC tablet Take 40 mg by mouth every morning        simvastatin (ZOCOR) 40 MG tablet Take 40 mg by mouth At Bedtime        sotalol (BETAPACE) 160 MG tablet Take 80 mg by mouth 2 times daily        vitamin B complex with vitamin C (STRESS TAB) tablet Take 1 tablet by mouth every morning        warfarin (COUMADIN) 5 MG tablet Take 7.5mg 5 days week ,\ 5 mg Tues, Sat  Takes in the evening       levothyroxine (SYNTHROID/LEVOTHROID) 175 MCG tablet  Take 175 mcg by mouth every morning          Physical Examination:  There were no vitals taken for this visit.  General: Alert and oriented, no distress  Lungs: Speaking in complete sentences, comfortable on room air  Psych: Euthymic, linear thought process    The rest of a comprehensive physical examination is deferred due to PHE (public health emergency) video visit restrictions.    Laboratory Studies:  No visits with results within 1 Week(s) from this visit.   Latest known visit with results is:   Orders Only on 04/19/2021   Component Date Value Ref Range Status     WBC 04/19/2021 8.0  4.0 - 11.0 10e9/L Final     RBC Count 04/19/2021 4.10  3.8 - 5.2 10e12/L Final     Hemoglobin 04/19/2021 10.7* 11.7 - 15.7 g/dL Final     Hematocrit 04/19/2021 35.5  35.0 - 47.0 % Final     MCV 04/19/2021 87  78 - 100 fl Final     MCH 04/19/2021 26.1* 26.5 - 33.0 pg Final     MCHC 04/19/2021 30.1* 31.5 - 36.5 g/dL Final     RDW 04/19/2021 15.9* 10.0 - 15.0 % Final     Platelet Count 04/19/2021 255  150 - 450 10e9/L Final     Diff Method 04/19/2021 Automated Method   Final     % Neutrophils 04/19/2021 60.8  % Final     % Lymphocytes 04/19/2021 23.8  % Final     % Monocytes 04/19/2021 10.9  % Final     % Eosinophils 04/19/2021 3.6  % Final     % Basophils 04/19/2021 0.6  % Final     % Immature Granulocytes 04/19/2021 0.3  % Final     Nucleated RBCs 04/19/2021 0  0 /100 Final     Absolute Neutrophil 04/19/2021 4.9  1.6 - 8.3 10e9/L Final     Absolute Lymphocytes 04/19/2021 1.9  0.8 - 5.3 10e9/L Final     Absolute Monocytes 04/19/2021 0.9  0.0 - 1.3 10e9/L Final     Absolute Eosinophils 04/19/2021 0.3  0.0 - 0.7 10e9/L Final     Absolute Basophils 04/19/2021 0.1  0.0 - 0.2 10e9/L Final     Abs Immature Granulocytes 04/19/2021 0.0  0 - 0.4 10e9/L Final     Absolute Nucleated RBC 04/19/2021 0.0   Final     Sodium 04/19/2021 137  133 - 144 mmol/L Final     Potassium 04/19/2021 4.3  3.4 - 5.3 mmol/L Final     Chloride 04/19/2021 106  94 -  109 mmol/L Final     Carbon Dioxide 04/19/2021 27  20 - 32 mmol/L Final     Anion Gap 04/19/2021 4  3 - 14 mmol/L Final     Glucose 04/19/2021 110* 70 - 99 mg/dL Final     Urea Nitrogen 04/19/2021 21  7 - 30 mg/dL Final     Creatinine 04/19/2021 0.96  0.52 - 1.04 mg/dL Final     GFR Estimate 04/19/2021 60* >60 mL/min/[1.73_m2] Final     GFR Estimate If Black 04/19/2021 70  >60 mL/min/[1.73_m2] Final     Calcium 04/19/2021 8.3* 8.5 - 10.1 mg/dL Final     Bilirubin Total 04/19/2021 0.5  0.2 - 1.3 mg/dL Final     Albumin 04/19/2021 3.3* 3.4 - 5.0 g/dL Final     Protein Total 04/19/2021 6.6* 6.8 - 8.8 g/dL Final     Alkaline Phosphatase 04/19/2021 63  40 - 150 U/L Final     ALT 04/19/2021 26  0 - 50 U/L Final     AST 04/19/2021 8  0 - 45 U/L Final     Creatinine 04/19/2021 1.0  0.52 - 1.04 mg/dL Final     GFR Estimate 04/19/2021 55* >60 mL/min/[1.73_m2] Final     GFR Estimate If Black 04/19/2021 66  >60 mL/min/[1.73_m2] Final       Imaging Studies:  CT Chest/Abdomen/Pelvis w Contrast  Narrative: EXAMINATION: CT CHEST/ABDOMEN/PELVIS W CONTRAST  4/19/2021 7:26 AM      CLINICAL HISTORY: Melanoma, stage >= IIB, monitor; Skin melanoma (H)    COMPARISON: CT 1/11/2021, 9/28/2020, 6/22/2020, PET CT 12/16/2019        PROCEDURE COMMENTS: CT of the chest, abdomen, and pelvis was performed  with Isovue 370 135cc intravenous contrast. Axial MIP  images of the  chest, and coronal and sagittal reformatted images of the chest,  abdomen, and pelvis obtained.    FINDINGS:    Support devices: Right chest wall pacemaker with leads in the right  atrium and right ventricle.    Chest:  The thyroid is unremarkable. The trachea and central airways are  clear. No focal pulmonary infiltrate. No pleural effusion.    Tree in bud nodularity is within the posterior inferior right upper  lobe (series 11, image 106), this is new from 1/11/2021. Small amount  of mucous plugging within the left lower lobe.     A few tiny pulmonary nodules are present  such as a left upper lobe  pulmonary nodule which measures up to 3 mm (series 11, image 48), not  significantly changed from prior. Stable 4 mm pulmonary nodule within  the right basilar lung (series 11, image 202). No other new or  enlarging pulmonary nodules.    There are no abnormally sized axillary, hilar, or mediastinal lymph  nodes. Postoperative changes from left axillary lymph node dissection.    Moderate coronary artery calcification. No pericardial effusion.  Normal configuration of the great vessels    Abdomen/pelvis:  There are no arterially enhancing foci within the liver. Small amount  of focal fatty deposition about the falciform ligament. Postoperative  changes of pancreaticoduodenectomy and cholecystectomy. Iatrogenic  pneumobilia, unchanged. No intra or extrahepatic biliary ductal  dilation. The residual pancreatic tail is within normal limits. The  spleen is unremarkable. Tiny splenule in the left upper quadrant.  Adrenal glands are normal.    No focal renal lesions. No hydronephrosis or hydroureter. The bladder  is partially distended and unremarkable.    There are no abnormally dilated or thickened loops of small bowel or  colon. Appendix is unremarkable. There is no free fluid or free air.    There are no abnormally sized lymph nodes. Major vasculature of the  abdomen is patent. There is moderate calcific atherosclerotic disease,  most notably at the origins of the celiac and superior mesenteric  arteries although this is not significantly changed since prior exam.    Bones:   Moderate degenerative changes of the spine without acute osseous  abnormalities. Stable lytic foci in the left femoral head.    Postoperative changes of the anterior abdominal wall.  Impression: IMPRESSION:    1. No evidence of new or recurrent metastatic disease in the chest  abdomen or pelvis.    2. Small tree-in-bud opacities within the peripheral right upper lobe.  This could potentially be seen in infectious  bronchiolitis. Short  interval follow-up may be appropriate.    3. Other sub-6 mm pulmonary nodules throughout the lungs are  unchanged.    I have personally reviewed the examination and initial interpretation  and I agree with the findings.    THOMAS BANKS MD      ASSESSMENT AND PLAN:    #1 Vulvar Melanoma, resected stage III  It was a pleasure to see Mrs. Trent. She has resected stage IIIC vulvar melanoma. She received 1 year of adjuvant therapy through August 2020. She is doing well off of adjuvant therapy and we will continue with observation.  - Return to clinic in 3-4 months with CT-CAP and labs    #2 Anemia, improved  Patient is on Warfarin and a PPI. Her hemoglobin had fallen to 9.8 g/dL. She started Iron replacement therapy  and hemoglobin improved to 12.1 g/dL, MCV 82. Today, hemoglobin is back down to 10.7 (MCV 87) off of replacement. She should resume intermittent oral Iron supplementation before iron reserves are depleted. We will continue to monitor.    Elton Arellano M.D.   of Medicine  Hematology, Oncology and Transplantation

## 2021-05-09 PROBLEM — Z85.3 HISTORY OF BREAST CANCER: Status: ACTIVE | Noted: 2021-03-30

## 2021-05-09 PROBLEM — Z95.0 PACEMAKER: Status: ACTIVE | Noted: 2020-12-07

## 2021-05-09 PROBLEM — Z85.850 HISTORY OF THYROID CANCER: Status: ACTIVE | Noted: 2021-03-30

## 2021-05-09 PROBLEM — N18.30 CHRONIC KIDNEY DISEASE, STAGE 3 (H): Status: RESOLVED | Noted: 2021-01-21 | Resolved: 2021-05-09

## 2021-05-09 RX ORDER — BLOOD SUGAR DIAGNOSTIC
1 STRIP MISCELLANEOUS
COMMUNITY
Start: 2020-04-16 | End: 2023-08-02

## 2021-05-09 RX ORDER — WARFARIN SODIUM 5 MG/1
TABLET ORAL
COMMUNITY
Start: 2021-03-02

## 2021-05-09 RX ORDER — LANCETS
EACH MISCELLANEOUS 2 TIMES DAILY
COMMUNITY
Start: 2021-01-21 | End: 2023-08-02

## 2021-05-09 RX ORDER — BLOOD SUGAR DIAGNOSTIC
STRIP MISCELLANEOUS
COMMUNITY
Start: 2021-01-21

## 2021-05-09 RX ORDER — BLOOD-GLUCOSE METER
1 KIT MISCELLANEOUS
COMMUNITY
Start: 2020-04-16

## 2021-05-09 RX ORDER — CLINDAMYCIN HCL 300 MG
CAPSULE ORAL
COMMUNITY
Start: 2021-03-30 | End: 2023-08-02

## 2021-05-09 RX ORDER — LANCETS
1 EACH MISCELLANEOUS
COMMUNITY
Start: 2019-07-08 | End: 2023-08-02

## 2021-05-09 RX ORDER — CLOBETASOL PROPIONATE 0.5 MG/G
OINTMENT TOPICAL
COMMUNITY
Start: 2021-04-14 | End: 2023-08-02

## 2021-05-09 RX ORDER — SIMVASTATIN 10 MG
10 TABLET ORAL
COMMUNITY
Start: 2021-03-30 | End: 2022-03-30

## 2021-05-09 RX ORDER — LISINOPRIL 5 MG/1
5 TABLET ORAL
COMMUNITY
Start: 2021-03-30

## 2021-05-09 RX ORDER — LEVOTHYROXINE SODIUM 175 UG/1
175 TABLET ORAL
COMMUNITY
Start: 2021-03-30 | End: 2022-03-30

## 2021-05-12 DIAGNOSIS — C7A.8 PRIMARY MALIGNANT NEUROENDOCRINE TUMOR OF PANCREAS (H): Primary | ICD-10-CM

## 2021-06-20 ENCOUNTER — HEALTH MAINTENANCE LETTER (OUTPATIENT)
Age: 71
End: 2021-06-20

## 2021-09-13 ENCOUNTER — LAB (OUTPATIENT)
Dept: LAB | Facility: CLINIC | Age: 71
End: 2021-09-13
Attending: INTERNAL MEDICINE
Payer: COMMERCIAL

## 2021-09-13 ENCOUNTER — ANCILLARY PROCEDURE (OUTPATIENT)
Dept: CT IMAGING | Facility: CLINIC | Age: 71
End: 2021-09-13
Attending: INTERNAL MEDICINE
Payer: COMMERCIAL

## 2021-09-13 VITALS
HEART RATE: 83 BPM | WEIGHT: 246.9 LBS | OXYGEN SATURATION: 99 % | RESPIRATION RATE: 16 BRPM | BODY MASS INDEX: 37.55 KG/M2 | SYSTOLIC BLOOD PRESSURE: 140 MMHG | TEMPERATURE: 98.5 F | DIASTOLIC BLOOD PRESSURE: 79 MMHG

## 2021-09-13 DIAGNOSIS — C51.9 MALIGNANT MELANOMA OF SKIN OF VULVA (H): ICD-10-CM

## 2021-09-13 DIAGNOSIS — C43.9 MELANOMA OF SKIN (H): ICD-10-CM

## 2021-09-13 DIAGNOSIS — C7A.8 PRIMARY MALIGNANT NEUROENDOCRINE TUMOR OF PANCREAS (H): ICD-10-CM

## 2021-09-13 DIAGNOSIS — D50.0 IRON DEFICIENCY ANEMIA DUE TO CHRONIC BLOOD LOSS: ICD-10-CM

## 2021-09-13 LAB
ALBUMIN SERPL-MCNC: 3.7 G/DL (ref 3.4–5)
ALP SERPL-CCNC: 79 U/L (ref 40–150)
ALT SERPL W P-5'-P-CCNC: 27 U/L (ref 0–50)
ANION GAP SERPL CALCULATED.3IONS-SCNC: 6 MMOL/L (ref 3–14)
AST SERPL W P-5'-P-CCNC: 13 U/L (ref 0–45)
BASOPHILS # BLD AUTO: 0 10E3/UL (ref 0–0.2)
BASOPHILS NFR BLD AUTO: 0 %
BILIRUB SERPL-MCNC: 0.4 MG/DL (ref 0.2–1.3)
BUN SERPL-MCNC: 18 MG/DL (ref 7–30)
CALCIUM SERPL-MCNC: 9.1 MG/DL (ref 8.5–10.1)
CHLORIDE BLD-SCNC: 110 MMOL/L (ref 94–109)
CO2 SERPL-SCNC: 25 MMOL/L (ref 20–32)
CREAT BLD-MCNC: 1 MG/DL (ref 0.5–1)
CREAT SERPL-MCNC: 0.97 MG/DL (ref 0.52–1.04)
EOSINOPHIL # BLD AUTO: 0.3 10E3/UL (ref 0–0.7)
EOSINOPHIL NFR BLD AUTO: 4 %
ERYTHROCYTE [DISTWIDTH] IN BLOOD BY AUTOMATED COUNT: 15.1 % (ref 10–15)
FERRITIN SERPL-MCNC: 10 NG/ML (ref 8–252)
GFR SERPL CREATININE-BSD FRML MDRD: 57 ML/MIN/1.73M2
GFR SERPL CREATININE-BSD FRML MDRD: 59 ML/MIN/1.73M2
GLUCOSE BLD-MCNC: 125 MG/DL (ref 70–99)
HCT VFR BLD AUTO: 37 % (ref 35–47)
HGB BLD-MCNC: 11.6 G/DL (ref 11.7–15.7)
IMM GRANULOCYTES # BLD: 0 10E3/UL
IMM GRANULOCYTES NFR BLD: 0 %
IRON SATN MFR SERPL: 7 % (ref 15–46)
IRON SERPL-MCNC: 35 UG/DL (ref 35–180)
LYMPHOCYTES # BLD AUTO: 2 10E3/UL (ref 0.8–5.3)
LYMPHOCYTES NFR BLD AUTO: 26 %
MCH RBC QN AUTO: 26.4 PG (ref 26.5–33)
MCHC RBC AUTO-ENTMCNC: 31.4 G/DL (ref 31.5–36.5)
MCV RBC AUTO: 84 FL (ref 78–100)
MONOCYTES # BLD AUTO: 0.8 10E3/UL (ref 0–1.3)
MONOCYTES NFR BLD AUTO: 10 %
NEUTROPHILS # BLD AUTO: 4.6 10E3/UL (ref 1.6–8.3)
NEUTROPHILS NFR BLD AUTO: 60 %
NRBC # BLD AUTO: 0 10E3/UL
NRBC BLD AUTO-RTO: 0 /100
PLATELET # BLD AUTO: 272 10E3/UL (ref 150–450)
POTASSIUM BLD-SCNC: 5.2 MMOL/L (ref 3.4–5.3)
PROT SERPL-MCNC: 7.3 G/DL (ref 6.8–8.8)
RBC # BLD AUTO: 4.4 10E6/UL (ref 3.8–5.2)
RETICS # AUTO: 0.07 10E6/UL (ref 0.03–0.1)
RETICS/RBC NFR AUTO: 1.6 % (ref 0.5–2)
SODIUM SERPL-SCNC: 141 MMOL/L (ref 133–144)
TIBC SERPL-MCNC: 492 UG/DL (ref 240–430)
WBC # BLD AUTO: 7.7 10E3/UL (ref 4–11)

## 2021-09-13 PROCEDURE — 74177 CT ABD & PELVIS W/CONTRAST: CPT | Mod: GC | Performed by: RADIOLOGY

## 2021-09-13 PROCEDURE — 85004 AUTOMATED DIFF WBC COUNT: CPT

## 2021-09-13 PROCEDURE — 36415 COLL VENOUS BLD VENIPUNCTURE: CPT

## 2021-09-13 PROCEDURE — 85045 AUTOMATED RETICULOCYTE COUNT: CPT

## 2021-09-13 PROCEDURE — 80053 COMPREHEN METABOLIC PANEL: CPT

## 2021-09-13 PROCEDURE — 83550 IRON BINDING TEST: CPT

## 2021-09-13 PROCEDURE — 82728 ASSAY OF FERRITIN: CPT

## 2021-09-13 PROCEDURE — 71260 CT THORAX DX C+: CPT | Mod: GC | Performed by: RADIOLOGY

## 2021-09-13 RX ORDER — IOPAMIDOL 755 MG/ML
135 INJECTION, SOLUTION INTRAVASCULAR ONCE
Status: COMPLETED | OUTPATIENT
Start: 2021-09-13 | End: 2021-09-13

## 2021-09-13 RX ADMIN — IOPAMIDOL 135 ML: 755 INJECTION, SOLUTION INTRAVASCULAR at 07:37

## 2021-09-13 ASSESSMENT — PAIN SCALES - GENERAL: PAINLEVEL: NO PAIN (0)

## 2021-09-13 NOTE — NURSING NOTE
Chief Complaint   Patient presents with     Blood Draw     Labs drawn via PIV placed by Rn in lab. VS taken.     Labs drawn from PIV placed by RN. Line flushed with saline. Vitals taken. Pt checked in for appointment(s).    Chrissy HARRIS RN PHN BSN  BMT/Oncology Lab

## 2021-09-16 ENCOUNTER — VIRTUAL VISIT (OUTPATIENT)
Dept: ONCOLOGY | Facility: CLINIC | Age: 71
End: 2021-09-16
Attending: INTERNAL MEDICINE
Payer: COMMERCIAL

## 2021-09-16 DIAGNOSIS — C51.9 MALIGNANT MELANOMA OF SKIN OF VULVA (H): Primary | ICD-10-CM

## 2021-09-16 PROCEDURE — 999N001193 HC VIDEO/TELEPHONE VISIT; NO CHARGE

## 2021-09-16 PROCEDURE — 99214 OFFICE O/P EST MOD 30 MIN: CPT | Mod: 95 | Performed by: INTERNAL MEDICINE

## 2021-09-16 NOTE — PROGRESS NOTES
"Ilana is a 71 year old who is being evaluated via a billable video visit.      How would you like to obtain your AVS? MyChart  If the video visit is dropped, the invitation should be resent by: Send to e-mail at: [email or web address]  Will anyone else be joining your video visit? No  {If patient encounters technical issues they should call [phone] :[phone]}    Video Start Time: {video visit start/end time for provider to select:152948}  Video-Visit Details    Type of service:  Video Visit    Video End Time:{video visit start/end time for provider to select:152948}    Originating Location (pt. Location): {video visit patient location:822180::\"Home\"}    Distant Location (provider location):  [facility] CANCER [facility]     Platform used for Video Visit: {Virtual Visit Platforms:877396::\"[employer]\"}  "

## 2021-09-16 NOTE — LETTER
9/16/2021         RE: Ilana Trent  [address] [facility] [address]        Dear Colleague,    Thank you for referring your patient, Ilana Trent, to the [facility] [place]. Please see a copy of my visit note below.    Ilana is a 71 year old who is being evaluated via a billable video visit.      How would you like to obtain your AVS? MyChart  If the video visit is dropped, the invitation should be resent by: Send to e-mail at: [email or web address]  Will anyone else be joining your video visit? No  {If patient encounters technical issues they should call [phone]}    Video-Visit Details  Type of service:  Video Visit  Video Start Time: 5:15 PM  Video End Time: 5:45 PM  Originating Location (pt. Location): Home  Distant Location (provider location):  [facility] [place]   Platform used for Video Visit: [facility] ONCOLOGY PROGRESS NOTE  Melanoma Clinic  Sep 16, 2021    Reason for Visit: Stage III vulvar melanoma, status post excision    Melanoma History:  1. 6/4/2019, she had excision of the right vulvar mass in [place], and on pathology this shows malignant melanoma with ulceration, at least fausto level IV, Breslow thickness at least 5 mm, mitotic rate is 3 per mm2, TILs are present and brisk, pathologic stage pT4b.  2. 6/28/19, she had PET-CT, which showed no obvious evidence of metastatic disease.  3. 7/30/2019, she has wide local excision and sentinel biopsy (Specimen #: [id number]), which showed residual melanoma to a depth of invasion of 1.3 mm and margins negative. Right inguinal sentinel lymph node showed a subcapsular 1 mm focus of cells. pT4b pN1a MX, Stage IIIC.  4. PET-CT obtained on 9/23 negative for metastatic disease.  5. 9/26/2019, she started adjuvant nivolumab monthly   6. PET/CT 12/2019 showing MARISA  7. CT CAP 3/2020 MARISA  8. 8/6/2020, She completes adjuvant nivolumab immunotherapy.  9. September 2020, she has Covid-19 infections,  exposed at work. She has mild symptoms. Dyspnea improves markedly after change of pacemaker battery (for sinus and AV node dysfunction).      History of Present Illness:  Ilana Trent is  71 year old woman with resected stage IIIC melanoma s/p 1 year of adjuvant nivolumab, completed August 2020.    Ilana is doing well. She was having some shortness of breath, which she looked into at the end of July. She was seen by cardiology in the [facility] system, and they felt dyspnea was likely related to partially blocked coronary artery and pacemaker rate setting, or a recent Covid infection. Currently she denies any major complaints, no chest pain, dizziness, fever, chills. No SOB at rest, nausea, vomiting, or diarrhea.     CT-chest/abd/pelvis, shows no evidence for recurrence or metastasis.    ECOG performance status is 0.    Current Outpatient Medications   Medication Sig Dispense Refill     acetaminophen (TYLENOL) 500 MG tablet Take 1-2 tablets (500-1,000 mg) by mouth every 6 hours as needed for mild pain 50 tablet 0     ALOE PO Take by mouth daily as needed       B-D ULTRA-FINE 33 LANCETS MISC 1 each by Other route       blood glucose (ONETOUCH ULTRA) test strip 1 strip by Other route       blood glucose monitoring (SOFTCLIX) lancets 1 each by Other route       calcium carbonate (OS-RENEA) 1500 (600 Ca) MG tablet Take 600 mg by mouth daily       cetirizine-pseudoePHEDrine ER (ZYRTEC-D) 5-120 MG 12 hr tablet Take 1 tablet by mouth every morning        clindamycin (CLEOCIN) 300 MG capsule TAKE ONE CAPSULE BY MOUTH TWICE A DAY FOR 7 DAYS       clobetasol (TEMOVATE) 0.05 % external ointment        CONTOUR NEXT EZ (CONTOUR NEXT EZ W/DEVICE KIT) w/Device KIT See Admin Instructions  0     diltiazem ER COATED BEADS (CARDIZEM CD/CARTIA XT) 120 MG 24 hr capsule Take 120 mg by mouth       furosemide (LASIX) 20 MG tablet Take 20 mg by mouth daily as needed (SWELLING)       levothyroxine (SYNTHROID/LEVOTHROID) 175 MCG tablet Take 175  mcg by mouth       lisinopril (ZESTRIL) 5 MG tablet Take 5 mg by mouth       metFORMIN (GLUCOPHAGE-XR) 500 MG 24 hr tablet Take 500 mg by mouth every morning        Microlet Lancets MISC 2 times daily       multivitamin w/minerals (THERA-VIT-M) tablet Take 1 tablet by mouth daily       naproxen (NAPROSYN) 500 MG tablet Take 500 mg by mouth every morning        ONETOUCH ULTRA test strip TEST BLOOD SUGARS TWICE DAILY       pantoprazole (PROTONIX) 40 MG EC tablet Take 40 mg by mouth every morning        simvastatin (ZOCOR) 10 MG tablet Take 10 mg by mouth       sotalol (BETAPACE) 160 MG tablet Take 80 mg by mouth 2 times daily        vitamin B complex with vitamin C (STRESS TAB) tablet Take 1 tablet by mouth every morning        warfarin ANTICOAGULANT (COUMADIN) 5 MG tablet Take 1 & 1/2 tablet by mouth once daily for 6 days of the week and 1 tablet by mouth on 1 day per week.         Physical Examination:  There were no vitals taken for this visit.  General: Alert and oriented, no distress  Lungs: Speaking in complete sentences, comfortable on room air  Psych: Euthymic, linear thought process    The rest of a comprehensive physical examination is deferred due to PHE (public health emergency) video visit restrictions.    Laboratory Studies:  Ancillary Procedure on 09/13/2021   Component Date Value Ref Range Status     Creatinine POCT 09/13/2021 1.0  0.5 - 1.0 mg/dL Final     GFR, ESTIMATED POCT 09/13/2021 57* >60 mL/min/1.73m2 Final   Lab on 09/13/2021   Component Date Value Ref Range Status     Ferritin 09/13/2021 10  8 - 252 ng/mL Final     % Reticulocyte 09/13/2021 1.6  0.5 - 2.0 % Final     Absolute Reticulocyte 09/13/2021 0.070  0.025 - 0.095 10e6/uL Final     Iron 09/13/2021 35  35 - 180 ug/dL Final     Iron Binding Capacity 09/13/2021 492* 240 - 430 ug/dL Final     Iron Sat Index 09/13/2021 7* 15 - 46 % Final     Sodium 09/13/2021 141  133 - 144 mmol/L Final     Potassium 09/13/2021 5.2  3.4 - 5.3 mmol/L Final      Chloride 09/13/2021 110* 94 - 109 mmol/L Final     Carbon Dioxide (CO2) 09/13/2021 25  20 - 32 mmol/L Final     Anion Gap 09/13/2021 6  3 - 14 mmol/L Final     Urea Nitrogen 09/13/2021 18  7 - 30 mg/dL Final     Creatinine 09/13/2021 0.97  0.52 - 1.04 mg/dL Final     Calcium 09/13/2021 9.1  8.5 - 10.1 mg/dL Final     Glucose 09/13/2021 125* 70 - 99 mg/dL Final     Alkaline Phosphatase 09/13/2021 79  40 - 150 U/L Final     AST 09/13/2021 13  0 - 45 U/L Final     ALT 09/13/2021 27  0 - 50 U/L Final     Protein Total 09/13/2021 7.3  6.8 - 8.8 g/dL Final     Albumin 09/13/2021 3.7  3.4 - 5.0 g/dL Final     Bilirubin Total 09/13/2021 0.4  0.2 - 1.3 mg/dL Final     GFR Estimate 09/13/2021 59* >60 mL/min/1.73m2 Final     WBC Count 09/13/2021 7.7  4.0 - 11.0 10e3/uL Final     RBC Count 09/13/2021 4.40  3.80 - 5.20 10e6/uL Final     Hemoglobin 09/13/2021 11.6* 11.7 - 15.7 g/dL Final     Hematocrit 09/13/2021 37.0  35.0 - 47.0 % Final     MCV 09/13/2021 84  78 - 100 fL Final     MCH 09/13/2021 26.4* 26.5 - 33.0 pg Final     MCHC 09/13/2021 31.4* 31.5 - 36.5 g/dL Final     RDW 09/13/2021 15.1* 10.0 - 15.0 % Final     Platelet Count 09/13/2021 272  150 - 450 10e3/uL Final     % Neutrophils 09/13/2021 60  % Final     % Lymphocytes 09/13/2021 26  % Final     % Monocytes 09/13/2021 10  % Final     % Eosinophils 09/13/2021 4  % Final     % Basophils 09/13/2021 0  % Final     % Immature Granulocytes 09/13/2021 0  % Final     NRBCs per 100 WBC 09/13/2021 0  <1 /100 Final     Absolute Neutrophils 09/13/2021 4.6  1.6 - 8.3 10e3/uL Final     Absolute Lymphocytes 09/13/2021 2.0  0.8 - 5.3 10e3/uL Final     Absolute Monocytes 09/13/2021 0.8  0.0 - 1.3 10e3/uL Final     Absolute Eosinophils 09/13/2021 0.3  0.0 - 0.7 10e3/uL Final     Absolute Basophils 09/13/2021 0.0  0.0 - 0.2 10e3/uL Final     Absolute Immature Granulocytes 09/13/2021 0.0  <=0.0 10e3/uL Final     Absolute NRBCs 09/13/2021 0.0  10e3/uL Final         Imaging  Studies:  CT Chest/Abdomen/Pelvis w Contrast  Narrative: EXAM: CT CHEST/ABDOMEN/PELVIS W CONTRAST, 9/13/2021    TECHNIQUE:  Helical CT images from the thoracic inlet through the  symphysis pubis were obtained with Isovue 370 135cc intravenous  contrast. Coronal and sagittal reformatted images were generated at a  workstation for further assessment.    HISTORY: Primary malignant neuroendocrine tumor of pancreas (H).    COMPARISON: CT 4/19/2021, 1/11/2021, and 3/23/2020    FINDINGS:     LUNGS: Stable solid nodularities measuring 3 mm and under, most  prominent in the right lower lobe(series 11, image 129). No new  suspicious pulmonary lesions, acute airspace disease, pleural effusion  or pneumothorax.   CHEST: Stable dual lead pacemaker in the right chest wall. Heart is  normal size with mild coronary artery calcifications. Nonaneurysmal  thoracic aorta. Normal caliber main pulmonary arteries without central  pulmonary embolism. No pathologically enlarged thoracic lymph nodes.     HEPATOBILIARY: Postsurgical changes of pancreaticoduodenectomy with  unchanged iatrogenic pneumobilia. Remnant pancreas is unremarkable  without suspicious lesion or duct dilation. No arterially-enhancing  hepatic lesions. Diffuse hepatic hypoattenuation compared to the  spleen. Hepatic vasculature is patent. Gallbladder is surgically  absent.  SPLEEN: Within normal limits.  ADRENALS: No nodules.   GENITOURINARY: No hydronephrosis or obstructing renal stones. Bladder  and pelvic organs are unremarkable.  BOWEL/PERITONEUM: No abnormally thickened or dilated bowel loops.  Colonic diverticulosis without evidence of acute diverticulitis. No  free fluid or air within the abdomen.  VESSELS: Nonaneurysmal abdominal aorta with scattered atherosclerotic  change.  LYMPH NODES: No pathologically enlarged retroperitoneal, mesenteric,  or pelvic lymph nodes.    BONES/SOFT TISSUES: No acute skeletal abnormality or suspicious  skeletal lesion. Unchanged  mild T7-T11 compression deformities with  approximately 20-30% loss of height. Degenerative lumbar spondylosis  most pronounced at L3-4 and L4-5.   Impression: IMPRESSION:   1. Stable postsurgical changes of pancreaticoduodenectomy, without  evidence of new or recurrent disease in the chest, abdomen, or pelvis.  2. Stable pulmonary nodules measuring up to 3 mm. No new suspicious  pulmonary lesions or acute airspace disease.  3. Incidental hepatic steatosis, colonic diverticulosis without  evidence of diverticulitis, and degenerative changes of the  thoracolumbar spine.    I have personally reviewed the examination and initial interpretation  and I agree with the findings.    NICOLAS ALMODOVAR MD         SYSTEM ID:  [id number]      ASSESSMENT AND PLAN:    #1 Vulvar Melanoma, resected stage III  It was a pleasure to see Mrs. Trent. She has resected stage IIIC vulvar melanoma. She received 1 year of adjuvant therapy through August 2020. She is doing well off of adjuvant therapy and we will continue with observation.  - Return to clinic in 4 months with CT-CAP and labs    #2 Anemia, improving  Patient is on Warfarin and a PPI. Her hemoglobin had fallen to 9.8 g/dL, and she started Iron replacement therapy  and hemoglobin improved. Today, hemoglobin is 11.6 (MCV 84) off of replacement. She should resume intermittent oral Iron supplementation before iron reserves are depleted. Every few day dosing should allow her to maintain hemoglobin. We will continue to monitor.    Elton Arellano M.D.   of Medicine  Hematology, Oncology and Transplantation        Again, thank you for allowing me to participate in the care of your patient.        Sincerely,        Elton Perez MD

## 2021-09-16 NOTE — PROGRESS NOTES
Ilana is a 71 year old who is being evaluated via a billable video visit.      How would you like to obtain your AVS? MyChart  If the video visit is dropped, the invitation should be resent by: Send to e-mail at: [email or web address]  Will anyone else be joining your video visit? No      Video-Visit Details  Type of service:  Video Visit  Video Start Time: 5:15 PM  Video End Time: 5:45 PM  Originating Location (pt. Location): Home  Distant Location (provider location):  [facility]   Platform used for Video Visit: [facility] ONCOLOGY PROGRESS NOTE  Melanoma Clinic  Sep 16, 2021    Reason for Visit: Stage III vulvar melanoma, status post excision    Melanoma History:  1. 6/4/2019, she had excision of the right vulvar mass in [place], and on pathology this shows malignant melanoma with ulceration, at least fausto level IV, Breslow thickness at least 5 mm, mitotic rate is 3 per mm2, TILs are present and brisk, pathologic stage pT4b.  2. 6/28/19, she had PET-CT, which showed no obvious evidence of metastatic disease.  3. 7/30/2019, she has wide local excision and sentinel biopsy (Specimen #: [id number]), which showed residual melanoma to a depth of invasion of 1.3 mm and margins negative. Right inguinal sentinel lymph node showed a subcapsular 1 mm focus of cells. pT4b pN1a MX, Stage IIIC.  4. PET-CT obtained on 9/23 negative for metastatic disease.  5. 9/26/2019, she started adjuvant nivolumab monthly   6. PET/CT 12/2019 showing MARISA  7. CT CAP 3/2020 MARISA  8. 8/6/2020, She completes adjuvant nivolumab immunotherapy.  9. September 2020, she has Covid-19 infections, exposed at work. She has mild symptoms. Dyspnea improves markedly after change of pacemaker battery (for sinus and AV node dysfunction).      History of Present Illness:  Ilana Trent is  71 year old woman with resected stage IIIC melanoma s/p 1 year of adjuvant nivolumab, completed August 2020.    Ilana is doing well. She  was having some shortness of breath, which she looked into at the end of July. She was seen by cardiology in the [facility] system, and they felt dyspnea was likely related to partially blocked coronary artery and pacemaker rate setting, or a recent Covid infection. Currently she denies any major complaints, no chest pain, dizziness, fever, chills. No SOB at rest, nausea, vomiting, or diarrhea.     CT-chest/abd/pelvis, shows no evidence for recurrence or metastasis.    ECOG performance status is 0.    Current Outpatient Medications   Medication Sig Dispense Refill     acetaminophen (TYLENOL) 500 MG tablet Take 1-2 tablets (500-1,000 mg) by mouth every 6 hours as needed for mild pain 50 tablet 0     ALOE PO Take by mouth daily as needed       B-D ULTRA-FINE 33 LANCETS MISC 1 each by Other route       blood glucose (ONETOUCH ULTRA) test strip 1 strip by Other route       blood glucose monitoring (SOFTCLIX) lancets 1 each by Other route       calcium carbonate (OS-RENEA) 1500 (600 Ca) MG tablet Take 600 mg by mouth daily       cetirizine-pseudoePHEDrine ER (ZYRTEC-D) 5-120 MG 12 hr tablet Take 1 tablet by mouth every morning        clindamycin (CLEOCIN) 300 MG capsule TAKE ONE CAPSULE BY MOUTH TWICE A DAY FOR 7 DAYS       clobetasol (TEMOVATE) 0.05 % external ointment        CONTOUR NEXT EZ (CONTOUR NEXT EZ W/DEVICE KIT) w/Device KIT See Admin Instructions  0     diltiazem ER COATED BEADS (CARDIZEM CD/CARTIA XT) 120 MG 24 hr capsule Take 120 mg by mouth       furosemide (LASIX) 20 MG tablet Take 20 mg by mouth daily as needed (SWELLING)       levothyroxine (SYNTHROID/LEVOTHROID) 175 MCG tablet Take 175 mcg by mouth       lisinopril (ZESTRIL) 5 MG tablet Take 5 mg by mouth       metFORMIN (GLUCOPHAGE-XR) 500 MG 24 hr tablet Take 500 mg by mouth every morning        Microlet Lancets MISC 2 times daily       multivitamin w/minerals (THERA-VIT-M) tablet Take 1 tablet by mouth daily       naproxen (NAPROSYN) 500 MG tablet  Take 500 mg by mouth every morning        ONETOUCH ULTRA test strip TEST BLOOD SUGARS TWICE DAILY       pantoprazole (PROTONIX) 40 MG EC tablet Take 40 mg by mouth every morning        simvastatin (ZOCOR) 10 MG tablet Take 10 mg by mouth       sotalol (BETAPACE) 160 MG tablet Take 80 mg by mouth 2 times daily        vitamin B complex with vitamin C (STRESS TAB) tablet Take 1 tablet by mouth every morning        warfarin ANTICOAGULANT (COUMADIN) 5 MG tablet Take 1 & 1/2 tablet by mouth once daily for 6 days of the week and 1 tablet by mouth on 1 day per week.         Physical Examination:  There were no vitals taken for this visit.  General: Alert and oriented, no distress  Lungs: Speaking in complete sentences, comfortable on room air  Psych: Euthymic, linear thought process    The rest of a comprehensive physical examination is deferred due to PHE (public health emergency) video visit restrictions.    Laboratory Studies:  Ancillary Procedure on 09/13/2021   Component Date Value Ref Range Status     Creatinine POCT 09/13/2021 1.0  0.5 - 1.0 mg/dL Final     GFR, ESTIMATED POCT 09/13/2021 57* >60 mL/min/1.73m2 Final   Lab on 09/13/2021   Component Date Value Ref Range Status     Ferritin 09/13/2021 10  8 - 252 ng/mL Final     % Reticulocyte 09/13/2021 1.6  0.5 - 2.0 % Final     Absolute Reticulocyte 09/13/2021 0.070  0.025 - 0.095 10e6/uL Final     Iron 09/13/2021 35  35 - 180 ug/dL Final     Iron Binding Capacity 09/13/2021 492* 240 - 430 ug/dL Final     Iron Sat Index 09/13/2021 7* 15 - 46 % Final     Sodium 09/13/2021 141  133 - 144 mmol/L Final     Potassium 09/13/2021 5.2  3.4 - 5.3 mmol/L Final     Chloride 09/13/2021 110* 94 - 109 mmol/L Final     Carbon Dioxide (CO2) 09/13/2021 25  20 - 32 mmol/L Final     Anion Gap 09/13/2021 6  3 - 14 mmol/L Final     Urea Nitrogen 09/13/2021 18  7 - 30 mg/dL Final     Creatinine 09/13/2021 0.97  0.52 - 1.04 mg/dL Final     Calcium 09/13/2021 9.1  8.5 - 10.1 mg/dL Final      Glucose 09/13/2021 125* 70 - 99 mg/dL Final     Alkaline Phosphatase 09/13/2021 79  40 - 150 U/L Final     AST 09/13/2021 13  0 - 45 U/L Final     ALT 09/13/2021 27  0 - 50 U/L Final     Protein Total 09/13/2021 7.3  6.8 - 8.8 g/dL Final     Albumin 09/13/2021 3.7  3.4 - 5.0 g/dL Final     Bilirubin Total 09/13/2021 0.4  0.2 - 1.3 mg/dL Final     GFR Estimate 09/13/2021 59* >60 mL/min/1.73m2 Final     WBC Count 09/13/2021 7.7  4.0 - 11.0 10e3/uL Final     RBC Count 09/13/2021 4.40  3.80 - 5.20 10e6/uL Final     Hemoglobin 09/13/2021 11.6* 11.7 - 15.7 g/dL Final     Hematocrit 09/13/2021 37.0  35.0 - 47.0 % Final     MCV 09/13/2021 84  78 - 100 fL Final     MCH 09/13/2021 26.4* 26.5 - 33.0 pg Final     MCHC 09/13/2021 31.4* 31.5 - 36.5 g/dL Final     RDW 09/13/2021 15.1* 10.0 - 15.0 % Final     Platelet Count 09/13/2021 272  150 - 450 10e3/uL Final     % Neutrophils 09/13/2021 60  % Final     % Lymphocytes 09/13/2021 26  % Final     % Monocytes 09/13/2021 10  % Final     % Eosinophils 09/13/2021 4  % Final     % Basophils 09/13/2021 0  % Final     % Immature Granulocytes 09/13/2021 0  % Final     NRBCs per 100 WBC 09/13/2021 0  <1 /100 Final     Absolute Neutrophils 09/13/2021 4.6  1.6 - 8.3 10e3/uL Final     Absolute Lymphocytes 09/13/2021 2.0  0.8 - 5.3 10e3/uL Final     Absolute Monocytes 09/13/2021 0.8  0.0 - 1.3 10e3/uL Final     Absolute Eosinophils 09/13/2021 0.3  0.0 - 0.7 10e3/uL Final     Absolute Basophils 09/13/2021 0.0  0.0 - 0.2 10e3/uL Final     Absolute Immature Granulocytes 09/13/2021 0.0  <=0.0 10e3/uL Final     Absolute NRBCs 09/13/2021 0.0  10e3/uL Final         Imaging Studies:  CT Chest/Abdomen/Pelvis w Contrast  Narrative: EXAM: CT CHEST/ABDOMEN/PELVIS W CONTRAST, 9/13/2021    TECHNIQUE:  Helical CT images from the thoracic inlet through the  symphysis pubis were obtained with Isovue 370 135cc intravenous  contrast. Coronal and sagittal reformatted images were generated at a  workstation for  further assessment.    HISTORY: Primary malignant neuroendocrine tumor of pancreas (H).    COMPARISON: CT 4/19/2021, 1/11/2021, and 3/23/2020    FINDINGS:     LUNGS: Stable solid nodularities measuring 3 mm and under, most  prominent in the right lower lobe(series 11, image 129). No new  suspicious pulmonary lesions, acute airspace disease, pleural effusion  or pneumothorax.   CHEST: Stable dual lead pacemaker in the right chest wall. Heart is  normal size with mild coronary artery calcifications. Nonaneurysmal  thoracic aorta. Normal caliber main pulmonary arteries without central  pulmonary embolism. No pathologically enlarged thoracic lymph nodes.     HEPATOBILIARY: Postsurgical changes of pancreaticoduodenectomy with  unchanged iatrogenic pneumobilia. Remnant pancreas is unremarkable  without suspicious lesion or duct dilation. No arterially-enhancing  hepatic lesions. Diffuse hepatic hypoattenuation compared to the  spleen. Hepatic vasculature is patent. Gallbladder is surgically  absent.  SPLEEN: Within normal limits.  ADRENALS: No nodules.   GENITOURINARY: No hydronephrosis or obstructing renal stones. Bladder  and pelvic organs are unremarkable.  BOWEL/PERITONEUM: No abnormally thickened or dilated bowel loops.  Colonic diverticulosis without evidence of acute diverticulitis. No  free fluid or air within the abdomen.  VESSELS: Nonaneurysmal abdominal aorta with scattered atherosclerotic  change.  LYMPH NODES: No pathologically enlarged retroperitoneal, mesenteric,  or pelvic lymph nodes.    BONES/SOFT TISSUES: No acute skeletal abnormality or suspicious  skeletal lesion. Unchanged mild T7-T11 compression deformities with  approximately 20-30% loss of height. Degenerative lumbar spondylosis  most pronounced at L3-4 and L4-5.   Impression: IMPRESSION:   1. Stable postsurgical changes of pancreaticoduodenectomy, without  evidence of new or recurrent disease in the chest, abdomen, or pelvis.  2. Stable pulmonary  nodules measuring up to 3 mm. No new suspicious  pulmonary lesions or acute airspace disease.  3. Incidental hepatic steatosis, colonic diverticulosis without  evidence of diverticulitis, and degenerative changes of the  thoracolumbar spine.    I have personally reviewed the examination and initial interpretation  and I agree with the findings.    NICOLAS ALMODOVAR MD         SYSTEM ID:  [id number]      ASSESSMENT AND PLAN:    #1 Vulvar Melanoma, resected stage III  It was a pleasure to see Mrs. Trent. She has resected stage IIIC vulvar melanoma. She received 1 year of adjuvant therapy through August 2020. She is doing well off of adjuvant therapy and we will continue with observation.  - Return to clinic in 4 months with CT-CAP and labs    #2 Anemia, improving  Patient is on Warfarin and a PPI. Her hemoglobin had fallen to 9.8 g/dL, and she started Iron replacement therapy  and hemoglobin improved. Today, hemoglobin is 11.6 (MCV 84) off of replacement. She should resume intermittent oral Iron supplementation before iron reserves are depleted. Every few day dosing should allow her to maintain hemoglobin. We will continue to monitor.    Elton Arellano M.D.   of Medicine  Hematology, Oncology and Transplantation

## 2021-10-10 ENCOUNTER — HEALTH MAINTENANCE LETTER (OUTPATIENT)
Age: 71
End: 2021-10-10

## 2022-01-18 ENCOUNTER — ANCILLARY PROCEDURE (OUTPATIENT)
Dept: CT IMAGING | Facility: CLINIC | Age: 72
End: 2022-01-18
Attending: INTERNAL MEDICINE
Payer: COMMERCIAL

## 2022-01-18 ENCOUNTER — LAB (OUTPATIENT)
Dept: LAB | Facility: CLINIC | Age: 72
End: 2022-01-18
Attending: INTERNAL MEDICINE
Payer: COMMERCIAL

## 2022-01-18 DIAGNOSIS — C43.9 MELANOMA OF SKIN (H): Primary | ICD-10-CM

## 2022-01-18 DIAGNOSIS — C51.9 MALIGNANT MELANOMA OF SKIN OF VULVA (H): ICD-10-CM

## 2022-01-18 LAB
ALBUMIN SERPL-MCNC: 3.6 G/DL (ref 3.4–5)
ALP SERPL-CCNC: 95 U/L (ref 40–150)
ALT SERPL W P-5'-P-CCNC: 31 U/L (ref 0–50)
ANION GAP SERPL CALCULATED.3IONS-SCNC: 9 MMOL/L (ref 3–14)
AST SERPL W P-5'-P-CCNC: 16 U/L (ref 0–45)
BILIRUB SERPL-MCNC: 0.4 MG/DL (ref 0.2–1.3)
BUN SERPL-MCNC: 20 MG/DL (ref 7–30)
CALCIUM SERPL-MCNC: 9 MG/DL (ref 8.5–10.1)
CHLORIDE BLD-SCNC: 104 MMOL/L (ref 94–109)
CO2 SERPL-SCNC: 26 MMOL/L (ref 20–32)
CREAT BLD-MCNC: 1 MG/DL (ref 0.5–1)
CREAT SERPL-MCNC: 0.96 MG/DL (ref 0.52–1.04)
FERRITIN SERPL-MCNC: 32 NG/ML (ref 8–252)
GFR SERPL CREATININE-BSD FRML MDRD: 60 ML/MIN/1.73M2
GFR SERPL CREATININE-BSD FRML MDRD: 63 ML/MIN/1.73M2
GLUCOSE BLD-MCNC: 143 MG/DL (ref 70–99)
POTASSIUM BLD-SCNC: 5.3 MMOL/L (ref 3.4–5.3)
PROT SERPL-MCNC: 7.2 G/DL (ref 6.8–8.8)
SODIUM SERPL-SCNC: 139 MMOL/L (ref 133–144)

## 2022-01-18 PROCEDURE — 36415 COLL VENOUS BLD VENIPUNCTURE: CPT

## 2022-01-18 PROCEDURE — 82728 ASSAY OF FERRITIN: CPT

## 2022-01-18 PROCEDURE — 74177 CT ABD & PELVIS W/CONTRAST: CPT | Performed by: RADIOLOGY

## 2022-01-18 PROCEDURE — 71260 CT THORAX DX C+: CPT | Performed by: RADIOLOGY

## 2022-01-18 PROCEDURE — 80053 COMPREHEN METABOLIC PANEL: CPT

## 2022-01-18 PROCEDURE — 83550 IRON BINDING TEST: CPT

## 2022-01-18 PROCEDURE — 80053 COMPREHEN METABOLIC PANEL: CPT | Performed by: PATHOLOGY

## 2022-01-18 RX ORDER — IOPAMIDOL 755 MG/ML
95 INJECTION, SOLUTION INTRAVASCULAR ONCE
Status: COMPLETED | OUTPATIENT
Start: 2022-01-18 | End: 2022-01-18

## 2022-01-18 RX ADMIN — IOPAMIDOL 95 ML: 755 INJECTION, SOLUTION INTRAVASCULAR at 07:20

## 2022-01-18 NOTE — NURSING NOTE
Chief Complaint   Patient presents with     Blood Draw     IV blood draw via previously placed.     Labs drawn via previously placed.    Alix Bernardo MA

## 2022-01-20 ENCOUNTER — VIRTUAL VISIT (OUTPATIENT)
Dept: ONCOLOGY | Facility: CLINIC | Age: 72
End: 2022-01-20
Attending: INTERNAL MEDICINE
Payer: COMMERCIAL

## 2022-01-20 DIAGNOSIS — C51.9 MALIGNANT MELANOMA OF SKIN OF VULVA (H): Primary | ICD-10-CM

## 2022-01-20 LAB
IRON SATN MFR SERPL: 9 % (ref 15–46)
IRON SERPL-MCNC: 38 UG/DL (ref 35–180)
TIBC SERPL-MCNC: 441 UG/DL (ref 240–430)

## 2022-01-20 PROCEDURE — 99214 OFFICE O/P EST MOD 30 MIN: CPT | Mod: 95 | Performed by: INTERNAL MEDICINE

## 2022-01-20 PROCEDURE — G0463 HOSPITAL OUTPT CLINIC VISIT: HCPCS | Mod: PN,RTG | Performed by: INTERNAL MEDICINE

## 2022-01-20 NOTE — PROGRESS NOTES
Ilana is a 71 year old who is being evaluated via a billable video visit.      How would you like to obtain your AVS? MyChart  If the video visit is dropped, the invitation should be resent by: Send to e-mail at: [email or web address]  Will anyone else be joining your video visit? Amira Jean VF    Video-Visit Details  Type of service:  Video Visit  Video Start Time: 9:01 AM  Video End Time:9:17 AM  Originating Location (pt. Location): Home  Distant Location (provider location):  [facility]   Platform used for Video Visit: [facility] ONCOLOGY PROGRESS NOTE  Melanoma Clinic  Jan 20, 2022    Reason for Visit: Stage III vulvar melanoma, status post excision    Melanoma History:  1. 6/4/2019, she had excision of the right vulvar mass in [place], and on pathology this shows malignant melanoma with ulceration, at least fausto level IV, Breslow thickness at least 5 mm, mitotic rate is 3 per mm2, TILs are present and brisk, pathologic stage pT4b.  2. 6/28/19, she had PET-CT, which showed no obvious evidence of metastatic disease.  3. 7/30/2019, she has wide local excision and sentinel biopsy (Specimen #: [id number]), which showed residual melanoma to a depth of invasion of 1.3 mm and margins negative. Right inguinal sentinel lymph node showed a subcapsular 1 mm focus of cells. pT4b pN1a MX, Stage IIIC.  4. PET-CT obtained on 9/23 negative for metastatic disease.  5. 9/26/2019, she started adjuvant nivolumab monthly   6. PET/CT 12/2019 showing MARISA  7. CT CAP 3/2020 MARISA  8. 8/6/2020, She completes adjuvant nivolumab immunotherapy.  9. September 2020, she has Covid-19 infections, exposed at work. She has mild symptoms. Dyspnea improves markedly after change of pacemaker battery (for sinus and AV node dysfunction).      History of Present Illness:  Ilana Trent is  71 year old woman with resected stage IIIC melanoma s/p 1 year of adjuvant nivolumab, completed August 2020.  She presents via video visit for restaging.    She had CT-chest/abd/pelvis, shows no evidence for recurrence or metastasis.    Ilana is doing well. She was sick with a cold over the Christmas holiday. She just finished a course of cephalosporin antibiotics and the cough is now gone and she feels pretty good now. She has some mild shortness of breath, but no fevers or chills.  Initially thought due to cardiac disease, but since then stress testing was conducted and negative.   She has seen cardiology and pulmonary medicine and there were no clear indications of heart or lung disease.Now, baseline SOB thought due to weight gain and deconditioning. She is swimming now every day to try to maintain some physical activity. Arthritis in the knees is stable. She has good days and bad days. She is managing this with naprosyn.    ECOG performance status is 1.    Current Outpatient Medications   Medication Sig Dispense Refill     acetaminophen (TYLENOL) 500 MG tablet Take 1-2 tablets (500-1,000 mg) by mouth every 6 hours as needed for mild pain 50 tablet 0     ALOE PO Take by mouth daily as needed       B-D ULTRA-FINE 33 LANCETS MISC 1 each by Other route       blood glucose (ONETOUCH ULTRA) test strip 1 strip by Other route       blood glucose monitoring (SOFTCLIX) lancets 1 each by Other route       calcium carbonate (OS-RENAE) 1500 (600 Ca) MG tablet Take 600 mg by mouth daily       cetirizine-pseudoePHEDrine ER (ZYRTEC-D) 5-120 MG 12 hr tablet Take 1 tablet by mouth every morning        clindamycin (CLEOCIN) 300 MG capsule TAKE ONE CAPSULE BY MOUTH TWICE A DAY FOR 7 DAYS       clobetasol (TEMOVATE) 0.05 % external ointment        CONTOUR NEXT EZ (CONTOUR NEXT EZ W/DEVICE KIT) w/Device KIT See Admin Instructions  0     diltiazem ER COATED BEADS (CARDIZEM CD/CARTIA XT) 120 MG 24 hr capsule Take 120 mg by mouth       furosemide (LASIX) 20 MG tablet Take 20 mg by mouth daily as needed (SWELLING)       levothyroxine  (SYNTHROID/LEVOTHROID) 175 MCG tablet Take 175 mcg by mouth       lisinopril (ZESTRIL) 5 MG tablet Take 5 mg by mouth       metFORMIN (GLUCOPHAGE-XR) 500 MG 24 hr tablet Take 500 mg by mouth every morning        Microlet Lancets MISC 2 times daily       multivitamin w/minerals (THERA-VIT-M) tablet Take 1 tablet by mouth daily       naproxen (NAPROSYN) 500 MG tablet Take 500 mg by mouth every morning        ONETOUCH ULTRA test strip TEST BLOOD SUGARS TWICE DAILY       pantoprazole (PROTONIX) 40 MG EC tablet Take 40 mg by mouth every morning        simvastatin (ZOCOR) 10 MG tablet Take 10 mg by mouth       sotalol (BETAPACE) 160 MG tablet Take 80 mg by mouth 2 times daily        vitamin B complex with vitamin C (STRESS TAB) tablet Take 1 tablet by mouth every morning        warfarin ANTICOAGULANT (COUMADIN) 5 MG tablet Take 1 & 1/2 tablet by mouth once daily for 6 days of the week and 1 tablet by mouth on 1 day per week.         Physical Examination:  There were no vitals taken for this visit.  GENERAL: Healthy, alert and no distress  EYES: Eyes grossly normal to inspection.  No discharge or erythema, or obvious scleral/conjunctival abnormalities.  HENT: Normal cephalic/atraumatic.  External ears, nose and mouth without ulcers or lesions.  No nasal drainage visible.  NECK: No asymmetry, visible masses or scars  RESP: No audible wheeze, cough, or visible cyanosis.  No visible retractions or increased work of breathing.    SKIN: Visible skin clear. No significant rash, abnormal pigmentation or lesions.  NEURO: Cranial nerves grossly intact.  Mentation and speech appropriate for age.  PSYCH: Mentation appears normal, affect normal/bright, judgement and insight intact, normal speech and appearance well-groomed.    The rest of a comprehensive physical examination is deferred due to PHE (public health emergency) video visit restrictions.    Laboratory Studies:  Lab on 01/18/2022   Component Date Value Ref Range Status      Sodium 01/18/2022 139  133 - 144 mmol/L Final     Potassium 01/18/2022 5.3  3.4 - 5.3 mmol/L Final     Chloride 01/18/2022 104  94 - 109 mmol/L Final     Carbon Dioxide (CO2) 01/18/2022 26  20 - 32 mmol/L Final     Anion Gap 01/18/2022 9  3 - 14 mmol/L Final     Urea Nitrogen 01/18/2022 20  7 - 30 mg/dL Final     Creatinine 01/18/2022 0.96  0.52 - 1.04 mg/dL Final     Calcium 01/18/2022 9.0  8.5 - 10.1 mg/dL Final     Glucose 01/18/2022 143* 70 - 99 mg/dL Final     Alkaline Phosphatase 01/18/2022 95  40 - 150 U/L Final     AST 01/18/2022 16  0 - 45 U/L Final     ALT 01/18/2022 31  0 - 50 U/L Final     Protein Total 01/18/2022 7.2  6.8 - 8.8 g/dL Final     Albumin 01/18/2022 3.6  3.4 - 5.0 g/dL Final     Bilirubin Total 01/18/2022 0.4  0.2 - 1.3 mg/dL Final     GFR Estimate 01/18/2022 63  >60 mL/min/1.73m2 Final     Ferritin 01/18/2022 32  8 - 252 ng/mL Final   Ancillary Procedure on 01/18/2022   Component Date Value Ref Range Status     Creatinine POCT 01/18/2022 1.0  0.5 - 1.0 mg/dL Final     GFR, ESTIMATED POCT 01/18/2022 60* >60 mL/min/1.73m2 Final     I reviewed the labs.      Imaging Studies:  CT Chest/Abdomen/Pelvis w Contrast  Narrative: EXAMINATION: CT CHEST/ABDOMEN/PELVIS W CONTRAST, 1/18/2022 7:31 AM    TECHNIQUE: Helical CT images from the thoracic inlet through the  symphysis pubis were obtained with intravenous contrast. Contrast  dose: Isovue 370 95 mls    COMPARISON: CT 9/13/2021 and 4/19/2021    HISTORY: Melanoma, stage >= IIB, monitor; Malignant melanoma of skin  of vulva (H)    FINDINGS:    Chest:    Heart/ Mediastinum: Heart is within normal limits. Atherosclerotic  calcifications of the aorta and coronary arteries.  Small mediastinal  lymph nodes are unchanged. Esophagus appears normal. Right chest wall  AICD with leads in the right atrium and right ventricle.    Lungs/pleura: The central tracheobronchial tree is patent.  Stable  scarring versus atelectasis in the right middle lobe. Stable 3  mm  nodule in the right lower lobe on series 9 image 24. Scattered  additional 1 to 2 mm nodules are unchanged. No pneumothorax or pleural  effusion.     Chest wall/axilla: No bulky lymphadenopathy..    Abdomen and Pelvis:    Liver: Unchanged pneumobilia.. No suspicious masses. No hepatic  steatosis.     Gallbladder/biliary tree: Gallbladder is surgically absent. No biliary  dilatation. Postsurgical changes of hepaticojejunostomy.    Spleen: Unremarkable.    Pancreas: Postsurgical changes of pancreaticoduodenectomy. Remaining  pancreas is unremarkable.    Adrenal glands: Unremarkable.    Kidneys: Unremarkable. No hydronephrosis.    Bowel: Postsurgical changes of pancreaticoduodenectomy. Few colonic  diverticula without evidence of acute diverticulitis. Normal appendix.    Retroperitoneum: Atherosclerotic calcifications of the aorta and its  major branches..  No bulky lymphadenopathy.    Pelvis: Urinary bladder is unremarkable.  Uterus and adnexa are within  normal limits.    Bones: Mild multilevel degenerative changes of the spine. No  suspicious lesions.    Soft Tissues: Unremarkable.  Impression: IMPRESSION:  1.  Stable postsurgical changes of the pancreaticoduodenectomy. No  evidence of recurrent or metastatic disease in the chest, abdomen, or  pelvis.  2.  Stable pulmonary nodules. No new pulmonary nodules. Unchanged  right middle lobe atelectasis.    I have personally reviewed the examination and initial interpretation  and I agree with the findings.    NICOLAS ALMODOVAR MD         SYSTEM ID:  [id number]  I reviewed the images today.    ASSESSMENT AND PLAN:    #1 Vulvar Melanoma, resected stage III  It was a pleasure to see Mrs. Trent. She has resected stage IIIC vulvar melanoma. She received 1 year of adjuvant therapy through August 2020. She is doing well off of adjuvant therapy and we will continue with observation. Return to clinic in 4 months with CT-CAP and labs We will probably go through every 6 months at  that time.  -RTC 4 mo    #2 Anemia, improving  CBC order . Ferritin is low normal, though. She may continue with Iron every M/W/F to maintain and we will add on Iron studies this time. Next visit will obtain ferritin and CBC.    Elton Arellano M.D.   of Medicine  Hematology, Oncology and Transplantation

## 2022-01-20 NOTE — LETTER
1/20/2022         RE: Ilana Trent  [id number] [facility] [address]        Dear Colleague,    Thank you for referring your patient, Ilana Trent, to the [facility]. Please see a copy of my visit note below.    Ilana is a 71 year old who is being evaluated via a billable video visit.      How would you like to obtain your AVS? MyChart  If the video visit is dropped, the invitation should be resent by: Send to e-mail at: [email or web address]  Will anyone else be joining your video visit? Amira Jean VF    Video-Visit Details  Type of service:  Video Visit  Video Start Time: 9:01 AM  Video End Time:9:17 AM  Originating Location (pt. Location): Home  Distant Location (provider location):  [facility]   Platform used for Video Visit: [facility]     MEDICAL ONCOLOGY PROGRESS NOTE  Melanoma Clinic  Jan 20, 2022    Reason for Visit: Stage III vulvar melanoma, status post excision    Melanoma History:  1. 6/4/2019, she had excision of the right vulvar mass in [place], and on pathology this shows malignant melanoma with ulceration, at least fausto level IV, Breslow thickness at least 5 mm, mitotic rate is 3 per mm2, TILs are present and brisk, pathologic stage pT4b.  2. 6/28/19, she had PET-CT, which showed no obvious evidence of metastatic disease.  3. 7/30/2019, she has wide local excision and sentinel biopsy (Specimen #: [id number]), which showed residual melanoma to a depth of invasion of 1.3 mm and margins negative. Right inguinal sentinel lymph node showed a subcapsular 1 mm focus of cells. pT4b pN1a MX, Stage IIIC.  4. PET-CT obtained on 9/23 negative for metastatic disease.  5. 9/26/2019, she started adjuvant nivolumab monthly   6. PET/CT 12/2019 showing MARISA  7. CT CAP 3/2020 MARISA  8. 8/6/2020, She completes adjuvant nivolumab immunotherapy.  9. September 2020, she has Covid-19 infections, exposed at work. She has mild symptoms. Dyspnea  improves markedly after change of pacemaker battery (for sinus and AV node dysfunction).      History of Present Illness:  Ilana Trent is  71 year old woman with resected stage IIIC melanoma s/p 1 year of adjuvant nivolumab, completed August 2020. She presents via video visit for restaging.    She had CT-chest/abd/pelvis, shows no evidence for recurrence or metastasis.    Ilana is doing well. She was sick with a cold over the Christmas holiday. She just finished a course of cephalosporin antibiotics and the cough is now gone and she feels pretty good now. She has some mild shortness of breath, but no fevers or chills.  Initially thought due to cardiac disease, but since then stress testing was conducted and negative.   She has seen cardiology and pulmonary medicine and there were no clear indications of heart or lung disease.Now, baseline SOB thought due to weight gain and deconditioning. She is swimming now every day to try to maintain some physical activity. Arthritis in the knees is stable. She has good days and bad days. She is managing this with naprosyn.    ECOG performance status is 1.    Current Outpatient Medications   Medication Sig Dispense Refill     acetaminophen (TYLENOL) 500 MG tablet Take 1-2 tablets (500-1,000 mg) by mouth every 6 hours as needed for mild pain 50 tablet 0     ALOE PO Take by mouth daily as needed       B-D ULTRA-FINE 33 LANCETS MISC 1 each by Other route       blood glucose (ONETOUCH ULTRA) test strip 1 strip by Other route       blood glucose monitoring (SOFTCLIX) lancets 1 each by Other route       calcium carbonate (OS-RENEA) 1500 (600 Ca) MG tablet Take 600 mg by mouth daily       cetirizine-pseudoePHEDrine ER (ZYRTEC-D) 5-120 MG 12 hr tablet Take 1 tablet by mouth every morning        clindamycin (CLEOCIN) 300 MG capsule TAKE ONE CAPSULE BY MOUTH TWICE A DAY FOR 7 DAYS       clobetasol (TEMOVATE) 0.05 % external ointment        CONTOUR NEXT EZ (CONTOUR NEXT EZ W/DEVICE KIT)  w/Device KIT See Admin Instructions  0     diltiazem ER COATED BEADS (CARDIZEM CD/CARTIA XT) 120 MG 24 hr capsule Take 120 mg by mouth       furosemide (LASIX) 20 MG tablet Take 20 mg by mouth daily as needed (SWELLING)       levothyroxine (SYNTHROID/LEVOTHROID) 175 MCG tablet Take 175 mcg by mouth       lisinopril (ZESTRIL) 5 MG tablet Take 5 mg by mouth       metFORMIN (GLUCOPHAGE-XR) 500 MG 24 hr tablet Take 500 mg by mouth every morning        Microlet Lancets MISC 2 times daily       multivitamin w/minerals (THERA-VIT-M) tablet Take 1 tablet by mouth daily       naproxen (NAPROSYN) 500 MG tablet Take 500 mg by mouth every morning        ONETOUCH ULTRA test strip TEST BLOOD SUGARS TWICE DAILY       pantoprazole (PROTONIX) 40 MG EC tablet Take 40 mg by mouth every morning        simvastatin (ZOCOR) 10 MG tablet Take 10 mg by mouth       sotalol (BETAPACE) 160 MG tablet Take 80 mg by mouth 2 times daily        vitamin B complex with vitamin C (STRESS TAB) tablet Take 1 tablet by mouth every morning        warfarin ANTICOAGULANT (COUMADIN) 5 MG tablet Take 1 & 1/2 tablet by mouth once daily for 6 days of the week and 1 tablet by mouth on 1 day per week.         Physical Examination:  There were no vitals taken for this visit.  GENERAL: Healthy, alert and no distress  EYES: Eyes grossly normal to inspection.  No discharge or erythema, or obvious scleral/conjunctival abnormalities.  HENT: Normal cephalic/atraumatic.  External ears, nose and mouth without ulcers or lesions.  No nasal drainage visible.  NECK: No asymmetry, visible masses or scars  RESP: No audible wheeze, cough, or visible cyanosis.  No visible retractions or increased work of breathing.    SKIN: Visible skin clear. No significant rash, abnormal pigmentation or lesions.  NEURO: Cranial nerves grossly intact.  Mentation and speech appropriate for age.  PSYCH: Mentation appears normal, affect normal/bright, judgement and insight intact, normal speech and  appearance well-groomed.    The rest of a comprehensive physical examination is deferred due to PHE (public health emergency) video visit restrictions.    Laboratory Studies:  Lab on 01/18/2022   Component Date Value Ref Range Status     Sodium 01/18/2022 139  133 - 144 mmol/L Final     Potassium 01/18/2022 5.3  3.4 - 5.3 mmol/L Final     Chloride 01/18/2022 104  94 - 109 mmol/L Final     Carbon Dioxide (CO2) 01/18/2022 26  20 - 32 mmol/L Final     Anion Gap 01/18/2022 9  3 - 14 mmol/L Final     Urea Nitrogen 01/18/2022 20  7 - 30 mg/dL Final     Creatinine 01/18/2022 0.96  0.52 - 1.04 mg/dL Final     Calcium 01/18/2022 9.0  8.5 - 10.1 mg/dL Final     Glucose 01/18/2022 143* 70 - 99 mg/dL Final     Alkaline Phosphatase 01/18/2022 95  40 - 150 U/L Final     AST 01/18/2022 16  0 - 45 U/L Final     ALT 01/18/2022 31  0 - 50 U/L Final     Protein Total 01/18/2022 7.2  6.8 - 8.8 g/dL Final     Albumin 01/18/2022 3.6  3.4 - 5.0 g/dL Final     Bilirubin Total 01/18/2022 0.4  0.2 - 1.3 mg/dL Final     GFR Estimate 01/18/2022 63  >60 mL/min/1.73m2 Final     Ferritin 01/18/2022 32  8 - 252 ng/mL Final   Ancillary Procedure on 01/18/2022   Component Date Value Ref Range Status     Creatinine POCT 01/18/2022 1.0  0.5 - 1.0 mg/dL Final     GFR, ESTIMATED POCT 01/18/2022 60* >60 mL/min/1.73m2 Final     I reviewed the labs.      Imaging Studies:  CT Chest/Abdomen/Pelvis w Contrast  Narrative: EXAMINATION: CT CHEST/ABDOMEN/PELVIS W CONTRAST, 1/18/2022 7:31 AM    TECHNIQUE: Helical CT images from the thoracic inlet through the  symphysis pubis were obtained with intravenous contrast. Contrast  dose: Isovue 370 95 mls    COMPARISON: CT 9/13/2021 and 4/19/2021    HISTORY: Melanoma, stage >= IIB, monitor; Malignant melanoma of skin  of vulva (H)    FINDINGS:    Chest:    Heart/ Mediastinum: Heart is within normal limits. Atherosclerotic  calcifications of the aorta and coronary arteries.  Small mediastinal  lymph nodes are unchanged.  Esophagus appears normal. Right chest wall  AICD with leads in the right atrium and right ventricle.    Lungs/pleura: The central tracheobronchial tree is patent.  Stable  scarring versus atelectasis in the right middle lobe. Stable 3 mm  nodule in the right lower lobe on series 9 image 24. Scattered  additional 1 to 2 mm nodules are unchanged. No pneumothorax or pleural  effusion.     Chest wall/axilla: No bulky lymphadenopathy..    Abdomen and Pelvis:    Liver: Unchanged pneumobilia.. No suspicious masses. No hepatic  steatosis.     Gallbladder/biliary tree: Gallbladder is surgically absent. No biliary  dilatation. Postsurgical changes of hepaticojejunostomy.    Spleen: Unremarkable.    Pancreas: Postsurgical changes of pancreaticoduodenectomy. Remaining  pancreas is unremarkable.    Adrenal glands: Unremarkable.    Kidneys: Unremarkable. No hydronephrosis.    Bowel: Postsurgical changes of pancreaticoduodenectomy. Few colonic  diverticula without evidence of acute diverticulitis. Normal appendix.    Retroperitoneum: Atherosclerotic calcifications of the aorta and its  major branches..  No bulky lymphadenopathy.    Pelvis: Urinary bladder is unremarkable.  Uterus and adnexa are within  normal limits.    Bones: Mild multilevel degenerative changes of the spine. No  suspicious lesions.    Soft Tissues: Unremarkable.  Impression: IMPRESSION:  1.  Stable postsurgical changes of the pancreaticoduodenectomy. No  evidence of recurrent or metastatic disease in the chest, abdomen, or  pelvis.  2.  Stable pulmonary nodules. No new pulmonary nodules. Unchanged  right middle lobe atelectasis.    I have personally reviewed the examination and initial interpretation  and I agree with the findings.    NICOLAS ALMODOVAR MD         SYSTEM ID:  [id number]  I reviewed the images today.    ASSESSMENT AND PLAN:    #1 Vulvar Melanoma, resected stage III  It was a pleasure to see Mrs. Trent. She has resected stage IIIC vulvar melanoma. She  received 1 year of adjuvant therapy through 2020. She is doing well off of adjuvant therapy and we will continue with observation. Return to clinic in 4 months with CT-CAP and labs We will probably go through every 6 months at that time.  -RTC 4 mo    #2 Anemia, improving  CBC order . Ferritin is low normal, though. She may continue with Iron every M/W/F to maintain and we will add on Iron studies this time. Next visit will obtain ferritin and CBC.              Again, thank you for allowing me to participate in the care of your patient.      Sincerely,    Elton Perez MD

## 2022-01-30 ENCOUNTER — HEALTH MAINTENANCE LETTER (OUTPATIENT)
Age: 72
End: 2022-01-30

## 2022-05-09 ENCOUNTER — ANCILLARY PROCEDURE (OUTPATIENT)
Dept: CT IMAGING | Facility: CLINIC | Age: 72
End: 2022-05-09
Attending: INTERNAL MEDICINE
Payer: COMMERCIAL

## 2022-05-09 ENCOUNTER — LAB (OUTPATIENT)
Dept: LAB | Facility: CLINIC | Age: 72
End: 2022-05-09

## 2022-05-09 DIAGNOSIS — C51.9 MALIGNANT MELANOMA OF SKIN OF VULVA (H): ICD-10-CM

## 2022-05-09 LAB
BASOPHILS # BLD AUTO: 0.1 10E3/UL (ref 0–0.2)
BASOPHILS NFR BLD AUTO: 1 %
CREAT BLD-MCNC: 0.9 MG/DL (ref 0.5–1)
EOSINOPHIL # BLD AUTO: 0.2 10E3/UL (ref 0–0.7)
EOSINOPHIL NFR BLD AUTO: 3 %
ERYTHROCYTE [DISTWIDTH] IN BLOOD BY AUTOMATED COUNT: 15.3 % (ref 10–15)
FERRITIN SERPL-MCNC: 15 NG/ML (ref 8–252)
GFR SERPL CREATININE-BSD FRML MDRD: >60 ML/MIN/1.73M2
HCT VFR BLD AUTO: 38.6 % (ref 35–47)
HGB BLD-MCNC: 12 G/DL (ref 11.7–15.7)
IMM GRANULOCYTES # BLD: 0 10E3/UL
IMM GRANULOCYTES NFR BLD: 1 %
LYMPHOCYTES # BLD AUTO: 1.9 10E3/UL (ref 0.8–5.3)
LYMPHOCYTES NFR BLD AUTO: 26 %
MCH RBC QN AUTO: 26.2 PG (ref 26.5–33)
MCHC RBC AUTO-ENTMCNC: 31.1 G/DL (ref 31.5–36.5)
MCV RBC AUTO: 84 FL (ref 78–100)
MONOCYTES # BLD AUTO: 0.8 10E3/UL (ref 0–1.3)
MONOCYTES NFR BLD AUTO: 11 %
NEUTROPHILS # BLD AUTO: 4.3 10E3/UL (ref 1.6–8.3)
NEUTROPHILS NFR BLD AUTO: 58 %
NRBC # BLD AUTO: 0 10E3/UL
NRBC BLD AUTO-RTO: 0 /100
PLATELET # BLD AUTO: 295 10E3/UL (ref 150–450)
RBC # BLD AUTO: 4.58 10E6/UL (ref 3.8–5.2)
WBC # BLD AUTO: 7.3 10E3/UL (ref 4–11)

## 2022-05-09 PROCEDURE — 36415 COLL VENOUS BLD VENIPUNCTURE: CPT

## 2022-05-09 PROCEDURE — 71260 CT THORAX DX C+: CPT | Performed by: RADIOLOGY

## 2022-05-09 PROCEDURE — 82565 ASSAY OF CREATININE: CPT

## 2022-05-09 PROCEDURE — 82728 ASSAY OF FERRITIN: CPT

## 2022-05-09 PROCEDURE — 85025 COMPLETE CBC W/AUTO DIFF WBC: CPT

## 2022-05-09 PROCEDURE — 74177 CT ABD & PELVIS W/CONTRAST: CPT | Performed by: RADIOLOGY

## 2022-05-09 RX ORDER — IOPAMIDOL 755 MG/ML
135 INJECTION, SOLUTION INTRAVASCULAR ONCE
Status: COMPLETED | OUTPATIENT
Start: 2022-05-09 | End: 2022-05-09

## 2022-05-09 RX ADMIN — IOPAMIDOL 135 ML: 755 INJECTION, SOLUTION INTRAVASCULAR at 08:08

## 2022-05-12 ENCOUNTER — VIRTUAL VISIT (OUTPATIENT)
Dept: ONCOLOGY | Facility: CLINIC | Age: 72
End: 2022-05-12
Attending: INTERNAL MEDICINE
Payer: COMMERCIAL

## 2022-05-12 DIAGNOSIS — C51.9 MALIGNANT MELANOMA OF SKIN OF VULVA (H): Primary | ICD-10-CM

## 2022-05-12 DIAGNOSIS — D50.9 IRON DEFICIENCY ANEMIA, UNSPECIFIED IRON DEFICIENCY ANEMIA TYPE: ICD-10-CM

## 2022-05-12 PROCEDURE — G0463 HOSPITAL OUTPT CLINIC VISIT: HCPCS | Mod: PN,RTG | Performed by: INTERNAL MEDICINE

## 2022-05-12 PROCEDURE — 99214 OFFICE O/P EST MOD 30 MIN: CPT | Mod: 95 | Performed by: INTERNAL MEDICINE

## 2022-05-12 NOTE — LETTER
5/12/2022         RE: Ilana Trent  [id number] [facility] [address]        Dear Colleague,    Thank you for referring your patient, Ilana Trent, to the [facility]. Please see a copy of my visit note below.      MEDICAL ONCOLOGY PROGRESS NOTE  Melanoma Clinic  May 12, 2022    Reason for Visit: Stage III vulvar melanoma, status post excision    Melanoma History:  1. 6/4/2019, she had excision of the right vulvar mass in [place], and on pathology this shows malignant melanoma with ulceration, at least fausto level IV, Breslow thickness at least 5 mm, mitotic rate is 3 per mm2, TILs are present and brisk, pathologic stage pT4b.  2. 6/28/19, she had PET-CT, which showed no obvious evidence of metastatic disease.  3. 7/30/2019, she has wide local excision and sentinel biopsy (Specimen #: [id number]), which showed residual melanoma to a depth of invasion of 1.3 mm and margins negative. Right inguinal sentinel lymph node showed a subcapsular 1 mm focus of cells. pT4b pN1a MX, Stage IIIC.  4. PET-CT obtained on 9/23 negative for metastatic disease.  5. 9/26/2019, she started adjuvant nivolumab monthly   6. PET/CT 12/2019 showing MARISA  7. CT CAP 3/2020 MARISA  8. 8/6/2020, She completes adjuvant nivolumab immunotherapy.  9. September 2020, she has Covid-19 infections, exposed at work. She has mild symptoms. Dyspnea improves markedly after change of pacemaker battery (for sinus and AV node dysfunction).      History of Present Illness:  Ilana Trent is  72 year old woman with resected stage IIIC melanoma s/p 1 year of adjuvant nivolumab, completed August 2020. She presents via video visit for restaging.    She had CT-chest/abd/pelvis, shows no evidence for recurrence or metastasis.    She has seen cardiology and pulmonary medicine and there were no clear indications of heart or lung disease. Now, her baseline SOB is thought due to weight gain and deconditioning. She is swimming now every day  to try to maintain some physical activity. This has helped some. Arthritis in the knees is stable. She has good days and bad days. She is managing joint pains with naprosyn.    ECOG performance status is 1.    Current Outpatient Medications   Medication Sig Dispense Refill     acetaminophen (TYLENOL) 500 MG tablet Take 1-2 tablets (500-1,000 mg) by mouth every 6 hours as needed for mild pain 50 tablet 0     ALOE PO Take by mouth daily as needed       B-D ULTRA-FINE 33 LANCETS MISC 1 each by Other route       blood glucose (ONETOUCH ULTRA) test strip 1 strip by Other route       blood glucose monitoring (SOFTCLIX) lancets 1 each by Other route       calcium carbonate (OS-RENEA) 1500 (600 Ca) MG tablet Take 600 mg by mouth daily       cetirizine-pseudoePHEDrine ER (ZYRTEC-D) 5-120 MG 12 hr tablet Take 1 tablet by mouth every morning        clindamycin (CLEOCIN) 300 MG capsule TAKE ONE CAPSULE BY MOUTH TWICE A DAY FOR 7 DAYS       clobetasol (TEMOVATE) 0.05 % external ointment        CONTOUR NEXT EZ (CONTOUR NEXT EZ W/DEVICE KIT) w/Device KIT See Admin Instructions  0     diltiazem ER COATED BEADS (CARDIZEM CD/CARTIA XT) 120 MG 24 hr capsule Take 120 mg by mouth       furosemide (LASIX) 20 MG tablet Take 20 mg by mouth daily as needed (SWELLING)       levothyroxine (SYNTHROID/LEVOTHROID) 175 MCG tablet Take 175 mcg by mouth       lisinopril (ZESTRIL) 5 MG tablet Take 5 mg by mouth       metFORMIN (GLUCOPHAGE-XR) 500 MG 24 hr tablet Take 500 mg by mouth every morning        Microlet Lancets MISC 2 times daily       multivitamin w/minerals (THERA-VIT-M) tablet Take 1 tablet by mouth daily       naproxen (NAPROSYN) 500 MG tablet Take 500 mg by mouth every morning        ONETOUCH ULTRA test strip TEST BLOOD SUGARS TWICE DAILY       pantoprazole (PROTONIX) 40 MG EC tablet Take 40 mg by mouth every morning        simvastatin (ZOCOR) 10 MG tablet Take 10 mg by mouth       sotalol (BETAPACE) 160 MG tablet Take 80 mg by mouth 2  times daily        vitamin B complex with vitamin C (STRESS TAB) tablet Take 1 tablet by mouth every morning        warfarin ANTICOAGULANT (COUMADIN) 5 MG tablet Take 1 & 1/2 tablet by mouth once daily for 6 days of the week and 1 tablet by mouth on 1 day per week.         Physical Examination:  There were no vitals taken for this visit.  GENERAL: Healthy, alert and no distress  EYES: Eyes grossly normal to inspection.  No discharge or erythema, or obvious scleral/conjunctival abnormalities.  HENT: Normal cephalic/atraumatic.  External ears, nose and mouth without ulcers or lesions.  No nasal drainage visible.  NECK: No asymmetry, visible masses or scars  RESP: No audible wheeze, cough, or visible cyanosis.  No visible retractions or increased work of breathing.    SKIN: Visible skin clear. No significant rash, abnormal pigmentation or lesions.  NEURO: Cranial nerves grossly intact.  Mentation and speech appropriate for age.  PSYCH: Mentation appears normal, affect normal/bright, judgement and insight intact, normal speech and appearance well-groomed.    The rest of a comprehensive physical examination is deferred due to PHE (public health emergency) video visit restrictions.    Laboratory Studies:  Ancillary Procedure on 05/09/2022   Component Date Value Ref Range Status     Creatinine POCT 05/09/2022 0.9  0.5 - 1.0 mg/dL Final     GFR, ESTIMATED POCT 05/09/2022 >60  >60 mL/min/1.73m2 Final   Lab on 05/09/2022   Component Date Value Ref Range Status     Ferritin 05/09/2022 15  8 - 252 ng/mL Final     WBC Count 05/09/2022 7.3  4.0 - 11.0 10e3/uL Final     RBC Count 05/09/2022 4.58  3.80 - 5.20 10e6/uL Final     Hemoglobin 05/09/2022 12.0  11.7 - 15.7 g/dL Final     Hematocrit 05/09/2022 38.6  35.0 - 47.0 % Final     MCV 05/09/2022 84  78 - 100 fL Final     MCH 05/09/2022 26.2 (A) 26.5 - 33.0 pg Final     MCHC 05/09/2022 31.1 (A) 31.5 - 36.5 g/dL Final     RDW 05/09/2022 15.3 (A) 10.0 - 15.0 % Final     Platelet  Count 05/09/2022 295  150 - 450 10e3/uL Final     % Neutrophils 05/09/2022 58  % Final     % Lymphocytes 05/09/2022 26  % Final     % Monocytes 05/09/2022 11  % Final     % Eosinophils 05/09/2022 3  % Final     % Basophils 05/09/2022 1  % Final     % Immature Granulocytes 05/09/2022 1  % Final     NRBCs per 100 WBC 05/09/2022 0  <1 /100 Final     Absolute Neutrophils 05/09/2022 4.3  1.6 - 8.3 10e3/uL Final     Absolute Lymphocytes 05/09/2022 1.9  0.8 - 5.3 10e3/uL Final     Absolute Monocytes 05/09/2022 0.8  0.0 - 1.3 10e3/uL Final     Absolute Eosinophils 05/09/2022 0.2  0.0 - 0.7 10e3/uL Final     Absolute Basophils 05/09/2022 0.1  0.0 - 0.2 10e3/uL Final     Absolute Immature Granulocytes 05/09/2022 0.0  <=0.4 10e3/uL Final     Absolute NRBCs 05/09/2022 0.0  10e3/uL Final     I reviewed the labs.      Imaging Studies:  CT Chest/Abdomen/Pelvis w Contrast  Narrative: EXAMINATION: CT CHEST/ABDOMEN/PELVIS W CONTRAST, 5/9/2022 8:32 AM    TECHNIQUE: Helical CT images from the thoracic inlet through the  symphysis pubis were obtained with intravenous contrast. Contrast  dose: 135 cc [id number]    COMPARISON: CT 1/18/2022 and  9/13/2021    HISTORY: Melanoma, stage >= IIB, monitor; Malignant melanoma of skin  of vulva (H)    FINDINGS:    Chest:    Heart/ Mediastinum: Heart is within normal limits. No evidence of  central pulmonary embolism. No bulky lymphadenopathy.  Esophagus  appears normal. Right chest wall pacemaker with leads in the right  atrium and right ventricle.    Lungs/pleura: The central tracheobronchial tree is patent.  No focal  mass or consolidation.  Stable 3 mm nodule in the right lower lobe on  series 10 image 88, and 2 mm calcified nodule in the left lower lobe  on image 136 and 80. No new suspicious nodules. No pneumothorax or  pleural effusion.     Chest wall/axilla: No bulky lymphadenopathy..    Abdomen and Pelvis:    Liver: Mild pneumobilia is unchanged.. No suspicious masses. No  hepatic  steatosis.     Gallbladder/biliary tree: Gallbladder is surgically absent. No biliary  dilatation.    Spleen: Unremarkable. Splenule anterior to the spleen in the left  upper quadrant.    Pancreas: Stable postsurgical changes of pancreaticoduodenectomy.  Remaining pancreas is unremarkable. No mass or ductal dilatation.    Adrenal glands: Unremarkable.    Kidneys: Unremarkable. No hydronephrosis.    Bowel: Stable postsurgical changes of pancreaticoduodenectomy. No  obstruction. A few colonic diverticula without evidence of acute  diverticulitis.    Retroperitoneum: Vasculature is grossly unremarkable..  No bulky  lymphadenopathy. A slightly prominent 9 mm left para-aortic node at  the level of the kidneys measures 9 mm short axis, similar to prior  studies.    Pelvis: Urinary bladder is unremarkable.  Uterus and adnexa are within  normal limits.    Bones: Moderate multilevel degenerative changes of the spine. No  suspicious lesions.    Soft Tissues: Unremarkable.  Impression: IMPRESSION:    1.  No convincing evidence of metastatic disease in the chest,  abdomen, or pelvis.  2.  Stable pulmonary nodules.    I have personally reviewed the examination and initial interpretation  and I agree with the findings.    THOMAS BANKS MD         SYSTEM ID:  [id number]  I reviewed the images in PACS today.    ASSESSMENT AND PLAN:    #1 Vulvar Melanoma, resected stage III  It was a pleasure to see Mrs.Bonnie Trent. She has a history of resected stage IIIC vulvar melanoma. She received 1 year of adjuvant therapy through August 2020. She is doing well off of adjuvant therapy and is without evidenc eof recurrence or metastasis. We will continue with observation. Return to clinic in 4 months with CT-CAP and labs. We may then transition to 6 months at that time.  -RTC 4 months    #2 Anemia, improving  She may continue with Iron every M/W/F to maintain. Next visit will obtain ferritin and CBC.      Elton Arellano  M.D.   of Medicine  Hematology, Oncology and Transplantation

## 2022-05-12 NOTE — PROGRESS NOTES
Ilana is a 72 year old who is being evaluated via a billable video visit.      If the video visit is dropped, the invitation should be resent by: Send to e-mail at: [email or web address]  Will anyone else be joining your video visit? No      Video-Visit Details  Type of service:  Video Visit  Video Start Time: 4:45 PM  Video End Time:5:15 PM  Originating Location (pt. Location): Home  Distant Location (provider location):  [facility]   Platform used for Video Visit: [facility] ONCOLOGY PROGRESS NOTE  Melanoma Clinic  May 12, 2022    Reason for Visit: Stage III vulvar melanoma, status post excision    Melanoma History:  1. 6/4/2019, she had excision of the right vulvar mass in [place], and on pathology this shows malignant melanoma with ulceration, at least fausto level IV, Breslow thickness at least 5 mm, mitotic rate is 3 per mm2, TILs are present and brisk, pathologic stage pT4b.  2. 6/28/19, she had PET-CT, which showed no obvious evidence of metastatic disease.  3. 7/30/2019, she has wide local excision and sentinel biopsy (Specimen #: [id number]), which showed residual melanoma to a depth of invasion of 1.3 mm and margins negative. Right inguinal sentinel lymph node showed a subcapsular 1 mm focus of cells. pT4b pN1a MX, Stage IIIC.  4. PET-CT obtained on 9/23 negative for metastatic disease.  5. 9/26/2019, she started adjuvant nivolumab monthly   6. PET/CT 12/2019 showing MARISA  7. CT CAP 3/2020 MARISA  8. 8/6/2020, She completes adjuvant nivolumab immunotherapy.  9. September 2020, she has Covid-19 infections, exposed at work. She has mild symptoms. Dyspnea improves markedly after change of pacemaker battery (for sinus and AV node dysfunction).      History of Present Illness:  Ilana Trent is  72 year old woman with resected stage IIIC melanoma s/p 1 year of adjuvant nivolumab, completed August 2020. She presents via video visit for restaging.    She had CT-chest/abd/pelvis,  shows no evidence for recurrence or metastasis.    She has seen cardiology and pulmonary medicine and there were no clear indications of heart or lung disease. Now, her baseline SOB is thought due to weight gain and deconditioning. She is swimming now every day to try to maintain some physical activity. This has helped some. Arthritis in the knees is stable. She has good days and bad days. She is managing joint pains with naprosyn.    ECOG performance status is 1.    Current Outpatient Medications   Medication Sig Dispense Refill     acetaminophen (TYLENOL) 500 MG tablet Take 1-2 tablets (500-1,000 mg) by mouth every 6 hours as needed for mild pain 50 tablet 0     ALOE PO Take by mouth daily as needed       B-D ULTRA-FINE 33 LANCETS MISC 1 each by Other route       blood glucose (ONETOUCH ULTRA) test strip 1 strip by Other route       blood glucose monitoring (SOFTCLIX) lancets 1 each by Other route       calcium carbonate (OS-RENEA) 1500 (600 Ca) MG tablet Take 600 mg by mouth daily       cetirizine-pseudoePHEDrine ER (ZYRTEC-D) 5-120 MG 12 hr tablet Take 1 tablet by mouth every morning        clindamycin (CLEOCIN) 300 MG capsule TAKE ONE CAPSULE BY MOUTH TWICE A DAY FOR 7 DAYS       clobetasol (TEMOVATE) 0.05 % external ointment        CONTOUR NEXT EZ (CONTOUR NEXT EZ W/DEVICE KIT) w/Device KIT See Admin Instructions  0     diltiazem ER COATED BEADS (CARDIZEM CD/CARTIA XT) 120 MG 24 hr capsule Take 120 mg by mouth       furosemide (LASIX) 20 MG tablet Take 20 mg by mouth daily as needed (SWELLING)       levothyroxine (SYNTHROID/LEVOTHROID) 175 MCG tablet Take 175 mcg by mouth       lisinopril (ZESTRIL) 5 MG tablet Take 5 mg by mouth       metFORMIN (GLUCOPHAGE-XR) 500 MG 24 hr tablet Take 500 mg by mouth every morning        Microlet Lancets MISC 2 times daily       multivitamin w/minerals (THERA-VIT-M) tablet Take 1 tablet by mouth daily       naproxen (NAPROSYN) 500 MG tablet Take 500 mg by mouth every morning         ONETOUCH ULTRA test strip TEST BLOOD SUGARS TWICE DAILY       pantoprazole (PROTONIX) 40 MG EC tablet Take 40 mg by mouth every morning        simvastatin (ZOCOR) 10 MG tablet Take 10 mg by mouth       sotalol (BETAPACE) 160 MG tablet Take 80 mg by mouth 2 times daily        vitamin B complex with vitamin C (STRESS TAB) tablet Take 1 tablet by mouth every morning        warfarin ANTICOAGULANT (COUMADIN) 5 MG tablet Take 1 & 1/2 tablet by mouth once daily for 6 days of the week and 1 tablet by mouth on 1 day per week.         Physical Examination:  There were no vitals taken for this visit.  GENERAL: Healthy, alert and no distress  EYES: Eyes grossly normal to inspection.  No discharge or erythema, or obvious scleral/conjunctival abnormalities.  HENT: Normal cephalic/atraumatic.  External ears, nose and mouth without ulcers or lesions.  No nasal drainage visible.  NECK: No asymmetry, visible masses or scars  RESP: No audible wheeze, cough, or visible cyanosis.  No visible retractions or increased work of breathing.    SKIN: Visible skin clear. No significant rash, abnormal pigmentation or lesions.  NEURO: Cranial nerves grossly intact.  Mentation and speech appropriate for age.  PSYCH: Mentation appears normal, affect normal/bright, judgement and insight intact, normal speech and appearance well-groomed.    The rest of a comprehensive physical examination is deferred due to PHE (public health emergency) video visit restrictions.    Laboratory Studies:  Ancillary Procedure on 05/09/2022   Component Date Value Ref Range Status     Creatinine POCT 05/09/2022 0.9  0.5 - 1.0 mg/dL Final     GFR, ESTIMATED POCT 05/09/2022 >60  >60 mL/min/1.73m2 Final   Lab on 05/09/2022   Component Date Value Ref Range Status     Ferritin 05/09/2022 15  8 - 252 ng/mL Final     WBC Count 05/09/2022 7.3  4.0 - 11.0 10e3/uL Final     RBC Count 05/09/2022 4.58  3.80 - 5.20 10e6/uL Final     Hemoglobin 05/09/2022 12.0  11.7 - 15.7 g/dL  Final     Hematocrit 05/09/2022 38.6  35.0 - 47.0 % Final     MCV 05/09/2022 84  78 - 100 fL Final     MCH 05/09/2022 26.2 (A) 26.5 - 33.0 pg Final     MCHC 05/09/2022 31.1 (A) 31.5 - 36.5 g/dL Final     RDW 05/09/2022 15.3 (A) 10.0 - 15.0 % Final     Platelet Count 05/09/2022 295  150 - 450 10e3/uL Final     % Neutrophils 05/09/2022 58  % Final     % Lymphocytes 05/09/2022 26  % Final     % Monocytes 05/09/2022 11  % Final     % Eosinophils 05/09/2022 3  % Final     % Basophils 05/09/2022 1  % Final     % Immature Granulocytes 05/09/2022 1  % Final     NRBCs per 100 WBC 05/09/2022 0  <1 /100 Final     Absolute Neutrophils 05/09/2022 4.3  1.6 - 8.3 10e3/uL Final     Absolute Lymphocytes 05/09/2022 1.9  0.8 - 5.3 10e3/uL Final     Absolute Monocytes 05/09/2022 0.8  0.0 - 1.3 10e3/uL Final     Absolute Eosinophils 05/09/2022 0.2  0.0 - 0.7 10e3/uL Final     Absolute Basophils 05/09/2022 0.1  0.0 - 0.2 10e3/uL Final     Absolute Immature Granulocytes 05/09/2022 0.0  <=0.4 10e3/uL Final     Absolute NRBCs 05/09/2022 0.0  10e3/uL Final     I reviewed the labs.      Imaging Studies:  CT Chest/Abdomen/Pelvis w Contrast  Narrative: EXAMINATION: CT CHEST/ABDOMEN/PELVIS W CONTRAST, 5/9/2022 8:32 AM    TECHNIQUE: Helical CT images from the thoracic inlet through the  symphysis pubis were obtained with intravenous contrast. Contrast  dose: 135 cc Isovue-[id number]    COMPARISON: CT 1/18/2022 and  9/13/2021    HISTORY: Melanoma, stage >= IIB, monitor; Malignant melanoma of skin  of vulva (H)    FINDINGS:    Chest:    Heart/ Mediastinum: Heart is within normal limits. No evidence of  central pulmonary embolism. No bulky lymphadenopathy.  Esophagus  appears normal. Right chest wall pacemaker with leads in the right  atrium and right ventricle.    Lungs/pleura: The central tracheobronchial tree is patent.  No focal  mass or consolidation.  Stable 3 mm nodule in the right lower lobe on  series 10 image 88, and 2 mm calcified nodule in the  left lower lobe  on image 136 and 80. No new suspicious nodules. No pneumothorax or  pleural effusion.     Chest wall/axilla: No bulky lymphadenopathy..    Abdomen and Pelvis:    Liver: Mild pneumobilia is unchanged.. No suspicious masses. No  hepatic steatosis.     Gallbladder/biliary tree: Gallbladder is surgically absent. No biliary  dilatation.    Spleen: Unremarkable. Splenule anterior to the spleen in the left  upper quadrant.    Pancreas: Stable postsurgical changes of pancreaticoduodenectomy.  Remaining pancreas is unremarkable. No mass or ductal dilatation.    Adrenal glands: Unremarkable.    Kidneys: Unremarkable. No hydronephrosis.    Bowel: Stable postsurgical changes of pancreaticoduodenectomy. No  obstruction. A few colonic diverticula without evidence of acute  diverticulitis.    Retroperitoneum: Vasculature is grossly unremarkable..  No bulky  lymphadenopathy. A slightly prominent 9 mm left para-aortic node at  the level of the kidneys measures 9 mm short axis, similar to prior  studies.    Pelvis: Urinary bladder is unremarkable.  Uterus and adnexa are within  normal limits.    Bones: Moderate multilevel degenerative changes of the spine. No  suspicious lesions.    Soft Tissues: Unremarkable.  Impression: IMPRESSION:    1.  No convincing evidence of metastatic disease in the chest,  abdomen, or pelvis.  2.  Stable pulmonary nodules.    I have personally reviewed the examination and initial interpretation  and I agree with the findings.    THOMAS BANKS MD         SYSTEM ID:  [id number]  I reviewed the images in PACS today.    ASSESSMENT AND PLAN:    #1 Vulvar Melanoma, resected stage III  It was a pleasure to see Mrs.Bonnie Trent. She has a history of resected stage IIIC vulvar melanoma. She received 1 year of adjuvant therapy through August 2020. She is doing well off of adjuvant therapy and is without evidenc eof recurrence or metastasis. We will continue with observation. Return to clinic in 4  months with CT-CAP and labs. We may then transition to 6 months at that time.  -RTC 4 months    #2 Anemia, improving  She may continue with Iron every M/W/F to maintain. Next visit will obtain ferritin and CBC.      Elton Arellano M.D.   of Medicine  Hematology, Oncology and Transplantation

## 2022-05-21 ENCOUNTER — HEALTH MAINTENANCE LETTER (OUTPATIENT)
Age: 72
End: 2022-05-21

## 2022-09-12 ENCOUNTER — LAB (OUTPATIENT)
Dept: LAB | Facility: CLINIC | Age: 72
End: 2022-09-12
Payer: COMMERCIAL

## 2022-09-12 ENCOUNTER — ANCILLARY PROCEDURE (OUTPATIENT)
Dept: CT IMAGING | Facility: CLINIC | Age: 72
End: 2022-09-12
Attending: INTERNAL MEDICINE
Payer: COMMERCIAL

## 2022-09-12 DIAGNOSIS — C51.9 MALIGNANT MELANOMA OF SKIN OF VULVA (H): ICD-10-CM

## 2022-09-12 LAB
BASOPHILS # BLD AUTO: 0.1 10E3/UL (ref 0–0.2)
BASOPHILS NFR BLD AUTO: 1 %
CREAT BLD-MCNC: 0.9 MG/DL (ref 0.5–1)
EOSINOPHIL # BLD AUTO: 0.2 10E3/UL (ref 0–0.7)
EOSINOPHIL NFR BLD AUTO: 2 %
ERYTHROCYTE [DISTWIDTH] IN BLOOD BY AUTOMATED COUNT: 15 % (ref 10–15)
FERRITIN SERPL-MCNC: 14 NG/ML (ref 8–252)
GFR SERPL CREATININE-BSD FRML MDRD: >60 ML/MIN/1.73M2
HCT VFR BLD AUTO: 41.3 % (ref 35–47)
HGB BLD-MCNC: 12.2 G/DL (ref 11.7–15.7)
IMM GRANULOCYTES # BLD: 0 10E3/UL
IMM GRANULOCYTES NFR BLD: 1 %
LYMPHOCYTES # BLD AUTO: 2.1 10E3/UL (ref 0.8–5.3)
LYMPHOCYTES NFR BLD AUTO: 27 %
MCH RBC QN AUTO: 26.5 PG (ref 26.5–33)
MCHC RBC AUTO-ENTMCNC: 29.5 G/DL (ref 31.5–36.5)
MCV RBC AUTO: 90 FL (ref 78–100)
MONOCYTES # BLD AUTO: 0.7 10E3/UL (ref 0–1.3)
MONOCYTES NFR BLD AUTO: 9 %
NEUTROPHILS # BLD AUTO: 4.6 10E3/UL (ref 1.6–8.3)
NEUTROPHILS NFR BLD AUTO: 60 %
NRBC # BLD AUTO: 0 10E3/UL
NRBC BLD AUTO-RTO: 0 /100
PLATELET # BLD AUTO: 290 10E3/UL (ref 150–450)
RBC # BLD AUTO: 4.6 10E6/UL (ref 3.8–5.2)
WBC # BLD AUTO: 7.8 10E3/UL (ref 4–11)

## 2022-09-12 PROCEDURE — 71260 CT THORAX DX C+: CPT | Performed by: RADIOLOGY

## 2022-09-12 PROCEDURE — 80053 COMPREHEN METABOLIC PANEL: CPT

## 2022-09-12 PROCEDURE — 74177 CT ABD & PELVIS W/CONTRAST: CPT | Performed by: RADIOLOGY

## 2022-09-12 PROCEDURE — 85025 COMPLETE CBC W/AUTO DIFF WBC: CPT

## 2022-09-12 PROCEDURE — 82728 ASSAY OF FERRITIN: CPT

## 2022-09-12 PROCEDURE — 80053 COMPREHEN METABOLIC PANEL: CPT | Performed by: INTERNAL MEDICINE

## 2022-09-12 PROCEDURE — 36415 COLL VENOUS BLD VENIPUNCTURE: CPT

## 2022-09-12 RX ORDER — IOPAMIDOL 755 MG/ML
135 INJECTION, SOLUTION INTRAVASCULAR ONCE
Status: COMPLETED | OUTPATIENT
Start: 2022-09-12 | End: 2022-09-12

## 2022-09-12 RX ADMIN — IOPAMIDOL 135 ML: 755 INJECTION, SOLUTION INTRAVASCULAR at 08:24

## 2022-09-12 NOTE — PROGRESS NOTES
MEDICAL ONCOLOGY PROGRESS NOTE  Melanoma Clinic  Sep 13, 2022    Reason for Visit: Stage III vulvar melanoma, status post excision    Melanoma History:  1. 6/4/2019, she had excision of the right vulvar mass in [place], and on pathology this shows malignant melanoma with ulceration, at least fausto level IV, Breslow thickness at least 5 mm, mitotic rate is 3 per mm2, TILs are present and brisk, pathologic stage pT4b.  2. 6/28/19, she had PET-CT, which showed no obvious evidence of metastatic disease.  3. 7/30/2019, she has wide local excision and sentinel biopsy (Specimen #: [id number]), which showed residual melanoma to a depth of invasion of 1.3 mm and margins negative. Right inguinal sentinel lymph node showed a subcapsular 1 mm focus of cells. pT4b pN1a MX, Stage IIIC.  4. PET-CT obtained on 9/23 negative for metastatic disease.  5. 9/26/2019, she started adjuvant nivolumab monthly   6. PET/CT 12/2019 showing MARISA  7. CT CAP 3/2020 MARISA  8. 8/6/2020, She completes adjuvant nivolumab immunotherapy.  9. September 2020, she has Covid-19 infections, exposed at work. She has mild symptoms. Dyspnea improves markedly after change of pacemaker battery (for sinus and AV node dysfunction).      History of Present Illness:  Ilana Trent is  72 year old woman with resected stage IIIC melanoma s/p 1 year of adjuvant nivolumab, completed August 2020. She presents via video visit for restaging.    ***    ECOG performance status is 1.    Current Outpatient Medications   Medication Sig Dispense Refill     acetaminophen (TYLENOL) 500 MG tablet Take 1-2 tablets (500-1,000 mg) by mouth every 6 hours as needed for mild pain 50 tablet 0     ALOE PO Take by mouth daily as needed       B-D ULTRA-FINE 33 LANCETS MISC 1 each by Other route       blood glucose (ONETOUCH ULTRA) test strip 1 strip by Other route       blood glucose monitoring (SOFTCLIX) lancets 1 each by Other route       calcium carbonate (OS-RENEA) 1500 (600 Ca) MG tablet Take  600 mg by mouth daily       cetirizine-pseudoePHEDrine ER (ZYRTEC-D) 5-120 MG 12 hr tablet Take 1 tablet by mouth every morning        clindamycin (CLEOCIN) 300 MG capsule TAKE ONE CAPSULE BY MOUTH TWICE A DAY FOR 7 DAYS       clobetasol (TEMOVATE) 0.05 % external ointment        CONTOUR NEXT EZ (CONTOUR NEXT EZ W/DEVICE KIT) w/Device KIT See Admin Instructions  0     diltiazem ER COATED BEADS (CARDIZEM CD/CARTIA XT) 120 MG 24 hr capsule Take 120 mg by mouth       furosemide (LASIX) 20 MG tablet Take 20 mg by mouth daily as needed (SWELLING)       levothyroxine (SYNTHROID/LEVOTHROID) 175 MCG tablet Take 175 mcg by mouth       lisinopril (ZESTRIL) 5 MG tablet Take 5 mg by mouth       metFORMIN (GLUCOPHAGE-XR) 500 MG 24 hr tablet Take 500 mg by mouth every morning        Microlet Lancets MISC 2 times daily       multivitamin w/minerals (THERA-VIT-M) tablet Take 1 tablet by mouth daily       naproxen (NAPROSYN) 500 MG tablet Take 500 mg by mouth every morning        ONETOUCH ULTRA test strip TEST BLOOD SUGARS TWICE DAILY       pantoprazole (PROTONIX) 40 MG EC tablet Take 40 mg by mouth every morning        simvastatin (ZOCOR) 10 MG tablet Take 10 mg by mouth       sotalol (BETAPACE) 160 MG tablet Take 80 mg by mouth 2 times daily        vitamin B complex with vitamin C (STRESS TAB) tablet Take 1 tablet by mouth every morning        warfarin ANTICOAGULANT (COUMADIN) 5 MG tablet Take 1 & 1/2 tablet by mouth once daily for 6 days of the week and 1 tablet by mouth on 1 day per week.         Physical Examination:  There were no vitals taken for this visit.    Video physical exam  General: Patient appears well in no acute distress.   Skin: No visualized rash or lesions on visualized skin  Eyes: EOMI, no erythema, sclera icterus or discharge noted  Resp: Appears to be breathing comfortably without accessory muscle usage, speaking in full sentences, no cough  MSK: Appears to have normal range of motion based on visualized  movements  Neurologic: No apparent tremors, facial movements symmetric  Psych: affect ***, alert and oriented      Laboratory Studies:  Lab on 09/12/2022   Component Date Value Ref Range Status     WBC Count 09/12/2022 7.8  4.0 - 11.0 10e3/uL Final     RBC Count 09/12/2022 4.60  3.80 - 5.20 10e6/uL Final     Hemoglobin 09/12/2022 12.2  11.7 - 15.7 g/dL Final     Hematocrit 09/12/2022 41.3  35.0 - 47.0 % Final     MCV 09/12/2022 90  78 - 100 fL Final     MCH 09/12/2022 26.5  26.5 - 33.0 pg Final     MCHC 09/12/2022 29.5 (A) 31.5 - 36.5 g/dL Final     RDW 09/12/2022 15.0  10.0 - 15.0 % Final     Platelet Count 09/12/2022 290  150 - 450 10e3/uL Final     % Neutrophils 09/12/2022 60  % Final     % Lymphocytes 09/12/2022 27  % Final     % Monocytes 09/12/2022 9  % Final     % Eosinophils 09/12/2022 2  % Final     % Basophils 09/12/2022 1  % Final     % Immature Granulocytes 09/12/2022 1  % Final     NRBCs per 100 WBC 09/12/2022 0  <1 /100 Final     Absolute Neutrophils 09/12/2022 4.6  1.6 - 8.3 10e3/uL Final     Absolute Lymphocytes 09/12/2022 2.1  0.8 - 5.3 10e3/uL Final     Absolute Monocytes 09/12/2022 0.7  0.0 - 1.3 10e3/uL Final     Absolute Eosinophils 09/12/2022 0.2  0.0 - 0.7 10e3/uL Final     Absolute Basophils 09/12/2022 0.1  0.0 - 0.2 10e3/uL Final     Absolute Immature Granulocytes 09/12/2022 0.0  <=0.4 10e3/uL Final     Absolute NRBCs 09/12/2022 0.0  10e3/uL Final     I reviewed the labs.      Imaging Studies:  CT Chest/Abdomen/Pelvis w Contrast  Narrative: EXAMINATION: CT CHEST/ABDOMEN/PELVIS W CONTRAST, 5/9/2022 8:32 AM    TECHNIQUE: Helical CT images from the thoracic inlet through the  symphysis pubis were obtained with intravenous contrast. Contrast  dose: 135 cc [id number]    COMPARISON: CT 1/18/2022 and  9/13/2021    HISTORY: Melanoma, stage >= IIB, monitor; Malignant melanoma of skin  of vulva (H)    FINDINGS:    Chest:    Heart/ Mediastinum: Heart is within normal limits. No evidence of  central  pulmonary embolism. No bulky lymphadenopathy.  Esophagus  appears normal. Right chest wall pacemaker with leads in the right  atrium and right ventricle.    Lungs/pleura: The central tracheobronchial tree is patent.  No focal  mass or consolidation.  Stable 3 mm nodule in the right lower lobe on  series 10 image 88, and 2 mm calcified nodule in the left lower lobe  on image 136 and 80. No new suspicious nodules. No pneumothorax or  pleural effusion.     Chest wall/axilla: No bulky lymphadenopathy..    Abdomen and Pelvis:    Liver: Mild pneumobilia is unchanged.. No suspicious masses. No  hepatic steatosis.     Gallbladder/biliary tree: Gallbladder is surgically absent. No biliary  dilatation.    Spleen: Unremarkable. Splenule anterior to the spleen in the left  upper quadrant.    Pancreas: Stable postsurgical changes of pancreaticoduodenectomy.  Remaining pancreas is unremarkable. No mass or ductal dilatation.    Adrenal glands: Unremarkable.    Kidneys: Unremarkable. No hydronephrosis.    Bowel: Stable postsurgical changes of pancreaticoduodenectomy. No  obstruction. A few colonic diverticula without evidence of acute  diverticulitis.    Retroperitoneum: Vasculature is grossly unremarkable..  No bulky  lymphadenopathy. A slightly prominent 9 mm left para-aortic node at  the level of the kidneys measures 9 mm short axis, similar to prior  studies.    Pelvis: Urinary bladder is unremarkable.  Uterus and adnexa are within  normal limits.    Bones: Moderate multilevel degenerative changes of the spine. No  suspicious lesions.    Soft Tissues: Unremarkable.  Impression: IMPRESSION:    1.  No convincing evidence of metastatic disease in the chest,  abdomen, or pelvis.  2.  Stable pulmonary nodules.    I have personally reviewed the examination and initial interpretation  and I agree with the findings.    THOMAS BANKS MD         SYSTEM ID:  [id number]      ASSESSMENT AND PLAN:    #1 Vulvar Melanoma, resected stage  III  It was a pleasure to see Mrs.Bonnie Trent. She has a history of resected stage IIIC vulvar melanoma. She received 1 year of adjuvant therapy through August 2020. She is doing well off of adjuvant therapy and is without evidenc of recurrence or metastasis. We will continue with observation. Return to clinic in 4 months with CT-CAP and labs. We may then transition to 6 months at that time.  -RTC 4 months    #2 Anemia, improving  She may continue with Iron every M/W/F to maintain. Next visit will obtain ferritin and CBC.    *** minutes spent on the date of the encounter doing {2021 E&M time in:146600}     Xochilt Tobar CNP  [facility]  [address] [place] [id number]  [phone]

## 2022-09-13 ENCOUNTER — VIRTUAL VISIT (OUTPATIENT)
Dept: ONCOLOGY | Facility: CLINIC | Age: 72
End: 2022-09-13
Attending: INTERNAL MEDICINE
Payer: COMMERCIAL

## 2022-09-13 DIAGNOSIS — D50.9 IRON DEFICIENCY ANEMIA, UNSPECIFIED IRON DEFICIENCY ANEMIA TYPE: ICD-10-CM

## 2022-09-13 DIAGNOSIS — C51.9 MALIGNANT MELANOMA OF SKIN OF VULVA (H): Primary | ICD-10-CM

## 2022-09-13 LAB
ALBUMIN SERPL-MCNC: 3.7 G/DL (ref 3.4–5)
ALP SERPL-CCNC: 81 U/L (ref 40–150)
ALT SERPL W P-5'-P-CCNC: 35 U/L (ref 0–50)
ANION GAP SERPL CALCULATED.3IONS-SCNC: 7 MMOL/L (ref 3–14)
AST SERPL W P-5'-P-CCNC: 18 U/L (ref 0–45)
BILIRUB SERPL-MCNC: 0.3 MG/DL (ref 0.2–1.3)
BUN SERPL-MCNC: 20 MG/DL (ref 7–30)
CALCIUM SERPL-MCNC: 9.4 MG/DL (ref 8.5–10.1)
CHLORIDE BLD-SCNC: 113 MMOL/L (ref 94–109)
CO2 SERPL-SCNC: 21 MMOL/L (ref 20–32)
CREAT SERPL-MCNC: 0.93 MG/DL (ref 0.52–1.04)
GFR SERPL CREATININE-BSD FRML MDRD: 65 ML/MIN/1.73M2
GLUCOSE BLD-MCNC: 137 MG/DL (ref 70–99)
POTASSIUM BLD-SCNC: 5.4 MMOL/L (ref 3.4–5.3)
PROT SERPL-MCNC: 7.4 G/DL (ref 6.8–8.8)
SODIUM SERPL-SCNC: 141 MMOL/L (ref 133–144)

## 2022-09-13 PROCEDURE — 99214 OFFICE O/P EST MOD 30 MIN: CPT | Mod: 95 | Performed by: REGISTERED NURSE

## 2022-09-13 PROCEDURE — G0463 HOSPITAL OUTPT CLINIC VISIT: HCPCS | Mod: PN,RTG | Performed by: REGISTERED NURSE

## 2022-09-13 NOTE — PROGRESS NOTES
Ilana is a 72 year old who is being evaluated via a billable video visit.      How would you like to obtain your AVS? [email or web address]  If the video visit is dropped, the invitation should be resent by: Send to e-mail at: [email or web address]  Will anyone else be joining your video visit? No        Video-Visit Details    Video Start Time: 4:21 PM    Type of service:  Video Visit    Video End Time:4:33 PM    Originating Location (pt. Location): Home    Distant Location (provider location):  [facility]     Platform used for Video Visit: [facility]      MEDICAL ONCOLOGY PROGRESS NOTE  Melanoma Clinic  Sep 13, 2022    Reason for Visit: Stage III vulvar melanoma, status post excision    Melanoma History:  1. 6/4/2019, she had excision of the right vulvar mass in [place], and on pathology this shows malignant melanoma with ulceration, at least fausto level IV, Breslow thickness at least 5 mm, mitotic rate is 3 per mm2, TILs are present and brisk, pathologic stage pT4b.  2. 6/28/19, she had PET-CT, which showed no obvious evidence of metastatic disease.  3. 7/30/2019, she has wide local excision and sentinel biopsy (Specimen #: [id number]), which showed residual melanoma to a depth of invasion of 1.3 mm and margins negative. Right inguinal sentinel lymph node showed a subcapsular 1 mm focus of cells. pT4b pN1a MX, Stage IIIC.  4. PET-CT obtained on 9/23 negative for metastatic disease.  5. 9/26/2019, she started adjuvant nivolumab monthly   6. PET/CT 12/2019 showing MARISA  7. CT CAP 3/2020 MARISA  8. 8/6/2020, She completes adjuvant nivolumab immunotherapy.  9. September 2020, she has Covid-19 infections, exposed at work. She has mild symptoms. Dyspnea improves markedly after change of pacemaker battery (for sinus and AV node dysfunction).      History of Present Illness:  Ilana Trent is  72 year old woman with resected stage IIIC melanoma s/p 1 year of adjuvant nivolumab, completed August  2020. She presents via video visit for restaging.    She had CT-chest/abd/pelvis, shows no evidence for recurrence or metastasis. There is a questionable new 3 mm right middle lob nodule, possibly representing an area of nodular atelectasis or scarring.     She continues to follow with cardiology in [place] for shortness of breath. She feels this is at baseline. She did develop bronchitis about 1 month ago but is feeling better. Denies any recurrent Covid infections although her  was positive in the past few months. She tries to take oral iron every other day but doesn't take this consistently.    ECOG performance status is 1.    Current Outpatient Medications   Medication Sig Dispense Refill     acetaminophen (TYLENOL) 500 MG tablet Take 1-2 tablets (500-1,000 mg) by mouth every 6 hours as needed for mild pain 50 tablet 0     ALOE PO Take by mouth daily as needed       B-D ULTRA-FINE 33 LANCETS MISC 1 each by Other route       blood glucose (ONETOUCH ULTRA) test strip 1 strip by Other route       blood glucose monitoring (SOFTCLIX) lancets 1 each by Other route       calcium carbonate (OS-RENEA) 1500 (600 Ca) MG tablet Take 600 mg by mouth daily       cetirizine-pseudoePHEDrine ER (ZYRTEC-D) 5-120 MG 12 hr tablet Take 1 tablet by mouth every morning        clindamycin (CLEOCIN) 300 MG capsule TAKE ONE CAPSULE BY MOUTH TWICE A DAY FOR 7 DAYS       clobetasol (TEMOVATE) 0.05 % external ointment        CONTOUR NEXT EZ (CONTOUR NEXT EZ W/DEVICE KIT) w/Device KIT See Admin Instructions  0     diltiazem ER COATED BEADS (CARDIZEM CD/CARTIA XT) 120 MG 24 hr capsule Take 120 mg by mouth       furosemide (LASIX) 20 MG tablet Take 20 mg by mouth daily as needed (SWELLING)       levothyroxine (SYNTHROID/LEVOTHROID) 175 MCG tablet Take 175 mcg by mouth       lisinopril (ZESTRIL) 5 MG tablet Take 5 mg by mouth       metFORMIN (GLUCOPHAGE-XR) 500 MG 24 hr tablet Take 500 mg by mouth every morning        Microlet Lancets  MISC 2 times daily       multivitamin w/minerals (THERA-VIT-M) tablet Take 1 tablet by mouth daily       naproxen (NAPROSYN) 500 MG tablet Take 500 mg by mouth every morning        ONETOUCH ULTRA test strip TEST BLOOD SUGARS TWICE DAILY       pantoprazole (PROTONIX) 40 MG EC tablet Take 40 mg by mouth every morning        simvastatin (ZOCOR) 10 MG tablet Take 10 mg by mouth       sotalol (BETAPACE) 160 MG tablet Take 80 mg by mouth 2 times daily        vitamin B complex with vitamin C (STRESS TAB) tablet Take 1 tablet by mouth every morning        warfarin ANTICOAGULANT (COUMADIN) 5 MG tablet Take 1 & 1/2 tablet by mouth once daily for 6 days of the week and 1 tablet by mouth on 1 day per week.         Physical Examination:  There were no vitals taken for this visit.  Video physical exam  General: Patient appears well in no acute distress.   Skin: No visualized rash or lesions on visualized skin  Eyes: EOMI, no erythema, sclera icterus or discharge noted  Resp: Appears to be breathing comfortably without accessory muscle usage, speaking in full sentences, no cough  MSK: Appears to have normal range of motion based on visualized movements  Neurologic: No apparent tremors, facial movements symmetric  Psych: affect pleasant, alert and oriented      Laboratory Studies:  Ancillary Procedure on 09/12/2022   Component Date Value Ref Range Status     Creatinine POCT 09/12/2022 0.9  0.5 - 1.0 mg/dL Final     GFR, ESTIMATED POCT 09/12/2022 >60  >60 mL/min/1.73m2 Final   Lab on 09/12/2022   Component Date Value Ref Range Status     Ferritin 09/12/2022 14  8 - 252 ng/mL Final     WBC Count 09/12/2022 7.8  4.0 - 11.0 10e3/uL Final     RBC Count 09/12/2022 4.60  3.80 - 5.20 10e6/uL Final     Hemoglobin 09/12/2022 12.2  11.7 - 15.7 g/dL Final     Hematocrit 09/12/2022 41.3  35.0 - 47.0 % Final     MCV 09/12/2022 90  78 - 100 fL Final     MCH 09/12/2022 26.5  26.5 - 33.0 pg Final     MCHC 09/12/2022 29.5 (A) 31.5 - 36.5 g/dL Final      RDW 09/12/2022 15.0  10.0 - 15.0 % Final     Platelet Count 09/12/2022 290  150 - 450 10e3/uL Final     % Neutrophils 09/12/2022 60  % Final     % Lymphocytes 09/12/2022 27  % Final     % Monocytes 09/12/2022 9  % Final     % Eosinophils 09/12/2022 2  % Final     % Basophils 09/12/2022 1  % Final     % Immature Granulocytes 09/12/2022 1  % Final     NRBCs per 100 WBC 09/12/2022 0  <1 /100 Final     Absolute Neutrophils 09/12/2022 4.6  1.6 - 8.3 10e3/uL Final     Absolute Lymphocytes 09/12/2022 2.1  0.8 - 5.3 10e3/uL Final     Absolute Monocytes 09/12/2022 0.7  0.0 - 1.3 10e3/uL Final     Absolute Eosinophils 09/12/2022 0.2  0.0 - 0.7 10e3/uL Final     Absolute Basophils 09/12/2022 0.1  0.0 - 0.2 10e3/uL Final     Absolute Immature Granulocytes 09/12/2022 0.0  <=0.4 10e3/uL Final     Absolute NRBCs 09/12/2022 0.0  10e3/uL Final     I reviewed the labs.      Imaging Studies:  CT Chest/Abdomen/Pelvis w Contrast  Narrative: EXAM: CT CHEST/ABDOMEN/PELVIS W CONTRAST  LOCATION: [facility]  DATE/TIME: 9/12/2022 8:30 AM    INDICATION: Melanoma, stage >= IIB, monitor.  COMPARISON: None.  TECHNIQUE: CT scan of the chest, abdomen, and pelvis was performed following injection of IV contrast. Multiplanar reformats were obtained. Dose reduction techniques were used.   CONTRAST: 135 mL isovue 370.    FINDINGS:   LUNGS AND PLEURA: Questionable new 3 mm nodule in the right middle lobe on series 7 image 82. Similar 3 mm right lower lobe nodule (series 7/69). Postradiation fibrosis changes along the subpleural anterior left upper lobe. No effusions. Patchy areas of   mild atelectasis/scarring. Mild bronchial wall thickening. Small volume airway secretions.    MEDIASTINUM/AXILLAE: Dual-lead right chest cardiac device. Heart size is at the upper limits of normal. No adenopathy. Normal-caliber aorta. Atrophic or absent thyroid gland. Postsurgical changes of the left breast and axilla.    CORONARY ARTERY  CALCIFICATION: Moderate.    HEPATOBILIARY: Similar small volume pneumobilia. Cholecystectomy. Hepatic steatosis. There is contour nodularity of the liver and widening in the fissures suggestive of fibrosis and/or early cirrhosis, similar to prior exam. No suspicious hepatic   lesions.    PANCREAS: Postsurgical changes of the pancreaticoduodenectomy. Pancreaticogastrostomy is similar. No pancreatic ductal dilation.    SPLEEN: Normal.    ADRENAL GLANDS: Normal.    KIDNEYS/BLADDER: Normal.    BOWEL: Diverticulosis of the colon. No acute inflammatory change. No obstruction. Normal appendix.    LYMPH NODES: No lymphadenopathy.    VASCULATURE: Unremarkable.    PELVIC ORGANS: Unremarkable.    MUSCULOSKELETAL: No suspicious osseous lesions.  Impression: IMPRESSION:  1.  Questionable new 3 mm right middle lobe nodule. This could also represent an area of nodular atelectasis or scarring. Recommend follow-up in 3 months.  2.  Otherwise, no other evidence to suggest progression of disease.    Recommend Follow Up: 3 month follow-up possible new right middle lobe 3 mm nodule.    This report will be copied to the [facility] to ensure a provider acknowledges the finding.   I reviewed the above imaging today.    ASSESSMENT AND PLAN:    #1 Vulvar Melanoma, resected stage III  It was a pleasure to meet Mrs.Bonnie Trent today. She has a history of resected stage IIIC vulvar melanoma. She received 1 year of adjuvant therapy through August 2020. She is doing well off of adjuvant therapy and is without evidence of recurrence or metastasis. She does have a history of small lung nodules, with most recent CT showing questionable new 3 mm nodule. For this reason, we will repeat CT-CAP in 3 months. If stable, may transition to 6 months at that time.   -RTC in 3 months with Dr. Perez with CT CAP prior    #2 Anemia, improving  Hemoglobin stable. Ferritin stable but low at 14. Advised she increase oral iron to daily or at least every  other day consistently which she is agreeable. This does not upset her stomach. Recheck on return.     34 minutes spent on the date of the encounter doing chart review, review of test results, interpretation of tests, patient visit and documentation     Xochilt Tobar CNP  [facility] [address] [address]  [phone]

## 2022-09-13 NOTE — NURSING NOTE
Patient declined individual  medication review by support staff because no new changes. Did not complete distress screening due to time constraint.  Ana Oates

## 2022-09-18 ENCOUNTER — HEALTH MAINTENANCE LETTER (OUTPATIENT)
Age: 72
End: 2022-09-18

## 2023-01-02 DIAGNOSIS — C51.9 MALIGNANT MELANOMA OF SKIN OF VULVA (H): Primary | ICD-10-CM

## 2023-01-18 ENCOUNTER — LAB (OUTPATIENT)
Dept: LAB | Facility: CLINIC | Age: 73
End: 2023-01-18
Payer: COMMERCIAL

## 2023-01-18 ENCOUNTER — ANCILLARY PROCEDURE (OUTPATIENT)
Dept: CT IMAGING | Facility: CLINIC | Age: 73
End: 2023-01-18
Attending: REGISTERED NURSE
Payer: COMMERCIAL

## 2023-01-18 DIAGNOSIS — C51.9 MALIGNANT MELANOMA OF SKIN OF VULVA (H): ICD-10-CM

## 2023-01-18 LAB
ALBUMIN SERPL-MCNC: 3.2 G/DL (ref 3.4–5)
ALP SERPL-CCNC: 117 U/L (ref 40–150)
ALT SERPL W P-5'-P-CCNC: 49 U/L (ref 0–50)
ANION GAP SERPL CALCULATED.3IONS-SCNC: 7 MMOL/L (ref 3–14)
AST SERPL W P-5'-P-CCNC: 28 U/L (ref 0–45)
BASOPHILS # BLD AUTO: 0.1 10E3/UL (ref 0–0.2)
BASOPHILS NFR BLD AUTO: 1 %
BILIRUB SERPL-MCNC: 0.3 MG/DL (ref 0.2–1.3)
BUN SERPL-MCNC: 23 MG/DL (ref 7–30)
CALCIUM SERPL-MCNC: 9 MG/DL (ref 8.5–10.1)
CHLORIDE BLD-SCNC: 107 MMOL/L (ref 94–109)
CO2 SERPL-SCNC: 26 MMOL/L (ref 20–32)
CREAT BLD-MCNC: 1 MG/DL (ref 0.5–1)
CREAT SERPL-MCNC: 0.78 MG/DL (ref 0.52–1.04)
EOSINOPHIL # BLD AUTO: 0.2 10E3/UL (ref 0–0.7)
EOSINOPHIL NFR BLD AUTO: 3 %
ERYTHROCYTE [DISTWIDTH] IN BLOOD BY AUTOMATED COUNT: 15.5 % (ref 10–15)
FERRITIN SERPL-MCNC: 67 NG/ML (ref 8–252)
GFR SERPL CREATININE-BSD FRML MDRD: 60 ML/MIN/1.73M2
GFR SERPL CREATININE-BSD FRML MDRD: 81 ML/MIN/1.73M2
GLUCOSE BLD-MCNC: 161 MG/DL (ref 70–99)
HCT VFR BLD AUTO: 37.2 % (ref 35–47)
HGB BLD-MCNC: 11.4 G/DL (ref 11.7–15.7)
IMM GRANULOCYTES # BLD: 0.4 10E3/UL
IMM GRANULOCYTES NFR BLD: 4 %
LYMPHOCYTES # BLD AUTO: 2.2 10E3/UL (ref 0.8–5.3)
LYMPHOCYTES NFR BLD AUTO: 23 %
MCH RBC QN AUTO: 26.9 PG (ref 26.5–33)
MCHC RBC AUTO-ENTMCNC: 30.6 G/DL (ref 31.5–36.5)
MCV RBC AUTO: 88 FL (ref 78–100)
MONOCYTES # BLD AUTO: 1.3 10E3/UL (ref 0–1.3)
MONOCYTES NFR BLD AUTO: 14 %
NEUTROPHILS # BLD AUTO: 5.3 10E3/UL (ref 1.6–8.3)
NEUTROPHILS NFR BLD AUTO: 55 %
NRBC # BLD AUTO: 0 10E3/UL
NRBC BLD AUTO-RTO: 0 /100
PLATELET # BLD AUTO: 341 10E3/UL (ref 150–450)
POTASSIUM BLD-SCNC: 5.3 MMOL/L (ref 3.4–5.3)
PROT SERPL-MCNC: 7.3 G/DL (ref 6.8–8.8)
RBC # BLD AUTO: 4.24 10E6/UL (ref 3.8–5.2)
SODIUM SERPL-SCNC: 140 MMOL/L (ref 133–144)
WBC # BLD AUTO: 9.5 10E3/UL (ref 4–11)

## 2023-01-18 PROCEDURE — 82565 ASSAY OF CREATININE: CPT

## 2023-01-18 PROCEDURE — 74177 CT ABD & PELVIS W/CONTRAST: CPT | Performed by: RADIOLOGY

## 2023-01-18 PROCEDURE — 85025 COMPLETE CBC W/AUTO DIFF WBC: CPT

## 2023-01-18 PROCEDURE — 80053 COMPREHEN METABOLIC PANEL: CPT

## 2023-01-18 PROCEDURE — 36415 COLL VENOUS BLD VENIPUNCTURE: CPT

## 2023-01-18 PROCEDURE — 71260 CT THORAX DX C+: CPT | Performed by: RADIOLOGY

## 2023-01-18 PROCEDURE — 82728 ASSAY OF FERRITIN: CPT

## 2023-01-18 RX ORDER — IOPAMIDOL 755 MG/ML
135 INJECTION, SOLUTION INTRAVASCULAR ONCE
Status: COMPLETED | OUTPATIENT
Start: 2023-01-18 | End: 2023-01-18

## 2023-01-18 RX ADMIN — IOPAMIDOL 135 ML: 755 INJECTION, SOLUTION INTRAVASCULAR at 08:09

## 2023-01-23 ENCOUNTER — VIRTUAL VISIT (OUTPATIENT)
Dept: ONCOLOGY | Facility: CLINIC | Age: 73
End: 2023-01-23
Attending: REGISTERED NURSE
Payer: COMMERCIAL

## 2023-01-23 DIAGNOSIS — C51.9 MALIGNANT MELANOMA OF SKIN OF VULVA (H): Primary | ICD-10-CM

## 2023-01-23 PROCEDURE — 99213 OFFICE O/P EST LOW 20 MIN: CPT | Mod: 95 | Performed by: PHYSICIAN ASSISTANT

## 2023-01-23 RX ORDER — SIMVASTATIN 10 MG
10 TABLET ORAL AT BEDTIME
COMMUNITY

## 2023-01-23 NOTE — Clinical Note
1/23/2023         RE: Ilana Trent  [address] [facility] [address]        Dear Colleague,    Thank you for referring your patient, Ilana Trent, to the [facility]. Please see a copy of my visit note below.    Ilana is a 72 year old who is being evaluated via a billable video visit.      How would you like to obtain your AVS? MyChart  If the video visit is dropped, the invitation should be resent by: Send text to: [phone]  Will anyone else be joining your video visit? No      Video-Visit Details    Type of service:  Video Visit   Video Start Time: 8:05 AM  Video End Time:8:10 AM    Originating Location (pt. Location): Home  Distant Location (provider location):  Off-site  Platform used for Video Visit: [facility] [place] [facility] ONCOLOGY PROGRESS NOTE  Melanoma Clinic  Jan 23, 2023    Reason for Visit: Stage III vulvar melanoma, status post excision    Melanoma History:  1. 6/4/2019, she had excision of the right vulvar mass in [place], and on pathology this shows malignant melanoma with ulceration, at least fausto level IV, Breslow thickness at least 5 mm, mitotic rate is 3 per mm2, TILs are present and brisk, pathologic stage pT4b.  2. 6/28/19, she had PET-CT, which showed no obvious evidence of metastatic disease.  3. 7/30/2019, she has wide local excision and sentinel biopsy (Specimen #: [id number]), which showed residual melanoma to a depth of invasion of 1.3 mm and margins negative. Right inguinal sentinel lymph node showed a subcapsular 1 mm focus of cells. pT4b pN1a MX, Stage IIIC.  4. PET-CT obtained on 9/23 negative for metastatic disease.  5. 9/26/2019, she started adjuvant nivolumab monthly   6. PET/CT 12/2019 showing MARISA  7. CT CAP 3/2020 MARISA  8. 8/6/2020, She completes adjuvant nivolumab immunotherapy.  9. September 2020, she has Covid-19 infections, exposed at work. She has mild symptoms. Dyspnea improves markedly after change of pacemaker  battery (for sinus and AV node dysfunction).      History of Present Illness:  Ilana Trent is  72 year old woman with resected stage IIIC melanoma s/p 1 year of adjuvant nivolumab, completed August 2020. She presents via video visit for restaging.    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   -Denies any dyspnea.   -Energy is doing well. Swims every morning before work.  -Had left knee surgery in October. May need right knee surgery.       Current Outpatient Medications   Medication Sig Dispense Refill     acetaminophen (TYLENOL) 500 MG tablet Take 1-2 tablets (500-1,000 mg) by mouth every 6 hours as needed for mild pain 50 tablet 0     ALOE PO Take by mouth daily as needed       B-D ULTRA-FINE 33 LANCETS MISC 1 each by Other route       blood glucose monitoring (SOFTCLIX) lancets 1 each by Other route       calcium carbonate (OS-RENEA) 1500 (600 Ca) MG tablet Take 600 mg by mouth daily       cetirizine-pseudoePHEDrine ER (ZYRTEC-D) 5-120 MG 12 hr tablet Take 1 tablet by mouth every morning        furosemide (LASIX) 20 MG tablet Take 20 mg by mouth daily as needed (SWELLING)       lisinopril (ZESTRIL) 5 MG tablet Take 5 mg by mouth       metFORMIN (GLUCOPHAGE-XR) 500 MG 24 hr tablet Take 500 mg by mouth every morning        Microlet Lancets MISC 2 times daily       multivitamin w/minerals (THERA-VIT-M) tablet Take 1 tablet by mouth daily       naproxen (NAPROSYN) 500 MG tablet Take 500 mg by mouth every morning        ONETOUCH ULTRA test strip TEST BLOOD SUGARS TWICE DAILY       pantoprazole (PROTONIX) 40 MG EC tablet Take 40 mg by mouth every morning        simvastatin (ZOCOR) 10 MG tablet Take 10 mg by mouth At Bedtime       sotalol (BETAPACE) 160 MG tablet Take 80 mg by mouth 2 times daily        vitamin B complex with vitamin C (STRESS TAB) tablet Take 1 tablet by mouth every morning        warfarin ANTICOAGULANT (COUMADIN) 5 MG tablet Take 1 & 1/2 tablet by mouth once daily for 6 days of the week and 1  tablet by mouth on 1 day per week.       blood glucose (ONETOUCH ULTRA) test strip 1 strip by Other route       clindamycin (CLEOCIN) 300 MG capsule TAKE ONE CAPSULE BY MOUTH TWICE A DAY FOR 7 DAYS       clobetasol (TEMOVATE) 0.05 % external ointment        CONTOUR NEXT EZ (CONTOUR NEXT EZ W/DEVICE KIT) w/Device KIT See Admin Instructions  0     diltiazem ER COATED BEADS (CARDIZEM CD/CARTIA XT) 120 MG 24 hr capsule Take 120 mg by mouth       levothyroxine (SYNTHROID/LEVOTHROID) 175 MCG tablet Take 175 mcg by mouth       simvastatin (ZOCOR) 10 MG tablet Take 10 mg by mouth         Physical Examination:  There were no vitals taken for this visit.  Video physical exam  General: Patient appears well in no acute distress.   Skin: No visualized rash or lesions on visualized skin  Eyes: EOMI, no erythema, sclera icterus or discharge noted  Resp: Appears to be breathing comfortably without accessory muscle usage, speaking in full sentences, no cough  MSK: Appears to have normal range of motion based on visualized movements  Neurologic: No apparent tremors, facial movements symmetric  Psych: affect pleasant, alert and oriented      Laboratory Studies:  Ancillary Procedure on 01/18/2023   Component Date Value Ref Range Status     Creatinine POCT 01/18/2023 1.0  0.5 - 1.0 mg/dL Final     GFR, ESTIMATED POCT 01/18/2023 60 (L)  >60 mL/min/1.73m2 Final   Lab on 01/18/2023   Component Date Value Ref Range Status     Ferritin 01/18/2023 67  8 - 252 ng/mL Final     Sodium 01/18/2023 140  133 - 144 mmol/L Final     Potassium 01/18/2023 5.3  3.4 - 5.3 mmol/L Final     Chloride 01/18/2023 107  94 - 109 mmol/L Final     Carbon Dioxide (CO2) 01/18/2023 26  20 - 32 mmol/L Final     Anion Gap 01/18/2023 7  3 - 14 mmol/L Final     Urea Nitrogen 01/18/2023 23  7 - 30 mg/dL Final     Creatinine 01/18/2023 0.78  0.52 - 1.04 mg/dL Final     Calcium 01/18/2023 9.0  8.5 - 10.1 mg/dL Final     Glucose 01/18/2023 161 (H)  70 - 99 mg/dL Final      Alkaline Phosphatase 01/18/2023 117  40 - 150 U/L Final     AST 01/18/2023 28  0 - 45 U/L Final     ALT 01/18/2023 49  0 - 50 U/L Final     Protein Total 01/18/2023 7.3  6.8 - 8.8 g/dL Final     Albumin 01/18/2023 3.2 (L)  3.4 - 5.0 g/dL Final     Bilirubin Total 01/18/2023 0.3  0.2 - 1.3 mg/dL Final     GFR Estimate 01/18/2023 81  >60 mL/min/1.73m2 Final     WBC Count 01/18/2023 9.5  4.0 - 11.0 10e3/uL Final     RBC Count 01/18/2023 4.24  3.80 - 5.20 10e6/uL Final     Hemoglobin 01/18/2023 11.4 (L)  11.7 - 15.7 g/dL Final     Hematocrit 01/18/2023 37.2  35.0 - 47.0 % Final     MCV 01/18/2023 88  78 - 100 fL Final     MCH 01/18/2023 26.9  26.5 - 33.0 pg Final     MCHC 01/18/2023 30.6 (L)  31.5 - 36.5 g/dL Final     RDW 01/18/2023 15.5 (H)  10.0 - 15.0 % Final     Platelet Count 01/18/2023 341  150 - 450 10e3/uL Final     % Neutrophils 01/18/2023 55  % Final     % Lymphocytes 01/18/2023 23  % Final     % Monocytes 01/18/2023 14  % Final     % Eosinophils 01/18/2023 3  % Final     % Basophils 01/18/2023 1  % Final     % Immature Granulocytes 01/18/2023 4  % Final     NRBCs per 100 WBC 01/18/2023 0  <1 /100 Final     Absolute Neutrophils 01/18/2023 5.3  1.6 - 8.3 10e3/uL Final     Absolute Lymphocytes 01/18/2023 2.2  0.8 - 5.3 10e3/uL Final     Absolute Monocytes 01/18/2023 1.3  0.0 - 1.3 10e3/uL Final     Absolute Eosinophils 01/18/2023 0.2  0.0 - 0.7 10e3/uL Final     Absolute Basophils 01/18/2023 0.1  0.0 - 0.2 10e3/uL Final     Absolute Immature Granulocytes 01/18/2023 0.4  <=0.4 10e3/uL Final     Absolute NRBCs 01/18/2023 0.0  10e3/uL Final     I reviewed the labs.      Imaging Studies:  CT Chest/Abdomen/Pelvis w Contrast  Narrative: EXAM: CT CHEST/ABDOMEN/PELVIS W CONTRAST  LOCATION: [facility]  DATE/TIME: 1/18/2023 8:21 AM    INDICATION:  Malignant melanoma of skin of vulva (H)  COMPARISON: 09/12/2022   TECHNIQUE: CT scan of the chest, abdomen, and pelvis was performed following  injection of IV contrast. Multiplanar reformats were obtained. Dose reduction techniques were used.   CONTRAST: 135 ml isovue 370    FINDINGS:   LUNGS AND PLEURA: A 3 mm right lower lobe has not changed (series 7 image 76). 2 mm right upper lobe nodules have not changed (series 7 image 56). A few tiny left lung nodules have not changed. Thickening and mild architectural distortion along the   anterior left upper lobe has not significantly changed.     MEDIASTINUM/AXILLAE: Right chest wall pacemaker. 8 mm right lower paratracheal lymph node has not changed in size. Multiple other prominent mediastinal nodes have not changed in size. Left axillary surgical clips are present.     CORONARY ARTERY CALCIFICATION: Moderate to severe.     HEPATOBILIARY: Cholecystectomy. Pneumobilia is again seen.     PANCREAS: Pancreaticoduodenal and pancreaticogastrostomy. No pancreatic duct dilatation.     SPLEEN: Normal.    ADRENAL GLANDS: Normal.    KIDNEYS/BLADDER: Normal.    BOWEL: Surgical change of the proximal bowel.     LYMPH NODES: Multiple prominent prominent abdominal lymph nodes are again seen.     VASCULATURE: Unremarkable.    PELVIC ORGANS: Normal.    MUSCULOSKELETAL: Degenerative change in the spine.  There is minimal unchanged skin thickening of the left breast.  Impression: IMPRESSION:  1.  No change from the prior study.   I reviewed the above imaging today.    ASSESSMENT AND PLAN:    #1 Vulvar Melanoma, resected stage III  It was a pleasure to meet Mrs.Bonnie Trent today. She has a history of resected stage IIIC vulvar melanoma. She received 1 year of adjuvant therapy through August 2020. She is doing well off of adjuvant therapy and is without evidence of recurrence or metastasis. She does have a history of small lung nodules, with most recent CT showing questionable new 3 mm nodule. For this reason, we will repeat CT-CAP in 3 months. If stable, may transition to 6 months at that time.   -RTC in 3 months with   Ana with CT CAP prior    #2 Anemia, improving  Hemoglobin stable. Ferritin stable but low at 14. Advised she increase oral iron to daily or at least every other day consistently which she is agreeable. This does not upset her stomach. Recheck on return.     Health care maintenance.  Eye exams: Recommend exams at least every 2 years. Up to date  Dental exams: Recommend exams and cleanings every 6 months. Up to date  Primary care: Recommend follow up at least annually. Up to date  Skin care: Recommend the use of sunscreen with SPF of at least 30, reapplying every 2 hours with prolonged sun exposure.   Tobacco use: Recommend abstaining. Denies use.  Alcohol use: Recommend no more than 1/day for women and 2/day for men. 2-3 beers/month  Physical activity: Recommend regular activity, ideally 150 minutes/week of moderate intensity activity.  Swims for 30-35 minutes/day 5 days/week.   Vaccination: Patient has/has not received an influenza vaccine this season. Patient has received *** COVID-19 vaccines and is up to date/not up to date. Up to date    Xochilt Lee PA-C  [facility]  [address] [place] [id number]  [phone]    *** minutes spent on the date of the encounter doing {2021 E&M time in:193729}     [place] for scan and labs          Again, thank you for allowing me to participate in the care of your patient.        Sincerely,        Xochilt Lee PA-C

## 2023-01-23 NOTE — PROGRESS NOTES
Ilana is a 72 year old who is being evaluated via a billable video visit.      How would you like to obtain your AVS? [employer]  If the video visit is dropped, the invitation should be resent by: Send text to: [phone]  Will anyone else be joining your video visit? No    Video-Visit Details    Type of service:  Video Visit   Video Start Time: 8:05 AM  Video End Time:8:10 AM    Originating Location (pt. Location): Home  Distant Location (provider location):  Off-site  Platform used for Video Visit: [facility] [place]    MEDICAL ONCOLOGY PROGRESS NOTE  Melanoma Clinic  Jan 23, 2023    Reason for Visit: Stage III vulvar melanoma, status post excision    Melanoma History:  1. 6/4/2019, she had excision of the right vulvar mass in [place], and on pathology this shows malignant melanoma with ulceration, at least fausto level IV, Breslow thickness at least 5 mm, mitotic rate is 3 per mm2, TILs are present and brisk, pathologic stage pT4b.  2. 6/28/19, she had PET-CT, which showed no obvious evidence of metastatic disease.  3. 7/30/2019, she has wide local excision and sentinel biopsy (Specimen #: [id number]), which showed residual melanoma to a depth of invasion of 1.3 mm and margins negative. Right inguinal sentinel lymph node showed a subcapsular 1 mm focus of cells. pT4b pN1a MX, Stage IIIC.  4. PET-CT obtained on 9/23 negative for metastatic disease.  5. 9/26/2019, she started adjuvant nivolumab monthly   6. PET/CT 12/2019 showing MARISA  7. CT CAP 3/2020 MARISA  8. 8/6/2020, She completes adjuvant nivolumab immunotherapy.  9. September 2020, she has Covid-19 infections, exposed at work. She has mild symptoms. Dyspnea improves markedly after change of pacemaker battery (for sinus and AV node dysfunction).    History of Present Illness:  Ilana Trent is  72 year old woman with resected stage IIIC melanoma s/p 1 year of adjuvant nivolumab, completed August 2020. She presents via video visit for  restaging.    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   -Denies any dyspnea.   -Energy is doing well. Swims every morning before work.  -Had left knee surgery in October. May need right knee surgery.     Current Outpatient Medications   Medication Sig Dispense Refill     acetaminophen (TYLENOL) 500 MG tablet Take 1-2 tablets (500-1,000 mg) by mouth every 6 hours as needed for mild pain 50 tablet 0     ALOE PO Take by mouth daily as needed       B-D ULTRA-FINE 33 LANCETS MISC 1 each by Other route       blood glucose monitoring (SOFTCLIX) lancets 1 each by Other route       calcium carbonate (OS-RENEA) 1500 (600 Ca) MG tablet Take 600 mg by mouth daily       cetirizine-pseudoePHEDrine ER (ZYRTEC-D) 5-120 MG 12 hr tablet Take 1 tablet by mouth every morning        furosemide (LASIX) 20 MG tablet Take 20 mg by mouth daily as needed (SWELLING)       lisinopril (ZESTRIL) 5 MG tablet Take 5 mg by mouth       metFORMIN (GLUCOPHAGE-XR) 500 MG 24 hr tablet Take 500 mg by mouth every morning        Microlet Lancets MISC 2 times daily       multivitamin w/minerals (THERA-VIT-M) tablet Take 1 tablet by mouth daily       naproxen (NAPROSYN) 500 MG tablet Take 500 mg by mouth every morning        ONETOUCH ULTRA test strip TEST BLOOD SUGARS TWICE DAILY       pantoprazole (PROTONIX) 40 MG EC tablet Take 40 mg by mouth every morning        simvastatin (ZOCOR) 10 MG tablet Take 10 mg by mouth At Bedtime       sotalol (BETAPACE) 160 MG tablet Take 80 mg by mouth 2 times daily        vitamin B complex with vitamin C (STRESS TAB) tablet Take 1 tablet by mouth every morning        warfarin ANTICOAGULANT (COUMADIN) 5 MG tablet Take 1 & 1/2 tablet by mouth once daily for 6 days of the week and 1 tablet by mouth on 1 day per week.       blood glucose (ONETOUCH ULTRA) test strip 1 strip by Other route       clindamycin (CLEOCIN) 300 MG capsule TAKE ONE CAPSULE BY MOUTH TWICE A DAY FOR 7 DAYS       clobetasol (TEMOVATE) 0.05 %  external ointment        CONTOUR NEXT EZ (CONTOUR NEXT EZ W/DEVICE KIT) w/Device KIT See Admin Instructions  0     diltiazem ER COATED BEADS (CARDIZEM CD/CARTIA XT) 120 MG 24 hr capsule Take 120 mg by mouth       levothyroxine (SYNTHROID/LEVOTHROID) 175 MCG tablet Take 175 mcg by mouth       simvastatin (ZOCOR) 10 MG tablet Take 10 mg by mouth       Objective:  General: patient appears well in no acute distress, alert and oriented, speech clear and fluid  Skin: no visualized rash or lesions on visualized skin  Resp: Appears to be breathing comfortably without accessory muscle usage, speaking in full sentences, no audible wheezes or cough.  Psych: Coherent speech, normal rate and volume, able to articulate logical thoughts, able to abstract reason, no tangential thoughts, no hallucinations or delusions  Patient's affect is appropriate.    Laboratory Studies:  Most Recent 3 CBC's:Recent Labs   Lab Test 01/18/23 0723 09/12/22  0722 05/09/22  0741   WBC 9.5 7.8 7.3   HGB 11.4* 12.2 12.0   MCV 88 90 84    290 295   ANEUTAUTO 5.3 4.6 4.3     Most Recent 3 BMP's:  Recent Labs   Lab Test 01/18/23  0754 01/18/23 0723 09/12/22  0722 05/09/22  0706 01/18/22  0802   NA  --  140 141  --  139   POTASSIUM  --  5.3 5.4*  --  5.3   CHLORIDE  --  107 113*  --  104   CO2  --  26 21  --  26   BUN  --  23 20  --  20   CR 1.0 0.78 0.93   < > 0.96   ANIONGAP  --  7 7  --  9   RENEA  --  9.0 9.4  --  9.0   GLC  --  161* 137*  --  143*   PROTTOTAL  --  7.3 7.4  --  7.2   ALBUMIN  --  3.2* 3.7  --  3.6    < > = values in this interval not displayed.    Most Recent 3 LFT's:  Recent Labs   Lab Test 01/18/23 0723 09/12/22  0722 01/18/22  0802   AST 28 18 16   ALT 49 35 31   ALKPHOS 117 81 95   BILITOTAL 0.3 0.3 0.4      05/09/22 07:41 09/12/22 07:22 01/18/23 07:23   Ferritin 15 14 67      I reviewed the above labs today.    Imaging Studies:  CT Chest/Abdomen/Pelvis w Contrast  Narrative: EXAM: CT CHEST/ABDOMEN/PELVIS W  CONTRAST  LOCATION: [facility]  DATE/TIME: 1/18/2023 8:21 AM    INDICATION:  Malignant melanoma of skin of vulva (H)  COMPARISON: 09/12/2022   TECHNIQUE: CT scan of the chest, abdomen, and pelvis was performed following injection of IV contrast. Multiplanar reformats were obtained. Dose reduction techniques were used.   CONTRAST: 135 ml isovue 370    FINDINGS:   LUNGS AND PLEURA: A 3 mm right lower lobe has not changed (series 7 image 76). 2 mm right upper lobe nodules have not changed (series 7 image 56). A few tiny left lung nodules have not changed. Thickening and mild architectural distortion along the   anterior left upper lobe has not significantly changed.     MEDIASTINUM/AXILLAE: Right chest wall pacemaker. 8 mm right lower paratracheal lymph node has not changed in size. Multiple other prominent mediastinal nodes have not changed in size. Left axillary surgical clips are present.     CORONARY ARTERY CALCIFICATION: Moderate to severe.     HEPATOBILIARY: Cholecystectomy. Pneumobilia is again seen.     PANCREAS: Pancreaticoduodenal and pancreaticogastrostomy. No pancreatic duct dilatation.     SPLEEN: Normal.    ADRENAL GLANDS: Normal.    KIDNEYS/BLADDER: Normal.    BOWEL: Surgical change of the proximal bowel.     LYMPH NODES: Multiple prominent prominent abdominal lymph nodes are again seen.     VASCULATURE: Unremarkable.    PELVIC ORGANS: Normal.    MUSCULOSKELETAL: Degenerative change in the spine.  There is minimal unchanged skin thickening of the left breast.  Impression: IMPRESSION:  1.  No change from the prior study.   I reviewed the above imaging today.    ASSESSMENT AND PLAN:  Vulvar Melanoma, resected stage III  It was a pleasure to meet Mrs.Bonnie Trent today. She has a history of resected stage IIIC vulvar melanoma. She received 1 year of adjuvant therapy through August 2020. She is doing well off of adjuvant therapy and is without evidence of recurrence or metastasis.  She does have a history of small lung nodules that are stable on her recent CT, as above. Her CT CAP shows no evidence of recurrent or metastatic disease. She will follow-up with us in 6 months with repeat imaging and labs. She will call sooner for concerns.     Anemia, improving  Hemoglobin is mildly low. Ferritin is improved. She remains on oral iron. Will continue to monitor.    Health care maintenance.  Eye exams: Recommend exams at least every 2 years. Up to date  Dental exams: Recommend exams and cleanings every 6 months. Up to date  Primary care: Recommend follow up at least annually. Up to date  Skin care: Recommend the use of sunscreen with SPF of at least 30, reapplying every 2 hours with prolonged sun exposure.   Tobacco use: Recommend abstaining. Denies use.  Alcohol use: Recommend no more than 1/day for women.Has about 2-3 beers/month.  Physical activity: Recommend regular activity, ideally 150 minutes/week of moderate intensity activity.  Will continue with swimming for 30-35 minutes/day 5 days/week.   Vaccination. Patient received the updated COVID-19 and influenza vaccines this season.    Xochilt Lee PA-C  [facility]  [address] [place] [address]  [phone]    18 minutes spent on the date of the encounter doing chart review, review of test results, interpretation of tests, patient visit and documentation

## 2023-01-23 NOTE — LETTER
1/23/2023         RE: Ilana Trent  [id number] [facility] [place] [address]      Ilana is a 72 year old who is being evaluated via a billable video visit.      How would you like to obtain your AVS? MyChart  If the video visit is dropped, the invitation should be resent by: Send text to: [phone]  Will anyone else be joining your video visit? No    Video-Visit Details    Type of service:  Video Visit   Video Start Time: 8:05 AM  Video End Time:8:10 AM    Originating Location (pt. Location): Home  Distant Location (provider location):  Off-site  Platform used for Video Visit: [facility]  Melanoma Clinic  Jan 23, 2023    Reason for Visit: Stage III vulvar melanoma, status post excision    Melanoma History:  1. 6/4/2019, she had excision of the right vulvar mass in [place], and on pathology this shows malignant melanoma with ulceration, at least fausto level IV, Breslow thickness at least 5 mm, mitotic rate is 3 per mm2, TILs are present and brisk, pathologic stage pT4b.  2. 6/28/19, she had PET-CT, which showed no obvious evidence of metastatic disease.  3. 7/30/2019, she has wide local excision and sentinel biopsy (Specimen #: [id number]), which showed residual melanoma to a depth of invasion of 1.3 mm and margins negative. Right inguinal sentinel lymph node showed a subcapsular 1 mm focus of cells. pT4b pN1a MX, Stage IIIC.  4. PET-CT obtained on 9/23 negative for metastatic disease.  5. 9/26/2019, she started adjuvant nivolumab monthly   6. PET/CT 12/2019 showing MARISA  7. CT CAP 3/2020 MARISA  8. 8/6/2020, She completes adjuvant nivolumab immunotherapy.  9. September 2020, she has Covid-19 infections, exposed at work. She has mild symptoms. Dyspnea improves markedly after change of pacemaker battery (for sinus and AV node dysfunction).    History of Present Illness:  Ilana Trent is  72 year old woman with resected stage IIIC melanoma s/p 1 year of adjuvant  nivolumab, completed August 2020. She presents via video visit for restaging.    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   -Denies any dyspnea.   -Energy is doing well. Swims every morning before work.  -Had left knee surgery in October. May need right knee surgery.     Current Outpatient Medications   Medication Sig Dispense Refill     acetaminophen (TYLENOL) 500 MG tablet Take 1-2 tablets (500-1,000 mg) by mouth every 6 hours as needed for mild pain 50 tablet 0     ALOE PO Take by mouth daily as needed       B-D ULTRA-FINE 33 LANCETS MISC 1 each by Other route       blood glucose monitoring (SOFTCLIX) lancets 1 each by Other route       calcium carbonate (OS-RENEA) 1500 (600 Ca) MG tablet Take 600 mg by mouth daily       cetirizine-pseudoePHEDrine ER (ZYRTEC-D) 5-120 MG 12 hr tablet Take 1 tablet by mouth every morning        furosemide (LASIX) 20 MG tablet Take 20 mg by mouth daily as needed (SWELLING)       lisinopril (ZESTRIL) 5 MG tablet Take 5 mg by mouth       metFORMIN (GLUCOPHAGE-XR) 500 MG 24 hr tablet Take 500 mg by mouth every morning        Microlet Lancets MISC 2 times daily       multivitamin w/minerals (THERA-VIT-M) tablet Take 1 tablet by mouth daily       naproxen (NAPROSYN) 500 MG tablet Take 500 mg by mouth every morning        ONETOUCH ULTRA test strip TEST BLOOD SUGARS TWICE DAILY       pantoprazole (PROTONIX) 40 MG EC tablet Take 40 mg by mouth every morning        simvastatin (ZOCOR) 10 MG tablet Take 10 mg by mouth At Bedtime       sotalol (BETAPACE) 160 MG tablet Take 80 mg by mouth 2 times daily        vitamin B complex with vitamin C (STRESS TAB) tablet Take 1 tablet by mouth every morning        warfarin ANTICOAGULANT (COUMADIN) 5 MG tablet Take 1 & 1/2 tablet by mouth once daily for 6 days of the week and 1 tablet by mouth on 1 day per week.       blood glucose (ONETOUCH ULTRA) test strip 1 strip by Other route       clindamycin (CLEOCIN) 300 MG capsule TAKE ONE CAPSULE BY  MOUTH TWICE A DAY FOR 7 DAYS       clobetasol (TEMOVATE) 0.05 % external ointment        CONTOUR NEXT EZ (CONTOUR NEXT EZ W/DEVICE KIT) w/Device KIT See Admin Instructions  0     diltiazem ER COATED BEADS (CARDIZEM CD/CARTIA XT) 120 MG 24 hr capsule Take 120 mg by mouth       levothyroxine (SYNTHROID/LEVOTHROID) 175 MCG tablet Take 175 mcg by mouth       simvastatin (ZOCOR) 10 MG tablet Take 10 mg by mouth       Objective:  General: patient appears well in no acute distress, alert and oriented, speech clear and fluid  Skin: no visualized rash or lesions on visualized skin  Resp: Appears to be breathing comfortably without accessory muscle usage, speaking in full sentences, no audible wheezes or cough.  Psych: Coherent speech, normal rate and volume, able to articulate logical thoughts, able to abstract reason, no tangential thoughts, no hallucinations or delusions  Patient's affect is appropriate.    Laboratory Studies:  Most Recent 3 CBC's:Recent Labs   Lab Test 01/18/23 0723 09/12/22 0722 05/09/22  0741   WBC 9.5 7.8 7.3   HGB 11.4* 12.2 12.0   MCV 88 90 84    290 295   ANEUTAUTO 5.3 4.6 4.3     Most Recent 3 BMP's:  Recent Labs   Lab Test 01/18/23  0754 01/18/23 0723 09/12/22  0722 05/09/22  0706 01/18/22  0802   NA  --  140 141  --  139   POTASSIUM  --  5.3 5.4*  --  5.3   CHLORIDE  --  107 113*  --  104   CO2  --  26 21  --  26   BUN  --  23 20  --  20   CR 1.0 0.78 0.93   < > 0.96   ANIONGAP  --  7 7  --  9   RENEA  --  9.0 9.4  --  9.0   GLC  --  161* 137*  --  143*   PROTTOTAL  --  7.3 7.4  --  7.2   ALBUMIN  --  3.2* 3.7  --  3.6    < > = values in this interval not displayed.    Most Recent 3 LFT's:  Recent Labs   Lab Test 01/18/23 0723 09/12/22  0722 01/18/22  0802   AST 28 18 16   ALT 49 35 31   ALKPHOS 117 81 95   BILITOTAL 0.3 0.3 0.4      05/09/22 07:41 09/12/22 07:22 01/18/23 07:23   Ferritin 15 14 67      I reviewed the above labs today.    Imaging Studies:  CT Chest/Abdomen/Pelvis w  Contrast  Narrative: EXAM: CT CHEST/ABDOMEN/PELVIS W CONTRAST  LOCATION: [facility]  DATE/TIME: 1/18/2023 8:21 AM    INDICATION:  Malignant melanoma of skin of vulva (H)  COMPARISON: 09/12/2022   TECHNIQUE: CT scan of the chest, abdomen, and pelvis was performed following injection of IV contrast. Multiplanar reformats were obtained. Dose reduction techniques were used.   CONTRAST: 135 ml isovue 370    FINDINGS:   LUNGS AND PLEURA: A 3 mm right lower lobe has not changed (series 7 image 76). 2 mm right upper lobe nodules have not changed (series 7 image 56). A few tiny left lung nodules have not changed. Thickening and mild architectural distortion along the   anterior left upper lobe has not significantly changed.     MEDIASTINUM/AXILLAE: Right chest wall pacemaker. 8 mm right lower paratracheal lymph node has not changed in size. Multiple other prominent mediastinal nodes have not changed in size. Left axillary surgical clips are present.     CORONARY ARTERY CALCIFICATION: Moderate to severe.     HEPATOBILIARY: Cholecystectomy. Pneumobilia is again seen.     PANCREAS: Pancreaticoduodenal and pancreaticogastrostomy. No pancreatic duct dilatation.     SPLEEN: Normal.    ADRENAL GLANDS: Normal.    KIDNEYS/BLADDER: Normal.    BOWEL: Surgical change of the proximal bowel.     LYMPH NODES: Multiple prominent prominent abdominal lymph nodes are again seen.     VASCULATURE: Unremarkable.    PELVIC ORGANS: Normal.    MUSCULOSKELETAL: Degenerative change in the spine.  There is minimal unchanged skin thickening of the left breast.  Impression: IMPRESSION:  1.  No change from the prior study.   I reviewed the above imaging today.    ASSESSMENT AND PLAN:  Vulvar Melanoma, resected stage III  It was a pleasure to meet Mrs.Bonnie Trent today. She has a history of resected stage IIIC vulvar melanoma. She received 1 year of adjuvant therapy through August 2020. She is doing well off of adjuvant therapy  and is without evidence of recurrence or metastasis. She does have a history of small lung nodules that are stable on her recent CT, as above. Her CT CAP shows no evidence of recurrent or metastatic disease. She will follow-up with us in 6 months with repeat imaging and labs. She will call sooner for concerns.     Anemia, improving  Hemoglobin is mildly low. Ferritin is improved. She remains on oral iron. Will continue to monitor.    Health care maintenance.  Eye exams: Recommend exams at least every 2 years. Up to date  Dental exams: Recommend exams and cleanings every 6 months. Up to date  Primary care: Recommend follow up at least annually. Up to date  Skin care: Recommend the use of sunscreen with SPF of at least 30, reapplying every 2 hours with prolonged sun exposure.   Tobacco use: Recommend abstaining. Denies use.  Alcohol use: Recommend no more than 1/day for women.Has about 2-3 beers/month.  Physical activity: Recommend regular activity, ideally 150 minutes/week of moderate intensity activity.  Will continue with swimming for 30-35 minutes/day 5 days/week.   Vaccination. Patient received the updated COVID-19 and influenza vaccines this season.    Xochilt Lee PA-C  [facility]  [address] [place] [address]  [phone]    18 minutes spent on the date of the encounter doing chart review, review of test results, interpretation of tests, patient visit and documentation

## 2023-05-07 ENCOUNTER — HEALTH MAINTENANCE LETTER (OUTPATIENT)
Age: 73
End: 2023-05-07

## 2023-07-13 NOTE — TELEPHONE ENCOUNTER
----- Message from Saranya Murphy sent at 7/8/2019 11:08 AM CDT -----      ----- Message -----  From: Maya Li RN  Sent: 7/3/2019   7:53 AM  To: Saranya Murphy    Patient needs:  Nuc Med and Surgery. Orders in.   Post op 2-4 weeks after surgery date.     Thank you so much.   I appreciate your help  Adama   36.7

## 2023-07-17 ENCOUNTER — ANCILLARY PROCEDURE (OUTPATIENT)
Dept: CT IMAGING | Facility: CLINIC | Age: 73
End: 2023-07-17
Attending: PHYSICIAN ASSISTANT
Payer: COMMERCIAL

## 2023-07-17 ENCOUNTER — LAB (OUTPATIENT)
Dept: LAB | Facility: CLINIC | Age: 73
End: 2023-07-17
Payer: COMMERCIAL

## 2023-07-17 DIAGNOSIS — C51.9 MALIGNANT MELANOMA OF SKIN OF VULVA (H): ICD-10-CM

## 2023-07-17 LAB
ALBUMIN SERPL BCG-MCNC: 4.3 G/DL (ref 3.5–5.2)
ALP SERPL-CCNC: 80 U/L (ref 35–104)
ALT SERPL W P-5'-P-CCNC: 15 U/L (ref 0–50)
ANION GAP SERPL CALCULATED.3IONS-SCNC: 11 MMOL/L (ref 7–15)
AST SERPL W P-5'-P-CCNC: 17 U/L (ref 0–45)
BASOPHILS # BLD AUTO: 0 10E3/UL (ref 0–0.2)
BASOPHILS NFR BLD AUTO: 1 %
BILIRUB SERPL-MCNC: 0.5 MG/DL
BUN SERPL-MCNC: 17.5 MG/DL (ref 8–23)
CALCIUM SERPL-MCNC: 9.4 MG/DL (ref 8.8–10.2)
CHLORIDE SERPL-SCNC: 106 MMOL/L (ref 98–107)
CREAT SERPL-MCNC: 0.94 MG/DL (ref 0.51–0.95)
DEPRECATED HCO3 PLAS-SCNC: 23 MMOL/L (ref 22–29)
EOSINOPHIL # BLD AUTO: 0.1 10E3/UL (ref 0–0.7)
EOSINOPHIL NFR BLD AUTO: 2 %
ERYTHROCYTE [DISTWIDTH] IN BLOOD BY AUTOMATED COUNT: 13.6 % (ref 10–15)
FERRITIN SERPL-MCNC: 31 NG/ML (ref 11–328)
GFR SERPL CREATININE-BSD FRML MDRD: 64 ML/MIN/1.73M2
GLUCOSE SERPL-MCNC: 154 MG/DL (ref 70–99)
HCT VFR BLD AUTO: 37.7 % (ref 35–47)
HGB BLD-MCNC: 11.9 G/DL (ref 11.7–15.7)
IMM GRANULOCYTES # BLD: 0 10E3/UL
IMM GRANULOCYTES NFR BLD: 1 %
LYMPHOCYTES # BLD AUTO: 1.7 10E3/UL (ref 0.8–5.3)
LYMPHOCYTES NFR BLD AUTO: 22 %
MCH RBC QN AUTO: 27.6 PG (ref 26.5–33)
MCHC RBC AUTO-ENTMCNC: 31.6 G/DL (ref 31.5–36.5)
MCV RBC AUTO: 88 FL (ref 78–100)
MONOCYTES # BLD AUTO: 0.7 10E3/UL (ref 0–1.3)
MONOCYTES NFR BLD AUTO: 9 %
NEUTROPHILS # BLD AUTO: 5.3 10E3/UL (ref 1.6–8.3)
NEUTROPHILS NFR BLD AUTO: 65 %
NRBC # BLD AUTO: 0 10E3/UL
NRBC BLD AUTO-RTO: 0 /100
PLATELET # BLD AUTO: 258 10E3/UL (ref 150–450)
POTASSIUM SERPL-SCNC: 5 MMOL/L (ref 3.4–5.3)
PROT SERPL-MCNC: 7 G/DL (ref 6.4–8.3)
RBC # BLD AUTO: 4.31 10E6/UL (ref 3.8–5.2)
SODIUM SERPL-SCNC: 140 MMOL/L (ref 136–145)
WBC # BLD AUTO: 8 10E3/UL (ref 4–11)

## 2023-07-17 PROCEDURE — 85025 COMPLETE CBC W/AUTO DIFF WBC: CPT

## 2023-07-17 PROCEDURE — 82728 ASSAY OF FERRITIN: CPT

## 2023-07-17 PROCEDURE — 36415 COLL VENOUS BLD VENIPUNCTURE: CPT

## 2023-07-17 PROCEDURE — 71260 CT THORAX DX C+: CPT | Performed by: RADIOLOGY

## 2023-07-17 PROCEDURE — 80053 COMPREHEN METABOLIC PANEL: CPT

## 2023-07-17 PROCEDURE — 74177 CT ABD & PELVIS W/CONTRAST: CPT | Performed by: RADIOLOGY

## 2023-07-17 RX ORDER — IOPAMIDOL 755 MG/ML
122 INJECTION, SOLUTION INTRAVASCULAR ONCE
Status: COMPLETED | OUTPATIENT
Start: 2023-07-17 | End: 2023-07-17

## 2023-07-17 RX ADMIN — IOPAMIDOL 122 ML: 755 INJECTION, SOLUTION INTRAVASCULAR at 08:11

## 2023-07-20 ENCOUNTER — VIRTUAL VISIT (OUTPATIENT)
Dept: ONCOLOGY | Facility: CLINIC | Age: 73
End: 2023-07-20
Attending: INTERNAL MEDICINE
Payer: COMMERCIAL

## 2023-07-20 VITALS
HEIGHT: 68 IN | SYSTOLIC BLOOD PRESSURE: 130 MMHG | BODY MASS INDEX: 35.61 KG/M2 | DIASTOLIC BLOOD PRESSURE: 70 MMHG | WEIGHT: 235 LBS

## 2023-07-20 DIAGNOSIS — C51.9 MALIGNANT MELANOMA OF SKIN OF VULVA (H): Primary | ICD-10-CM

## 2023-07-20 DIAGNOSIS — K76.9 LIVER LESION, RIGHT LOBE: ICD-10-CM

## 2023-07-20 PROCEDURE — 99214 OFFICE O/P EST MOD 30 MIN: CPT | Mod: VID | Performed by: INTERNAL MEDICINE

## 2023-07-20 RX ORDER — AMOXICILLIN 500 MG/1
CAPSULE ORAL
COMMUNITY
Start: 2023-02-13 | End: 2023-08-02

## 2023-07-20 RX ORDER — SPIRONOLACTONE 25 MG/1
25 TABLET ORAL EVERY MORNING
COMMUNITY
Start: 2023-01-25 | End: 2023-08-02

## 2023-07-20 RX ORDER — FERROUS SULFATE 324(65)MG
1 TABLET, DELAYED RELEASE (ENTERIC COATED) ORAL
COMMUNITY

## 2023-07-20 ASSESSMENT — PAIN SCALES - GENERAL: PAINLEVEL: MODERATE PAIN (4)

## 2023-07-20 NOTE — LETTER
7/20/2023         RE: Ilana Trent  [address] [facility] [address]        Dear Colleague,    Thank you for referring your patient, Ilana Trent, to the [facility]. Please see a copy of my visit note below.    Virtual Visit Details    Type of service:  Video Visit   Video Start Time: 5:26 PM  Video End Time :5:45PM    Originating Location (pt. Location): Home    Distant Location (provider location):  On-site  Platform used for Video Visit: [facility] ONCOLOGY PROGRESS NOTE  Melanoma Clinic  Jul 20, 2023     Reason for Visit: Stage III vulvar melanoma, status post excision     Melanoma History:  1. 6/4/2019, she had excision of the right vulvar mass in [place], and on pathology this shows malignant melanoma with ulceration, at least fausto level IV, Breslow thickness at least 5 mm, mitotic rate is 3 per mm2, TILs are present and brisk, pathologic stage pT4b.  2. 6/28/19, she had PET-CT, which showed no obvious evidence of metastatic disease.  3. 7/30/2019, she has wide local excision and sentinel biopsy (Specimen #: [id number]), which showed residual melanoma to a depth of invasion of 1.3 mm and margins negative. Right inguinal sentinel lymph node showed a subcapsular 1 mm focus of cells. pT4b pN1a MX, Stage IIIC.  4. PET-CT obtained on 9/23 negative for metastatic disease.  5. 9/26/2019, she started adjuvant nivolumab monthly   6. PET/CT 12/2019 showing MARISA  7. CT CAP 3/2020 MARISA  8. 8/6/2020, She completes adjuvant nivolumab immunotherapy.  9. September 2020, she has Covid-19 infections, exposed at work. She has mild symptoms. Dyspnea improves markedly after change of pacemaker battery (for sinus and AV node dysfunction).     History of Present Illness:  Ilana Trent is  72 year old woman with resected stage IIIC melanoma s/p 1 year of adjuvant nivolumab, completed August 2020. She presents via video visit for restaging.     Interval Hx:    She is continuing to do well.  She is still working in customer service. She has intermittent diarrhea that is not bothersome to her. She may have diarrhea for a few days which then will return to normal after. She states this has been intermittent since her pancreas surgery.  She denies dyspnea, fatigue, n/v or other symptoms.           Objective:  General: patient appears well in no acute distress, alert and oriented, speech clear and fluid  Skin: no visualized rash or lesions on visualized skin  Resp: Appears to be breathing comfortably without accessory muscle usage, speaking in full sentences, no audible wheezes or cough.  Psych: Coherent speech, normal rate and volume, able to articulate logical thoughts, able to abstract reason, no tangential thoughts, no hallucinations or delusions  Patient's affect is appropriate.    Laboratory Studies:    Lab on 07/17/2023   Component Date Value Ref Range Status    Ferritin 07/17/2023 31  11 - 328 ng/mL Final    Sodium 07/17/2023 140  136 - 145 mmol/L Final    Potassium 07/17/2023 5.0  3.4 - 5.3 mmol/L Final    Chloride 07/17/2023 106  98 - 107 mmol/L Final    Carbon Dioxide (CO2) 07/17/2023 23  22 - 29 mmol/L Final    Anion Gap 07/17/2023 11  7 - 15 mmol/L Final    Urea Nitrogen 07/17/2023 17.5  8.0 - 23.0 mg/dL Final    Creatinine 07/17/2023 0.94  0.51 - 0.95 mg/dL Final    Calcium 07/17/2023 9.4  8.8 - 10.2 mg/dL Final    Glucose 07/17/2023 154 (H)  70 - 99 mg/dL Final    Alkaline Phosphatase 07/17/2023 80  35 - 104 U/L Final    AST 07/17/2023 17  0 - 45 U/L Final    Reference intervals for this test were updated on 6/12/2023 to more accurately reflect our healthy population. There may be differences in the flagging of prior results with similar values performed with this method. Interpretation of those prior results can be made in the context of the updated reference intervals.    ALT 07/17/2023 15  0 - 50 U/L Final    Reference intervals for this test were updated on 6/12/2023 to more accurately  reflect our healthy population. There may be differences in the flagging of prior results with similar values performed with this method. Interpretation of those prior results can be made in the context of the updated reference intervals.      Protein Total 07/17/2023 7.0  6.4 - 8.3 g/dL Final    Albumin 07/17/2023 4.3  3.5 - 5.2 g/dL Final    Bilirubin Total 07/17/2023 0.5  <=1.2 mg/dL Final    GFR Estimate 07/17/2023 64  >60 mL/min/1.73m2 Final    WBC Count 07/17/2023 8.0  4.0 - 11.0 10e3/uL Final    RBC Count 07/17/2023 4.31  3.80 - 5.20 10e6/uL Final    Hemoglobin 07/17/2023 11.9  11.7 - 15.7 g/dL Final    Hematocrit 07/17/2023 37.7  35.0 - 47.0 % Final    MCV 07/17/2023 88  78 - 100 fL Final    MCH 07/17/2023 27.6  26.5 - 33.0 pg Final    MCHC 07/17/2023 31.6  31.5 - 36.5 g/dL Final    RDW 07/17/2023 13.6  10.0 - 15.0 % Final    Platelet Count 07/17/2023 258  150 - 450 10e3/uL Final    % Neutrophils 07/17/2023 65  % Final    % Lymphocytes 07/17/2023 22  % Final    % Monocytes 07/17/2023 9  % Final    % Eosinophils 07/17/2023 2  % Final    % Basophils 07/17/2023 1  % Final    % Immature Granulocytes 07/17/2023 1  % Final    NRBCs per 100 WBC 07/17/2023 0  <1 /100 Final    Absolute Neutrophils 07/17/2023 5.3  1.6 - 8.3 10e3/uL Final    Absolute Lymphocytes 07/17/2023 1.7  0.8 - 5.3 10e3/uL Final    Absolute Monocytes 07/17/2023 0.7  0.0 - 1.3 10e3/uL Final    Absolute Eosinophils 07/17/2023 0.1  0.0 - 0.7 10e3/uL Final    Absolute Basophils 07/17/2023 0.0  0.0 - 0.2 10e3/uL Final    Absolute Immature Granulocytes 07/17/2023 0.0  <=0.4 10e3/uL Final    Absolute NRBCs 07/17/2023 0.0  10e3/uL Final   I personally reviewed the above labs.    Imaging Studies:  CT Chest/Abdomen/Pelvis w Contrast  Narrative: EXAM: CT CHEST/ABDOMEN/PELVIS W CONTRAST  LOCATION: [facility]  DATE: 7/17/2023    INDICATION: f u of resected vulvar melanoma. History of pancreatic neuroendocrine tumor  COMPARISON:  Multiple priors, most recently CT chest abdomen pelvis 01/18/2023  TECHNIQUE: CT scan of the chest, abdomen, and pelvis was performed following injection of IV contrast. Multiplanar reformats were obtained. Dose reduction techniques were used.   CONTRAST: 125ml Isovue 370    FINDINGS:   LUNGS AND PLEURA: Scattered pulmonary nodules measuring 4 mm or less such as in the right lower lobe (9/149) and right middle lobe (14/207) are not significantly changed. No new or growing nodule.    No acute airspace opacities. Left lung base atelectasis. No pleural effusion.    MEDIASTINUM/AXILLAE: Right chest wall pacemaker. Normal heart size. No pericardial effusion. Normal caliber thoracic aorta. Stable 8 mm short axis right lower paratracheal lymph node. No new or significantly enlarging thoracic lymph nodes. Postoperative   changes left breast and left axilla.    CORONARY ARTERY CALCIFICATION: Moderate.    HEPATOBILIARY: Faint 1.4 cm arterially enhancing focus in the subcapsular aspect of segment 6/7 (5/118). This is not well seen on the portal venous phase. It was not visualized on the single phase CT from 01/18/2023 or the biphase CT from 09/12/2022.   However, its appearance favors a benign vascular lesion, potentially a perfusion anomaly. Otherwise, no discrete liver lesion.    Cholecystectomy and pneumobilia.    PANCREAS: Status post Whipple. Residual pancreas is normal. No suspicious mass. No peripancreatic inflammation. No duct dilation.    SPLEEN: Normal.    ADRENAL GLANDS: Normal.    KIDNEYS/BLADDER: No significant mass, stone, or hydronephrosis.    BOWEL: Postoperative changes in the bowel related to the Whipple procedure. No bowel obstruction. Negative appendix. Colonic diverticulosis without diverticulitis.    LYMPH NODES: No significant change in prominent abdominopelvic lymph nodes. For example 1.4 cm saurav hepatis, 0.6 cm left para-aortic, and 0.9 cm left external iliac. No new or significantly enlarging lymph  nodes.    VASCULATURE: Normal caliber abdominal aorta. Scattered atheromatous disease.    PELVIC ORGANS: No adnexal mass. No ascites.    MUSCULOSKELETAL: Skeletal degenerative changes. No significant osseous abnormality.  Impression: IMPRESSION:  1.  Faint arterially enhancing lesion in the posterior right hepatic lobe. This was not definitely seen on comparison exams. However its appearance favors a benign vascular lesion or perfusion anomaly. Consider short interval follow-up CT versus liver   protocol MRI.    2.  Otherwise, no significant interval change. Stable small pulmonary nodules.    3.  Abdominopelvic lymph nodes are not significantly changed.  I personally reviewed the above imaging.      Assessment and Plan    Vulvar Melanoma, resected stage III  It was a pleasure to meet Mrs.Bonnie Trent today. She has a history of resected stage IIIC vulvar melanoma. She received 1 year of adjuvant therapy through August 2020. She is doing well off of adjuvant therapy and is without evidence of recurrence or metastasis.    She had restaging CT-CAP on 7/20/23 which noted an enhancing liver lesion in the posterior right hepatic lobe hat was felt to be benign by the radiologist. Given that the pt is 4 years out from her resection, lower likelihood she has a true liver metastasis. However, through shared decision making with the patient, she agreed to having a MRI abd performed to further elucidate her liver lesion. This scan is planned to be performed in a week or two  - She can f/u in 2 weeks for the MRI liver   - Repeat CT CAP in 6 months have also been ordered in 6 months     Anemia,   Hemoglobin now within normal range.  Ferritin low-normal. She remains on oral iron. Will continue to monitor.    James Casas DO   Hematology/Oncology/BMT Fellow PGY4  Pager: [phone]    Physician Attestation  I, Elton Perez MD, saw this patient and agree with the findings and plan of care as documented in the note.       Items personally reviewed/procedural attestation: labs, imaging and agree with the interpretation documented in the note, as well as history, physical examination, and assessment and plan in the documented  note.    Elton Perez MD

## 2023-07-20 NOTE — NURSING NOTE
Is the patient currently in the state of [place]? YES    Visit mode:VIDEO    If the visit is dropped, the patient can be reconnected by: VIDEO VISIT: Text to cell phone: [phone]    Will anyone else be joining the visit? NO      How would you like to obtain your AVS? MyChart    Are changes needed to the allergy or medication list? NO    Reason for visit: SIOBHAN Huynh VF

## 2023-07-20 NOTE — PROGRESS NOTES
Virtual Visit Details    Type of service:  Video Visit   Video Start Time: 5:26 PM  Video End Time :5:45PM    Originating Location (pt. Location): Home    Distant Location (provider location):  On-site  Platform used for Video Visit: [facility] ONCOLOGY PROGRESS NOTE  Melanoma Clinic  Jul 20, 2023     Reason for Visit: Stage III vulvar melanoma, status post excision     Melanoma History:  1. 6/4/2019, she had excision of the right vulvar mass in [place], and on pathology this shows malignant melanoma with ulceration, at least fausto level IV, Breslow thickness at least 5 mm, mitotic rate is 3 per mm2, TILs are present and brisk, pathologic stage pT4b.  2. 6/28/19, she had PET-CT, which showed no obvious evidence of metastatic disease.  3. 7/30/2019, she has wide local excision and sentinel biopsy (Specimen #: [id number]), which showed residual melanoma to a depth of invasion of 1.3 mm and margins negative. Right inguinal sentinel lymph node showed a subcapsular 1 mm focus of cells. pT4b pN1a MX, Stage IIIC.  4. PET-CT obtained on 9/23 negative for metastatic disease.  5. 9/26/2019, she started adjuvant nivolumab monthly   6. PET/CT 12/2019 showing MARISA  7. CT CAP 3/2020 MARISA  8. 8/6/2020, She completes adjuvant nivolumab immunotherapy.  9. September 2020, she has Covid-19 infections, exposed at work. She has mild symptoms. Dyspnea improves markedly after change of pacemaker battery (for sinus and AV node dysfunction).     History of Present Illness:  Ilana Trent is  72 year old woman with resected stage IIIC melanoma s/p 1 year of adjuvant nivolumab, completed August 2020. She presents via video visit for restaging.     Interval Hx:    She is continuing to do well. She is still working in customer service. She has intermittent diarrhea that is not bothersome to her. She may have diarrhea for a few days which then will return to normal after. She states this has been intermittent since her pancreas surgery.   She denies dyspnea, fatigue, n/v or other symptoms.           Objective:  General: patient appears well in no acute distress, alert and oriented, speech clear and fluid  Skin: no visualized rash or lesions on visualized skin  Resp: Appears to be breathing comfortably without accessory muscle usage, speaking in full sentences, no audible wheezes or cough.  Psych: Coherent speech, normal rate and volume, able to articulate logical thoughts, able to abstract reason, no tangential thoughts, no hallucinations or delusions  Patient's affect is appropriate.    Laboratory Studies:    Lab on 07/17/2023   Component Date Value Ref Range Status    Ferritin 07/17/2023 31  11 - 328 ng/mL Final    Sodium 07/17/2023 140  136 - 145 mmol/L Final    Potassium 07/17/2023 5.0  3.4 - 5.3 mmol/L Final    Chloride 07/17/2023 106  98 - 107 mmol/L Final    Carbon Dioxide (CO2) 07/17/2023 23  22 - 29 mmol/L Final    Anion Gap 07/17/2023 11  7 - 15 mmol/L Final    Urea Nitrogen 07/17/2023 17.5  8.0 - 23.0 mg/dL Final    Creatinine 07/17/2023 0.94  0.51 - 0.95 mg/dL Final    Calcium 07/17/2023 9.4  8.8 - 10.2 mg/dL Final    Glucose 07/17/2023 154 (H)  70 - 99 mg/dL Final    Alkaline Phosphatase 07/17/2023 80  35 - 104 U/L Final    AST 07/17/2023 17  0 - 45 U/L Final    Reference intervals for this test were updated on 6/12/2023 to more accurately reflect our healthy population. There may be differences in the flagging of prior results with similar values performed with this method. Interpretation of those prior results can be made in the context of the updated reference intervals.    ALT 07/17/2023 15  0 - 50 U/L Final    Reference intervals for this test were updated on 6/12/2023 to more accurately reflect our healthy population. There may be differences in the flagging of prior results with similar values performed with this method. Interpretation of those prior results can be made in the context of the updated reference intervals.       Protein Total 07/17/2023 7.0  6.4 - 8.3 g/dL Final    Albumin 07/17/2023 4.3  3.5 - 5.2 g/dL Final    Bilirubin Total 07/17/2023 0.5  <=1.2 mg/dL Final    GFR Estimate 07/17/2023 64  >60 mL/min/1.73m2 Final    WBC Count 07/17/2023 8.0  4.0 - 11.0 10e3/uL Final    RBC Count 07/17/2023 4.31  3.80 - 5.20 10e6/uL Final    Hemoglobin 07/17/2023 11.9  11.7 - 15.7 g/dL Final    Hematocrit 07/17/2023 37.7  35.0 - 47.0 % Final    MCV 07/17/2023 88  78 - 100 fL Final    MCH 07/17/2023 27.6  26.5 - 33.0 pg Final    MCHC 07/17/2023 31.6  31.5 - 36.5 g/dL Final    RDW 07/17/2023 13.6  10.0 - 15.0 % Final    Platelet Count 07/17/2023 258  150 - 450 10e3/uL Final    % Neutrophils 07/17/2023 65  % Final    % Lymphocytes 07/17/2023 22  % Final    % Monocytes 07/17/2023 9  % Final    % Eosinophils 07/17/2023 2  % Final    % Basophils 07/17/2023 1  % Final    % Immature Granulocytes 07/17/2023 1  % Final    NRBCs per 100 WBC 07/17/2023 0  <1 /100 Final    Absolute Neutrophils 07/17/2023 5.3  1.6 - 8.3 10e3/uL Final    Absolute Lymphocytes 07/17/2023 1.7  0.8 - 5.3 10e3/uL Final    Absolute Monocytes 07/17/2023 0.7  0.0 - 1.3 10e3/uL Final    Absolute Eosinophils 07/17/2023 0.1  0.0 - 0.7 10e3/uL Final    Absolute Basophils 07/17/2023 0.0  0.0 - 0.2 10e3/uL Final    Absolute Immature Granulocytes 07/17/2023 0.0  <=0.4 10e3/uL Final    Absolute NRBCs 07/17/2023 0.0  10e3/uL Final   I personally reviewed the above labs.    Imaging Studies:  CT Chest/Abdomen/Pelvis w Contrast  Narrative: EXAM: CT CHEST/ABDOMEN/PELVIS W CONTRAST  LOCATION: [facility]  DATE: 7/17/2023    INDICATION: f u of resected vulvar melanoma. History of pancreatic neuroendocrine tumor  COMPARISON: Multiple priors, most recently CT chest abdomen pelvis 01/18/2023  TECHNIQUE: CT scan of the chest, abdomen, and pelvis was performed following injection of IV contrast. Multiplanar reformats were obtained. Dose reduction techniques were used.    CONTRAST: 125ml Isovue 370    FINDINGS:   LUNGS AND PLEURA: Scattered pulmonary nodules measuring 4 mm or less such as in the right lower lobe (9/149) and right middle lobe (14/207) are not significantly changed. No new or growing nodule.    No acute airspace opacities. Left lung base atelectasis. No pleural effusion.    MEDIASTINUM/AXILLAE: Right chest wall pacemaker. Normal heart size. No pericardial effusion. Normal caliber thoracic aorta. Stable 8 mm short axis right lower paratracheal lymph node. No new or significantly enlarging thoracic lymph nodes. Postoperative   changes left breast and left axilla.    CORONARY ARTERY CALCIFICATION: Moderate.    HEPATOBILIARY: Faint 1.4 cm arterially enhancing focus in the subcapsular aspect of segment 6/7 (5/118). This is not well seen on the portal venous phase. It was not visualized on the single phase CT from 01/18/2023 or the biphase CT from 09/12/2022.   However, its appearance favors a benign vascular lesion, potentially a perfusion anomaly. Otherwise, no discrete liver lesion.    Cholecystectomy and pneumobilia.    PANCREAS: Status post Whipple. Residual pancreas is normal. No suspicious mass. No peripancreatic inflammation. No duct dilation.    SPLEEN: Normal.    ADRENAL GLANDS: Normal.    KIDNEYS/BLADDER: No significant mass, stone, or hydronephrosis.    BOWEL: Postoperative changes in the bowel related to the Whipple procedure. No bowel obstruction. Negative appendix. Colonic diverticulosis without diverticulitis.    LYMPH NODES: No significant change in prominent abdominopelvic lymph nodes. For example 1.4 cm saurav hepatis, 0.6 cm left para-aortic, and 0.9 cm left external iliac. No new or significantly enlarging lymph nodes.    VASCULATURE: Normal caliber abdominal aorta. Scattered atheromatous disease.    PELVIC ORGANS: No adnexal mass. No ascites.    MUSCULOSKELETAL: Skeletal degenerative changes. No significant osseous abnormality.  Impression:  IMPRESSION:  1.  Faint arterially enhancing lesion in the posterior right hepatic lobe. This was not definitely seen on comparison exams. However its appearance favors a benign vascular lesion or perfusion anomaly. Consider short interval follow-up CT versus liver   protocol MRI.    2.  Otherwise, no significant interval change. Stable small pulmonary nodules.    3.  Abdominopelvic lymph nodes are not significantly changed.  I personally reviewed the above imaging.      Assessment and Plan    Vulvar Melanoma, resected stage III  It was a pleasure to meet Mrs.Bonnie Trent today. She has a history of resected stage IIIC vulvar melanoma. She received 1 year of adjuvant therapy through August 2020. She is doing well off of adjuvant therapy and is without evidence of recurrence or metastasis.    She had restaging CT-CAP on 7/20/23 which noted an enhancing liver lesion in the posterior right hepatic lobe hat was felt to be benign by the radiologist. Given that the pt is 4 years out from her resection, lower likelihood she has a true liver metastasis. However, through shared decision making with the patient, she agreed to having a MRI abd performed to further elucidate her liver lesion. This scan is planned to be performed in a week or two  - She can f/u in 2 weeks for the MRI liver   - Repeat CT CAP in 6 months have also been ordered in 6 months     Anemia,   Hemoglobin now within normal range.  Ferritin low-normal. She remains on oral iron. Will continue to monitor.    James Casas DO   Hematology/Oncology/BMT Fellow PGY4  Pager: [phone]    Physician Attestation   I, Elton Perez MD, saw this patient and agree with the findings and plan of care as documented in the note.      Items personally reviewed/procedural attestation: labs, imaging and agree with the interpretation documented in the note, as well as history, physical examination, and assessment and plan in the documented  note.    Elton Maria  MD Ana

## 2023-08-23 ENCOUNTER — ANCILLARY PROCEDURE (OUTPATIENT)
Dept: CARDIOLOGY | Facility: CLINIC | Age: 73
End: 2023-08-23
Attending: INTERNAL MEDICINE
Payer: COMMERCIAL

## 2023-08-23 ENCOUNTER — HOSPITAL ENCOUNTER (OUTPATIENT)
Dept: MRI IMAGING | Facility: CLINIC | Age: 73
Discharge: HOME | End: 2023-08-23
Attending: INTERNAL MEDICINE
Payer: COMMERCIAL

## 2023-08-23 ENCOUNTER — HOSPITAL ENCOUNTER (OUTPATIENT)
Dept: GENERAL RADIOLOGY | Facility: CLINIC | Age: 73
Discharge: HOME | End: 2023-08-23
Attending: INTERNAL MEDICINE
Payer: COMMERCIAL

## 2023-08-23 VITALS — RESPIRATION RATE: 16 BRPM | OXYGEN SATURATION: 97 % | HEART RATE: 74 BPM

## 2023-08-23 DIAGNOSIS — I49.5 SINUS NODE DYSFUNCTION (H): ICD-10-CM

## 2023-08-23 DIAGNOSIS — I49.5 SINUS NODE DYSFUNCTION (H): Primary | ICD-10-CM

## 2023-08-23 DIAGNOSIS — K76.9 LIVER LESION, RIGHT LOBE: ICD-10-CM

## 2023-08-23 DIAGNOSIS — Z45.02 ENCOUNTER FOR ADJUSTMENT AND MANAGEMENT OF AUTOMATIC IMPLANTABLE CARDIAC DEFIBRILLATOR: ICD-10-CM

## 2023-08-23 DIAGNOSIS — C51.9 MALIGNANT MELANOMA OF SKIN OF VULVA (H): ICD-10-CM

## 2023-08-23 LAB
MDC_IDC_EPISODE_DTM: NORMAL
MDC_IDC_EPISODE_DURATION: 1016 S
MDC_IDC_EPISODE_DURATION: 1018 S
MDC_IDC_EPISODE_DURATION: 1022 S
MDC_IDC_EPISODE_DURATION: 104 S
MDC_IDC_EPISODE_DURATION: 1052 S
MDC_IDC_EPISODE_DURATION: 107 S
MDC_IDC_EPISODE_DURATION: 1073 S
MDC_IDC_EPISODE_DURATION: 1130 S
MDC_IDC_EPISODE_DURATION: 119 S
MDC_IDC_EPISODE_DURATION: 136 S
MDC_IDC_EPISODE_DURATION: 1427 S
MDC_IDC_EPISODE_DURATION: 1550 S
MDC_IDC_EPISODE_DURATION: 1739 S
MDC_IDC_EPISODE_DURATION: 1810 S
MDC_IDC_EPISODE_DURATION: 182 S
MDC_IDC_EPISODE_DURATION: 207 S
MDC_IDC_EPISODE_DURATION: 22 S
MDC_IDC_EPISODE_DURATION: 2212 S
MDC_IDC_EPISODE_DURATION: 228 S
MDC_IDC_EPISODE_DURATION: 232 S
MDC_IDC_EPISODE_DURATION: 253 S
MDC_IDC_EPISODE_DURATION: 27 S
MDC_IDC_EPISODE_DURATION: 278 S
MDC_IDC_EPISODE_DURATION: 280 S
MDC_IDC_EPISODE_DURATION: 300 S
MDC_IDC_EPISODE_DURATION: 301 S
MDC_IDC_EPISODE_DURATION: 3042 S
MDC_IDC_EPISODE_DURATION: 31 S
MDC_IDC_EPISODE_DURATION: 316 S
MDC_IDC_EPISODE_DURATION: 32 S
MDC_IDC_EPISODE_DURATION: 39 S
MDC_IDC_EPISODE_DURATION: 39 S
MDC_IDC_EPISODE_DURATION: 407 S
MDC_IDC_EPISODE_DURATION: 4257 S
MDC_IDC_EPISODE_DURATION: 51 S
MDC_IDC_EPISODE_DURATION: 529 S
MDC_IDC_EPISODE_DURATION: 53 S
MDC_IDC_EPISODE_DURATION: 539 S
MDC_IDC_EPISODE_DURATION: 5518 S
MDC_IDC_EPISODE_DURATION: 58 S
MDC_IDC_EPISODE_DURATION: 71 S
MDC_IDC_EPISODE_DURATION: 717 S
MDC_IDC_EPISODE_DURATION: 7714 S
MDC_IDC_EPISODE_DURATION: 83 S
MDC_IDC_EPISODE_DURATION: 840 S
MDC_IDC_EPISODE_DURATION: 901 S
MDC_IDC_EPISODE_DURATION: 957 S
MDC_IDC_EPISODE_DURATION: 97 S
MDC_IDC_EPISODE_DURATION: 99 S
MDC_IDC_EPISODE_DURATION: NORMAL S
MDC_IDC_EPISODE_ID: 151
MDC_IDC_EPISODE_ID: 152
MDC_IDC_EPISODE_ID: 153
MDC_IDC_EPISODE_ID: 154
MDC_IDC_EPISODE_ID: 155
MDC_IDC_EPISODE_ID: 156
MDC_IDC_EPISODE_ID: 157
MDC_IDC_EPISODE_ID: 158
MDC_IDC_EPISODE_ID: 159
MDC_IDC_EPISODE_ID: 160
MDC_IDC_EPISODE_ID: 161
MDC_IDC_EPISODE_ID: 162
MDC_IDC_EPISODE_ID: 163
MDC_IDC_EPISODE_ID: 164
MDC_IDC_EPISODE_ID: 165
MDC_IDC_EPISODE_ID: 166
MDC_IDC_EPISODE_ID: 167
MDC_IDC_EPISODE_ID: 168
MDC_IDC_EPISODE_ID: 169
MDC_IDC_EPISODE_ID: 170
MDC_IDC_EPISODE_ID: 171
MDC_IDC_EPISODE_ID: 172
MDC_IDC_EPISODE_ID: 173
MDC_IDC_EPISODE_ID: 174
MDC_IDC_EPISODE_ID: 175
MDC_IDC_EPISODE_ID: 176
MDC_IDC_EPISODE_ID: 177
MDC_IDC_EPISODE_ID: 178
MDC_IDC_EPISODE_ID: 179
MDC_IDC_EPISODE_ID: 180
MDC_IDC_EPISODE_ID: 181
MDC_IDC_EPISODE_ID: 182
MDC_IDC_EPISODE_ID: 183
MDC_IDC_EPISODE_ID: 184
MDC_IDC_EPISODE_ID: 185
MDC_IDC_EPISODE_ID: 186
MDC_IDC_EPISODE_ID: 187
MDC_IDC_EPISODE_ID: 188
MDC_IDC_EPISODE_ID: 189
MDC_IDC_EPISODE_ID: 190
MDC_IDC_EPISODE_ID: 191
MDC_IDC_EPISODE_ID: 192
MDC_IDC_EPISODE_ID: 193
MDC_IDC_EPISODE_ID: 194
MDC_IDC_EPISODE_ID: 195
MDC_IDC_EPISODE_ID: 196
MDC_IDC_EPISODE_ID: 197
MDC_IDC_EPISODE_ID: 198
MDC_IDC_EPISODE_ID: 199
MDC_IDC_EPISODE_ID: 200
MDC_IDC_EPISODE_TYPE: NORMAL
MDC_IDC_LEAD_IMPLANT_DT: NORMAL
MDC_IDC_LEAD_LOCATION: NORMAL
MDC_IDC_LEAD_LOCATION_DETAIL_1: NORMAL
MDC_IDC_LEAD_MFG: NORMAL
MDC_IDC_LEAD_MODEL: NORMAL
MDC_IDC_LEAD_POLARITY_TYPE: NORMAL
MDC_IDC_LEAD_SERIAL: NORMAL
MDC_IDC_MSMT_BATTERY_DTM: NORMAL
MDC_IDC_MSMT_BATTERY_DTM: NORMAL
MDC_IDC_MSMT_BATTERY_REMAINING_LONGEVITY: 126 MO
MDC_IDC_MSMT_BATTERY_REMAINING_LONGEVITY: 127 MO
MDC_IDC_MSMT_BATTERY_RRT_TRIGGER: 2.62
MDC_IDC_MSMT_BATTERY_RRT_TRIGGER: 2.62
MDC_IDC_MSMT_BATTERY_STATUS: NORMAL
MDC_IDC_MSMT_BATTERY_STATUS: NORMAL
MDC_IDC_MSMT_BATTERY_VOLTAGE: 3.01 V
MDC_IDC_MSMT_BATTERY_VOLTAGE: 3.01 V
MDC_IDC_MSMT_LEADCHNL_RA_IMPEDANCE_VALUE: 342 OHM
MDC_IDC_MSMT_LEADCHNL_RA_IMPEDANCE_VALUE: 361 OHM
MDC_IDC_MSMT_LEADCHNL_RA_IMPEDANCE_VALUE: 418 OHM
MDC_IDC_MSMT_LEADCHNL_RA_IMPEDANCE_VALUE: 437 OHM
MDC_IDC_MSMT_LEADCHNL_RA_PACING_THRESHOLD_AMPLITUDE: 0.75 V
MDC_IDC_MSMT_LEADCHNL_RA_PACING_THRESHOLD_AMPLITUDE: 0.75 V
MDC_IDC_MSMT_LEADCHNL_RA_PACING_THRESHOLD_PULSEWIDTH: 0.4 MS
MDC_IDC_MSMT_LEADCHNL_RA_PACING_THRESHOLD_PULSEWIDTH: 0.4 MS
MDC_IDC_MSMT_LEADCHNL_RA_SENSING_INTR_AMPL: 1.62 MV
MDC_IDC_MSMT_LEADCHNL_RA_SENSING_INTR_AMPL: 1.75 MV
MDC_IDC_MSMT_LEADCHNL_RA_SENSING_INTR_AMPL: 1.75 MV
MDC_IDC_MSMT_LEADCHNL_RA_SENSING_INTR_AMPL: 2 MV
MDC_IDC_MSMT_LEADCHNL_RV_IMPEDANCE_VALUE: 494 OHM
MDC_IDC_MSMT_LEADCHNL_RV_IMPEDANCE_VALUE: 513 OHM
MDC_IDC_MSMT_LEADCHNL_RV_IMPEDANCE_VALUE: 570 OHM
MDC_IDC_MSMT_LEADCHNL_RV_IMPEDANCE_VALUE: 589 OHM
MDC_IDC_MSMT_LEADCHNL_RV_PACING_THRESHOLD_AMPLITUDE: 1 V
MDC_IDC_MSMT_LEADCHNL_RV_PACING_THRESHOLD_AMPLITUDE: 1 V
MDC_IDC_MSMT_LEADCHNL_RV_PACING_THRESHOLD_PULSEWIDTH: 0.4 MS
MDC_IDC_MSMT_LEADCHNL_RV_PACING_THRESHOLD_PULSEWIDTH: 0.4 MS
MDC_IDC_MSMT_LEADCHNL_RV_SENSING_INTR_AMPL: 10.88 MV
MDC_IDC_MSMT_LEADCHNL_RV_SENSING_INTR_AMPL: 10.88 MV
MDC_IDC_MSMT_LEADCHNL_RV_SENSING_INTR_AMPL: 13.12 MV
MDC_IDC_MSMT_LEADCHNL_RV_SENSING_INTR_AMPL: 14.12 MV
MDC_IDC_PG_IMPLANT_DTM: NORMAL
MDC_IDC_PG_IMPLANT_DTM: NORMAL
MDC_IDC_PG_MFG: NORMAL
MDC_IDC_PG_MFG: NORMAL
MDC_IDC_PG_MODEL: NORMAL
MDC_IDC_PG_MODEL: NORMAL
MDC_IDC_PG_SERIAL: NORMAL
MDC_IDC_PG_SERIAL: NORMAL
MDC_IDC_PG_TYPE: NORMAL
MDC_IDC_PG_TYPE: NORMAL
MDC_IDC_SESS_CLINIC_NAME: NORMAL
MDC_IDC_SESS_CLINIC_NAME: NORMAL
MDC_IDC_SESS_DTM: NORMAL
MDC_IDC_SESS_DTM: NORMAL
MDC_IDC_SESS_TYPE: NORMAL
MDC_IDC_SESS_TYPE: NORMAL
MDC_IDC_SET_BRADY_AT_MODE_SWITCH_RATE: 171 {BEATS}/MIN
MDC_IDC_SET_BRADY_AT_MODE_SWITCH_RATE: 171 {BEATS}/MIN
MDC_IDC_SET_BRADY_HYSTRATE: NORMAL
MDC_IDC_SET_BRADY_HYSTRATE: NORMAL
MDC_IDC_SET_BRADY_LOWRATE: 60 {BEATS}/MIN
MDC_IDC_SET_BRADY_LOWRATE: 60 {BEATS}/MIN
MDC_IDC_SET_BRADY_MAX_SENSOR_RATE: 130 {BEATS}/MIN
MDC_IDC_SET_BRADY_MAX_SENSOR_RATE: 130 {BEATS}/MIN
MDC_IDC_SET_BRADY_MAX_TRACKING_RATE: 130 {BEATS}/MIN
MDC_IDC_SET_BRADY_MAX_TRACKING_RATE: 130 {BEATS}/MIN
MDC_IDC_SET_BRADY_MODE: NORMAL
MDC_IDC_SET_BRADY_MODE: NORMAL
MDC_IDC_SET_BRADY_PAV_DELAY_LOW: 180 MS
MDC_IDC_SET_BRADY_PAV_DELAY_LOW: 180 MS
MDC_IDC_SET_BRADY_SAV_DELAY_LOW: 150 MS
MDC_IDC_SET_BRADY_SAV_DELAY_LOW: 150 MS
MDC_IDC_SET_LEADCHNL_RA_PACING_AMPLITUDE: 1.75 V
MDC_IDC_SET_LEADCHNL_RA_PACING_AMPLITUDE: 1.75 V
MDC_IDC_SET_LEADCHNL_RA_PACING_ANODE_ELECTRODE_1: NORMAL
MDC_IDC_SET_LEADCHNL_RA_PACING_ANODE_ELECTRODE_1: NORMAL
MDC_IDC_SET_LEADCHNL_RA_PACING_ANODE_LOCATION_1: NORMAL
MDC_IDC_SET_LEADCHNL_RA_PACING_ANODE_LOCATION_1: NORMAL
MDC_IDC_SET_LEADCHNL_RA_PACING_CAPTURE_MODE: NORMAL
MDC_IDC_SET_LEADCHNL_RA_PACING_CAPTURE_MODE: NORMAL
MDC_IDC_SET_LEADCHNL_RA_PACING_CATHODE_ELECTRODE_1: NORMAL
MDC_IDC_SET_LEADCHNL_RA_PACING_CATHODE_ELECTRODE_1: NORMAL
MDC_IDC_SET_LEADCHNL_RA_PACING_CATHODE_LOCATION_1: NORMAL
MDC_IDC_SET_LEADCHNL_RA_PACING_CATHODE_LOCATION_1: NORMAL
MDC_IDC_SET_LEADCHNL_RA_PACING_POLARITY: NORMAL
MDC_IDC_SET_LEADCHNL_RA_PACING_POLARITY: NORMAL
MDC_IDC_SET_LEADCHNL_RA_PACING_PULSEWIDTH: 0.4 MS
MDC_IDC_SET_LEADCHNL_RA_PACING_PULSEWIDTH: 0.4 MS
MDC_IDC_SET_LEADCHNL_RA_SENSING_ANODE_ELECTRODE_1: NORMAL
MDC_IDC_SET_LEADCHNL_RA_SENSING_ANODE_ELECTRODE_1: NORMAL
MDC_IDC_SET_LEADCHNL_RA_SENSING_ANODE_LOCATION_1: NORMAL
MDC_IDC_SET_LEADCHNL_RA_SENSING_ANODE_LOCATION_1: NORMAL
MDC_IDC_SET_LEADCHNL_RA_SENSING_CATHODE_ELECTRODE_1: NORMAL
MDC_IDC_SET_LEADCHNL_RA_SENSING_CATHODE_ELECTRODE_1: NORMAL
MDC_IDC_SET_LEADCHNL_RA_SENSING_CATHODE_LOCATION_1: NORMAL
MDC_IDC_SET_LEADCHNL_RA_SENSING_CATHODE_LOCATION_1: NORMAL
MDC_IDC_SET_LEADCHNL_RA_SENSING_POLARITY: NORMAL
MDC_IDC_SET_LEADCHNL_RA_SENSING_POLARITY: NORMAL
MDC_IDC_SET_LEADCHNL_RA_SENSING_SENSITIVITY: 0.3 MV
MDC_IDC_SET_LEADCHNL_RA_SENSING_SENSITIVITY: 0.3 MV
MDC_IDC_SET_LEADCHNL_RV_PACING_AMPLITUDE: 2 V
MDC_IDC_SET_LEADCHNL_RV_PACING_AMPLITUDE: 2 V
MDC_IDC_SET_LEADCHNL_RV_PACING_ANODE_ELECTRODE_1: NORMAL
MDC_IDC_SET_LEADCHNL_RV_PACING_ANODE_ELECTRODE_1: NORMAL
MDC_IDC_SET_LEADCHNL_RV_PACING_ANODE_LOCATION_1: NORMAL
MDC_IDC_SET_LEADCHNL_RV_PACING_ANODE_LOCATION_1: NORMAL
MDC_IDC_SET_LEADCHNL_RV_PACING_CAPTURE_MODE: NORMAL
MDC_IDC_SET_LEADCHNL_RV_PACING_CAPTURE_MODE: NORMAL
MDC_IDC_SET_LEADCHNL_RV_PACING_CATHODE_ELECTRODE_1: NORMAL
MDC_IDC_SET_LEADCHNL_RV_PACING_CATHODE_ELECTRODE_1: NORMAL
MDC_IDC_SET_LEADCHNL_RV_PACING_CATHODE_LOCATION_1: NORMAL
MDC_IDC_SET_LEADCHNL_RV_PACING_CATHODE_LOCATION_1: NORMAL
MDC_IDC_SET_LEADCHNL_RV_PACING_POLARITY: NORMAL
MDC_IDC_SET_LEADCHNL_RV_PACING_POLARITY: NORMAL
MDC_IDC_SET_LEADCHNL_RV_PACING_PULSEWIDTH: 0.4 MS
MDC_IDC_SET_LEADCHNL_RV_PACING_PULSEWIDTH: 0.4 MS
MDC_IDC_SET_LEADCHNL_RV_SENSING_ANODE_ELECTRODE_1: NORMAL
MDC_IDC_SET_LEADCHNL_RV_SENSING_ANODE_ELECTRODE_1: NORMAL
MDC_IDC_SET_LEADCHNL_RV_SENSING_ANODE_LOCATION_1: NORMAL
MDC_IDC_SET_LEADCHNL_RV_SENSING_ANODE_LOCATION_1: NORMAL
MDC_IDC_SET_LEADCHNL_RV_SENSING_CATHODE_ELECTRODE_1: NORMAL
MDC_IDC_SET_LEADCHNL_RV_SENSING_CATHODE_ELECTRODE_1: NORMAL
MDC_IDC_SET_LEADCHNL_RV_SENSING_CATHODE_LOCATION_1: NORMAL
MDC_IDC_SET_LEADCHNL_RV_SENSING_CATHODE_LOCATION_1: NORMAL
MDC_IDC_SET_LEADCHNL_RV_SENSING_POLARITY: NORMAL
MDC_IDC_SET_LEADCHNL_RV_SENSING_POLARITY: NORMAL
MDC_IDC_SET_LEADCHNL_RV_SENSING_SENSITIVITY: 0.9 MV
MDC_IDC_SET_LEADCHNL_RV_SENSING_SENSITIVITY: 0.9 MV
MDC_IDC_SET_ZONE_DETECTION_INTERVAL: 350 MS
MDC_IDC_SET_ZONE_DETECTION_INTERVAL: 350 MS
MDC_IDC_SET_ZONE_DETECTION_INTERVAL: 400 MS
MDC_IDC_SET_ZONE_DETECTION_INTERVAL: 400 MS
MDC_IDC_SET_ZONE_TYPE: NORMAL
MDC_IDC_STAT_AT_BURDEN_PERCENT: 0 %
MDC_IDC_STAT_AT_BURDEN_PERCENT: 0 %
MDC_IDC_STAT_AT_DTM_END: NORMAL
MDC_IDC_STAT_AT_DTM_END: NORMAL
MDC_IDC_STAT_AT_DTM_START: NORMAL
MDC_IDC_STAT_AT_DTM_START: NORMAL
MDC_IDC_STAT_BRADY_AP_VP_PERCENT: 0.07 %
MDC_IDC_STAT_BRADY_AP_VP_PERCENT: 0.07 %
MDC_IDC_STAT_BRADY_AP_VS_PERCENT: 99.67 %
MDC_IDC_STAT_BRADY_AP_VS_PERCENT: 99.67 %
MDC_IDC_STAT_BRADY_AS_VP_PERCENT: 0 %
MDC_IDC_STAT_BRADY_AS_VP_PERCENT: 0 %
MDC_IDC_STAT_BRADY_AS_VS_PERCENT: 0.26 %
MDC_IDC_STAT_BRADY_AS_VS_PERCENT: 0.26 %
MDC_IDC_STAT_BRADY_DTM_END: NORMAL
MDC_IDC_STAT_BRADY_DTM_END: NORMAL
MDC_IDC_STAT_BRADY_DTM_START: NORMAL
MDC_IDC_STAT_BRADY_DTM_START: NORMAL
MDC_IDC_STAT_BRADY_RA_PERCENT_PACED: 99.49 %
MDC_IDC_STAT_BRADY_RA_PERCENT_PACED: 99.49 %
MDC_IDC_STAT_BRADY_RV_PERCENT_PACED: 0.1 %
MDC_IDC_STAT_BRADY_RV_PERCENT_PACED: 0.1 %
MDC_IDC_STAT_EPISODE_RECENT_COUNT: 0
MDC_IDC_STAT_EPISODE_RECENT_COUNT_DTM_END: NORMAL
MDC_IDC_STAT_EPISODE_RECENT_COUNT_DTM_START: NORMAL
MDC_IDC_STAT_EPISODE_TOTAL_COUNT: 0
MDC_IDC_STAT_EPISODE_TOTAL_COUNT: 199
MDC_IDC_STAT_EPISODE_TOTAL_COUNT: 199
MDC_IDC_STAT_EPISODE_TOTAL_COUNT_DTM_END: NORMAL
MDC_IDC_STAT_EPISODE_TOTAL_COUNT_DTM_START: NORMAL
MDC_IDC_STAT_EPISODE_TYPE: NORMAL

## 2023-08-23 PROCEDURE — 71046 X-RAY EXAM CHEST 2 VIEWS: CPT | Mod: 26 | Performed by: RADIOLOGY

## 2023-08-23 PROCEDURE — 93286 PERI-PX EVAL PM/LDLS PM IP: CPT | Mod: 26 | Performed by: INTERNAL MEDICINE

## 2023-08-23 PROCEDURE — 74183 MRI ABD W/O CNTR FLWD CNTR: CPT | Mod: 26 | Performed by: RADIOLOGY

## 2023-08-23 PROCEDURE — 255N000002 HC RX 255 OP 636: Performed by: INTERNAL MEDICINE

## 2023-08-23 PROCEDURE — 71046 X-RAY EXAM CHEST 2 VIEWS: CPT

## 2023-08-23 PROCEDURE — A9581 GADOXETATE DISODIUM INJ: HCPCS | Performed by: INTERNAL MEDICINE

## 2023-08-23 PROCEDURE — 93286 PERI-PX EVAL PM/LDLS PM IP: CPT

## 2023-08-23 PROCEDURE — 74183 MRI ABD W/O CNTR FLWD CNTR: CPT

## 2023-08-23 RX ADMIN — GADOXETATE DISODIUM 10 ML: 181.43 INJECTION, SOLUTION INTRAVENOUS at 13:55

## 2023-08-23 NOTE — PROGRESS NOTES
Patient's device placed into MRI Surescan mode prior to scan with WENDY Elizabeth nurse. Patient monitored by RN via ECG and pulse oximetry throughout procedure.  Patient stable throughout.  [employer] Device placed back into previous mode at completion of MRI with WENDY Elizabeth nurse.

## 2023-08-28 ENCOUNTER — VIRTUAL VISIT (OUTPATIENT)
Dept: ONCOLOGY | Facility: CLINIC | Age: 73
End: 2023-08-28
Attending: PHYSICIAN ASSISTANT
Payer: COMMERCIAL

## 2023-08-28 VITALS — HEIGHT: 68 IN | BODY MASS INDEX: 36.07 KG/M2 | WEIGHT: 238 LBS

## 2023-08-28 DIAGNOSIS — C51.9 MALIGNANT MELANOMA OF SKIN OF VULVA (H): Primary | ICD-10-CM

## 2023-08-28 PROCEDURE — 99213 OFFICE O/P EST LOW 20 MIN: CPT | Mod: 95 | Performed by: PHYSICIAN ASSISTANT

## 2023-08-28 ASSESSMENT — PAIN SCALES - GENERAL: PAINLEVEL: NO PAIN (0)

## 2023-08-28 NOTE — PROGRESS NOTES
Virtual Visit Details    Type of service:  Video Visit   Video Start Time: 4:28 PM  Video End Time:4:33 PM    Originating Location (pt. Location): Home  Distant Location (provider location):  Off-site  Platform used for Video Visit: [facility] ONCOLOGY PROGRESS NOTE  Melanoma Clinic  Aug 28, 2023    Reason for Visit: Stage III vulvar melanoma, status post excision    Melanoma History:  1. 6/4/2019, she had excision of the right vulvar mass in [place], and on pathology this shows malignant melanoma with ulceration, at least fausto level IV, Breslow thickness at least 5 mm, mitotic rate is 3 per mm2, TILs are present and brisk, pathologic stage pT4b.  2. 6/28/19, she had PET-CT, which showed no obvious evidence of metastatic disease.  3. 7/30/2019, she has wide local excision and sentinel biopsy (Specimen #: [id number]), which showed residual melanoma to a depth of invasion of 1.3 mm and margins negative. Right inguinal sentinel lymph node showed a subcapsular 1 mm focus of cells. pT4b pN1a MX, Stage IIIC.  4. PET-CT obtained on 9/23 negative for metastatic disease.  5. 9/26/2019, she started adjuvant nivolumab monthly   6. PET/CT 12/2019 showing MARISA  7. CT CAP 3/2020 MARISA  8. 8/6/2020, She completes adjuvant nivolumab immunotherapy.  9. September 2020, she has Covid-19 infections, exposed at work. She has mild symptoms. Dyspnea improves markedly after change of pacemaker battery (for sinus and AV node dysfunction).    History of Present Illness:  Ilana Trent is  73 year old woman with resected stage IIIC melanoma s/p 1 year of adjuvant nivolumab, completed August 2020. She presents via video visit for restaging.    -Doing okay overall.   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 Continues to swim every morning.    Current Outpatient Medications   Medication Sig Dispense Refill    acetaminophen (TYLENOL) 500 MG tablet Take 1-2 tablets (500-1,000 mg) by mouth every 6 hours as needed for mild pain 50 tablet 0    ALOE PO Take  by mouth daily as needed      B-D ULTRA-FINE 33 LANCETS MISC 1 each by Other route      calcium carbonate (OS-RENEA) 1500 (600 Ca) MG tablet Take 600 mg by mouth daily      cetirizine-pseudoePHEDrine ER (ZYRTEC-D) 5-120 MG 12 hr tablet Take 1 tablet by mouth every morning       Ferrous Sulfate 324 (65 Fe) MG TBEC Take 1 tablet by mouth      furosemide (LASIX) 20 MG tablet Take 20 mg by mouth daily as needed (SWELLING)      levothyroxine (SYNTHROID/LEVOTHROID) 175 MCG tablet Take 175 mcg by mouth      lisinopril (ZESTRIL) 5 MG tablet Take 5 mg by mouth      metFORMIN (GLUCOPHAGE-XR) 500 MG 24 hr tablet Take 500 mg by mouth every morning       multivitamin w/minerals (THERA-VIT-M) tablet Take 1 tablet by mouth daily      naproxen (NAPROSYN) 500 MG tablet Take 500 mg by mouth every morning       ONETOUCH ULTRA test strip TEST BLOOD SUGARS TWICE DAILY      pantoprazole (PROTONIX) 40 MG EC tablet Take 40 mg by mouth every morning       simvastatin (ZOCOR) 10 MG tablet Take 10 mg by mouth At Bedtime      sotalol (BETAPACE) 160 MG tablet Take 80 mg by mouth 2 times daily       vitamin B complex with vitamin C (STRESS TAB) tablet Take 1 tablet by mouth every morning       warfarin ANTICOAGULANT (COUMADIN) 5 MG tablet Take 1 & 1/2 tablet by mouth once daily for 6 days of the week and 1 tablet by mouth on 1 day per week.       Objective:  General: patient appears well in no acute distress, alert and oriented, speech clear and fluid  Skin: no visualized rash or lesions on visualized skin  Resp: Appears to be breathing comfortably without accessory muscle usage, speaking in full sentences, no audible wheezes or cough.  Psych: Coherent speech, normal rate and volume, able to articulate logical thoughts, able to abstract reason, no tangential thoughts, no hallucinations or delusions  Patient's affect is appropriate.    Laboratory Studies:  Most Recent 3 CBC's:  Recent Labs   Lab Test 07/17/23  0716 01/18/23  0723 09/12/22  0722    WBC 8.0 9.5 7.8   HGB 11.9 11.4* 12.2   MCV 88 88 90    341 290   ANEUTAUTO 5.3 5.3 4.6     Most Recent 3 BMP's:  Recent Labs   Lab Test 07/17/23  0716 01/18/23  0754 01/18/23  0723 09/12/22  0722     --  140 141   POTASSIUM 5.0  --  5.3 5.4*   CHLORIDE 106  --  107 113*   CO2 23  --  26 21   BUN 17.5  --  23 20   CR 0.94 1.0 0.78 0.93   ANIONGAP 11  --  7 7   RENEA 9.4  --  9.0 9.4   *  --  161* 137*   PROTTOTAL 7.0  --  7.3 7.4   ALBUMIN 4.3  --  3.2* 3.7    Most Recent 3 LFT's:  Recent Labs   Lab Test 07/17/23  0716 01/18/23  0723 09/12/22  0722   AST 17 28 18   ALT 15 49 35   ALKPHOS 80 117 81   BILITOTAL 0.5 0.3 0.3   I reviewed the above labs today.    Imaging Studies:  Liver MRI on 8/23/23 shows the following:     Liver: Normal hepatic morphology. Diffuse signal loss on out of phase  imaging, suggestive of fatty deposition. No significant iron  deposition. No suspicious liver lesions.     Gallbladder/Bile Ducts: Cholecystectomy. Normal bile ducts.     Spleen: Normal. Small splenule left upper quadrant.     Pancreas: Postsurgical changes of Whipple procedure. There is gastric  drainage of the body and tail of the pancreas. Remaining pancreas  demonstrates fatty infiltration. No pancreatic ductal dilatation.     Adrenal glands: Normal     Kidneys: Normal.     Bowel: Postsurgical changes of hepaticojejunostomy. No evidence of  bowel obstruction. Scattered colonic diverticuli.     Lymph nodes: No suspicious lymphadenopathy.      Blood vessels: Patent vasculature. No abdominal aortic aneurysm.     Lung bases: The heart is at the upper limits of normal for size. Small  hiatal hernia. Lung bases are clear.     Bones and soft tissues: No acute osseous abnormality.      Mesentery and abdominal wall: Post surgical changes of the anterior  abdominal wall.     Ascites: None                                                               IMPRESSION:   1. No focal hepatic mass or other findings to suggest  metastatic  disease. The focus of hepatic arterial enhancement on prior CT  7/17/2023 likely represents a perfusion abnormality.     2. Hepatic steatosis.    Chest x-ray on 8/23/23 shows the following:  EXAM: XR CHEST 2 VIEWS 8/23/2023 12:42 PM     DEMOGRAPHICS: 73 years Female     INDICATION: Malignant melanoma of skin of vulva (H); Liver lesion,  right lobe     COMPARISON: CT 7/17/2023     TECHNIQUE: Upright PA and lateral views of the chest.     FINDINGS:   Right chest wall pacemaker with leads in the right atrium and right  ventricle. Surgical clips in the left axilla. Heart size is within  normal limits. The trachea is midline. Left basilar streaky opacities.  The remaining lung fields are clear. Bilateral costophrenic angles are  sharp with no blunting. No discernible pneumothorax. Degenerative  changes in the visualized spine.                                                                   IMPRESSION:   Left basilar atelectasis with no focal consolidations. Stable right  pacemaker.  I reviewed the above imaging today.    ASSESSMENT AND PLAN:  Vulvar Melanoma, resected stage III  It was a pleasure to meet Mrs.Bonnie Trent today. She has a history of resected stage IIIC vulvar melanoma. She received 1 year of adjuvant therapy through August 2020. She is doing well off of adjuvant therapy and is without evidence of recurrence or metastasis on her imaging, as above, which was reviewed with the patient today. The questionable liver lesion on CT does not appear concerning on the abdominal MRI. She will follow-up with us in January with repeat imaging and labs. She will call sooner for concerns.     Health care maintenance.  Eye exams: Recommend exams at least every 2 years. Up to date, last about 6 weeks ago  Dental exams: Recommend exams and cleanings every 6 months. Up to date  Primary care: Recommend follow up at least annually. Up to date  Skin care: Recommend the use of sunscreen with SPF of at least 30,  reapplying every 2 hours with prolonged sun exposure.   Tobacco use: Recommend abstaining. Denies use.  Alcohol use: Recommend no more than 1/day for women. Has about 2-3 beers/month.  Physical activity: Recommend regular activity, ideally 150 minutes/week of moderate intensity activity.  Will continue with swimming for 30-35 minutes/day 5 days/week. Also, rides stationary bike for 15 minutes most days.     Xochilt Lee PA-C  [facility]  [address], [place] [address] [phone]    15 minutes spent on the date of the encounter doing chart review, review of test results, interpretation of tests, patient visit and documentation

## 2023-08-28 NOTE — NURSING NOTE
Patient denies any changes since echeck-in regarding medication and allergies and states all information entered during echeck-in remains accurate.    Is the patient currently in the state of [place]? YES    Visit mode:VIDEO    If the visit is dropped, the patient can be reconnected by: VIDEO VISIT: Send to e-mail at: [email or web address]    Will anyone else be joining the visit? NO  (If patient encounters technical issues they should call [phone]    How would you like to obtain your AVS? MyChart    Are changes needed to the allergy or medication list? No, Pt stated no changes to allergies, and Pt stated no med changes    Reason for visit: No chief complaint on file.    Dena Sanchez, Visit Facilitator/MA.

## 2023-08-28 NOTE — LETTER
8/28/2023         RE: Ilana Trent  [address] [facility] [address]        Dear Colleague,    Thank you for referring your patient, Ilana Trent, to the [facility]. Please see a copy of my visit note below.    Virtual Visit Details    Type of service:  Video Visit   Video Start Time: 4:28 PM  Video End Time:4:33 PM    Originating Location (pt. Location): Home  Distant Location (provider location):  Off-site  Platform used for Video Visit: [facility] ONCOLOGY PROGRESS NOTE  Melanoma Clinic  Aug 28, 2023    Reason for Visit: Stage III vulvar melanoma, status post excision    Melanoma History:  1. 6/4/2019, she had excision of the right vulvar mass in [place], and on pathology this shows malignant melanoma with ulceration, at least fausto level IV, Breslow thickness at least 5 mm, mitotic rate is 3 per mm2, TILs are present and brisk, pathologic stage pT4b.  2. 6/28/19, she had PET-CT, which showed no obvious evidence of metastatic disease.  3. 7/30/2019, she has wide local excision and sentinel biopsy (Specimen #: [id number]), which showed residual melanoma to a depth of invasion of 1.3 mm and margins negative. Right inguinal sentinel lymph node showed a subcapsular 1 mm focus of cells. pT4b pN1a MX, Stage IIIC.  4. PET-CT obtained on 9/23 negative for metastatic disease.  5. 9/26/2019, she started adjuvant nivolumab monthly   6. PET/CT 12/2019 showing MARISA  7. CT CAP 3/2020 MARISA  8. 8/6/2020, She completes adjuvant nivolumab immunotherapy.  9. September 2020, she has Covid-19 infections, exposed at work. She has mild symptoms. Dyspnea improves markedly after change of pacemaker battery (for sinus and AV node dysfunction).    History of Present Illness:  Ilana Trent is  73 year old woman with resected stage IIIC melanoma s/p 1 year of adjuvant nivolumab, completed August 2020. She presents via video visit for restaging.    -Doing okay overall.   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  Continues to swim every morning.        Current Outpatient Medications   Medication Sig Dispense Refill    acetaminophen (TYLENOL) 500 MG tablet Take 1-2 tablets (500-1,000 mg) by mouth every 6 hours as needed for mild pain 50 tablet 0    ALOE PO Take by mouth daily as needed      B-D ULTRA-FINE 33 LANCETS MISC 1 each by Other route      calcium carbonate (OS-RENEA) 1500 (600 Ca) MG tablet Take 600 mg by mouth daily      cetirizine-pseudoePHEDrine ER (ZYRTEC-D) 5-120 MG 12 hr tablet Take 1 tablet by mouth every morning       Ferrous Sulfate 324 (65 Fe) MG TBEC Take 1 tablet by mouth      furosemide (LASIX) 20 MG tablet Take 20 mg by mouth daily as needed (SWELLING)      levothyroxine (SYNTHROID/LEVOTHROID) 175 MCG tablet Take 175 mcg by mouth      lisinopril (ZESTRIL) 5 MG tablet Take 5 mg by mouth      metFORMIN (GLUCOPHAGE-XR) 500 MG 24 hr tablet Take 500 mg by mouth every morning       multivitamin w/minerals (THERA-VIT-M) tablet Take 1 tablet by mouth daily      naproxen (NAPROSYN) 500 MG tablet Take 500 mg by mouth every morning       ONETOUCH ULTRA test strip TEST BLOOD SUGARS TWICE DAILY      pantoprazole (PROTONIX) 40 MG EC tablet Take 40 mg by mouth every morning       simvastatin (ZOCOR) 10 MG tablet Take 10 mg by mouth At Bedtime      sotalol (BETAPACE) 160 MG tablet Take 80 mg by mouth 2 times daily       vitamin B complex with vitamin C (STRESS TAB) tablet Take 1 tablet by mouth every morning       warfarin ANTICOAGULANT (COUMADIN) 5 MG tablet Take 1 & 1/2 tablet by mouth once daily for 6 days of the week and 1 tablet by mouth on 1 day per week.       Objective:  General: patient appears well in no acute distress, alert and oriented, speech clear and fluid  Skin: no visualized rash or lesions on visualized skin  Resp: Appears to be breathing comfortably without accessory muscle usage, speaking in full sentences, no audible wheezes or cough.  Psych: Coherent speech, normal rate and volume, able to  articulate logical thoughts, able to abstract reason, no tangential thoughts, no hallucinations or delusions  Patient's affect is appropriate.    Laboratory Studies:  Most Recent 3 CBC's:  Recent Labs   Lab Test 07/17/23  0716 01/18/23  0723 09/12/22  0722   WBC 8.0 9.5 7.8   HGB 11.9 11.4* 12.2   MCV 88 88 90    341 290   ANEUTAUTO 5.3 5.3 4.6     Most Recent 3 BMP's:  Recent Labs   Lab Test 07/17/23  0716 01/18/23  0754 01/18/23 0723 09/12/22  0722     --  140 141   POTASSIUM 5.0  --  5.3 5.4*   CHLORIDE 106  --  107 113*   CO2 23  --  26 21   BUN 17.5  --  23 20   CR 0.94 1.0 0.78 0.93   ANIONGAP 11  --  7 7   RENEA 9.4  --  9.0 9.4   *  --  161* 137*   PROTTOTAL 7.0  --  7.3 7.4   ALBUMIN 4.3  --  3.2* 3.7    Most Recent 3 LFT's:  Recent Labs   Lab Test 07/17/23  0716 01/18/23 0723 09/12/22  0722   AST 17 28 18   ALT 15 49 35   ALKPHOS 80 117 81   BILITOTAL 0.5 0.3 0.3      05/09/22 07:41 09/12/22 07:22 01/18/23 07:23   Ferritin 15 14 67      I reviewed the above labs today.    Imaging Studies:  MR Abdomen w/o & w Contrast  Narrative: Exam: MR ABDOMEN W/O & W CONTRAST, 8/23/2023 3:11 PM    Indication: Malignant melanoma of skin of vulva (H); Liver lesion,  right lobe    Comparison: CT chest abdomen and pelvis 7/17/2023    Technique: Images were acquired with and without intravenous contrast  through the abdomen. The following MR images were acquired: TrueFISP,  multiplanar T2 weighted, axial T1 in/out of phase, axial fat-saturated  T1, diffusion-weighted. Multiplanar T1-weighted images with fat  saturation were before contrast administration and at multiple time  points following the administration of intravenous contrast. Contrast  dose: 10mL Eovist    FINDINGS:    Liver: Normal hepatic morphology. Diffuse signal loss on out of phase  imaging, suggestive of fatty deposition. No significant iron  deposition. No suspicious liver lesions.    Gallbladder/Bile Ducts: Cholecystectomy. Normal bile  ducts.    Spleen: Normal. Small splenule left upper quadrant.    Pancreas: Postsurgical changes of Whipple procedure. There is gastric  drainage of the body and tail of the pancreas. Remaining pancreas  demonstrates fatty infiltration. No pancreatic ductal dilatation.    Adrenal glands: Normal    Kidneys: Normal.    Bowel: Postsurgical changes of hepaticojejunostomy. No evidence of  bowel obstruction. Scattered colonic diverticuli.    Lymph nodes: No suspicious lymphadenopathy.     Blood vessels: Patent vasculature. No abdominal aortic aneurysm.    Lung bases: The heart is at the upper limits of normal for size. Small  hiatal hernia. Lung bases are clear.    Bones and soft tissues: No acute osseous abnormality.     Mesentery and abdominal wall: Post surgical changes of the anterior  abdominal wall.    Ascites: None  Impression: IMPRESSION:   1. No focal hepatic mass or other findings to suggest metastatic  disease. The focus of hepatic arterial enhancement on prior CT  7/17/2023 likely represents a perfusion abnormality.    2. Hepatic steatosis.    I have personally reviewed the examination and initial interpretation  and I agree with the findings.    MADISON DE SANTIAGO MD         SYSTEM ID:  [id number]  Cardiac Device Check - Inpatient  PURPOSE OF VISIT: CIED programming and device evaluation POST MRI per MD   orders.     Full pacemaker interrogation performed after MRI complete.  Normal Device Function  Intrinsic Rhythm: SB @ 42 bpm  MRI Protection Mode Programmed OFF.  Pacing Mode Programmed From DOO 75 bpm to AAIR<=>DDDR  bpm  Estimated Battery Longevity to RRT= 10.5 years   Permanent Programming Changes: None    Device RN: MECHE Avilez RN    Dual lead pacemaker    I have reviewed and interpreted the device interrogation, settings,   programming and nurse's summary. The device is functioning within normal   device parameters. I agree with the current findings, assessment and plan.  Cardiac Device Check -  Inpatient  PURPOSE OF VISIT: CIED programming and device evaluation PRE-MRI per MD   orders. Complete MRI conditional pacing system verified. MRI checklists   completed.    Device: Medtronic [id number] XT  MRI  Normal Device Function  Intrinsic Rhythm: SB 42 bpm  AP= 99.5%  = <0.1%  Short V-V intervals: 0  Lead Trends Appear Stable: Yes  Estimated battery longevity to RRT = 10.5 years  Atrial Arrhythmia: No new episodes recorded since last remote transmission   on 8/15/23  Anticoagulant: warfarin  Ventricular Arrhythmia: 0  MRI Protection Mode Programmed ON  Pacing mode programmed from AAIR<=>DDDR  bpm to DOO 75 bpm    Pt to be monitored by IR RN during MRI scan.    Device RN: MECHE Avilez RN    Dual lead pacemaker    I have reviewed and interpreted the device interrogation, settings,   programming and nurse's summary. The device is functioning within normal   device parameters. I agree with the current findings, assessment and plan.  XR Chest 2 Views  Narrative: EXAM: XR CHEST 2 VIEWS 8/23/2023 12:42 PM    DEMOGRAPHICS: 73 years Female    INDICATION: Malignant melanoma of skin of vulva (H); Liver lesion,  right lobe    COMPARISON: CT 7/17/2023    TECHNIQUE: Upright PA and lateral views of the chest.    FINDINGS:   Right chest wall pacemaker with leads in the right atrium and right  ventricle. Surgical clips in the left axilla. Heart size is within  normal limits. The trachea is midline. Left basilar streaky opacities.  The remaining lung fields are clear. Bilateral costophrenic angles are  sharp with no blunting. No discernible pneumothorax. Degenerative  changes in the visualized spine.  Impression: IMPRESSION:   Left basilar atelectasis with no focal consolidations. Stable right  pacemaker.    I have personally reviewed the examination and initial interpretation  and I agree with the findings.    TAMIA ESPINOSA MD         SYSTEM ID:  [id number]  I reviewed the above imaging today.    ASSESSMENT AND  PLAN:  Vulvar Melanoma, resected stage III  It was a pleasure to meet Mrs.Bonnie Trent today. She has a history of resected stage IIIC vulvar melanoma. She received 1 year of adjuvant therapy through August 2020. She is doing well off of adjuvant therapy and is without evidence of recurrence or metastasis. She does have a history of small lung nodules that are stable on her recent CT, as above. Her CT CAP shows no evidence of recurrent or metastatic disease. She will follow-up with us in 6 months with repeat imaging and labs. She will call sooner for concerns.     Anemia, improving  Hemoglobin is mildly low. Ferritin is improved. She remains on oral iron. Will continue to monitor.    Health care maintenance.  Eye exams: Recommend exams at least every 2 years. Up to date, last about 6 weeks ago  Dental exams: Recommend exams and cleanings every 6 months. Up to date  Primary care: Recommend follow up at least annually. Up to date  Skin care: Recommend the use of sunscreen with SPF of at least 30, reapplying every 2 hours with prolonged sun exposure.   Tobacco use: Recommend abstaining. Denies use.  Alcohol use: Recommend no more than 1/day for women.Has about 2-3 beers/month.  Physical activity: Recommend regular activity, ideally 150 minutes/week of moderate intensity activity.  Will continue with swimming for 30-35 minutes/day 5 days/week. Also, rides stationary bike for 15 minutes most days.     Xochilt Lee PA-C  [facility]  [address] [place] [address]  [phone]    18 minutes spent on the date of the encounter doing chart review, review of test results, interpretation of tests, patient visit and documentation     Virtual Visit Details    Type of service:  Video Visit   Video Start Time: 4:28 PM  Video End Time:4:33 PM    Originating Location (pt. Location): Home  Distant Location (provider location):  Off-site  Platform used for Video Visit: [facility]    MEDICAL ONCOLOGY PROGRESS  NOTE  Melanoma Clinic  Aug 28, 2023    Reason for Visit: Stage III vulvar melanoma, status post excision    Melanoma History:  1. 6/4/2019, she had excision of the right vulvar mass in [place], and on pathology this shows malignant melanoma with ulceration, at least fausto level IV, Breslow thickness at least 5 mm, mitotic rate is 3 per mm2, TILs are present and brisk, pathologic stage pT4b.  2. 6/28/19, she had PET-CT, which showed no obvious evidence of metastatic disease.  3. 7/30/2019, she has wide local excision and sentinel biopsy (Specimen #: [id number]), which showed residual melanoma to a depth of invasion of 1.3 mm and margins negative. Right inguinal sentinel lymph node showed a subcapsular 1 mm focus of cells. pT4b pN1a MX, Stage IIIC.  4. PET-CT obtained on 9/23 negative for metastatic disease.  5. 9/26/2019, she started adjuvant nivolumab monthly   6. PET/CT 12/2019 showing MARISA  7. CT CAP 3/2020 MARISA  8. 8/6/2020, She completes adjuvant nivolumab immunotherapy.  9. September 2020, she has Covid-19 infections, exposed at work. She has mild symptoms. Dyspnea improves markedly after change of pacemaker battery (for sinus and AV node dysfunction).    History of Present Illness:  Ilana Trent is  73 year old woman with resected stage IIIC melanoma s/p 1 year of adjuvant nivolumab, completed August 2020. She presents via video visit for restaging.    -Doing okay overall.   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 Continues to swim every morning.    Current Outpatient Medications   Medication Sig Dispense Refill    acetaminophen (TYLENOL) 500 MG tablet Take 1-2 tablets (500-1,000 mg) by mouth every 6 hours as needed for mild pain 50 tablet 0    ALOE PO Take by mouth daily as needed      B-D ULTRA-FINE 33 LANCETS MISC 1 each by Other route      calcium carbonate (OS-RENEA) 1500 (600 Ca) MG tablet Take 600 mg by mouth daily      cetirizine-pseudoePHEDrine ER (ZYRTEC-D) 5-120 MG 12 hr tablet Take 1 tablet by mouth every morning        Ferrous Sulfate 324 (65 Fe) MG TBEC Take 1 tablet by mouth      furosemide (LASIX) 20 MG tablet Take 20 mg by mouth daily as needed (SWELLING)      levothyroxine (SYNTHROID/LEVOTHROID) 175 MCG tablet Take 175 mcg by mouth      lisinopril (ZESTRIL) 5 MG tablet Take 5 mg by mouth      metFORMIN (GLUCOPHAGE-XR) 500 MG 24 hr tablet Take 500 mg by mouth every morning       multivitamin w/minerals (THERA-VIT-M) tablet Take 1 tablet by mouth daily      naproxen (NAPROSYN) 500 MG tablet Take 500 mg by mouth every morning       ONETOUCH ULTRA test strip TEST BLOOD SUGARS TWICE DAILY      pantoprazole (PROTONIX) 40 MG EC tablet Take 40 mg by mouth every morning       simvastatin (ZOCOR) 10 MG tablet Take 10 mg by mouth At Bedtime      sotalol (BETAPACE) 160 MG tablet Take 80 mg by mouth 2 times daily       vitamin B complex with vitamin C (STRESS TAB) tablet Take 1 tablet by mouth every morning       warfarin ANTICOAGULANT (COUMADIN) 5 MG tablet Take 1 & 1/2 tablet by mouth once daily for 6 days of the week and 1 tablet by mouth on 1 day per week.       Objective:  General: patient appears well in no acute distress, alert and oriented, speech clear and fluid  Skin: no visualized rash or lesions on visualized skin  Resp: Appears to be breathing comfortably without accessory muscle usage, speaking in full sentences, no audible wheezes or cough.  Psych: Coherent speech, normal rate and volume, able to articulate logical thoughts, able to abstract reason, no tangential thoughts, no hallucinations or delusions  Patient's affect is appropriate.    Laboratory Studies:  Most Recent 3 CBC's:  Recent Labs   Lab Test 07/17/23  0716 01/18/23  0723 09/12/22  0722   WBC 8.0 9.5 7.8   HGB 11.9 11.4* 12.2   MCV 88 88 90    341 290   ANEUTAUTO 5.3 5.3 4.6     Most Recent 3 BMP's:  Recent Labs   Lab Test 07/17/23  0716 01/18/23  0754 01/18/23 0723 09/12/22  0722     --  140 141   POTASSIUM 5.0  --  5.3 5.4*   CHLORIDE 106   --  107 113*   CO2 23  --  26 21   BUN 17.5  --  23 20   CR 0.94 1.0 0.78 0.93   ANIONGAP 11  --  7 7   RENEA 9.4  --  9.0 9.4   *  --  161* 137*   PROTTOTAL 7.0  --  7.3 7.4   ALBUMIN 4.3  --  3.2* 3.7    Most Recent 3 LFT's:  Recent Labs   Lab Test 07/17/23  0716 01/18/23  0723 09/12/22  0722   AST 17 28 18   ALT 15 49 35   ALKPHOS 80 117 81   BILITOTAL 0.5 0.3 0.3   I reviewed the above labs today.    Imaging Studies:  Liver MRI on 8/23/23 shows the following:     Liver: Normal hepatic morphology. Diffuse signal loss on out of phase  imaging, suggestive of fatty deposition. No significant iron  deposition. No suspicious liver lesions.     Gallbladder/Bile Ducts: Cholecystectomy. Normal bile ducts.     Spleen: Normal. Small splenule left upper quadrant.     Pancreas: Postsurgical changes of Whipple procedure. There is gastric  drainage of the body and tail of the pancreas. Remaining pancreas  demonstrates fatty infiltration. No pancreatic ductal dilatation.     Adrenal glands: Normal     Kidneys: Normal.     Bowel: Postsurgical changes of hepaticojejunostomy. No evidence of  bowel obstruction. Scattered colonic diverticuli.     Lymph nodes: No suspicious lymphadenopathy.      Blood vessels: Patent vasculature. No abdominal aortic aneurysm.     Lung bases: The heart is at the upper limits of normal for size. Small  hiatal hernia. Lung bases are clear.     Bones and soft tissues: No acute osseous abnormality.      Mesentery and abdominal wall: Post surgical changes of the anterior  abdominal wall.     Ascites: None                                                               IMPRESSION:   1. No focal hepatic mass or other findings to suggest metastatic  disease. The focus of hepatic arterial enhancement on prior CT  7/17/2023 likely represents a perfusion abnormality.     2. Hepatic steatosis.    Chest x-ray on 8/23/23 shows the following:  EXAM: XR CHEST 2 VIEWS 8/23/2023 12:42 PM     DEMOGRAPHICS: 73 years  Female     INDICATION: Malignant melanoma of skin of vulva (H); Liver lesion,  right lobe     COMPARISON: CT 7/17/2023     TECHNIQUE: Upright PA and lateral views of the chest.     FINDINGS:   Right chest wall pacemaker with leads in the right atrium and right  ventricle. Surgical clips in the left axilla. Heart size is within  normal limits. The trachea is midline. Left basilar streaky opacities.  The remaining lung fields are clear. Bilateral costophrenic angles are  sharp with no blunting. No discernible pneumothorax. Degenerative  changes in the visualized spine.                                                                   IMPRESSION:   Left basilar atelectasis with no focal consolidations. Stable right  pacemaker.  I reviewed the above imaging today.    ASSESSMENT AND PLAN:  Vulvar Melanoma, resected stage III  It was a pleasure to meet Mrs.Bonnie Trent today. She has a history of resected stage IIIC vulvar melanoma. She received 1 year of adjuvant therapy through August 2020. She is doing well off of adjuvant therapy and is without evidence of recurrence or metastasis on her imaging, as above, which was reviewed with the patient today. The questionable liver lesion on CT does not appear concerning on the abdominal MRI. She will follow-up with us in January with repeat imaging and labs. She will call sooner for concerns.     Anemia, improving  Hemoglobin is mildly low. Ferritin is improved. She remains on oral iron. Will continue to monitor.    Health care maintenance.  Eye exams: Recommend exams at least every 2 years. Up to date, last about 6 weeks ago  Dental exams: Recommend exams and cleanings every 6 months. Up to date  Primary care: Recommend follow up at least annually. Up to date  Skin care: Recommend the use of sunscreen with SPF of at least 30, reapplying every 2 hours with prolonged sun exposure.   Tobacco use: Recommend abstaining. Denies use.  Alcohol use: Recommend no more than 1/day for  women.Has about 2-3 beers/month.  Physical activity: Recommend regular activity, ideally 150 minutes/week of moderate intensity activity.  Will continue with swimming for 30-35 minutes/day 5 days/week. Also, rides stationary bike for 15 minutes most days.     Xochilt Lee PA-C  [facility]  [address] [place] [address]  [phone]    18 minutes spent on the date of the encounter doing chart review, review of test results, interpretation of tests, patient visit and documentation

## 2023-08-30 PROBLEM — E66.01 CLASS 2 SEVERE OBESITY DUE TO EXCESS CALORIES WITH SERIOUS COMORBIDITY IN ADULT (H): Status: ACTIVE | Noted: 2023-08-30

## 2023-08-30 PROBLEM — E66.812 CLASS 2 SEVERE OBESITY DUE TO EXCESS CALORIES WITH SERIOUS COMORBIDITY IN ADULT (H): Status: ACTIVE | Noted: 2023-08-30

## 2023-10-08 ENCOUNTER — HEALTH MAINTENANCE LETTER (OUTPATIENT)
Age: 73
End: 2023-10-08

## 2024-01-15 ENCOUNTER — LAB (OUTPATIENT)
Dept: LAB | Facility: CLINIC | Age: 74
End: 2024-01-15
Payer: COMMERCIAL

## 2024-01-15 ENCOUNTER — ANCILLARY PROCEDURE (OUTPATIENT)
Dept: CT IMAGING | Facility: CLINIC | Age: 74
End: 2024-01-15
Attending: INTERNAL MEDICINE
Payer: COMMERCIAL

## 2024-01-15 ENCOUNTER — VIRTUAL VISIT (OUTPATIENT)
Dept: ONCOLOGY | Facility: CLINIC | Age: 74
End: 2024-01-15
Attending: INTERNAL MEDICINE
Payer: COMMERCIAL

## 2024-01-15 VITALS — BODY MASS INDEX: 34.86 KG/M2 | HEIGHT: 68 IN | WEIGHT: 230 LBS

## 2024-01-15 DIAGNOSIS — C51.9 MALIGNANT MELANOMA OF SKIN OF VULVA (H): ICD-10-CM

## 2024-01-15 DIAGNOSIS — R79.0 LOW FERRITIN: ICD-10-CM

## 2024-01-15 DIAGNOSIS — C51.9 MALIGNANT MELANOMA OF SKIN OF VULVA (H): Primary | ICD-10-CM

## 2024-01-15 LAB
BASOPHILS # BLD AUTO: 0 10E3/UL (ref 0–0.2)
BASOPHILS NFR BLD AUTO: 0 %
CREAT BLD-MCNC: 0.9 MG/DL (ref 0.5–1)
EGFRCR SERPLBLD CKD-EPI 2021: >60 ML/MIN/1.73M2
EOSINOPHIL # BLD AUTO: 0.1 10E3/UL (ref 0–0.7)
EOSINOPHIL NFR BLD AUTO: 2 %
ERYTHROCYTE [DISTWIDTH] IN BLOOD BY AUTOMATED COUNT: 15.5 % (ref 10–15)
FERRITIN SERPL-MCNC: 25 NG/ML (ref 11–328)
HCT VFR BLD AUTO: 39.9 % (ref 35–47)
HGB BLD-MCNC: 12.2 G/DL (ref 11.7–15.7)
IMM GRANULOCYTES # BLD: 0.1 10E3/UL
IMM GRANULOCYTES NFR BLD: 1 %
LYMPHOCYTES # BLD AUTO: 2.3 10E3/UL (ref 0.8–5.3)
LYMPHOCYTES NFR BLD AUTO: 25 %
MCH RBC QN AUTO: 27.3 PG (ref 26.5–33)
MCHC RBC AUTO-ENTMCNC: 30.6 G/DL (ref 31.5–36.5)
MCV RBC AUTO: 89 FL (ref 78–100)
MONOCYTES # BLD AUTO: 0.8 10E3/UL (ref 0–1.3)
MONOCYTES NFR BLD AUTO: 9 %
NEUTROPHILS # BLD AUTO: 6 10E3/UL (ref 1.6–8.3)
NEUTROPHILS NFR BLD AUTO: 63 %
NRBC # BLD AUTO: 0 10E3/UL
NRBC BLD AUTO-RTO: 0 /100
PLATELET # BLD AUTO: 291 10E3/UL (ref 150–450)
RBC # BLD AUTO: 4.47 10E6/UL (ref 3.8–5.2)
WBC # BLD AUTO: 9.4 10E3/UL (ref 4–11)

## 2024-01-15 PROCEDURE — 71260 CT THORAX DX C+: CPT | Mod: GC | Performed by: RADIOLOGY

## 2024-01-15 PROCEDURE — 82728 ASSAY OF FERRITIN: CPT

## 2024-01-15 PROCEDURE — 85025 COMPLETE CBC W/AUTO DIFF WBC: CPT

## 2024-01-15 PROCEDURE — 82565 ASSAY OF CREATININE: CPT

## 2024-01-15 PROCEDURE — 74177 CT ABD & PELVIS W/CONTRAST: CPT | Mod: GC | Performed by: RADIOLOGY

## 2024-01-15 PROCEDURE — 99213 OFFICE O/P EST LOW 20 MIN: CPT | Mod: 95 | Performed by: PHYSICIAN ASSISTANT

## 2024-01-15 PROCEDURE — 36415 COLL VENOUS BLD VENIPUNCTURE: CPT

## 2024-01-15 RX ORDER — IOPAMIDOL 755 MG/ML
122 INJECTION, SOLUTION INTRAVASCULAR ONCE
Status: COMPLETED | OUTPATIENT
Start: 2024-01-15 | End: 2024-01-15

## 2024-01-15 RX ADMIN — IOPAMIDOL 122 ML: 755 INJECTION, SOLUTION INTRAVASCULAR at 08:05

## 2024-01-15 ASSESSMENT — PAIN SCALES - GENERAL: PAINLEVEL: NO PAIN (0)

## 2024-01-15 NOTE — LETTER
1/15/2024         RE: Ilana Trent  [address] [facility] [address]        Dear Colleague,    Thank you for referring your patient, Ilana Trent, to the [facility]. Please see a copy of my visit note below.    Virtual Visit Details    Type of service:  Video Visit   Video Start Time: 4:06 PM  Video End Time:4:10 PM    Originating Location (pt. Location): Home  Distant Location (provider location):  Off-site  Platform used for Video Visit: [facility] ONCOLOGY PROGRESS NOTE  Melanoma Clinic  Jf 15, 2024    Reason for Visit: Stage III vulvar melanoma, status post excision    Melanoma History:  1. 6/4/2019, she had excision of the right vulvar mass in [place], and on pathology this shows malignant melanoma with ulceration, at least fausto level IV, Breslow thickness at least 5 mm, mitotic rate is 3 per mm2, TILs are present and brisk, pathologic stage pT4b.  2. 6/28/19, she had PET-CT, which showed no obvious evidence of metastatic disease.  3. 7/30/2019, she has wide local excision and sentinel biopsy (Specimen #: [id number]), which showed residual melanoma to a depth of invasion of 1.3 mm and margins negative. Right inguinal sentinel lymph node showed a subcapsular 1 mm focus of cells. pT4b pN1a MX, Stage IIIC.  4. PET-CT obtained on 9/23 negative for metastatic disease.  5. 9/26/2019, she started adjuvant nivolumab monthly   6. PET/CT 12/2019 showing MARISA  7. CT CAP 3/2020 MARISA  8. 8/6/2020, She completes adjuvant nivolumab immunotherapy.  9. September 2020, she has Covid-19 infections, exposed at work. She has mild symptoms. Dyspnea improves markedly after change of pacemaker battery (for sinus and AV node dysfunction).    History of Present Illness:  Ilana Trent is  73 year old woman with resected stage IIIC melanoma s/p 1 year of adjuvant nivolumab, completed August 2020. She presents via video visit for restaging.    -Doing okay overall.  -Had surgery on left knee. Will  Office Follow-up    NAME: Janet Mujica   :  1943  MRM:  [id number]    Date:  2022         Assessment and Plan:     Paroxysmal atrial fibrillation: Diagnosed 2022): Spontaneously converted. No DCCV performed. Remain in sinus rhythm however has occasional short-lived PAF on Holter monitor. Continue Eliquis. Continue Toprol-XL 25 mg p.o. daily. The Holter monitor demonstrated lowest heart rate of 32 bpm.  There were no significant pauses. The lowest heart rate was in the nighttime at 3:53 AM.     History of CAD, CABG: Continue aspirin, Crestor, Toprol-XL and Niaspan. Dyslipidemia: Continue Crestor, omega-3 and aspirin. Bradycardia: This was present in the past.  He had to go on a lower dose of Toprol-XL in the past.  The Holter monitor demonstrated lowest heart rate of 32 bpm.  There were no significant pauses. See Dr. Tracey Lino in 6 months. ATTENTION:   This medical record was transcribed using an electronic medical records/speech recognition system. Although proofread, it may and can contain electronic, spelling and other errors. Corrections may be executed at a later time. Please feel free to contact us for any clarifications as needed. Subjective:     Janet Mujica, a 67y.o. year-old who presents for followup. He has known history of CAD, CABG, history of hypertension, on 2022 he presented for routine colonoscopy and was found to have atrial fibrillation. He was sent to the ER. He was started on rate control medications. He spontaneously converted to sinus rhythm. He was discharged home. He had home monitoring for 15 days. He was found to have 2% atrial fibrillation. He was changed over from metoprolol to tartrate to metoprolol XL 25 mg p.o. daily. He currently does not have any symptoms of chest pain, shortness of breath, palpitations, lightheadedness or dizziness.     Exam:     Physical Exam:  Visit Vitals  /65 (BP 1 Location: Right arm, BP Patient Position: Sitting)   Pulse (!) 57   Ht 5' 8\" (1.727 m)   SpO2 98%   BMI 23.11 kg/m²     General appearance - alert, well appearing, and in no distress  Mental status - affect appropriate to mood  Eyes - sclera anicteric, moist mucous membranes  Neck - supple, no significant adenopathy  Chest - clear to auscultation, no wheezes, rales or rhonchi  Heart - normal rate, regular rhythm, normal S1, S2, no murmurs, rubs, clicks or gallops  Abdomen - soft, nontender, nondistended, no masses or organomegaly  Extremities - peripheral pulses normal, no pedal edema  Skin - normal coloration  no rashes    Medications:     Current Outpatient Medications   Medication Sig    ramelteon (ROZEREM) 8 mg tablet Take 8 mg by mouth nightly as needed for Sleep.    metoprolol succinate (TOPROL-XL) 25 mg XL tablet Take 0.5 Tablets by mouth nightly. lisinopriL (PRINIVIL, ZESTRIL) 5 mg tablet Take 1 Tablet by mouth daily. apixaban (ELIQUIS) 5 mg tablet Take 1 Tablet by mouth two (2) times a day for 90 days. cholecalciferol (VITAMIN D3) (1000 Units /25 mcg) tablet Take 1,000 Units by mouth daily. folic acid/multivit-min/lutein (CENTRUM SILVER PO) Take 1 Tablet by mouth daily. ALPRAZolam (XANAX) 0.5 mg tablet TAKE 1 TABLET NIGHTLY AS   NEEDED FOR ANXIETY. MAXIMUMDAILY AMOUNT: 0.5 MG    niacin (NIASPAN) 1,000 mg Tb24 tab TAKE 2 TABLETS NIGHTLY    Crestor 10 mg tablet TAKE 1 TABLET NIGHTLY    polyethylene glycol (MIRALAX) 17 gram/dose powder Take 17 g by mouth daily. 1/2 as needed    omega-3 fatty acids-vitamin e 1,000 mg cap Take 1 Cap by mouth two (2) times a day. ascorbic acid, vitamin C, (VITAMIN C) 500 mg tablet Take 500 mg by mouth daily. No current facility-administered medications for this visit. Diagnostic Data Review:       No specialty comments available.       Lab Review:     Lab Results   Component Value Date/Time    Cholesterol, total 124 08/02/2022 10:32 AM    HDL Cholesterol 61 eventually have the right knee done.   -Continues to swim every morning M-F for 1/2 mile 30-45 minutes. Also, rides her stationary bike for 15 minutes/day after work.     Current Outpatient Medications   Medication Sig Dispense Refill    acetaminophen (TYLENOL) 500 MG tablet Take 1-2 tablets (500-1,000 mg) by mouth every 6 hours as needed for mild pain 50 tablet 0    ALOE PO Take by mouth daily as needed      B-D ULTRA-FINE 33 LANCETS MISC 1 each by Other route      calcium carbonate (OS-RENEA) 1500 (600 Ca) MG tablet Take 600 mg by mouth daily      cetirizine-pseudoePHEDrine ER (ZYRTEC-D) 5-120 MG 12 hr tablet Take 1 tablet by mouth every morning       Ferrous Sulfate 324 (65 Fe) MG TBEC Take 1 tablet by mouth      furosemide (LASIX) 20 MG tablet Take 20 mg by mouth daily as needed (SWELLING)      levothyroxine (SYNTHROID/LEVOTHROID) 175 MCG tablet Take 175 mcg by mouth      lisinopril (ZESTRIL) 5 MG tablet Take 5 mg by mouth      metFORMIN (GLUCOPHAGE-XR) 500 MG 24 hr tablet Take 500 mg by mouth every morning       multivitamin w/minerals (THERA-VIT-M) tablet Take 1 tablet by mouth daily      naproxen (NAPROSYN) 500 MG tablet Take 500 mg by mouth every morning       ONETOUCH ULTRA test strip TEST BLOOD SUGARS TWICE DAILY      pantoprazole (PROTONIX) 40 MG EC tablet Take 40 mg by mouth every morning       simvastatin (ZOCOR) 10 MG tablet Take 10 mg by mouth At Bedtime      sotalol (BETAPACE) 160 MG tablet Take 80 mg by mouth 2 times daily       vitamin B complex with vitamin C (STRESS TAB) tablet Take 1 tablet by mouth every morning       warfarin ANTICOAGULANT (COUMADIN) 5 MG tablet Take 1 & 1/2 tablet by mouth once daily for 6 days of the week and 1 tablet by mouth on 1 day per week.       Objective:  General: patient appears well in no acute distress, alert and oriented, speech clear and fluid  Skin: no visualized rash or lesions on visualized skin  Resp: Appears to be breathing comfortably without accessory  08/02/2022 10:32 AM    LDL, calculated 52.6 08/02/2022 10:32 AM    Triglyceride 52 08/02/2022 10:32 AM    CHOL/HDL Ratio 2.0 08/02/2022 10:32 AM     Lab Results   Component Value Date/Time    Creatinine 1.02 09/29/2022 12:14 AM     Lab Results   Component Value Date/Time    BUN 16 09/29/2022 12:14 AM     Lab Results   Component Value Date/Time    Potassium 4.1 09/29/2022 12:14 AM     Lab Results   Component Value Date/Time    Hemoglobin A1c 5.8 (H) 08/02/2022 10:32 AM     Lab Results   Component Value Date/Time    HGB 14.7 11/09/2022 08:52 AM     Lab Results   Component Value Date/Time    PLATELET 864 (L) 90/28/5870 08:52 AM     No results for input(s): CPK, CKMB, TROIQ in the last 72 hours. No lab exists for component: CKQMB, CPKMB             ___________________________________________________    Antolin Feliz.  Torito Negro MD, [facility] muscle usage, speaking in full sentences, no audible wheezes or cough.  Psych: Coherent speech, normal rate and volume, able to articulate logical thoughts, able to abstract reason, no tangential thoughts, no hallucinations or delusions  Patient's affect is appropriate.    Laboratory Studies:  Most Recent 3 CBC's:  Recent Labs   Lab Test 01/15/24  0716 07/17/23  0716 01/18/23  0723   WBC 9.4 8.0 9.5   HGB 12.2 11.9 11.4*   MCV 89 88 88    258 341   ANEUTAUTO 6.0 5.3 5.3     Most Recent 3 BMP's:  Recent Labs   Lab Test 01/15/24  0759 07/17/23  0716 01/18/23  0754 01/18/23  0723 09/12/22  0722   NA  --  140  --  140 141   POTASSIUM  --  5.0  --  5.3 5.4*   CHLORIDE  --  106  --  107 113*   CO2  --  23  --  26 21   BUN  --  17.5  --  23 20   CR 0.9 0.94 1.0 0.78 0.93   ANIONGAP  --  11  --  7 7   RENEA  --  9.4  --  9.0 9.4   GLC  --  154*  --  161* 137*   PROTTOTAL  --  7.0  --  7.3 7.4   ALBUMIN  --  4.3  --  3.2* 3.7    Most Recent 3 LFT's:  Recent Labs   Lab Test 07/17/23  0716 01/18/23  0723 09/12/22  0722   AST 17 28 18   ALT 15 49 35   ALKPHOS 80 117 81   BILITOTAL 0.5 0.3 0.3   I reviewed the above labs today.    Imaging:  CT Chest/Abdomen/Pelvis w Contrast  Narrative: EXAMINATION: CT CHEST/ABDOMEN/PELVIS W CONTRAST, 1/15/2024 8:23 AM    TECHNIQUE: Helical CT images from the thoracic inlet through the  symphysis pubis were obtained with intravenous contrast. Contrast  dose: 122 ml isovue 370    COMPARISON: 8/23/2023 MR, 7/17/2023, 1/18/2023 CT    HISTORY: Malignant melanoma of skin of vulva (H)    FINDINGS:    Lower neck: Visualized portions of the lower neck and thyroid gland  are unremarkable.    Chest:   Heart/ Mediastinum: Heart is within normal limits. No evidence of  central pulmonary embolism. No bulky lymphadenopathy.  Esophagus  appears normal. Right chest wall implantable pacing device with  transvenous leads terminating within the right atrium and ventricle..  Lungs/pleura: The central  tracheobronchial tree is patent.  No focal  mass or consolidation.  Subpleural reticular abnormality on the  anterolateral aspect of the left upper lobe related to prior radiation  therapy. Linear atelectasis in the medial right middle lobe and  lateral left lung base. Unchanged 3 mm nodule in the right lower lobe  (series 9 image 133). Stable 2 mm nodule in the medial right middle  lobe (series 9 image 170). No pneumothorax or pleural effusion. Small  Bochdalek hernia on the right.  Chest wall/axilla: No bulky lymphadenopathy..    Abdomen and pelvis:  Hepatobiliary: No worrisome hepatic lesion or arterial enhancement.  Diffusely hypoattenuating hepatic parenchymal. Stable to slightly  decreased conspicuity of a 4 mm focus of arterial enhancement within  the inferior subcapsular left hepatic lobe (series 5 image 101)  presumably a benign transient perfusional difference. Cholecystectomy.  Choledochojejunostomy with pneumobilia, unchanged.     Pancreas: Resection of the pancreatic head with pancreaticogastrostomy  anastomosis. Unchanged fatty atrophy without mass or duct dilatation.    Spleen: Normal size. No focal lesions.    Adrenals: Normal.    Kidneys: No hydronephrosis, calculi or soft tissue density mass.  Stable subcentimeter cortical cysts.    Urinary bladder: Unremarkable.  Reproductive organs: Uterus is normal. No adnexal abnormality.    Gastrointestinal: Duodenectomy with gastrojejunal anastomosis. Small  and large bowel are normal in caliber and without abnormal wall  thickening. Colonic diverticulosis. Normal appendix.  Mesentery/Peritoneum: No ascites or pneumoperitoneum.    Lymph nodes: No lymphadenopathy.  Vasculature: No aortic aneurysm.     Bones and soft tissues: No aggressive lytic or sclerotic lesions. Left  breast lumpectomy changes. Left axillary surgical clips.  Impression: IMPRESSION:  1.  No evidence of metastatic disease in the chest, abdomen, or  pelvis.  2.  Other chronic and incidental  findings as detailed in the body of  the report.     I have personally reviewed the examination and initial interpretation  and I agree with the findings.    GLADYS QUICK,          SYSTEM ID:  [id number]  I reviewed the above imaging report today.    ASSESSMENT AND PLAN:  Vulvar Melanoma, resected stage III  It was a pleasure to meet Mrs.Bonnie Trent today. She has a history of resected stage IIIC vulvar melanoma. She received 1 year of adjuvant therapy through August 2020. She is doing well off of adjuvant therapy and is without evidence of recurrence or metastasis on her imaging, as above, which was reviewed with the patient today. The previous questionable liver lesion on CT did not appear concerning on the subsequent abdominal MRI. She will follow-up with me in 6 months with repeat imaging and labs. She will call sooner for concerns.     Health care maintenance.  Eye exams: Recommend exams at least every 2 years. Up to date  Dental exams: Recommend exams and cleanings every 6 months. Up to date  Primary care: Recommend follow up at least annually. Up to date  Skin care: Recommend the use of sunscreen with SPF of at least 30, reapplying every 2 hours with prolonged sun exposure.   Tobacco use: Recommend abstaining. Denies use.  Alcohol use: Recommend no more than 1/day for women. Has about 2-3 beers/month.  Physical activity: Recommend regular activity, ideally 150 minutes/week of moderate intensity activity.  Will continue with swimming for 30-35 minutes/day 5 days/week. Also, rides stationary bike for 15 minutes most days.     Xochilt Lee PA-C  [facility]  [address], [place] [address] [phone]    15 minutes spent on the date of the encounter doing chart review, review of test results, interpretation of tests, patient visit and documentation

## 2024-01-15 NOTE — NURSING NOTE
Is the patient currently in the state of [place]? YES    Visit mode:VIDEO    If the visit is dropped, the patient can be reconnected by: VIDEO VISIT: Text to cell phone:   Telephone Information:   Mobile [phone]       Will anyone else be joining the visit? NO  (If patient encounters technical issues they should call [phone]    How would you like to obtain your AVS? MyChart    Are changes needed to the allergy or medication list?  Pt states her allergies and medications were up-to-date during her echeck-in.    Reason for visit: RECHECK    Payal LILLY

## 2024-01-15 NOTE — PROGRESS NOTES
Virtual Visit Details    Type of service:  Video Visit   Video Start Time: 4:06 PM  Video End Time:4:10 PM    Originating Location (pt. Location): Home  Distant Location (provider location):  Off-site  Platform used for Video Visit: [facility] ONCOLOGY PROGRESS NOTE  Melanoma Clinic  Jf 15, 2024    Reason for Visit: Stage III vulvar melanoma, status post excision    Melanoma History:  1. 6/4/2019, she had excision of the right vulvar mass in [place], and on pathology this shows malignant melanoma with ulceration, at least fausto level IV, Breslow thickness at least 5 mm, mitotic rate is 3 per mm2, TILs are present and brisk, pathologic stage pT4b.  2. 6/28/19, she had PET-CT, which showed no obvious evidence of metastatic disease.  3. 7/30/2019, she has wide local excision and sentinel biopsy (Specimen #: [id number]), which showed residual melanoma to a depth of invasion of 1.3 mm and margins negative. Right inguinal sentinel lymph node showed a subcapsular 1 mm focus of cells. pT4b pN1a MX, Stage IIIC.  4. PET-CT obtained on 9/23 negative for metastatic disease.  5. 9/26/2019, she started adjuvant nivolumab monthly   6. PET/CT 12/2019 showing MARISA  7. CT CAP 3/2020 MARISA  8. 8/6/2020, She completes adjuvant nivolumab immunotherapy.  9. September 2020, she has Covid-19 infections, exposed at work. She has mild symptoms. Dyspnea improves markedly after change of pacemaker battery (for sinus and AV node dysfunction).    History of Present Illness:  Ilana Trent is  73 year old woman with resected stage IIIC melanoma s/p 1 year of adjuvant nivolumab, completed August 2020. She presents via video visit for restaging.    -Doing okay overall.  -Had surgery on left knee. Will eventually have the right knee done.   -Continues to swim every morning M-F for 1/2 mile 30-45 minutes. Also, rides her stationary bike for 15 minutes/day after work.     Current Outpatient Medications   Medication Sig Dispense Refill     acetaminophen (TYLENOL) 500 MG tablet Take 1-2 tablets (500-1,000 mg) by mouth every 6 hours as needed for mild pain 50 tablet 0    ALOE PO Take by mouth daily as needed      B-D ULTRA-FINE 33 LANCETS MISC 1 each by Other route      calcium carbonate (OS-RENEA) 1500 (600 Ca) MG tablet Take 600 mg by mouth daily      cetirizine-pseudoePHEDrine ER (ZYRTEC-D) 5-120 MG 12 hr tablet Take 1 tablet by mouth every morning       Ferrous Sulfate 324 (65 Fe) MG TBEC Take 1 tablet by mouth      furosemide (LASIX) 20 MG tablet Take 20 mg by mouth daily as needed (SWELLING)      levothyroxine (SYNTHROID/LEVOTHROID) 175 MCG tablet Take 175 mcg by mouth      lisinopril (ZESTRIL) 5 MG tablet Take 5 mg by mouth      metFORMIN (GLUCOPHAGE-XR) 500 MG 24 hr tablet Take 500 mg by mouth every morning       multivitamin w/minerals (THERA-VIT-M) tablet Take 1 tablet by mouth daily      naproxen (NAPROSYN) 500 MG tablet Take 500 mg by mouth every morning       ONETOUCH ULTRA test strip TEST BLOOD SUGARS TWICE DAILY      pantoprazole (PROTONIX) 40 MG EC tablet Take 40 mg by mouth every morning       simvastatin (ZOCOR) 10 MG tablet Take 10 mg by mouth At Bedtime      sotalol (BETAPACE) 160 MG tablet Take 80 mg by mouth 2 times daily       vitamin B complex with vitamin C (STRESS TAB) tablet Take 1 tablet by mouth every morning       warfarin ANTICOAGULANT (COUMADIN) 5 MG tablet Take 1 & 1/2 tablet by mouth once daily for 6 days of the week and 1 tablet by mouth on 1 day per week.       Objective:  General: patient appears well in no acute distress, alert and oriented, speech clear and fluid  Skin: no visualized rash or lesions on visualized skin  Resp: Appears to be breathing comfortably without accessory muscle usage, speaking in full sentences, no audible wheezes or cough.  Psych: Coherent speech, normal rate and volume, able to articulate logical thoughts, able to abstract reason, no tangential thoughts, no hallucinations or delusions   Patient's affect is appropriate.    Laboratory Studies:  Most Recent 3 CBC's:  Recent Labs   Lab Test 01/15/24  0716 07/17/23  0716 01/18/23 0723   WBC 9.4 8.0 9.5   HGB 12.2 11.9 11.4*   MCV 89 88 88    258 341   ANEUTAUTO 6.0 5.3 5.3     Most Recent 3 BMP's:  Recent Labs   Lab Test 01/15/24  0759 07/17/23  0716 01/18/23  0754 01/18/23  0723 09/12/22  0722   NA  --  140  --  140 141   POTASSIUM  --  5.0  --  5.3 5.4*   CHLORIDE  --  106  --  107 113*   CO2  --  23  --  26 21   BUN  --  17.5  --  23 20   CR 0.9 0.94 1.0 0.78 0.93   ANIONGAP  --  11  --  7 7   RENEA  --  9.4  --  9.0 9.4   GLC  --  154*  --  161* 137*   PROTTOTAL  --  7.0  --  7.3 7.4   ALBUMIN  --  4.3  --  3.2* 3.7    Most Recent 3 LFT's:  Recent Labs   Lab Test 07/17/23  0716 01/18/23 0723 09/12/22 0722   AST 17 28 18   ALT 15 49 35   ALKPHOS 80 117 81   BILITOTAL 0.5 0.3 0.3   I reviewed the above labs today.    Imaging:  CT Chest/Abdomen/Pelvis w Contrast  Narrative: EXAMINATION: CT CHEST/ABDOMEN/PELVIS W CONTRAST, 1/15/2024 8:23 AM    TECHNIQUE: Helical CT images from the thoracic inlet through the  symphysis pubis were obtained with intravenous contrast. Contrast  dose: 122 ml isovue 370    COMPARISON: 8/23/2023 MR, 7/17/2023, 1/18/2023 CT    HISTORY: Malignant melanoma of skin of vulva (H)    FINDINGS:    Lower neck: Visualized portions of the lower neck and thyroid gland  are unremarkable.    Chest:   Heart/ Mediastinum: Heart is within normal limits. No evidence of  central pulmonary embolism. No bulky lymphadenopathy.  Esophagus  appears normal. Right chest wall implantable pacing device with  transvenous leads terminating within the right atrium and ventricle..  Lungs/pleura: The central tracheobronchial tree is patent.  No focal  mass or consolidation.  Subpleural reticular abnormality on the  anterolateral aspect of the left upper lobe related to prior radiation  therapy. Linear atelectasis in the medial right middle lobe  and  lateral left lung base. Unchanged 3 mm nodule in the right lower lobe  (series 9 image 133). Stable 2 mm nodule in the medial right middle  lobe (series 9 image 170). No pneumothorax or pleural effusion. Small  Bochdalek hernia on the right.  Chest wall/axilla: No bulky lymphadenopathy..    Abdomen and pelvis:  Hepatobiliary: No worrisome hepatic lesion or arterial enhancement.  Diffusely hypoattenuating hepatic parenchymal. Stable to slightly  decreased conspicuity of a 4 mm focus of arterial enhancement within  the inferior subcapsular left hepatic lobe (series 5 image 101)  presumably a benign transient perfusional difference. Cholecystectomy.  Choledochojejunostomy with pneumobilia, unchanged.     Pancreas: Resection of the pancreatic head with pancreaticogastrostomy  anastomosis. Unchanged fatty atrophy without mass or duct dilatation.    Spleen: Normal size. No focal lesions.    Adrenals: Normal.    Kidneys: No hydronephrosis, calculi or soft tissue density mass.  Stable subcentimeter cortical cysts.    Urinary bladder: Unremarkable.  Reproductive organs: Uterus is normal. No adnexal abnormality.    Gastrointestinal: Duodenectomy with gastrojejunal anastomosis. Small  and large bowel are normal in caliber and without abnormal wall  thickening. Colonic diverticulosis. Normal appendix.  Mesentery/Peritoneum: No ascites or pneumoperitoneum.    Lymph nodes: No lymphadenopathy.  Vasculature: No aortic aneurysm.     Bones and soft tissues: No aggressive lytic or sclerotic lesions. Left  breast lumpectomy changes. Left axillary surgical clips.  Impression: IMPRESSION:  1.  No evidence of metastatic disease in the chest, abdomen, or  pelvis.  2.  Other chronic and incidental findings as detailed in the body of  the report.     I have personally reviewed the examination and initial interpretation  and I agree with the findings.    GLADYS QUICK,          SYSTEM ID:  [id number]  I reviewed the above imaging report  today.    ASSESSMENT AND PLAN:  Vulvar Melanoma, resected stage III  It was a pleasure to meet Mrs.Bonnie Trent today. She has a history of resected stage IIIC vulvar melanoma. She received 1 year of adjuvant therapy through August 2020. She is doing well off of adjuvant therapy and is without evidence of recurrence or metastasis on her imaging, as above, which was reviewed with the patient today. The previous questionable liver lesion on CT did not appear concerning on the subsequent abdominal MRI. She will follow-up with me in 6 months with repeat imaging and labs. She will call sooner for concerns.     Health care maintenance.  Eye exams: Recommend exams at least every 2 years. Up to date  Dental exams: Recommend exams and cleanings every 6 months. Up to date  Primary care: Recommend follow up at least annually. Up to date  Skin care: Recommend the use of sunscreen with SPF of at least 30, reapplying every 2 hours with prolonged sun exposure.   Tobacco use: Recommend abstaining. Denies use.  Alcohol use: Recommend no more than 1/day for women. Has about 2-3 beers/month.  Physical activity: Recommend regular activity, ideally 150 minutes/week of moderate intensity activity.  Will continue with swimming for 30-35 minutes/day 5 days/week. Also, rides stationary bike for 15 minutes most days.     Xochilt Lee PA-C  [facility]  [address] [place] [address] [phone]    15 minutes spent on the date of the encounter doing chart review, review of test results, interpretation of tests, patient visit and documentation

## 2024-02-25 ENCOUNTER — HEALTH MAINTENANCE LETTER (OUTPATIENT)
Age: 74
End: 2024-02-25

## 2024-06-21 ENCOUNTER — ANCILLARY PROCEDURE (OUTPATIENT)
Dept: CT IMAGING | Facility: CLINIC | Age: 74
End: 2024-06-21
Attending: PHYSICIAN ASSISTANT
Payer: COMMERCIAL

## 2024-06-21 ENCOUNTER — LAB (OUTPATIENT)
Dept: LAB | Facility: CLINIC | Age: 74
End: 2024-06-21
Attending: PHYSICIAN ASSISTANT
Payer: COMMERCIAL

## 2024-06-21 DIAGNOSIS — C51.9 MALIGNANT MELANOMA OF SKIN OF VULVA (H): ICD-10-CM

## 2024-06-21 LAB
BASOPHILS # BLD AUTO: 0.1 10E3/UL (ref 0–0.2)
BASOPHILS NFR BLD AUTO: 1 %
CREAT BLD-MCNC: 1.1 MG/DL (ref 0.5–1)
EGFRCR SERPLBLD CKD-EPI 2021: 53 ML/MIN/1.73M2
EOSINOPHIL # BLD AUTO: 0.3 10E3/UL (ref 0–0.7)
EOSINOPHIL NFR BLD AUTO: 3 %
ERYTHROCYTE [DISTWIDTH] IN BLOOD BY AUTOMATED COUNT: 13.9 % (ref 10–15)
FERRITIN SERPL-MCNC: 25 NG/ML (ref 11–328)
HCT VFR BLD AUTO: 41.1 % (ref 35–47)
HGB BLD-MCNC: 12.9 G/DL (ref 11.7–15.7)
IMM GRANULOCYTES # BLD: 0 10E3/UL
IMM GRANULOCYTES NFR BLD: 0 %
LYMPHOCYTES # BLD AUTO: 2.2 10E3/UL (ref 0.8–5.3)
LYMPHOCYTES NFR BLD AUTO: 25 %
MCH RBC QN AUTO: 27.4 PG (ref 26.5–33)
MCHC RBC AUTO-ENTMCNC: 31.4 G/DL (ref 31.5–36.5)
MCV RBC AUTO: 87 FL (ref 78–100)
MONOCYTES # BLD AUTO: 0.8 10E3/UL (ref 0–1.3)
MONOCYTES NFR BLD AUTO: 9 %
NEUTROPHILS # BLD AUTO: 5.6 10E3/UL (ref 1.6–8.3)
NEUTROPHILS NFR BLD AUTO: 63 %
NRBC # BLD AUTO: 0 10E3/UL
NRBC BLD AUTO-RTO: 0 /100
PLATELET # BLD AUTO: 268 10E3/UL (ref 150–450)
RBC # BLD AUTO: 4.7 10E6/UL (ref 3.8–5.2)
WBC # BLD AUTO: 9 10E3/UL (ref 4–11)

## 2024-06-21 PROCEDURE — 36415 COLL VENOUS BLD VENIPUNCTURE: CPT

## 2024-06-21 PROCEDURE — 74177 CT ABD & PELVIS W/CONTRAST: CPT | Performed by: RADIOLOGY

## 2024-06-21 PROCEDURE — 85025 COMPLETE CBC W/AUTO DIFF WBC: CPT

## 2024-06-21 PROCEDURE — 82565 ASSAY OF CREATININE: CPT

## 2024-06-21 PROCEDURE — 82728 ASSAY OF FERRITIN: CPT

## 2024-06-21 PROCEDURE — 71260 CT THORAX DX C+: CPT | Performed by: RADIOLOGY

## 2024-06-21 RX ORDER — IOPAMIDOL 755 MG/ML
122 INJECTION, SOLUTION INTRAVASCULAR ONCE
Status: COMPLETED | OUTPATIENT
Start: 2024-06-21 | End: 2024-06-21

## 2024-06-21 RX ADMIN — IOPAMIDOL 122 ML: 755 INJECTION, SOLUTION INTRAVASCULAR at 08:12

## 2024-06-24 ENCOUNTER — VIRTUAL VISIT (OUTPATIENT)
Dept: ONCOLOGY | Facility: CLINIC | Age: 74
End: 2024-06-24
Attending: PHYSICIAN ASSISTANT
Payer: COMMERCIAL

## 2024-06-24 DIAGNOSIS — C51.9 MALIGNANT MELANOMA OF SKIN OF VULVA (H): Primary | ICD-10-CM

## 2024-06-24 PROCEDURE — 99214 OFFICE O/P EST MOD 30 MIN: CPT | Mod: 95 | Performed by: PHYSICIAN ASSISTANT

## 2024-06-24 NOTE — NURSING NOTE
Is the patient currently in the state of [place]? YES    Visit mode:VIDEO    If the visit is dropped, the patient can be reconnected by: VIDEO VISIT: Send to e-mail at: [email or web address]    Will anyone else be joining the visit? NO  (If patient encounters technical issues they should call [phone]    How would you like to obtain your AVS? MyChart    Are changes needed to the allergy or medication list? Pt stated no changes to allergies and Pt stated no med changes    Are refills needed on medications prescribed by this physician? NO    Reason for visit: RECHECK    Payal LILLY

## 2024-06-24 NOTE — PROGRESS NOTES
Virtual Visit Details    Type of service:  Video Visit   Video Start Time: 4:15 PM  Video End Time:4:21 PM    Originating Location (pt. Location): Home  Distant Location (provider location):  Off-site  Platform used for Video Visit: [facility] ONCOLOGY PROGRESS NOTE  Melanoma Clinic  Jun 24, 2024    Reason for Visit: Stage III vulvar melanoma, status post excision    Melanoma History:  1. 6/4/2019, she had excision of the right vulvar mass in [place], and on pathology this shows malignant melanoma with ulceration, at least fausto level IV, Breslow thickness at least 5 mm, mitotic rate is 3 per mm2, TILs are present and brisk, pathologic stage pT4b.  2. 6/28/19, she had PET-CT, which showed no obvious evidence of metastatic disease.  3. 7/30/2019, she has wide local excision and sentinel biopsy (Specimen #: [id number]), which showed residual melanoma to a depth of invasion of 1.3 mm and margins negative. Right inguinal sentinel lymph node showed a subcapsular 1 mm focus of cells. pT4b pN1a MX, Stage IIIC.  4. PET-CT obtained on 9/23 negative for metastatic disease.  5. 9/26/2019, she started adjuvant nivolumab monthly   6. PET/CT 12/2019 showing MARISA  7. CT CAP 3/2020 MARISA  8. 8/6/2020, She completes adjuvant nivolumab immunotherapy.  9. September 2020, she has Covid-19 infections, exposed at work. She has mild symptoms. Dyspnea improves markedly after change of pacemaker battery (for sinus and AV node dysfunction).    History of Present Illness:  Ilana Trent is  74 year old woman with resected stage IIIC melanoma s/p 1 year of adjuvant nivolumab, completed August 2020. She presents via video visit for restaging.    -Doing well overall.  -Will have wisdom tooth pulled soon as infected. Will see a dental surgeon.   -Summer is going okay.   -Continues to swim every morning M-F for 1/2 mile 30-45 minutes. Also, rides her stationary bike for 15 minutes/day after work.   -Heart has been stable.   -Denies any skin  concerns.     Current Outpatient Medications   Medication Sig Dispense Refill    acetaminophen (TYLENOL) 500 MG tablet Take 1-2 tablets (500-1,000 mg) by mouth every 6 hours as needed for mild pain 50 tablet 0    ALOE PO Take by mouth daily as needed      B-D ULTRA-FINE 33 LANCETS MISC 1 each by Other route      calcium carbonate (OS-RENEA) 1500 (600 Ca) MG tablet Take 600 mg by mouth daily      cetirizine-pseudoePHEDrine ER (ZYRTEC-D) 5-120 MG 12 hr tablet Take 1 tablet by mouth every morning       Ferrous Sulfate 324 (65 Fe) MG TBEC Take 1 tablet by mouth      furosemide (LASIX) 20 MG tablet Take 20 mg by mouth daily as needed (SWELLING)      levothyroxine (SYNTHROID/LEVOTHROID) 175 MCG tablet Take 175 mcg by mouth      lisinopril (ZESTRIL) 5 MG tablet Take 5 mg by mouth      metFORMIN (GLUCOPHAGE-XR) 500 MG 24 hr tablet Take 500 mg by mouth every morning       multivitamin w/minerals (THERA-VIT-M) tablet Take 1 tablet by mouth daily      naproxen (NAPROSYN) 500 MG tablet Take 500 mg by mouth every morning       ONETOUCH ULTRA test strip TEST BLOOD SUGARS TWICE DAILY      pantoprazole (PROTONIX) 40 MG EC tablet Take 40 mg by mouth every morning       simvastatin (ZOCOR) 10 MG tablet Take 10 mg by mouth At Bedtime      sotalol (BETAPACE) 160 MG tablet Take 80 mg by mouth 2 times daily       vitamin B complex with vitamin C (STRESS TAB) tablet Take 1 tablet by mouth every morning       warfarin ANTICOAGULANT (COUMADIN) 5 MG tablet Take 1 & 1/2 tablet by mouth once daily for 6 days of the week and 1 tablet by mouth on 1 day per week.       Objective:  General: patient appears well in no acute distress, alert and oriented, speech clear and fluid  Skin: no visualized rash or lesions on visualized skin  Resp: Appears to be breathing comfortably without accessory muscle usage, speaking in full sentences, no audible wheezes or cough.  Psych: Coherent speech, normal rate and volume, able to articulate logical thoughts, able  to abstract reason, no tangential thoughts, no hallucinations or delusions  Patient's affect is appropriate.    Laboratory Studies:  Most Recent 3 CBC's:  Recent Labs   Lab Test 06/21/24  0730 01/15/24  0716 07/17/23  0716   WBC 9.0 9.4 8.0   HGB 12.9 12.2 11.9   MCV 87 89 88    291 258   ANEUTAUTO 5.6 6.0 5.3     Most Recent 3 BMP's:  Recent Labs   Lab Test 06/21/24  0707 01/15/24  0759 07/17/23  0716 01/18/23  0754 01/18/23  0723 09/12/22  0722   NA  --   --  140  --  140 141   POTASSIUM  --   --  5.0  --  5.3 5.4*   CHLORIDE  --   --  106  --  107 113*   CO2  --   --  23  --  26 21   BUN  --   --  17.5  --  23 20   CR 1.1* 0.9 0.94   < > 0.78 0.93   ANIONGAP  --   --  11  --  7 7   RENEA  --   --  9.4  --  9.0 9.4   GLC  --   --  154*  --  161* 137*   PROTTOTAL  --   --  7.0  --  7.3 7.4   ALBUMIN  --   --  4.3  --  3.2* 3.7    < > = values in this interval not displayed.    Most Recent 3 LFT's:  Recent Labs   Lab Test 07/17/23  0716 01/18/23  0723 09/12/22  0722   AST 17 28 18   ALT 15 49 35   ALKPHOS 80 117 81   BILITOTAL 0.5 0.3 0.3   I reviewed the above labs today.    Imaging:  CT Chest/Abdomen/Pelvis w Contrast  Narrative: EXAM: CT CHEST/ABDOMEN/PELVIS W CONTRAST  LOCATION: [facility]  DATE: 6/21/2024    INDICATION: Follow up of vulvar melanoma.  COMPARISON: CTs CAP 1/15/2024 and 7/17/2023. MRI liver 8/23/2023.  TECHNIQUE: CT scan of the chest, abdomen, and pelvis was performed following injection of IV contrast. Multiplanar reformats were obtained. Dose reduction techniques were used.   CONTRAST: 122 mL isovue 370    FINDINGS:   LUNGS AND PLEURA: No new, enlarging or suspicious pulmonary nodules. A 2 mm right upper lobe nodule (image 112 of series 9), 2-3 mm right lower lobe nodules (images 122, 128 and 164) and 2 mm left lower lobe nodules (images 185 and 190) as well as   several benign calcified granulomas are all unchanged. Minimal chronic radiation fibrosis anterior  aspect left upper lobe unchanged. Chronic bronchial wall thickening unchanged. No pleural effusion.    MEDIASTINUM/AXILLAE: No lymphadenopathy. Pacer anterior chest wall with lead tips in RA and RV. Heart size within normal limits. No pericardial effusion. Postop change left breast stable.    CORONARY ARTERY CALCIFICATION: Moderate.    HEPATOBILIARY: Moderate diffuse hepatic steatosis and borderline hepatomegaly unchanged. No suspicious liver lesion. Pneumobilia related to choledochojejunostomy. No bile duct dilatation. Cholecystectomy.    PANCREAS: Postop change Whipple procedure with surgical absence of the pancreatic head and neck unchanged.    SPLEEN: Normal.    ADRENAL GLANDS: Normal.    KIDNEYS/BLADDER: Kidneys, ureters and bladder are normal.    BOWEL: Colonic diverticulosis. No diverticulitis. No bowel obstruction or inflammatory change. Normal appendix. Gastrojejunostomy related to Whipple procedure. No peritoneal fluid or nodularity.    LYMPH NODES: No lymphadenopathy.    VASCULATURE: Normal.    PELVIC ORGANS: Normal.    MUSCULOSKELETAL: No bone lesions. Bones appear demineralized. Bridging endplate osteophyte formation thoracic spine. Degenerative disc disease lumbar spine.  Impression: IMPRESSION: No evidence of recurrent, residual or metastatic disease. No significant change.  I reviewed the above imaging report today.    ASSESSMENT AND PLAN:  Vulvar Melanoma, resected stage III  It was a pleasure to meet Mrs.Bonnie Trent today. She has a history of resected stage IIIC vulvar melanoma. She received 1 year of adjuvant therapy through August 2020. She is doing well off of adjuvant therapy and is without evidence of recurrence or metastasis on her imaging, as above, which was reviewed with the patient today. The previous questionable liver lesion on CT did not appear concerning on the subsequent abdominal MRI. She will follow-up with medical oncology in 1 year with repeat imaging. She will call sooner for  concerns.     Health care maintenance.  Eye exams: Recommend exams at least every 2 years. Up to date  Dental exams: Recommend exams and cleanings every 6 months. Up to date  Primary care: Recommend follow up at least annually. Up to date  Skin care: Recommend the use of sunscreen with SPF of at least 30, reapplying every 2 hours with prolonged sun exposure.   Tobacco use: Recommend abstaining. Denies use.  Alcohol use: Recommend no more than 1/day for women. Has about 2-3 beers/month.  Physical activity: Recommend regular activity, ideally 150 minutes/week of moderate intensity activity.  Will continue with swimming for 30-35 minutes/day 5 days/week. Also, rides stationary bike for 15 minutes most days.     Xochilt Lee PA-C  [facility]  [address] [place] [address] [phone]    30 minutes spent on the date of the encounter doing chart review, review of test results, interpretation of tests, patient visit and documentation     Addendum: I reviewed her case with Dr. Mitchell and will plan for a CT abdomen/pelvis for her next routine imaging in 1 year. Also, recommend she resume regular gynecologic exams to monitor for local recurrence.

## 2024-06-24 NOTE — LETTER
6/24/2024      Ilana Trent  [id number] [facility] [address]      Dear Colleague,    Thank you for referring your patient, Ilana Trent, to the [facility]. Please see a copy of my visit note below.    Virtual Visit Details    Type of service:  Video Visit   Video Start Time: 4:15 PM  Video End Time:4:21 PM    Originating Location (pt. Location): Home  Distant Location (provider location):  Off-site  Platform used for Video Visit: [facility] ONCOLOGY PROGRESS NOTE  Melanoma Clinic  Jun 24, 2024    Reason for Visit: Stage III vulvar melanoma, status post excision    Melanoma History:  1. 6/4/2019, she had excision of the right vulvar mass in [place], and on pathology this shows malignant melanoma with ulceration, at least fausto level IV, Breslow thickness at least 5 mm, mitotic rate is 3 per mm2, TILs are present and brisk, pathologic stage pT4b.  2. 6/28/19, she had PET-CT, which showed no obvious evidence of metastatic disease.  3. 7/30/2019, she has wide local excision and sentinel biopsy (Specimen #: [id number]), which showed residual melanoma to a depth of invasion of 1.3 mm and margins negative. Right inguinal sentinel lymph node showed a subcapsular 1 mm focus of cells. pT4b pN1a MX, Stage IIIC.  4. PET-CT obtained on 9/23 negative for metastatic disease.  5. 9/26/2019, she started adjuvant nivolumab monthly   6. PET/CT 12/2019 showing MARISA  7. CT CAP 3/2020 MARISA  8. 8/6/2020, She completes adjuvant nivolumab immunotherapy.  9. September 2020, she has Covid-19 infections, exposed at work. She has mild symptoms. Dyspnea improves markedly after change of pacemaker battery (for sinus and AV node dysfunction).    History of Present Illness:  Ilana Trent is  74 year old woman with resected stage IIIC melanoma s/p 1 year of adjuvant nivolumab, completed August 2020. She presents via video visit for restaging.    -Doing well overall.  -Will have wisdom tooth pulled soon as  infected. Will see a dental surgeon.   -Summer is going okay.   -Continues to swim every morning M-F for 1/2 mile 30-45 minutes. Also, rides her stationary bike for 15 minutes/day after work.   -Heart has been stable.   -Denies any skin concerns.     Current Outpatient Medications   Medication Sig Dispense Refill     acetaminophen (TYLENOL) 500 MG tablet Take 1-2 tablets (500-1,000 mg) by mouth every 6 hours as needed for mild pain 50 tablet 0     ALOE PO Take by mouth daily as needed       B-D ULTRA-FINE 33 LANCETS MISC 1 each by Other route       calcium carbonate (OS-RENEA) 1500 (600 Ca) MG tablet Take 600 mg by mouth daily       cetirizine-pseudoePHEDrine ER (ZYRTEC-D) 5-120 MG 12 hr tablet Take 1 tablet by mouth every morning        Ferrous Sulfate 324 (65 Fe) MG TBEC Take 1 tablet by mouth       furosemide (LASIX) 20 MG tablet Take 20 mg by mouth daily as needed (SWELLING)       levothyroxine (SYNTHROID/LEVOTHROID) 175 MCG tablet Take 175 mcg by mouth       lisinopril (ZESTRIL) 5 MG tablet Take 5 mg by mouth       metFORMIN (GLUCOPHAGE-XR) 500 MG 24 hr tablet Take 500 mg by mouth every morning        multivitamin w/minerals (THERA-VIT-M) tablet Take 1 tablet by mouth daily       naproxen (NAPROSYN) 500 MG tablet Take 500 mg by mouth every morning        ONETOUCH ULTRA test strip TEST BLOOD SUGARS TWICE DAILY       pantoprazole (PROTONIX) 40 MG EC tablet Take 40 mg by mouth every morning        simvastatin (ZOCOR) 10 MG tablet Take 10 mg by mouth At Bedtime       sotalol (BETAPACE) 160 MG tablet Take 80 mg by mouth 2 times daily        vitamin B complex with vitamin C (STRESS TAB) tablet Take 1 tablet by mouth every morning        warfarin ANTICOAGULANT (COUMADIN) 5 MG tablet Take 1 & 1/2 tablet by mouth once daily for 6 days of the week and 1 tablet by mouth on 1 day per week.       Objective:  General: patient appears well in no acute distress, alert and oriented, speech clear and fluid  Skin: no visualized  rash or lesions on visualized skin  Resp: Appears to be breathing comfortably without accessory muscle usage, speaking in full sentences, no audible wheezes or cough.  Psych: Coherent speech, normal rate and volume, able to articulate logical thoughts, able to abstract reason, no tangential thoughts, no hallucinations or delusions  Patient's affect is appropriate.    Laboratory Studies:  Most Recent 3 CBC's:  Recent Labs   Lab Test 06/21/24  0730 01/15/24  0716 07/17/23  0716   WBC 9.0 9.4 8.0   HGB 12.9 12.2 11.9   MCV 87 89 88    291 258   ANEUTAUTO 5.6 6.0 5.3     Most Recent 3 BMP's:  Recent Labs   Lab Test 06/21/24  0707 01/15/24  0759 07/17/23  0716 01/18/23  0754 01/18/23  0723 09/12/22  0722   NA  --   --  140  --  140 141   POTASSIUM  --   --  5.0  --  5.3 5.4*   CHLORIDE  --   --  106  --  107 113*   CO2  --   --  23  --  26 21   BUN  --   --  17.5  --  23 20   CR 1.1* 0.9 0.94   < > 0.78 0.93   ANIONGAP  --   --  11  --  7 7   RENEA  --   --  9.4  --  9.0 9.4   GLC  --   --  154*  --  161* 137*   PROTTOTAL  --   --  7.0  --  7.3 7.4   ALBUMIN  --   --  4.3  --  3.2* 3.7    < > = values in this interval not displayed.    Most Recent 3 LFT's:  Recent Labs   Lab Test 07/17/23  0716 01/18/23  0723 09/12/22  0722   AST 17 28 18   ALT 15 49 35   ALKPHOS 80 117 81   BILITOTAL 0.5 0.3 0.3   I reviewed the above labs today.    Imaging:  CT Chest/Abdomen/Pelvis w Contrast  Narrative: EXAM: CT CHEST/ABDOMEN/PELVIS W CONTRAST  LOCATION: [facility]  DATE: 6/21/2024    INDICATION: Follow up of vulvar melanoma.  COMPARISON: CTs CAP 1/15/2024 and 7/17/2023. MRI liver 8/23/2023.  TECHNIQUE: CT scan of the chest, abdomen, and pelvis was performed following injection of IV contrast. Multiplanar reformats were obtained. Dose reduction techniques were used.   CONTRAST: 122 mL isovue 370    FINDINGS:   LUNGS AND PLEURA: No new, enlarging or suspicious pulmonary nodules. A 2 mm right upper lobe  nodule (image 112 of series 9), 2-3 mm right lower lobe nodules (images 122, 128 and 164) and 2 mm left lower lobe nodules (images 185 and 190) as well as   several benign calcified granulomas are all unchanged. Minimal chronic radiation fibrosis anterior aspect left upper lobe unchanged. Chronic bronchial wall thickening unchanged. No pleural effusion.    MEDIASTINUM/AXILLAE: No lymphadenopathy. Pacer anterior chest wall with lead tips in RA and RV. Heart size within normal limits. No pericardial effusion. Postop change left breast stable.    CORONARY ARTERY CALCIFICATION: Moderate.    HEPATOBILIARY: Moderate diffuse hepatic steatosis and borderline hepatomegaly unchanged. No suspicious liver lesion. Pneumobilia related to choledochojejunostomy. No bile duct dilatation. Cholecystectomy.    PANCREAS: Postop change Whipple procedure with surgical absence of the pancreatic head and neck unchanged.    SPLEEN: Normal.    ADRENAL GLANDS: Normal.    KIDNEYS/BLADDER: Kidneys, ureters and bladder are normal.    BOWEL: Colonic diverticulosis. No diverticulitis. No bowel obstruction or inflammatory change. Normal appendix. Gastrojejunostomy related to Whipple procedure. No peritoneal fluid or nodularity.    LYMPH NODES: No lymphadenopathy.    VASCULATURE: Normal.    PELVIC ORGANS: Normal.    MUSCULOSKELETAL: No bone lesions. Bones appear demineralized. Bridging endplate osteophyte formation thoracic spine. Degenerative disc disease lumbar spine.  Impression: IMPRESSION: No evidence of recurrent, residual or metastatic disease. No significant change.  I reviewed the above imaging report today.    ASSESSMENT AND PLAN:  Vulvar Melanoma, resected stage III  It was a pleasure to meet Mrs.Bonnie Trent today. She has a history of resected stage IIIC vulvar melanoma. She received 1 year of adjuvant therapy through August 2020. She is doing well off of adjuvant therapy and is without evidence of recurrence or metastasis on her imaging,  as above, which was reviewed with the patient today. The previous questionable liver lesion on CT did not appear concerning on the subsequent abdominal MRI. She will follow-up with medical oncology in 1 year with repeat imaging. She will call sooner for concerns.     Health care maintenance.  Eye exams: Recommend exams at least every 2 years. Up to date  Dental exams: Recommend exams and cleanings every 6 months. Up to date  Primary care: Recommend follow up at least annually. Up to date  Skin care: Recommend the use of sunscreen with SPF of at least 30, reapplying every 2 hours with prolonged sun exposure.   Tobacco use: Recommend abstaining. Denies use.  Alcohol use: Recommend no more than 1/day for women. Has about 2-3 beers/month.  Physical activity: Recommend regular activity, ideally 150 minutes/week of moderate intensity activity.  Will continue with swimming for 30-35 minutes/day 5 days/week. Also, rides stationary bike for 15 minutes most days.     Xochilt Lee PA-C  [facility] [place] [address] [place], [place] [address] [phone]    30 minutes spent on the date of the encounter doing chart review, review of test results, interpretation of tests, patient visit and documentation     Again, thank you for allowing me to participate in the care of your patient.        Sincerely,        Xochilt Lee PA-C

## 2024-07-14 ENCOUNTER — HEALTH MAINTENANCE LETTER (OUTPATIENT)
Age: 74
End: 2024-07-14

## 2024-07-31 ENCOUNTER — PATIENT OUTREACH (OUTPATIENT)
Dept: ONCOLOGY | Facility: CLINIC | Age: 74
End: 2024-07-31
Payer: COMMERCIAL

## 2024-07-31 NOTE — PROGRESS NOTES
RUST/Voicemail    Clinical Data: Care Coordinator Outreach    Attempted to reach out to Ilana per the request of Xochilt Lee PA-C to inquire if Ilana has been following with gynecology for local exams.    Outreach attempted x 2.  Contacted home phone -  answered but reports that Ilana is at work. Contacted Ilana's cell phone and left message on patient's voicemail with call back information and requested return call.    Plan: Care Coordinator will send [employer] message.    Jennyfer Worley, RN, BSN  RN Care Coordinator  [facility]

## 2024-09-22 ENCOUNTER — HEALTH MAINTENANCE LETTER (OUTPATIENT)
Age: 74
End: 2024-09-22

## 2024-12-01 ENCOUNTER — HEALTH MAINTENANCE LETTER (OUTPATIENT)
Age: 74
End: 2024-12-01

## 2025-03-15 ENCOUNTER — HEALTH MAINTENANCE LETTER (OUTPATIENT)
Age: 75
End: 2025-03-15

## 2025-06-20 ENCOUNTER — ANCILLARY PROCEDURE (OUTPATIENT)
Dept: CT IMAGING | Facility: CLINIC | Age: 75
End: 2025-06-20
Attending: PHYSICIAN ASSISTANT
Payer: COMMERCIAL

## 2025-06-20 DIAGNOSIS — C51.9 MALIGNANT MELANOMA OF SKIN OF VULVA (H): ICD-10-CM

## 2025-06-20 LAB
CREAT BLD-MCNC: 1.4 MG/DL (ref 0.5–1)
EGFRCR SERPLBLD CKD-EPI 2021: 39 ML/MIN/1.73M2

## 2025-06-20 PROCEDURE — 74177 CT ABD & PELVIS W/CONTRAST: CPT | Mod: GC | Performed by: RADIOLOGY

## 2025-06-20 PROCEDURE — 82565 ASSAY OF CREATININE: CPT

## 2025-06-20 RX ORDER — IOPAMIDOL 755 MG/ML
126 INJECTION, SOLUTION INTRAVASCULAR ONCE
Status: COMPLETED | OUTPATIENT
Start: 2025-06-20 | End: 2025-06-20

## 2025-06-20 RX ADMIN — IOPAMIDOL 126 ML: 755 INJECTION, SOLUTION INTRAVASCULAR at 08:40

## 2025-06-27 RX ORDER — IOPAMIDOL 755 MG/ML
100 INJECTION, SOLUTION INTRAVASCULAR ONCE
Status: ACTIVE | OUTPATIENT
Start: 2025-06-27

## 2025-06-28 ENCOUNTER — HEALTH MAINTENANCE LETTER (OUTPATIENT)
Age: 75
End: 2025-06-28

## 2025-07-29 ENCOUNTER — VIRTUAL VISIT (OUTPATIENT)
Dept: ONCOLOGY | Facility: CLINIC | Age: 75
End: 2025-07-29
Attending: STUDENT IN AN ORGANIZED HEALTH CARE EDUCATION/TRAINING PROGRAM
Payer: COMMERCIAL

## 2025-07-29 DIAGNOSIS — C51.9 MALIGNANT MELANOMA OF SKIN OF VULVA (H): Primary | ICD-10-CM

## 2025-07-29 PROCEDURE — G2211 COMPLEX E/M VISIT ADD ON: HCPCS | Mod: 95 | Performed by: STUDENT IN AN ORGANIZED HEALTH CARE EDUCATION/TRAINING PROGRAM

## 2025-07-29 PROCEDURE — 98006 SYNCH AUDIO-VIDEO EST MOD 30: CPT | Performed by: STUDENT IN AN ORGANIZED HEALTH CARE EDUCATION/TRAINING PROGRAM

## 2025-07-29 NOTE — NURSING NOTE
Current patient location: [address] [place] [id number]    Is the patient currently in the state of [place]? YES    Visit mode: VIDEO    If the visit is dropped, the patient can be reconnected by:VIDEO VISIT: Text to cell phone:   Telephone Information:   Mobile [phone]       Will anyone else be joining the visit? NO  (If patient encounters technical issues they should call [phone])    Are changes needed to the allergy or medication list? Pt stated no changes to allergies and Pt stated no med changes    Are refills needed on medications prescribed by this physician? NO    Rooming Documentation:  Unable to complete questionnaire(s) due to time    Reason for visit: RECHECK    Payal LILLY

## 2025-07-29 NOTE — PROGRESS NOTES
Virtual Visit Details    Type of service:  Video Visit     Originating Location (pt. Location): Home    Distant Location (provider location):  On-site  Platform used for Video Visit: [facility]   Return Patient Visit    Name: Ilana Trent  MRN: [id number]  Date of visit: Jul 29, 2025    Diagnosis: Mucosal melanoma, vulvar primary  Stage:    Cancer Staging   Malignant melanoma of skin of vulva (H)  Staging form: Melanoma of the Skin, AJCC 8th Edition  - Pathologic stage from 7/30/2019: Stage IIIC (pT4b, pN1a, cM0) - Signed by Elton Alas MD on 1/21/2021      Molecular: PD-L1 10%, NGS not performed  Performance status: ECOG 0    Oncology History:   --6/4/2019, she had excision of the right vulvar mass in [place], and on pathology this shows malignant melanoma with ulceration, at least fausto level IV, Breslow thickness at least 5 mm, mitotic rate is 3 per mm2, TILs are present and brisk, pathologic stage pT4b.  --6/28/19, she had PET-CT, which showed no obvious evidence of metastatic disease.  --7/30/2019 wide local excision and sentinel biopsy: residual melanoma to a depth of invasion of 1.3 mm and margins negative. Right inguinal sentinel lymph node showed a subcapsular 1 mm focus of cells. pT4b pN1a MX, Stage IIIC.  --9/23/19 PET/CT negative for metastatic disease.  --9/26/2019 started adjuvant nivolumab monthly   --12/2019 PET/CT showing MARISA  --3/2020 CT CAP MARISA  --8/6/2020 completes adjuvant nivolumab immunotherapy.        HPI:   Ilana Trent is a 75 year old female with a history of thyroid cancer s/p thyroidecotmy (2007), pancreatic cancer s/p whipple (2011), L sided breast cancer s/p lumpectomy/RT (2003), and a vulvar melanoma s/p resection and SLNB with stage III disease.       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    Now more than 6 years out from surgical management of her melanoma.   She tolerated immunotherapy well without significant complications  No issues with ongoing surveillance   No acute complaints today  Has not been following closely with gynecology for routine exams      Exam:   There were no vitals taken for this visit.    Gen: Alert, interactive  HEENT: Non-icteric  Resp: comfortable on room air        Labs:   Reviewed in chart.    Imaging:   All pertinent imaging studies personally reviewed, bridges findings included in oncology history above    Pathology:   Reviewed in chart, key findings included in history above       Assessment and Plan:   Ilana Trent is a 75 year old female with a history of thyroid cancer s/p thyroidecotmy (2007), pancreatic cancer s/p whipple (2011), L sided breast cancer s/p lumpectomy/RT (2003), and a vulvar melanoma s/p resection and SLNB with stage III disease.       # mucosal melanoma, vulvar primary  -diagnosed in 2019, stage IIIC disease   -s/p surgical resection and SLNB followed by adjuvant nivolumab (no CLND)    She is now 6 years out from surgical management. We discussed that the majority of her risk of recurrence is likely behind her, but that late recurrences are known to occur and there is rationale for continued surveillance beyond 5 years, but that this decision is often individualized based on  patient preferences. She is interested in continued surveillance imaging.     Plan:  --Continue surveillance imaging with CT chest/AP in 1 year  --recommend re-establishing with gynecology for regular surveillance exams, including inguinal harjit exams        # prior malignancies  -breast cancer, thyroid cancer, and pancreatic neuroendocrine carcinoma histories  -seen by genetic counseling in 2019  -unable to see results of genetic testing    Plan:  --will request her prior genetic testing report          Ketan Mitchell MD PhD   of Medicine  Division of Hematology, Oncology and Transplantation    ---  30 minutes were spent on the date of the encounter performing chart review, history and exam, documentation, and further activities as noted above.     The longitudinal plan of care for the diagnosis(es)/condition(s) as documented were addressed during this visit. Due to the added complexity in care, I will continue to support Ilana in the subsequent management and with ongoing continuity of care.

## (undated) DEVICE — PACK VAG HYST

## (undated) DEVICE — PREP CHLORAPREP 26ML TINTED ORANGE  260815

## (undated) DEVICE — BLADE CLIPPER SGL USE 9680

## (undated) DEVICE — Device

## (undated) DEVICE — NDL BLUNT 18GA 1" W/O FILTER 305181

## (undated) DEVICE — SU VICRYL 2-0 CT-2 27" J333H

## (undated) DEVICE — CLIP HORIZON MED BLUE 002200

## (undated) DEVICE — DRAPE SHEET REV FOLD 3/4 9349

## (undated) DEVICE — SOL NACL 0.9% IRRIG 1000ML BOTTLE 2F7124

## (undated) DEVICE — LINEN TOWEL PACK X6 WHITE 5487

## (undated) DEVICE — SU VICRYL 3-0 SH 27" J316H

## (undated) DEVICE — DRSG GAUZE 4X4" TRAY 6939

## (undated) DEVICE — SU VICRYL 2-0 TIE 54" J615H

## (undated) DEVICE — SU MONOCRYL 3-0 PS-1 27" Y936H

## (undated) DEVICE — LINEN TOWEL PACK X5 5464

## (undated) DEVICE — SU SILK 2-0 SH 30" K833H

## (undated) DEVICE — PREP TECHNI-CARE CHLOROXYLENOL 3% 4OZ BOTTLE C222-4ZWO

## (undated) DEVICE — SOL WATER IRRIG 1000ML BOTTLE 2F7114

## (undated) DEVICE — SU VICRYL 3-0 FS-1 27" J442H

## (undated) DEVICE — CUP AND LID 2PK 2OZ STERILE  SSK9006A

## (undated) DEVICE — SU MONOCRYL 4-0 PS-2 27" UND Y426H

## (undated) DEVICE — SYR 10ML LL W/O NDL 302995

## (undated) DEVICE — SU VICRYL 2-0 CT-2 27" UND J269H

## (undated) DEVICE — CLIP HORIZON SM RED WIDE SLOT 001201

## (undated) DEVICE — SU DERMABOND ADVANCED .7ML DNX12

## (undated) RX ORDER — PHENAZOPYRIDINE HYDROCHLORIDE 200 MG/1
TABLET, FILM COATED ORAL
Status: DISPENSED
Start: 2019-07-30

## (undated) RX ORDER — ESMOLOL HYDROCHLORIDE 10 MG/ML
INJECTION INTRAVENOUS
Status: DISPENSED
Start: 2019-07-30

## (undated) RX ORDER — FENTANYL CITRATE 50 UG/ML
INJECTION, SOLUTION INTRAMUSCULAR; INTRAVENOUS
Status: DISPENSED
Start: 2019-07-30

## (undated) RX ORDER — CEFAZOLIN SODIUM 2 G/100ML
INJECTION, SOLUTION INTRAVENOUS
Status: DISPENSED
Start: 2019-07-30

## (undated) RX ORDER — HYDROMORPHONE HYDROCHLORIDE 1 MG/ML
INJECTION, SOLUTION INTRAMUSCULAR; INTRAVENOUS; SUBCUTANEOUS
Status: DISPENSED
Start: 2019-07-30

## (undated) RX ORDER — INDOCYANINE GREEN AND WATER 25 MG
KIT INJECTION
Status: DISPENSED
Start: 2019-07-30

## (undated) RX ORDER — PROPOFOL 10 MG/ML
INJECTION, EMULSION INTRAVENOUS
Status: DISPENSED
Start: 2019-07-30